# Patient Record
Sex: MALE | Race: WHITE | NOT HISPANIC OR LATINO | Employment: OTHER | URBAN - METROPOLITAN AREA
[De-identification: names, ages, dates, MRNs, and addresses within clinical notes are randomized per-mention and may not be internally consistent; named-entity substitution may affect disease eponyms.]

---

## 2019-02-21 RX ORDER — ATORVASTATIN CALCIUM 80 MG/1
80 TABLET, FILM COATED ORAL DAILY
COMMUNITY
End: 2023-04-03 | Stop reason: SDUPTHER

## 2019-02-21 RX ORDER — AMLODIPINE BESYLATE 5 MG/1
5 TABLET ORAL
Status: ON HOLD | COMMUNITY
End: 2023-03-28 | Stop reason: HOSPADM

## 2019-02-22 ENCOUNTER — OFFICE VISIT (OUTPATIENT)
Dept: ORTHOPEDICS | Facility: CLINIC | Age: 84
End: 2019-02-22
Payer: MEDICARE

## 2019-02-22 DIAGNOSIS — R60.0 PERIPHERAL EDEMA: ICD-10-CM

## 2019-02-22 DIAGNOSIS — M70.61 GREATER TROCHANTERIC BURSITIS OF RIGHT HIP: ICD-10-CM

## 2019-02-22 DIAGNOSIS — M17.11 PRIMARY OSTEOARTHRITIS OF RIGHT KNEE: Primary | ICD-10-CM

## 2019-02-22 PROCEDURE — 99213 PR OFFICE/OUTPT VISIT, EST, LEVL III, 20-29 MIN: ICD-10-PCS | Mod: ,,, | Performed by: ORTHOPAEDIC SURGERY

## 2019-02-22 PROCEDURE — 99213 OFFICE O/P EST LOW 20 MIN: CPT | Mod: ,,, | Performed by: ORTHOPAEDIC SURGERY

## 2019-02-22 RX ORDER — OXYCODONE AND ACETAMINOPHEN 5; 325 MG/1; MG/1
TABLET ORAL
Refills: 0 | COMMUNITY
Start: 2019-02-13 | End: 2019-12-02

## 2019-02-22 RX ORDER — ASPIRIN 81 MG/1
81 TABLET ORAL DAILY
COMMUNITY
End: 2023-04-03 | Stop reason: SDUPTHER

## 2019-02-22 RX ORDER — PROPRANOLOL HYDROCHLORIDE 120 MG/1
CAPSULE, EXTENDED RELEASE ORAL
Refills: 1 | Status: ON HOLD | COMMUNITY
Start: 2019-01-21 | End: 2023-03-28 | Stop reason: HOSPADM

## 2019-02-22 NOTE — PATIENT INSTRUCTIONS
The patient is ready to proceed with total knee arthroplasty.  Complications alternatives risks indications benefits and expectations are discussed with the patient

## 2019-02-22 NOTE — PROGRESS NOTES
Subjective:      Patient ID: Max Squiers is a 84 y.o. male.    Chief Complaint: Pain of the Right Knee    HPI patient has a long history of right knee pain. He has varus alignment on standing and walks with the aid of a walker  ROS      Objective:                General Musculoskeletal Exam   Gait: antalgic       Right Knee Exam     Inspection   Swelling: present  Deformity: present    Tenderness   The patient is tender to palpation of the medial joint line.    Crepitus   The patient has crepitus of the medial joint line and lateral joint line.    Range of Motion   Extension: normal   Flexion: normal     Tests   Ligament Examination Lachman: normal (-1 to 2mm)               Assessment:       Encounter Diagnoses   Name Primary?    Peripheral edema     Primary osteoarthritis of right knee Yes    Greater trochanteric bursitis of right hip           Plan:       Max was seen today for pain.    Diagnoses and all orders for this visit:    Primary osteoarthritis of right knee    Peripheral edema  -     COMPRESSION STOCKINGS    Greater trochanteric bursitis of right hip

## 2019-03-11 ENCOUNTER — OFFICE VISIT (OUTPATIENT)
Dept: ORTHOPEDICS | Facility: CLINIC | Age: 84
End: 2019-03-11
Payer: MEDICARE

## 2019-03-11 VITALS — BODY MASS INDEX: 23.7 KG/M2 | HEIGHT: 72 IN | WEIGHT: 175 LBS

## 2019-03-11 DIAGNOSIS — M17.11 PRIMARY OSTEOARTHRITIS OF RIGHT KNEE: Primary | ICD-10-CM

## 2019-03-11 PROCEDURE — 99499 NO LOS: ICD-10-PCS | Mod: S$GLB,,, | Performed by: ORTHOPAEDIC SURGERY

## 2019-03-11 PROCEDURE — 99499 UNLISTED E&M SERVICE: CPT | Mod: S$GLB,,, | Performed by: ORTHOPAEDIC SURGERY

## 2019-03-11 RX ORDER — ACETAMINOPHEN 500 MG
5000 TABLET ORAL DAILY
COMMUNITY
End: 2019-12-02

## 2019-03-11 NOTE — PROGRESS NOTES
Subjective:      Patient ID: Max Squires is a 84 y.o. male.    Chief Complaint: Pain of the Right Knee    HPI patient comes in today to sign consents for his right total knee arthroplasty.  He has been on anti-inflammatory medications and had injections with only short-term relief  Review of Systems   Constitution: Negative for fever and weight loss.   Cardiovascular: Negative for chest pain and leg swelling.   Musculoskeletal: Positive for arthritis, joint pain, joint swelling, muscle weakness and stiffness.   Genitourinary: Negative for bladder incontinence and hematuria.   Neurological: Negative for numbness, paresthesias and sensory change.         Objective:                General Musculoskeletal Exam   Gait: antalgic       Right Knee Exam     Inspection   Swelling: present  Effusion: present  Deformity: present    Tenderness   The patient is tender to palpation of the medial joint line.    Crepitus   The patient has crepitus of the patella.    Range of Motion   Extension: normal   Flexion: normal     Tests   Ligament Examination Lachman: normal (-1 to 2mm)   MCL - Valgus: normal (0 to 2mm)  LCL - Varus: normal              Assessment:       Encounter Diagnosis   Name Primary?    Primary osteoarthritis of right knee Yes          Plan:       Max was seen today for pain.    Diagnoses and all orders for this visit:    Primary osteoarthritis of right knee

## 2019-03-12 ENCOUNTER — OUTSIDE PLACE OF SERVICE (OUTPATIENT)
Dept: ADMINISTRATIVE | Facility: OTHER | Age: 84
End: 2019-03-12

## 2019-03-19 ENCOUNTER — TELEPHONE (OUTPATIENT)
Dept: ORTHOPEDICS | Facility: CLINIC | Age: 84
End: 2019-03-19

## 2019-03-19 ENCOUNTER — OFFICE VISIT (OUTPATIENT)
Dept: ORTHOPEDICS | Facility: CLINIC | Age: 84
End: 2019-03-19
Payer: MEDICARE

## 2019-03-19 DIAGNOSIS — M17.11 PRIMARY OSTEOARTHRITIS OF RIGHT KNEE: Primary | ICD-10-CM

## 2019-03-19 PROCEDURE — 73560 PR  X-RAY KNEE 1 OR 2 VIEW: ICD-10-PCS | Mod: TC,RT,S$GLB, | Performed by: ORTHOPAEDIC SURGERY

## 2019-03-19 PROCEDURE — 73560 X-RAY EXAM OF KNEE 1 OR 2: CPT | Mod: TC,RT,S$GLB, | Performed by: ORTHOPAEDIC SURGERY

## 2019-03-19 PROCEDURE — 99024 POSTOP FOLLOW-UP VISIT: CPT | Mod: S$GLB,,, | Performed by: ORTHOPAEDIC SURGERY

## 2019-03-19 PROCEDURE — 99024 PR POST-OP FOLLOW-UP VISIT: ICD-10-PCS | Mod: S$GLB,,, | Performed by: ORTHOPAEDIC SURGERY

## 2019-03-19 NOTE — TELEPHONE ENCOUNTER
----- Message from Sherlyn Guerra sent at 3/18/2019  2:33 PM CDT -----  Contact: Alem lozoya/[facility]  Alem with home health PT need verbal consent Phone:[phone]

## 2019-03-19 NOTE — PROGRESS NOTES
Subjective:      Patient ID: Max Squires is a 84 y.o. male.    Chief Complaint: No chief complaint on file.    HPI patient is 1 week postop right total knee arthroplasty.  He has moderate discomfort  ROS    unchanged  Objective:        Range of motion from -5 to 95.  There is no drainage.  There is no evidence of infection    Ortho/SPM Exam            Assessment:       Encounter Diagnosis   Name Primary?    Primary osteoarthritis of right knee Yes          Plan:       Diagnoses and all orders for this visit:    Primary osteoarthritis of right knee     The incision is redressed.  AP and lateral of the right knee shows no evidence of complication

## 2019-03-21 ENCOUNTER — TELEPHONE (OUTPATIENT)
Dept: ORTHOPEDICS | Facility: CLINIC | Age: 84
End: 2019-03-21

## 2019-03-21 NOTE — TELEPHONE ENCOUNTER
----- Message from Sherlyn Guerra sent at 3/21/2019  1:14 PM CDT -----  Contact: Alem lozoya/[facility]  Knee is bleeding, swollen, and mushy to the touch. Phone [phone]

## 2019-03-21 NOTE — TELEPHONE ENCOUNTER
3/21/19  3:10pm   Spoke w/ Alme.  Started bleeding after PT yesterday.  Bled more overnight.   Will make appt. For tomorrow AM.  Will also send in rx. For antibiotics.

## 2019-03-22 ENCOUNTER — OFFICE VISIT (OUTPATIENT)
Dept: ORTHOPEDICS | Facility: CLINIC | Age: 84
End: 2019-03-22
Payer: MEDICARE

## 2019-03-22 DIAGNOSIS — M96.840 POSTOPERATIVE HEMATOMA OF MUSCULOSKELETAL STRUCTURE FOLLOWING MUSCULOSKELETAL PROCEDURE: Primary | ICD-10-CM

## 2019-03-22 PROCEDURE — 99024 POSTOP FOLLOW-UP VISIT: CPT | Mod: S$GLB,,, | Performed by: ORTHOPAEDIC SURGERY

## 2019-03-22 PROCEDURE — 99024 PR POST-OP FOLLOW-UP VISIT: ICD-10-PCS | Mod: S$GLB,,, | Performed by: ORTHOPAEDIC SURGERY

## 2019-03-22 NOTE — PROGRESS NOTES
Subjective:      Patient ID: Max Squires is a 84 y.o. male.    Chief Complaint: Post-op Evaluation    HPI patient is status post total knee arthroplasty.  He comes in for evaluation of his wound for bleeding.    ROS    unchanged  Objective:      Patient has a subcutaneous hematoma which is draining.  This is overlying the patella. There is no evidence of infection or other complication.      Ortho/SPM Exam            Assessment:       Encounter Diagnosis   Name Primary?    Postoperative hematoma of musculoskeletal structure following musculoskeletal procedure Yes          Plan:       Max was seen today for post-op evaluation.    Diagnoses and all orders for this visit:    Postoperative hematoma of musculoskeletal structure following musculoskeletal procedure      The hematoma is expressed in the office today.  There is no further bleeding.  He is to begin antibiotics.  He will be seen back for recheck on Monday

## 2019-03-25 ENCOUNTER — OFFICE VISIT (OUTPATIENT)
Dept: ORTHOPEDICS | Facility: CLINIC | Age: 84
End: 2019-03-25
Payer: MEDICARE

## 2019-03-25 DIAGNOSIS — M96.840 POSTOPERATIVE HEMATOMA OF MUSCULOSKELETAL STRUCTURE FOLLOWING MUSCULOSKELETAL PROCEDURE: Primary | ICD-10-CM

## 2019-03-25 PROCEDURE — 99024 PR POST-OP FOLLOW-UP VISIT: ICD-10-PCS | Mod: S$GLB,,, | Performed by: ORTHOPAEDIC SURGERY

## 2019-03-25 PROCEDURE — 99024 POSTOP FOLLOW-UP VISIT: CPT | Mod: S$GLB,,, | Performed by: ORTHOPAEDIC SURGERY

## 2019-03-25 NOTE — PROGRESS NOTES
Subjective:      Patient ID: Max Squires is a 84 y.o. male.    Chief Complaint: Post-op Evaluation    HPI the patient is here for follow-up and evaluation of his postop wound hematoma status post total knee arthroplasty    ROS    unchanged from prior visit  Objective:     the erythema is significantly decreased.  He has 1 small area of bloody drainage on his dressing measuring approximately 3 cm across      Ortho/SPM Exam            Assessment:       Encounter Diagnosis   Name Primary?    Postoperative hematoma of musculoskeletal structure following musculoskeletal procedure Yes          Plan:       Max was seen today for post-op evaluation.    Diagnoses and all orders for this visit:    Postoperative hematoma of musculoskeletal structure following musculoskeletal procedure

## 2019-03-28 RX ORDER — AMLODIPINE BESYLATE 5 MG/1
TABLET ORAL
COMMUNITY
End: 2019-12-02

## 2019-03-28 RX ORDER — IBUPROFEN 800 MG/1
TABLET ORAL
COMMUNITY
End: 2019-12-02

## 2019-03-28 RX ORDER — ATORVASTATIN CALCIUM 80 MG/1
TABLET, FILM COATED ORAL
COMMUNITY
End: 2019-12-02

## 2019-03-28 RX ORDER — DIFLUPREDNATE OPHTHALMIC 0.5 MG/ML
EMULSION OPHTHALMIC
COMMUNITY
End: 2019-12-02

## 2019-03-28 RX ORDER — AMOXICILLIN 500 MG/1
CAPSULE ORAL
COMMUNITY
End: 2019-12-02

## 2019-03-28 RX ORDER — OXYCODONE AND ACETAMINOPHEN 7.5; 325 MG/1; MG/1
TABLET ORAL
Refills: 0 | COMMUNITY
Start: 2019-03-20 | End: 2019-12-02

## 2019-03-28 RX ORDER — POLYMYXIN B SULFATE AND TRIMETHOPRIM 1; 10000 MG/ML; [USP'U]/ML
SOLUTION OPHTHALMIC
COMMUNITY
End: 2019-12-02

## 2019-03-28 RX ORDER — OXYCODONE AND ACETAMINOPHEN 5; 325 MG/1; MG/1
TABLET ORAL
COMMUNITY
End: 2019-12-02

## 2019-03-28 RX ORDER — CEFADROXIL 500 MG/1
CAPSULE ORAL
Refills: 0 | COMMUNITY
Start: 2019-03-21 | End: 2019-12-02

## 2019-03-28 RX ORDER — AMLODIPINE BESYLATE AND ATORVASTATIN CALCIUM 5; 80 MG/1; MG/1
TABLET, FILM COATED ORAL
Status: ON HOLD | COMMUNITY
End: 2023-03-28 | Stop reason: HOSPADM

## 2019-03-29 ENCOUNTER — OFFICE VISIT (OUTPATIENT)
Dept: ORTHOPEDICS | Facility: CLINIC | Age: 84
End: 2019-03-29
Payer: MEDICARE

## 2019-03-29 DIAGNOSIS — M17.11 PRIMARY OSTEOARTHRITIS OF RIGHT KNEE: Primary | ICD-10-CM

## 2019-03-29 DIAGNOSIS — M96.840 POSTOPERATIVE HEMATOMA OF MUSCULOSKELETAL STRUCTURE FOLLOWING MUSCULOSKELETAL PROCEDURE: ICD-10-CM

## 2019-03-29 PROCEDURE — 99024 POSTOP FOLLOW-UP VISIT: CPT | Mod: S$GLB,,, | Performed by: ORTHOPAEDIC SURGERY

## 2019-03-29 PROCEDURE — 99024 PR POST-OP FOLLOW-UP VISIT: ICD-10-PCS | Mod: S$GLB,,, | Performed by: ORTHOPAEDIC SURGERY

## 2019-03-29 RX ORDER — CEFADROXIL 500 MG/1
500 CAPSULE ORAL EVERY 12 HOURS
Qty: 14 CAPSULE | Refills: 0
Start: 2019-03-29 | End: 2019-12-02

## 2019-03-29 NOTE — PROGRESS NOTES
Subjective:      Patient ID: Max Squires is a 84 y.o. male.    Chief Complaint: Post-op Evaluation of the Right Knee    HPI patient is here for follow-up for his postop hematoma status post total knee arthroplasty.      ROS unchanged from prior visit      Objective:        Range of motion is from 0-100 degrees. There is some persistent erythema.  He has a small area of bloody drainage on his dressing from yesterday measuring approximately 1.5 cm.  There is no active bleeding today    Ortho/SPM Exam            Assessment:       Encounter Diagnoses   Name Primary?    Primary osteoarthritis of right knee Yes    Postoperative hematoma of musculoskeletal structure following musculoskeletal procedure           Plan:       Max was seen today for post-op evaluation.    Diagnoses and all orders for this visit:    Primary osteoarthritis of right knee    Postoperative hematoma of musculoskeletal structure following musculoskeletal procedure      The dressing is changed today. The majority of his staples are removed with the exception of the area that is still oozing.

## 2019-04-05 ENCOUNTER — OFFICE VISIT (OUTPATIENT)
Dept: ORTHOPEDICS | Facility: CLINIC | Age: 84
End: 2019-04-05
Payer: MEDICARE

## 2019-04-05 DIAGNOSIS — M17.11 PRIMARY OSTEOARTHRITIS OF RIGHT KNEE: Primary | ICD-10-CM

## 2019-04-05 PROCEDURE — 99024 PR POST-OP FOLLOW-UP VISIT: ICD-10-PCS | Mod: S$GLB,,, | Performed by: ORTHOPAEDIC SURGERY

## 2019-04-05 PROCEDURE — 99024 POSTOP FOLLOW-UP VISIT: CPT | Mod: S$GLB,,, | Performed by: ORTHOPAEDIC SURGERY

## 2019-04-05 NOTE — PROGRESS NOTES
Subjective:      Patient ID: Max Squires is a 84 y.o. male.    Chief Complaint: Post-op Evaluation of the Right Knee    HPI patient is here for checkup following right total knee arthroplasty. He is 3 and half weeks postop.  He has had only occasional spot of blood on his dressing  ROS    unchanged from prior visit  Objective:            Ortho/SPM Exam  The swelling is markedly diminished.  He has much less redness.  He has only a tiny spot of blood on his dressing.  He has range of motion from 0-100 degrees          Assessment:       Encounter Diagnosis   Name Primary?    Primary osteoarthritis of right knee Yes          Plan:       Max was seen today for post-op evaluation.    Diagnoses and all orders for this visit:    Primary osteoarthritis of right knee     Dressing is changed today.  The remainder of the staples are removed.

## 2019-04-09 DIAGNOSIS — M17.11 PRIMARY OSTEOARTHRITIS OF RIGHT KNEE: Primary | ICD-10-CM

## 2019-04-09 RX ORDER — HYDROCODONE BITARTRATE AND ACETAMINOPHEN 5; 325 MG/1; MG/1
1 TABLET ORAL EVERY 8 HOURS PRN
Qty: 21 TABLET | Refills: 0 | Status: SHIPPED | OUTPATIENT
Start: 2019-04-09 | End: 2019-12-02

## 2019-04-18 ENCOUNTER — OFFICE VISIT (OUTPATIENT)
Dept: ORTHOPEDICS | Facility: CLINIC | Age: 84
End: 2019-04-18
Payer: MEDICARE

## 2019-04-18 DIAGNOSIS — M17.11 PRIMARY OSTEOARTHRITIS OF RIGHT KNEE: Primary | ICD-10-CM

## 2019-04-18 DIAGNOSIS — M96.840 POSTOPERATIVE HEMATOMA OF MUSCULOSKELETAL STRUCTURE FOLLOWING MUSCULOSKELETAL PROCEDURE: ICD-10-CM

## 2019-04-18 PROCEDURE — 99024 POSTOP FOLLOW-UP VISIT: CPT | Mod: S$GLB,,, | Performed by: ORTHOPAEDIC SURGERY

## 2019-04-18 PROCEDURE — 99024 PR POST-OP FOLLOW-UP VISIT: ICD-10-PCS | Mod: S$GLB,,, | Performed by: ORTHOPAEDIC SURGERY

## 2019-04-18 RX ORDER — SULFAMETHOXAZOLE AND TRIMETHOPRIM 800; 160 MG/1; MG/1
1 TABLET ORAL 2 TIMES DAILY
Qty: 14 TABLET | Refills: 0 | Status: SHIPPED | OUTPATIENT
Start: 2019-04-18 | End: 2019-12-02

## 2019-04-18 NOTE — PROGRESS NOTES
Subjective:      Patient ID: Max Squires is a 84 y.o. male.    Chief Complaint: Pain    HPI patient comes in for recheck own the incision of his right knee. He has developed a small opening approximately the size of a dime over the past few days.  There is some serous drainage  ROS    unchanged from prior visit  Objective:            Ortho/SPM Exam  Patient has range of motion from 0-110 degrees. There is a small dime-sized area of opening in the incision.  This appears to extend only into the subcutaneous tissues. Cultures are taken today.  Recommend debridement of the wound in the operating room          Assessment:       Encounter Diagnoses   Name Primary?    Primary osteoarthritis of right knee Yes    Postoperative hematoma of musculoskeletal structure following musculoskeletal procedure           Plan:       Max was seen today for pain.    Diagnoses and all orders for this visit:    Primary osteoarthritis of right knee    Postoperative hematoma of musculoskeletal structure following musculoskeletal procedure     He is scheduled to have debridement of this wound as an outpatient next week.  He is placed on Bactrim in the interim

## 2019-04-20 LAB — CULTURE: NORMAL

## 2019-04-23 ENCOUNTER — OUTSIDE PLACE OF SERVICE (OUTPATIENT)
Dept: ADMINISTRATIVE | Facility: OTHER | Age: 84
End: 2019-04-23

## 2019-04-30 ENCOUNTER — OFFICE VISIT (OUTPATIENT)
Dept: ORTHOPEDICS | Facility: CLINIC | Age: 84
End: 2019-04-30
Payer: MEDICARE

## 2019-04-30 DIAGNOSIS — M96.840 POSTOPERATIVE HEMATOMA OF MUSCULOSKELETAL STRUCTURE FOLLOWING MUSCULOSKELETAL PROCEDURE: Primary | ICD-10-CM

## 2019-04-30 PROCEDURE — 99024 PR POST-OP FOLLOW-UP VISIT: ICD-10-PCS | Mod: S$GLB,,, | Performed by: ORTHOPAEDIC SURGERY

## 2019-04-30 PROCEDURE — 99024 POSTOP FOLLOW-UP VISIT: CPT | Mod: S$GLB,,, | Performed by: ORTHOPAEDIC SURGERY

## 2019-04-30 NOTE — PROGRESS NOTES
Subjective:      Patient ID: Max Squires is a 84 y.o. male.    Chief Complaint: Post-op Evaluation of the Right Knee    HPI patient is 1 week postop debridement and primary closure of the open wound over his total knee arthroplasty.  He is doing well    ROS    unchanged from prior visit  Objective:            Ortho/SPM Exam    The incision is clean.  There is no drainage.  He does have surrounding erythema which is not unexpected.  He has range of motion from -8 to 110 degrees        Assessment:       Encounter Diagnosis   Name Primary?    Postoperative hematoma of musculoskeletal structure following musculoskeletal procedure Yes          Plan:       Max was seen today for post-op evaluation.    Diagnoses and all orders for this visit:    Postoperative hematoma of musculoskeletal structure following musculoskeletal procedure      Arrangements are made for him to attend outpatient physical therapy as he is not making progress with at home PT.  He will be seen back in 5 days for suture removal

## 2019-05-06 ENCOUNTER — OFFICE VISIT (OUTPATIENT)
Dept: ORTHOPEDICS | Facility: CLINIC | Age: 84
End: 2019-05-06
Payer: MEDICARE

## 2019-05-06 DIAGNOSIS — M96.840 POSTOPERATIVE HEMATOMA OF MUSCULOSKELETAL STRUCTURE FOLLOWING MUSCULOSKELETAL PROCEDURE: Primary | ICD-10-CM

## 2019-05-06 PROCEDURE — 99024 POSTOP FOLLOW-UP VISIT: CPT | Mod: S$GLB,,, | Performed by: ORTHOPAEDIC SURGERY

## 2019-05-06 PROCEDURE — 99024 PR POST-OP FOLLOW-UP VISIT: ICD-10-PCS | Mod: S$GLB,,, | Performed by: ORTHOPAEDIC SURGERY

## 2019-05-06 RX ORDER — METHOCARBAMOL 500 MG/1
500 TABLET, FILM COATED ORAL 2 TIMES DAILY
Qty: 28 TABLET | Refills: 2
Start: 2019-05-06 | End: 2019-05-16

## 2019-05-06 NOTE — PROGRESS NOTES
Subjective:      Patient ID: Max Squires is a 84 y.o. male.    Chief Complaint: Post-op Evaluation of the Right Knee    HPI patient is 2 and half weeks out from debridement of his incision with primary closure.    ROS    unchanged from prior visit  Objective:            Ortho/SPM Exam    The incision has sealed.  There is no drainage.  There is mild surrounding erythema but no evidence of infection        Assessment:       Encounter Diagnosis   Name Primary?    Postoperative hematoma of musculoskeletal structure following musculoskeletal procedure Yes          Plan:       Max was seen today for post-op evaluation.    Diagnoses and all orders for this visit:    Postoperative hematoma of musculoskeletal structure following musculoskeletal procedure     Sutures are removed today.  He will be seen back in 3 weeks for recheck

## 2019-05-28 ENCOUNTER — OFFICE VISIT (OUTPATIENT)
Dept: ORTHOPEDICS | Facility: CLINIC | Age: 84
End: 2019-05-28
Payer: MEDICARE

## 2019-05-28 DIAGNOSIS — M17.11 PRIMARY OSTEOARTHRITIS OF RIGHT KNEE: Primary | ICD-10-CM

## 2019-05-28 PROCEDURE — 99024 POSTOP FOLLOW-UP VISIT: CPT | Mod: S$GLB,,, | Performed by: ORTHOPAEDIC SURGERY

## 2019-05-28 PROCEDURE — 99024 PR POST-OP FOLLOW-UP VISIT: ICD-10-PCS | Mod: S$GLB,,, | Performed by: ORTHOPAEDIC SURGERY

## 2019-05-28 NOTE — PROGRESS NOTES
Subjective:      Patient ID: Max Squires is a 84 y.o. male.    Chief Complaint: Knee Pain (RT)    HPI patient is here for routine follow-up following right total knee arthroplasty. He is much improved since his last visit.  He is ambulating with the aid of a cane    ROS unchanged from prior visit      Objective:            Ortho/SPM Exam  The incision is well healed.  There is still some slight warmth and erythema around the knee which is expected.  He has range of motion from -5 to 130°.          Assessment:       Encounter Diagnosis   Name Primary?    Primary osteoarthritis of right knee Yes          Plan:       Max was seen today for knee pain.    Diagnoses and all orders for this visit:    Primary osteoarthritis of right knee

## 2019-12-02 ENCOUNTER — OFFICE VISIT (OUTPATIENT)
Dept: ORTHOPEDICS | Facility: CLINIC | Age: 84
End: 2019-12-02
Payer: MEDICARE

## 2019-12-02 DIAGNOSIS — M17.11 PRIMARY OSTEOARTHRITIS OF RIGHT KNEE: Primary | ICD-10-CM

## 2019-12-02 PROCEDURE — 99213 PR OFFICE/OUTPT VISIT, EST, LEVL III, 20-29 MIN: ICD-10-PCS | Mod: S$GLB,,, | Performed by: ORTHOPAEDIC SURGERY

## 2019-12-02 PROCEDURE — 99213 OFFICE O/P EST LOW 20 MIN: CPT | Mod: S$GLB,,, | Performed by: ORTHOPAEDIC SURGERY

## 2019-12-02 PROCEDURE — 1159F MED LIST DOCD IN RCRD: CPT | Mod: S$GLB,,, | Performed by: ORTHOPAEDIC SURGERY

## 2019-12-02 PROCEDURE — 1159F PR MEDICATION LIST DOCUMENTED IN MEDICAL RECORD: ICD-10-PCS | Mod: S$GLB,,, | Performed by: ORTHOPAEDIC SURGERY

## 2019-12-02 NOTE — PROGRESS NOTES
Subjective:      Patient ID: Max Squires is a 84 y.o. male.    Chief Complaint: Post-op Evaluation of the Right Knee    HPI patient is 8 half months status post right total knee arthroplasty. He is doing well without complaints.    ROS unchanged from prior visit      Objective:      Range of motion is from -3 to 130°.  There is no pathologic laxity and no effusion      Ortho/SPM Exam            Assessment:       Encounter Diagnosis   Name Primary?    Primary osteoarthritis of right knee Yes          Plan:       Max was seen today for post-op evaluation.    Diagnoses and all orders for this visit:    Primary osteoarthritis of right knee     Return p.r.n.

## 2021-08-24 ENCOUNTER — OFFICE VISIT (OUTPATIENT)
Dept: ORTHOPEDICS | Facility: CLINIC | Age: 86
End: 2021-08-24
Payer: MEDICARE

## 2021-08-24 DIAGNOSIS — M17.12 PRIMARY OSTEOARTHRITIS OF LEFT KNEE: Primary | ICD-10-CM

## 2021-08-24 PROCEDURE — 20610 DRAIN/INJ JOINT/BURSA W/O US: CPT | Mod: LT,S$GLB,, | Performed by: ORTHOPAEDIC SURGERY

## 2021-08-24 PROCEDURE — 1160F RVW MEDS BY RX/DR IN RCRD: CPT | Mod: CPTII,S$GLB,, | Performed by: ORTHOPAEDIC SURGERY

## 2021-08-24 PROCEDURE — 1160F PR REVIEW ALL MEDS BY PRESCRIBER/CLIN PHARMACIST DOCUMENTED: ICD-10-PCS | Mod: CPTII,S$GLB,, | Performed by: ORTHOPAEDIC SURGERY

## 2021-08-24 PROCEDURE — 20610 LARGE JOINT ASPIRATION/INJECTION: L KNEE: ICD-10-PCS | Mod: LT,S$GLB,, | Performed by: ORTHOPAEDIC SURGERY

## 2021-08-24 PROCEDURE — 99213 PR OFFICE/OUTPT VISIT, EST, LEVL III, 20-29 MIN: ICD-10-PCS | Mod: 25,S$GLB,, | Performed by: ORTHOPAEDIC SURGERY

## 2021-08-24 PROCEDURE — 99213 OFFICE O/P EST LOW 20 MIN: CPT | Mod: 25,S$GLB,, | Performed by: ORTHOPAEDIC SURGERY

## 2021-08-24 PROCEDURE — 1159F MED LIST DOCD IN RCRD: CPT | Mod: CPTII,S$GLB,, | Performed by: ORTHOPAEDIC SURGERY

## 2021-08-24 PROCEDURE — 1159F PR MEDICATION LIST DOCUMENTED IN MEDICAL RECORD: ICD-10-PCS | Mod: CPTII,S$GLB,, | Performed by: ORTHOPAEDIC SURGERY

## 2021-08-24 RX ORDER — OXYCODONE AND ACETAMINOPHEN 5; 325 MG/1; MG/1
1 TABLET ORAL EVERY 6 HOURS PRN
Status: ON HOLD | COMMUNITY
Start: 2021-08-13 | End: 2023-03-28 | Stop reason: HOSPADM

## 2021-08-24 RX ORDER — TRIAMCINOLONE ACETONIDE 40 MG/ML
20 INJECTION, SUSPENSION INTRA-ARTICULAR; INTRAMUSCULAR
Status: DISCONTINUED | OUTPATIENT
Start: 2021-08-24 | End: 2021-08-24 | Stop reason: HOSPADM

## 2021-08-24 RX ORDER — EZETIMIBE 10 MG/1
10 TABLET ORAL NIGHTLY
COMMUNITY
Start: 2021-06-15 | End: 2023-04-03 | Stop reason: SDUPTHER

## 2021-08-24 RX ORDER — DICLOFENAC SODIUM 10 MG/G
GEL TOPICAL
COMMUNITY
Start: 2021-08-13

## 2021-08-24 RX ORDER — TAMSULOSIN HYDROCHLORIDE 0.4 MG/1
2 CAPSULE ORAL DAILY
COMMUNITY
Start: 2021-07-29 | End: 2023-05-01 | Stop reason: SDUPTHER

## 2021-08-24 RX ORDER — FINASTERIDE 5 MG/1
5 TABLET, FILM COATED ORAL DAILY
COMMUNITY
Start: 2021-07-24 | End: 2023-04-03 | Stop reason: SDUPTHER

## 2021-08-24 RX ADMIN — TRIAMCINOLONE ACETONIDE 20 MG: 40 INJECTION, SUSPENSION INTRA-ARTICULAR; INTRAMUSCULAR at 01:08

## 2023-02-23 NOTE — TELEPHONE ENCOUNTER
3/19/19  11:55am   Spoke w/ Alem.  Verbal ok given to start PT--3 times per week.     Called and spoke with patient's cousin, Keya. Rescheduled patient for 2/28 with Dr. Patel.     Rosemarie Ashby, COT 9:02 AM 02/23/2023

## 2023-03-18 ENCOUNTER — HOSPITAL ENCOUNTER (INPATIENT)
Facility: HOSPITAL | Age: 88
LOS: 15 days | Discharge: HOME HEALTH | DRG: 371 | End: 2023-04-02
Attending: EMERGENCY MEDICINE | Admitting: STUDENT IN AN ORGANIZED HEALTH CARE EDUCATION/TRAINING PROGRAM
Payer: MEDICARE

## 2023-03-18 DIAGNOSIS — I50.9 CONGESTIVE HEART FAILURE, UNSPECIFIED HF CHRONICITY, UNSPECIFIED HEART FAILURE TYPE: ICD-10-CM

## 2023-03-18 DIAGNOSIS — B99.9 INFECTION: ICD-10-CM

## 2023-03-18 DIAGNOSIS — A04.72 C. DIFFICILE DIARRHEA: Primary | ICD-10-CM

## 2023-03-18 DIAGNOSIS — R60.0 LOWER EXTREMITY EDEMA: ICD-10-CM

## 2023-03-18 DIAGNOSIS — R06.00 DYSPNEA: ICD-10-CM

## 2023-03-18 DIAGNOSIS — I82.409 DVT (DEEP VENOUS THROMBOSIS): ICD-10-CM

## 2023-03-18 LAB
ALBUMIN SERPL-MCNC: 2.3 G/DL (ref 3.4–4.8)
ALBUMIN/GLOB SERPL: 0.7 RATIO (ref 1.1–2)
ALP SERPL-CCNC: 86 UNIT/L (ref 40–150)
ALT SERPL-CCNC: 10 UNIT/L (ref 0–55)
APPEARANCE UR: CLEAR
AST SERPL-CCNC: 13 UNIT/L (ref 5–34)
BACTERIA #/AREA URNS AUTO: ABNORMAL /HPF
BASOPHILS # BLD AUTO: 0.1 X10(3)/MCL (ref 0–0.2)
BASOPHILS NFR BLD AUTO: 0.5 %
BILIRUB UR QL STRIP.AUTO: NEGATIVE MG/DL
BILIRUBIN DIRECT+TOT PNL SERPL-MCNC: 0.6 MG/DL
BNP BLD-MCNC: 348.7 PG/ML
BUN SERPL-MCNC: 19.9 MG/DL (ref 8.4–25.7)
CALCIUM SERPL-MCNC: 8.1 MG/DL (ref 8.8–10)
CHLORIDE SERPL-SCNC: 90 MMOL/L (ref 98–107)
CO2 SERPL-SCNC: 33 MMOL/L (ref 23–31)
COLOR UR AUTO: YELLOW
CREAT SERPL-MCNC: 0.92 MG/DL (ref 0.73–1.18)
EOSINOPHIL # BLD AUTO: 0.03 X10(3)/MCL (ref 0–0.9)
EOSINOPHIL NFR BLD AUTO: 0.2 %
ERYTHROCYTE [DISTWIDTH] IN BLOOD BY AUTOMATED COUNT: 15.6 % (ref 11.5–17)
GFR SERPLBLD CREATININE-BSD FMLA CKD-EPI: >60 MLS/MIN/1.73/M2
GLOBULIN SER-MCNC: 3.4 GM/DL (ref 2.4–3.5)
GLUCOSE SERPL-MCNC: 105 MG/DL (ref 82–115)
GLUCOSE UR QL STRIP.AUTO: NEGATIVE MG/DL
HCT VFR BLD AUTO: 40.3 % (ref 42–52)
HGB BLD-MCNC: 13.1 G/DL (ref 14–18)
IMM GRANULOCYTES # BLD AUTO: 0.15 X10(3)/MCL (ref 0–0.04)
IMM GRANULOCYTES NFR BLD AUTO: 0.8 %
KETONES UR QL STRIP.AUTO: NEGATIVE MG/DL
LEUKOCYTE ESTERASE UR QL STRIP.AUTO: ABNORMAL UNIT/L
LYMPHOCYTES # BLD AUTO: 2.12 X10(3)/MCL (ref 0.6–4.6)
LYMPHOCYTES NFR BLD AUTO: 11.2 %
MAGNESIUM SERPL-MCNC: 1.6 MG/DL (ref 1.6–2.6)
MCH RBC QN AUTO: 27.8 PG
MCHC RBC AUTO-ENTMCNC: 32.5 G/DL (ref 33–36)
MCV RBC AUTO: 85.6 FL (ref 80–94)
MONOCYTES # BLD AUTO: 0.56 X10(3)/MCL (ref 0.1–1.3)
MONOCYTES NFR BLD AUTO: 3 %
NEUTROPHILS # BLD AUTO: 15.9 X10(3)/MCL (ref 2.1–9.2)
NEUTROPHILS NFR BLD AUTO: 84.3 %
NITRITE UR QL STRIP.AUTO: NEGATIVE
NRBC BLD AUTO-RTO: 0 %
PH UR STRIP.AUTO: 6 [PH]
PHOSPHATE SERPL-MCNC: 3.1 MG/DL (ref 2.3–4.7)
PLATELET # BLD AUTO: 190 X10(3)/MCL (ref 130–400)
PMV BLD AUTO: 9 FL (ref 7.4–10.4)
POTASSIUM SERPL-SCNC: 2.3 MMOL/L (ref 3.5–5.1)
PROT SERPL-MCNC: 5.7 GM/DL (ref 5.8–7.6)
PROT UR QL STRIP.AUTO: ABNORMAL MG/DL
RBC # BLD AUTO: 4.71 X10(6)/MCL (ref 4.7–6.1)
RBC #/AREA URNS AUTO: <5 /HPF
RBC UR QL AUTO: NEGATIVE UNIT/L
SODIUM SERPL-SCNC: 136 MMOL/L (ref 136–145)
SP GR UR STRIP.AUTO: 1.01 (ref 1–1.03)
SQUAMOUS #/AREA URNS AUTO: <5 /HPF
TROPONIN I SERPL-MCNC: 0.09 NG/ML (ref 0–0.04)
TROPONIN I SERPL-MCNC: 0.1 NG/ML (ref 0–0.04)
UROBILINOGEN UR STRIP-ACNC: 0.2 MG/DL
WBC # SPEC AUTO: 18.9 X10(3)/MCL (ref 4.5–11.5)
WBC #/AREA URNS AUTO: 16 /HPF

## 2023-03-18 PROCEDURE — 84484 ASSAY OF TROPONIN QUANT: CPT | Performed by: STUDENT IN AN ORGANIZED HEALTH CARE EDUCATION/TRAINING PROGRAM

## 2023-03-18 PROCEDURE — 25000003 PHARM REV CODE 250: Performed by: STUDENT IN AN ORGANIZED HEALTH CARE EDUCATION/TRAINING PROGRAM

## 2023-03-18 PROCEDURE — 96375 TX/PRO/DX INJ NEW DRUG ADDON: CPT

## 2023-03-18 PROCEDURE — 11000001 HC ACUTE MED/SURG PRIVATE ROOM

## 2023-03-18 PROCEDURE — 87077 CULTURE AEROBIC IDENTIFY: CPT | Performed by: PHYSICIAN ASSISTANT

## 2023-03-18 PROCEDURE — 85025 COMPLETE CBC W/AUTO DIFF WBC: CPT | Performed by: PHYSICIAN ASSISTANT

## 2023-03-18 PROCEDURE — 86318 IA INFECTIOUS AGENT ANTIBODY: CPT | Performed by: PHYSICIAN ASSISTANT

## 2023-03-18 PROCEDURE — 87045 FECES CULTURE AEROBIC BACT: CPT | Performed by: PHYSICIAN ASSISTANT

## 2023-03-18 PROCEDURE — 83880 ASSAY OF NATRIURETIC PEPTIDE: CPT | Performed by: PHYSICIAN ASSISTANT

## 2023-03-18 PROCEDURE — 87088 URINE BACTERIA CULTURE: CPT | Performed by: PHYSICIAN ASSISTANT

## 2023-03-18 PROCEDURE — 99285 EMERGENCY DEPT VISIT HI MDM: CPT | Mod: 25

## 2023-03-18 PROCEDURE — 63600175 PHARM REV CODE 636 W HCPCS: Performed by: STUDENT IN AN ORGANIZED HEALTH CARE EDUCATION/TRAINING PROGRAM

## 2023-03-18 PROCEDURE — 84100 ASSAY OF PHOSPHORUS: CPT | Performed by: STUDENT IN AN ORGANIZED HEALTH CARE EDUCATION/TRAINING PROGRAM

## 2023-03-18 PROCEDURE — 63600175 PHARM REV CODE 636 W HCPCS: Performed by: EMERGENCY MEDICINE

## 2023-03-18 PROCEDURE — 80053 COMPREHEN METABOLIC PANEL: CPT | Performed by: PHYSICIAN ASSISTANT

## 2023-03-18 PROCEDURE — 83735 ASSAY OF MAGNESIUM: CPT | Performed by: PHYSICIAN ASSISTANT

## 2023-03-18 PROCEDURE — 84484 ASSAY OF TROPONIN QUANT: CPT | Performed by: PHYSICIAN ASSISTANT

## 2023-03-18 PROCEDURE — 21400001 HC TELEMETRY ROOM

## 2023-03-18 PROCEDURE — 81001 URINALYSIS AUTO W/SCOPE: CPT | Performed by: PHYSICIAN ASSISTANT

## 2023-03-18 PROCEDURE — 96374 THER/PROPH/DIAG INJ IV PUSH: CPT

## 2023-03-18 RX ORDER — ONDANSETRON 2 MG/ML
4 INJECTION INTRAMUSCULAR; INTRAVENOUS EVERY 8 HOURS PRN
Status: DISCONTINUED | OUTPATIENT
Start: 2023-03-18 | End: 2023-04-02 | Stop reason: HOSPADM

## 2023-03-18 RX ORDER — GLUCAGON 1 MG
1 KIT INJECTION
Status: DISCONTINUED | OUTPATIENT
Start: 2023-03-18 | End: 2023-04-02 | Stop reason: HOSPADM

## 2023-03-18 RX ORDER — ACETAMINOPHEN 325 MG/1
650 TABLET ORAL EVERY 8 HOURS PRN
Status: DISCONTINUED | OUTPATIENT
Start: 2023-03-18 | End: 2023-04-02 | Stop reason: HOSPADM

## 2023-03-18 RX ORDER — MAGNESIUM SULFATE HEPTAHYDRATE 40 MG/ML
2 INJECTION, SOLUTION INTRAVENOUS
Status: COMPLETED | OUTPATIENT
Start: 2023-03-18 | End: 2023-03-18

## 2023-03-18 RX ORDER — POTASSIUM CHLORIDE 20 MEQ/1
40 TABLET, EXTENDED RELEASE ORAL ONCE
Status: COMPLETED | OUTPATIENT
Start: 2023-03-18 | End: 2023-03-18

## 2023-03-18 RX ORDER — IBUPROFEN 200 MG
24 TABLET ORAL
Status: DISCONTINUED | OUTPATIENT
Start: 2023-03-18 | End: 2023-04-02 | Stop reason: HOSPADM

## 2023-03-18 RX ORDER — FUROSEMIDE 10 MG/ML
40 INJECTION INTRAMUSCULAR; INTRAVENOUS
Status: COMPLETED | OUTPATIENT
Start: 2023-03-18 | End: 2023-03-18

## 2023-03-18 RX ORDER — IBUPROFEN 200 MG
16 TABLET ORAL
Status: DISCONTINUED | OUTPATIENT
Start: 2023-03-18 | End: 2023-04-02 | Stop reason: HOSPADM

## 2023-03-18 RX ORDER — ACETAMINOPHEN 325 MG/1
650 TABLET ORAL EVERY 4 HOURS PRN
Status: DISCONTINUED | OUTPATIENT
Start: 2023-03-18 | End: 2023-04-02 | Stop reason: HOSPADM

## 2023-03-18 RX ORDER — POTASSIUM CHLORIDE 14.9 MG/ML
40 INJECTION INTRAVENOUS
Status: COMPLETED | OUTPATIENT
Start: 2023-03-18 | End: 2023-03-18

## 2023-03-18 RX ADMIN — POTASSIUM CHLORIDE 40 MEQ: 1500 TABLET, EXTENDED RELEASE ORAL at 06:03

## 2023-03-18 RX ADMIN — FUROSEMIDE 40 MG: 10 INJECTION, SOLUTION INTRAMUSCULAR; INTRAVENOUS at 05:03

## 2023-03-18 RX ADMIN — POTASSIUM CHLORIDE 40 MEQ: 14.9 INJECTION, SOLUTION INTRAVENOUS at 05:03

## 2023-03-18 RX ADMIN — MAGNESIUM SULFATE HEPTAHYDRATE 2 G: 40 INJECTION, SOLUTION INTRAVENOUS at 05:03

## 2023-03-18 NOTE — Clinical Note
Diagnosis: C. difficile diarrhea [146692]   Admitting Provider:: JAN PALOMO [[id number]]   Future Attending Provider: JAN PALOMO [[id number]]   Reason for IP Medical Treatment  (Clinical interventions that can only be accomplished in the IP setting? ) :: IV electrolyte therapy   I certify that Inpatient services for greater than or equal to 2 midnights are medically necessary:: Yes   Plans for Post-Acute care--if anticipated (pick the single best option):: A. No post acute care anticipated at this time

## 2023-03-18 NOTE — ED PROVIDER NOTES
Encounter Date: 3/18/2023       History     Chief Complaint   Patient presents with    Diarrhea    Leg Swelling     Pt presents c/o diarrhea x 2 months/ states dx with Cdiff on Tuesday.  / home on meds/ no resolve/ also with bilateral lower extremity swelling.       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  In addition, he has had increasing lower extremity edema over the last 2 months, PND, orthopnea. He is on PRN nasal cannula oxygen secondary to COPD.  Denies any abdominal pain, wounds, rashes, weakness, dizziness, appetite changes, bloody stools.     Review of patient's allergies indicates:  No Known Allergies  Past Medical History:   Diagnosis Date    Arthritis     Hypercholesterolemia     Hypertension      Past Surgical History:   Procedure Laterality Date    BACK SURGERY      CORONARY ANGIOPLASTY WITH STENT PLACEMENT      HIP SURGERY Left     Dr. Hoyos    JOINT REPLACEMENT Left     [facility]    JOINT REPLACEMENT Right     [facility]     No family history on file.  Social History     Tobacco Use    Smoking status: Every Day     Packs/day: 1.00     Types: Cigarettes   Substance Use Topics    Alcohol use: No     Review of Systems   Constitutional:  Negative for activity change, chills, fatigue and fever.   HENT:  Negative for congestion, rhinorrhea and sore throat.    Respiratory:  Negative for cough, chest tightness, shortness of breath and wheezing.    Cardiovascular:  Positive for leg swelling. Negative for chest pain and palpitations.   Gastrointestinal:  Positive for diarrhea. Negative for abdominal pain, anal bleeding, constipation, nausea and vomiting.   Genitourinary:  Positive for frequency and urgency. Negative for dysuria, hematuria, penile swelling and scrotal swelling.   Musculoskeletal:  Negative for arthralgias, back pain and neck pain.   Skin:   Negative for rash and wound.   Neurological:  Negative for weakness, light-headedness and headaches.   Psychiatric/Behavioral:  Negative for agitation and decreased concentration.      Physical Exam     Initial Vitals [03/18/23 1529]   BP Pulse Resp Temp SpO2   115/67 87 16 98.6 °F (37 °C) 98 %      MAP       --         Physical Exam    Constitutional: He appears well-developed and well-nourished. No distress.   HENT:   Head: Normocephalic and atraumatic.   Right Ear: External ear normal.   Left Ear: External ear normal.   Nose: Nose normal.   Mouth/Throat: Oropharynx is clear and moist. No oropharyngeal exudate.   Eyes: EOM are normal. Pupils are equal, round, and reactive to light. Right eye exhibits no discharge. Left eye exhibits no discharge. No scleral icterus.   Neck: Neck supple. No JVD present.   Normal range of motion.  Cardiovascular:  Normal rate and regular rhythm.           No murmur heard.  Pulmonary/Chest: He has no wheezes. He has rhonchi.   Abdominal: Abdomen is soft. He exhibits no distension and no mass. There is no abdominal tenderness.   Musculoskeletal:         General: Edema present. No tenderness. Normal range of motion.      Cervical back: Normal range of motion and neck supple.      Comments: Three plus pitting edema bilateral lower extremities to the knees     Neurological: He is alert and oriented to person, place, and time. He has normal strength. No cranial nerve deficit or sensory deficit.   Skin: Skin is warm and dry. Capillary refill takes less than 2 seconds. No rash noted.       ED Course   Procedures  Labs Reviewed   B-TYPE NATRIURETIC PEPTIDE - Abnormal; Notable for the following components:       Result Value    Natriuretic Peptide 348.7 (*)     All other components within normal limits   COMPREHENSIVE METABOLIC PANEL - Abnormal; Notable for the following components:    Potassium Level 2.3 (*)     Chloride 90 (*)     Carbon Dioxide 33 (*)     Calcium Level Total 8.1 (*)      Protein Total 5.7 (*)     Albumin Level 2.3 (*)     Albumin/Globulin Ratio 0.7 (*)     All other components within normal limits   CBC WITH DIFFERENTIAL - Abnormal; Notable for the following components:    WBC 18.9 (*)     Hgb 13.1 (*)     Hct 40.3 (*)     MCHC 32.5 (*)     Neut # 15.90 (*)     IG# 0.15 (*)     All other components within normal limits   TROPONIN I - Abnormal; Notable for the following components:    Troponin-I 0.090 (*)     All other components within normal limits   MAGNESIUM - Normal   STOOL CULTURE [facility]   CLOSTRIDIUM DIFFICILE TOXIN A AND B, EIA   CBC W/ AUTO DIFFERENTIAL    Narrative:     The following orders were created for panel order CBC auto differential.  Procedure                               Abnormality         Status                     ---------                               -----------         ------                     CBC with Differential[[id number]]        Abnormal            Final result                 Please view results for these tests on the individual orders.   URINALYSIS, REFLEX TO URINE CULTURE   PHOSPHORUS     EKG Readings: (Independently Interpreted)   Initial Reading: No STEMI.     Imaging Results              X-Ray Chest 1 View (Final result)  Result time 03/18/23 17:37:57      Final result by Jasper Cerda MD (03/18/23 17:37:57)                   Impression:      As above.      Electronically signed by: Jasper Cerda  Date:    03/18/2023  Time:    17:37               Narrative:    EXAMINATION:  XR CHEST 1 VIEW    CLINICAL HISTORY:  Dyspnea, unspecified    TECHNIQUE:  One view    COMPARISON:  None available.    FINDINGS:  Cardiopericardial silhouette is within normal limits.  Lungs are remarkable severe chronic emphysematous changes with fibrosis.  There are cystic formations with honeycombing lower lung zones.  There are is scattered patchy and ground-glass opacities of the lungs which could represent associated mild pneumonitis without exclusion of developing early  infiltrates.  No significant fluid within the pleural space.  No pneumothorax.                                    X-Rays:   Independently Interpreted Readings:   Chest X-Ray: Severe chronic emphysematous changes without acute infiltrate   Medications   magnesium sulfate 2g in water 50mL IVPB (premix) (2 g Intravenous New Bag 3/18/23 1750)   potassium chloride SA CR tablet 40 mEq (has no administration in time range)   vancomycin 125 mg/5 mL oral solution 125 mg (has no administration in time range)     Followed by   vancomycin 125 mg/5 mL oral solution 125 mg (has no administration in time range)     Followed by   vancomycin 125 mg/5 mL oral solution 125 mg (has no administration in time range)     Followed by   vancomycin 125 mg/5 mL oral solution 125 mg (has no administration in time range)   potassium chloride 20 mEq in 100 mL IVPB (FOR CENTRAL LINE ADMINISTRATION ONLY) (40 mEq Intravenous New Bag 3/18/23 1720)   furosemide injection 40 mg (40 mg Intravenous Given 3/18/23 1719)     Medical Decision Making:   Differential Diagnosis:   C diff infection, new onset CHF, venous insufficiency  Clinical Tests:   Lab Tests: Ordered and Reviewed       <> Summary of Lab: CMP-hypokalemia, hypomagnesemia  Troponin-mildly elevated  BNP elevated    Radiological Study: Ordered and Reviewed  Medical Tests: Ordered and Reviewed  ED Management:  History and physical exam performed   Labs, EKG, CXR, ECHO  Given IV lasix for edema  IV and PO KCl, IV Mag given  Admit to Hospital medicine for IV electrolyte repletion                         Clinical Impression:   Final diagnoses:  [A04.72] C. difficile diarrhea (Primary)  [I50.9] Congestive heart failure, unspecified HF chronicity, unspecified heart failure type        ED Disposition Condition    Admit Stable                Abel Aggarwal MD  Resident  03/18/23 6999

## 2023-03-18 NOTE — FIRST PROVIDER EVALUATION
Medical screening examination initiated.  I have conducted a focused provider triage encounter, findings are as follows:    Brief history of present illness: 89 yo male presents to ED for evaluation of diarrhea and leg swelling. Daughter reports into and out of hospital with c-diff, last diagnosed on Tuesday. Did not start antibiotics. Hx of COPD     Vitals:    03/18/23 1529   BP: 115/67   BP Location: Left arm   Patient Position: Sitting   Pulse: 87   Resp: 16   Temp: 98.6 °F (37 °C)   TempSrc: Oral   SpO2: 98%   Weight: 68 kg (150 lb)       Pertinent physical exam:  Patient awake and sitting in wheelchair.     Brief workup plan:  labs, UA, Stool culture, cdiff    Preliminary workup initiated; this workup will be continued and followed by the physician or advanced practice provider that is assigned to the patient when roomed.

## 2023-03-19 PROBLEM — A04.72 C. DIFFICILE DIARRHEA: Status: ACTIVE | Noted: 2023-03-19

## 2023-03-19 LAB
ALBUMIN SERPL-MCNC: 2.3 G/DL (ref 3.4–4.8)
ALBUMIN/GLOB SERPL: 0.8 RATIO (ref 1.1–2)
ALP SERPL-CCNC: 82 UNIT/L (ref 40–150)
ALT SERPL-CCNC: 8 UNIT/L (ref 0–55)
ANION GAP SERPL CALC-SCNC: 10 MEQ/L
ANION GAP SERPL CALC-SCNC: 18 MEQ/L
AST SERPL-CCNC: 11 UNIT/L (ref 5–34)
AV INDEX (PROSTH): 1.27
AV MEAN GRADIENT: 3 MMHG
AV PEAK GRADIENT: 6 MMHG
AV VALVE AREA: 4.4 CM2
AV VELOCITY RATIO: 0.96
BASOPHILS # BLD AUTO: 0.05 X10(3)/MCL (ref 0–0.2)
BASOPHILS NFR BLD AUTO: 0.4 %
BILIRUBIN DIRECT+TOT PNL SERPL-MCNC: 0.6 MG/DL
BUN SERPL-MCNC: 13.9 MG/DL (ref 8.4–25.7)
BUN SERPL-MCNC: 17.1 MG/DL (ref 8.4–25.7)
BUN SERPL-MCNC: 18 MG/DL (ref 8.4–25.7)
CALCIUM SERPL-MCNC: 7.6 MG/DL (ref 8.8–10)
CALCIUM SERPL-MCNC: 7.8 MG/DL (ref 8.8–10)
CALCIUM SERPL-MCNC: 8 MG/DL (ref 8.8–10)
CHLORIDE SERPL-SCNC: 90 MMOL/L (ref 98–107)
CHLORIDE SERPL-SCNC: 92 MMOL/L (ref 98–107)
CHLORIDE SERPL-SCNC: 95 MMOL/L (ref 98–107)
CLOSTRIDIUM DIFFICILE GDH ANTIGEN (OHS): POSITIVE
CLOSTRIDIUM DIFFICILE TOXIN A/B (OHS): POSITIVE
CO2 SERPL-SCNC: 28 MMOL/L (ref 23–31)
CO2 SERPL-SCNC: 31 MMOL/L (ref 23–31)
CO2 SERPL-SCNC: 32 MMOL/L (ref 23–31)
CREAT SERPL-MCNC: 0.86 MG/DL (ref 0.73–1.18)
CREAT SERPL-MCNC: 0.86 MG/DL (ref 0.73–1.18)
CREAT SERPL-MCNC: 0.94 MG/DL (ref 0.73–1.18)
CREAT/UREA NIT SERPL: 15
CREAT/UREA NIT SERPL: 21
CV ECHO LV RWT: 0.56 CM
DOP CALC AO PEAK VEL: 1.22 M/S
DOP CALC AO VTI: 19.6 CM
DOP CALC LVOT AREA: 3.5 CM2
DOP CALC LVOT DIAMETER: 2.1 CM
DOP CALC LVOT PEAK VEL: 1.17 M/S
DOP CALC LVOT STROKE VOLUME: 86.2 CM3
DOP CALC MV VTI: 35.5 CM
DOP CALCLVOT PEAK VEL VTI: 24.9 CM
E WAVE DECELERATION TIME: 378 MSEC
E/A RATIO: 0.89
E/E' RATIO: 12 M/S
ECHO LV POSTERIOR WALL: 1.23 CM (ref 0.6–1.1)
EJECTION FRACTION: 50 %
EOSINOPHIL # BLD AUTO: 0.06 X10(3)/MCL (ref 0–0.9)
EOSINOPHIL NFR BLD AUTO: 0.4 %
ERYTHROCYTE [DISTWIDTH] IN BLOOD BY AUTOMATED COUNT: 15.7 % (ref 11.5–17)
FRACTIONAL SHORTENING: 14 % (ref 28–44)
GFR SERPLBLD CREATININE-BSD FMLA CKD-EPI: >60 MLS/MIN/1.73/M2
GLOBULIN SER-MCNC: 2.8 GM/DL (ref 2.4–3.5)
GLUCOSE SERPL-MCNC: 107 MG/DL (ref 82–115)
GLUCOSE SERPL-MCNC: 168 MG/DL (ref 82–115)
GLUCOSE SERPL-MCNC: 99 MG/DL (ref 82–115)
HCT VFR BLD AUTO: 36.6 % (ref 42–52)
HGB BLD-MCNC: 11.8 G/DL (ref 14–18)
IMM GRANULOCYTES # BLD AUTO: 0.1 X10(3)/MCL (ref 0–0.04)
IMM GRANULOCYTES NFR BLD AUTO: 0.7 %
INTERVENTRICULAR SEPTUM: 1.14 CM (ref 0.6–1.1)
LEFT ATRIUM SIZE: 4.9 CM
LEFT ATRIUM VOLUME MOD: 87.2 CM3
LEFT INTERNAL DIMENSION IN SYSTOLE: 3.78 CM (ref 2.1–4)
LEFT VENTRICLE DIASTOLIC VOLUME: 86.8 ML
LEFT VENTRICLE SYSTOLIC VOLUME: 61.2 ML
LEFT VENTRICULAR INTERNAL DIMENSION IN DIASTOLE: 4.38 CM (ref 3.5–6)
LEFT VENTRICULAR MASS: 186.57 G
LV LATERAL E/E' RATIO: 7.64 M/S
LV SEPTAL E/E' RATIO: 28 M/S
LVOT MG: 3 MMHG
LVOT MV: 0.74 CM/S
LYMPHOCYTES # BLD AUTO: 1.88 X10(3)/MCL (ref 0.6–4.6)
LYMPHOCYTES NFR BLD AUTO: 13.3 %
MAGNESIUM SERPL-MCNC: 1.6 MG/DL (ref 1.6–2.6)
MCH RBC QN AUTO: 27.6 PG
MCHC RBC AUTO-ENTMCNC: 32.2 G/DL (ref 33–36)
MCV RBC AUTO: 85.7 FL (ref 80–94)
MONOCYTES # BLD AUTO: 0.49 X10(3)/MCL (ref 0.1–1.3)
MONOCYTES NFR BLD AUTO: 3.5 %
MV MEAN GRADIENT: 2 MMHG
MV PEAK A VEL: 0.94 M/S
MV PEAK E VEL: 0.84 M/S
MV PEAK GRADIENT: 5 MMHG
MV STENOSIS PRESSURE HALF TIME: 89 MS
MV VALVE AREA BY CONTINUITY EQUATION: 2.43 CM2
MV VALVE AREA P 1/2 METHOD: 2.47 CM2
NEUTROPHILS # BLD AUTO: 11.6 X10(3)/MCL (ref 2.1–9.2)
NEUTROPHILS NFR BLD AUTO: 81.7 %
NRBC BLD AUTO-RTO: 0 %
PISA TR MAX VEL: 2.84 M/S
PLATELET # BLD AUTO: 175 X10(3)/MCL (ref 130–400)
PMV BLD AUTO: 9.6 FL (ref 7.4–10.4)
POTASSIUM SERPL-SCNC: 2.2 MMOL/L (ref 3.5–5.1)
POTASSIUM SERPL-SCNC: 2.7 MMOL/L (ref 3.5–5.1)
POTASSIUM SERPL-SCNC: 3.1 MMOL/L (ref 3.5–5.1)
PROT SERPL-MCNC: 5.1 GM/DL (ref 5.8–7.6)
RA PRESSURE: 3 MMHG
RBC # BLD AUTO: 4.27 X10(6)/MCL (ref 4.7–6.1)
RIGHT VENTRICULAR END-DIASTOLIC DIMENSION: 3.24 CM
SARS-COV-2 RDRP RESP QL NAA+PROBE: NEGATIVE
SODIUM SERPL-SCNC: 136 MMOL/L (ref 136–145)
SODIUM SERPL-SCNC: 137 MMOL/L (ref 136–145)
SODIUM SERPL-SCNC: 138 MMOL/L (ref 136–145)
TDI LATERAL: 0.11 M/S
TDI SEPTAL: 0.03 M/S
TDI: 0.07 M/S
TR MAX PG: 32 MMHG
TRICUSPID ANNULAR PLANE SYSTOLIC EXCURSION: 1.88 CM
TROPONIN I SERPL-MCNC: 0.09 NG/ML (ref 0–0.04)
TROPONIN I SERPL-MCNC: 0.11 NG/ML (ref 0–0.04)
TV REST PULMONARY ARTERY PRESSURE: 35 MMHG
WBC # SPEC AUTO: 14.2 X10(3)/MCL (ref 4.5–11.5)

## 2023-03-19 PROCEDURE — 25000003 PHARM REV CODE 250: Performed by: STUDENT IN AN ORGANIZED HEALTH CARE EDUCATION/TRAINING PROGRAM

## 2023-03-19 PROCEDURE — 87040 BLOOD CULTURE FOR BACTERIA: CPT | Performed by: PHYSICIAN ASSISTANT

## 2023-03-19 PROCEDURE — 84484 ASSAY OF TROPONIN QUANT: CPT | Performed by: PHYSICIAN ASSISTANT

## 2023-03-19 PROCEDURE — 93005 ELECTROCARDIOGRAM TRACING: CPT

## 2023-03-19 PROCEDURE — 27000221 HC OXYGEN, UP TO 24 HOURS

## 2023-03-19 PROCEDURE — 83735 ASSAY OF MAGNESIUM: CPT | Performed by: NURSE PRACTITIONER

## 2023-03-19 PROCEDURE — 25000003 PHARM REV CODE 250: Performed by: INTERNAL MEDICINE

## 2023-03-19 PROCEDURE — 93010 EKG 12-LEAD: ICD-10-PCS | Mod: ,,, | Performed by: INTERNAL MEDICINE

## 2023-03-19 PROCEDURE — 85025 COMPLETE CBC W/AUTO DIFF WBC: CPT | Performed by: PHYSICIAN ASSISTANT

## 2023-03-19 PROCEDURE — 63600175 PHARM REV CODE 636 W HCPCS: Performed by: INTERNAL MEDICINE

## 2023-03-19 PROCEDURE — 25000003 PHARM REV CODE 250: Performed by: NURSE PRACTITIONER

## 2023-03-19 PROCEDURE — 80053 COMPREHEN METABOLIC PANEL: CPT | Performed by: PHYSICIAN ASSISTANT

## 2023-03-19 PROCEDURE — 87635 SARS-COV-2 COVID-19 AMP PRB: CPT | Performed by: INTERNAL MEDICINE

## 2023-03-19 PROCEDURE — 21400001 HC TELEMETRY ROOM

## 2023-03-19 PROCEDURE — 93010 ELECTROCARDIOGRAM REPORT: CPT | Mod: ,,, | Performed by: INTERNAL MEDICINE

## 2023-03-19 PROCEDURE — 80061 LIPID PANEL: CPT | Performed by: NURSE PRACTITIONER

## 2023-03-19 PROCEDURE — 63600175 PHARM REV CODE 636 W HCPCS: Performed by: NURSE PRACTITIONER

## 2023-03-19 RX ORDER — EZETIMIBE 10 MG/1
10 TABLET ORAL NIGHTLY
Status: DISCONTINUED | OUTPATIENT
Start: 2023-03-19 | End: 2023-04-02 | Stop reason: HOSPADM

## 2023-03-19 RX ORDER — ACETAMINOPHEN 325 MG/1
650 TABLET ORAL EVERY 8 HOURS PRN
Status: DISCONTINUED | OUTPATIENT
Start: 2023-03-19 | End: 2023-04-02 | Stop reason: HOSPADM

## 2023-03-19 RX ORDER — TALC
6 POWDER (GRAM) TOPICAL NIGHTLY PRN
Status: DISCONTINUED | OUTPATIENT
Start: 2023-03-19 | End: 2023-04-02 | Stop reason: HOSPADM

## 2023-03-19 RX ORDER — POTASSIUM CHLORIDE 20 MEQ/1
40 TABLET, EXTENDED RELEASE ORAL 2 TIMES DAILY
Status: DISCONTINUED | OUTPATIENT
Start: 2023-03-19 | End: 2023-03-28

## 2023-03-19 RX ORDER — POTASSIUM CHLORIDE 20 MEQ/1
60 TABLET, EXTENDED RELEASE ORAL ONCE
Status: COMPLETED | OUTPATIENT
Start: 2023-03-19 | End: 2023-03-19

## 2023-03-19 RX ORDER — ASPIRIN 81 MG/1
81 TABLET ORAL DAILY
Status: DISCONTINUED | OUTPATIENT
Start: 2023-03-19 | End: 2023-04-02 | Stop reason: HOSPADM

## 2023-03-19 RX ORDER — TAMSULOSIN HYDROCHLORIDE 0.4 MG/1
2 CAPSULE ORAL DAILY
Status: DISCONTINUED | OUTPATIENT
Start: 2023-03-20 | End: 2023-04-02 | Stop reason: HOSPADM

## 2023-03-19 RX ORDER — MAGNESIUM SULFATE 1 G/100ML
1 INJECTION INTRAVENOUS ONCE
Status: COMPLETED | OUTPATIENT
Start: 2023-03-19 | End: 2023-03-19

## 2023-03-19 RX ORDER — ALBUTEROL SULFATE 90 UG/1
2 AEROSOL, METERED RESPIRATORY (INHALATION) EVERY 6 HOURS PRN
Status: DISCONTINUED | OUTPATIENT
Start: 2023-03-19 | End: 2023-04-02 | Stop reason: HOSPADM

## 2023-03-19 RX ORDER — ENOXAPARIN SODIUM 100 MG/ML
40 INJECTION SUBCUTANEOUS EVERY 24 HOURS
Status: DISCONTINUED | OUTPATIENT
Start: 2023-03-19 | End: 2023-04-02 | Stop reason: HOSPADM

## 2023-03-19 RX ORDER — POTASSIUM CHLORIDE 14.9 MG/ML
40 INJECTION INTRAVENOUS ONCE
Status: COMPLETED | OUTPATIENT
Start: 2023-03-19 | End: 2023-03-19

## 2023-03-19 RX ORDER — FINASTERIDE 5 MG/1
5 TABLET, FILM COATED ORAL DAILY
Status: DISCONTINUED | OUTPATIENT
Start: 2023-03-20 | End: 2023-04-02 | Stop reason: HOSPADM

## 2023-03-19 RX ORDER — SODIUM CHLORIDE 0.9 % (FLUSH) 0.9 %
10 SYRINGE (ML) INJECTION
Status: DISCONTINUED | OUTPATIENT
Start: 2023-03-19 | End: 2023-04-02 | Stop reason: HOSPADM

## 2023-03-19 RX ORDER — METOPROLOL SUCCINATE 25 MG/1
25 TABLET, EXTENDED RELEASE ORAL DAILY
Status: DISCONTINUED | OUTPATIENT
Start: 2023-03-19 | End: 2023-04-02 | Stop reason: HOSPADM

## 2023-03-19 RX ORDER — ATORVASTATIN CALCIUM 40 MG/1
40 TABLET, FILM COATED ORAL NIGHTLY
Status: DISCONTINUED | OUTPATIENT
Start: 2023-03-19 | End: 2023-04-02 | Stop reason: HOSPADM

## 2023-03-19 RX ORDER — MAGNESIUM SULFATE HEPTAHYDRATE 40 MG/ML
4 INJECTION, SOLUTION INTRAVENOUS ONCE
Status: COMPLETED | OUTPATIENT
Start: 2023-03-19 | End: 2023-03-19

## 2023-03-19 RX ORDER — LEVOFLOXACIN 5 MG/ML
750 INJECTION, SOLUTION INTRAVENOUS
Status: DISCONTINUED | OUTPATIENT
Start: 2023-03-19 | End: 2023-03-21

## 2023-03-19 RX ADMIN — MAGNESIUM SULFATE HEPTAHYDRATE 4 G: 40 INJECTION, SOLUTION INTRAVENOUS at 12:03

## 2023-03-19 RX ADMIN — MAGNESIUM SULFATE IN DEXTROSE 1 G: 10 INJECTION, SOLUTION INTRAVENOUS at 09:03

## 2023-03-19 RX ADMIN — EZETIMIBE 10 MG: 10 TABLET ORAL at 09:03

## 2023-03-19 RX ADMIN — Medication 125 MG: at 12:03

## 2023-03-19 RX ADMIN — LEVOFLOXACIN 750 MG: 750 INJECTION, SOLUTION INTRAVENOUS at 04:03

## 2023-03-19 RX ADMIN — POTASSIUM CHLORIDE 40 MEQ: 1500 TABLET, EXTENDED RELEASE ORAL at 09:03

## 2023-03-19 RX ADMIN — Medication 125 MG: at 01:03

## 2023-03-19 RX ADMIN — Medication 125 MG: at 07:03

## 2023-03-19 RX ADMIN — SACUBITRIL AND VALSARTAN 1 TABLET: 24; 26 TABLET, FILM COATED ORAL at 09:03

## 2023-03-19 RX ADMIN — ATORVASTATIN CALCIUM 40 MG: 40 TABLET, FILM COATED ORAL at 09:03

## 2023-03-19 RX ADMIN — POTASSIUM CHLORIDE 60 MEQ: 1500 TABLET, EXTENDED RELEASE ORAL at 09:03

## 2023-03-19 RX ADMIN — Medication 125 MG: at 05:03

## 2023-03-19 RX ADMIN — POTASSIUM CHLORIDE 40 MEQ: 14.9 INJECTION, SOLUTION INTRAVENOUS at 01:03

## 2023-03-19 RX ADMIN — METOPROLOL SUCCINATE 25 MG: 25 TABLET, EXTENDED RELEASE ORAL at 12:03

## 2023-03-19 RX ADMIN — POTASSIUM CHLORIDE 40 MEQ: 1500 TABLET, EXTENDED RELEASE ORAL at 08:03

## 2023-03-19 RX ADMIN — POTASSIUM CHLORIDE 40 MEQ: 14.9 INJECTION, SOLUTION INTRAVENOUS at 09:03

## 2023-03-19 RX ADMIN — POTASSIUM CHLORIDE 40 MEQ: 1500 TABLET, EXTENDED RELEASE ORAL at 01:03

## 2023-03-19 RX ADMIN — ASPIRIN 81 MG: 81 TABLET, COATED ORAL at 12:03

## 2023-03-19 RX ADMIN — ENOXAPARIN SODIUM 40 MG: 40 INJECTION SUBCUTANEOUS at 05:03

## 2023-03-19 NOTE — CONSULTS
Inpatient consult to Cardiology  Consult performed by: BELLE aBrry  Consult ordered by: Canelo Colorado MD      [facility] - [facility] Medical Telemetry  Cardiology  Consult Note    Patient Name: Max Squires  MRN: [id number]  Admission Date: 3/18/2023  Hospital Length of Stay: 1 days  Code Status: Full Code   Attending Provider: Flavio Muñoz MD   Consulting Provider: BELLE Barry  Primary Care Physician: Alber Menjivar MD  Principal Problem:C. difficile diarrhea    Patient information was obtained from patient, past medical records, and ER records.     Subjective:     Chief Complaint: Reason for Consult: Elevated Troponin/HF    HPI: Mr. Squires is an 87 y/o male who is unknown to [facility]. The patient presented to [facility] on 3.18.23 with c/o Diarrhea and BLE Swelling. The patient reported a 2 month history of Diarrhea and a more recent history of BLE Swelling. He has been treated for C.Diff with recurrence of infection. Upon arrival he was evaluated and found to have WBC 18.9, K 2.3, .7 and Troponin of 0.087. He also had a CXR which revealed Patchy Infiltrates and Emphysematic changes. He was started on Tx for C.Diff and Admitted to Hospital Medicine. [facility] has been consulted for Elevated Troponin and HF.     PMH: HTN, HLD, C.Diff, COPD/Home O2, OA  PSH: Back Surgery, Angiogram, Hip Surgery, Joint Replacements (Hip and Knee)  Family History: Reviewed and Unremarkable for Heart Disease  Social History: Denies ETOH and Illicit Drug Use; + Tobacco Use 1ppd for 60+ Years    Previous Cardiac Diagnostics:   ECHO 3.18.23: Pending     Review of patient's allergies indicates:  No Known Allergies    No current facility-administered medications on file prior to encounter.     Current Outpatient Medications on File Prior to Encounter   Medication Sig    amLODIPine (NORVASC) 5 MG tablet Take 5 mg by mouth.    amlodipine-atorvastatin (CADUET) 5-80 mg per tablet amlodipine 5 mg-atorvastatin 80 mg  tablet    aspirin (ECOTRIN) 81 MG EC tablet Take 81 mg by mouth once daily.    atorvastatin (LIPITOR) 80 MG tablet Take 80 mg by mouth once daily.    diclofenac sodium (VOLTAREN) 1 % Gel APPLY 1 TO 2 GRAMS TOPICALLY TO THE AFFECTED AREA FOUR TIMES DAILY AS NEEDED    ezetimibe (ZETIA) 10 mg tablet Take 10 mg by mouth every evening.    finasteride (PROSCAR) 5 mg tablet Take 5 mg by mouth once daily.    oxyCODONE-acetaminophen (PERCOCET) 5-325 mg per tablet Take 1 tablet by mouth every 6 (six) hours as needed.    propranolol (INDERAL LA) 120 MG 24 hr capsule TK 1 C PO QD    tamsulosin (FLOMAX) 0.4 mg Cap Take 2 capsules by mouth once daily.     Review of Systems   Constitutional:  Positive for fatigue.   Respiratory:  Negative for shortness of breath.    Cardiovascular:  Positive for leg swelling. Negative for chest pain and palpitations.   Gastrointestinal:  Positive for diarrhea. Negative for nausea, rectal pain and vomiting.   All other systems reviewed and are negative.    Objective:     Vital Signs (Most Recent):  Temp: 97.9 °F (36.6 °C) (03/19/23 0816)  Pulse: 72 (03/19/23 0816)  Resp: 18 (03/19/23 0402)  BP: 120/68 (03/19/23 0816)  SpO2: 100 % (03/19/23 0816) Vital Signs (24h Range):  Temp:  [97.6 °F (36.4 °C)-98.6 °F (37 °C)] 97.9 °F (36.6 °C)  Pulse:  [64-87] 72  Resp:  [16-23] 18  SpO2:  [95 %-100 %] 100 %  BP: (115-152)/(63-93) 120/68     Weight: 68 kg (150 lb)  Body mass index is 20.34 kg/m².    SpO2: 100 %         Intake/Output Summary (Last 24 hours) at 3/19/2023 0915  Last data filed at 3/19/2023 0623  Gross per 24 hour   Intake 240 ml   Output 600 ml   Net -360 ml       Lines/Drains/Airways       Peripheral Intravenous Line  Duration                  Peripheral IV - Single Lumen -- days         Peripheral IV - Single Lumen 03/18/23 22 G Right Forearm 1 day         Peripheral IV - Single Lumen 03/18/23 1557 20 G Left Hand <1 day                  Significant Labs:  Recent Results (from the past 72  hour(s))   Brain natriuretic peptide    Collection Time: 03/18/23  3:47 PM   Result Value Ref Range    Natriuretic Peptide 348.7 (H) <=100.0 pg/mL   Comprehensive metabolic panel    Collection Time: 03/18/23  3:47 PM   Result Value Ref Range    Sodium Level 136 136 - 145 mmol/L    Potassium Level 2.3 (LL) 3.5 - 5.1 mmol/L    Chloride 90 (L) 98 - 107 mmol/L    Carbon Dioxide 33 (H) 23 - 31 mmol/L    Glucose Level 105 82 - 115 mg/dL    Blood Urea Nitrogen 19.9 8.4 - 25.7 mg/dL    Creatinine 0.92 0.73 - 1.18 mg/dL    Calcium Level Total 8.1 (L) 8.8 - 10.0 mg/dL    Protein Total 5.7 (L) 5.8 - 7.6 gm/dL    Albumin Level 2.3 (L) 3.4 - 4.8 g/dL    Globulin 3.4 2.4 - 3.5 gm/dL    Albumin/Globulin Ratio 0.7 (L) 1.1 - 2.0 ratio    Bilirubin Total 0.6 <=1.5 mg/dL    Alkaline Phosphatase 86 40 - 150 unit/L    Alanine Aminotransferase 10 0 - 55 unit/L    Aspartate Aminotransferase 13 5 - 34 unit/L    eGFR >60 mls/min/1.73/m2   Magnesium    Collection Time: 03/18/23  3:47 PM   Result Value Ref Range    Magnesium Level 1.60 1.60 - 2.60 mg/dL   CBC with Differential    Collection Time: 03/18/23  3:47 PM   Result Value Ref Range    WBC 18.9 (H) 4.5 - 11.5 x10(3)/mcL    RBC 4.71 4.70 - 6.10 x10(6)/mcL    Hgb 13.1 (L) 14.0 - 18.0 g/dL    Hct 40.3 (L) 42.0 - 52.0 %    MCV 85.6 80.0 - 94.0 fL    MCH 27.8 pg    MCHC 32.5 (L) 33.0 - 36.0 g/dL    RDW 15.6 11.5 - 17.0 %    Platelet 190 130 - 400 x10(3)/mcL    MPV 9.0 7.4 - 10.4 fL    Neut % 84.3 %    Lymph % 11.2 %    Mono % 3.0 %    Eos % 0.2 %    Basophil % 0.5 %    Lymph # 2.12 0.6 - 4.6 x10(3)/mcL    Neut # 15.90 (H) 2.1 - 9.2 x10(3)/mcL    Mono # 0.56 0.1 - 1.3 x10(3)/mcL    Eos # 0.03 0 - 0.9 x10(3)/mcL    Baso # 0.10 0 - 0.2 x10(3)/mcL    IG# 0.15 (H) 0 - 0.04 x10(3)/mcL    IG% 0.8 %    NRBC% 0.0 %   Troponin I    Collection Time: 03/18/23  3:47 PM   Result Value Ref Range    Troponin-I 0.090 (H) 0.000 - 0.045 ng/mL   Echo    Collection Time: 03/18/23  5:31 PM   Result Value Ref  Range    TDI SEPTAL 0.03 m/s    LV LATERAL E/E' RATIO 7.64 m/s    LV SEPTAL E/E' RATIO 28.00 m/s    EF 40 %    Left Ventricular Outflow Tract Mean Velocity 0.74 cm/s    Left Ventricular Outflow Tract Mean Gradient 3.00 mmHg    TDI LATERAL 0.11 m/s    LVIDd 4.38 3.5 - 6.0 cm    IVS 1.14 (A) 0.6 - 1.1 cm    Posterior Wall 1.23 (A) 0.6 - 1.1 cm    LVIDs 3.78 2.1 - 4.0 cm    FS 14 28 - 44 %    LV mass 186.57 g    LA size 4.90 cm    RVDD 3.24 cm    TAPSE 1.88 cm    Left Ventricle Relative Wall Thickness 0.56 cm    AV mean gradient 3 mmHg    AV valve area 4.40 cm2    AV Velocity Ratio 0.96     AV index (prosthetic) 1.27     MV mean gradient 2 mmHg    MV valve area p 1/2 method 2.47 cm2    MV valve area by continuity eq 2.43 cm2    E/A ratio 0.89     Mean e' 0.07 m/s    E wave deceleration time 378.00 msec    LVOT diameter 2.10 cm    LVOT area 3.5 cm2    LVOT peak tyree 1.17 m/s    LVOT peak VTI 24.90 cm    Ao peak tyree 1.22 m/s    Ao VTI 19.6 cm    LVOT stroke volume 86.20 cm3    AV peak gradient 6 mmHg    MV peak gradient 5 mmHg    E/E' ratio 12.00 m/s    MV Peak E Tyree 0.84 m/s    TR Max Tyree 2.84 m/s    MV VTI 35.5 cm    MV stenosis pressure 1/2 time 89.00 ms    MV Peak A Tyree 0.94 m/s    LV Systolic Volume 61.20 mL    LV Diastolic Volume 86.80 mL    Triscuspid Valve Regurgitation Peak Gradient 32 mmHg    LA volume (mod) 87.20 cm3   Urinalysis, Reflex to Urine Culture    Collection Time: 03/18/23  6:04 PM    Specimen: Urine   Result Value Ref Range    Color, UA Yellow Yellow, Light-Yellow, Dark Yellow, Hannah, Straw    Appearance, UA Clear Clear    Specific Gravity, UA 1.009 1.005 - 1.030    pH, UA 6.0 5.0 - 8.5    Protein, UA Trace (A) Negative mg/dL    Glucose, UA Negative Negative, Normal mg/dL    Ketones, UA Negative Negative mg/dL    Blood, UA Negative Negative unit/L    Bilirubin, UA Negative Negative mg/dL    Urobilinogen, UA 0.2 0.2, 1.0, Normal mg/dL    Nitrites, UA Negative Negative    Leukocyte Esterase, UA 2+ (A)  Negative unit/L   Urinalysis, Microscopic    Collection Time: 03/18/23  6:04 PM   Result Value Ref Range    RBC, UA <5 <=5 /HPF    WBC, UA 16 (H) <=5 /HPF    Squamous Epithelial Cells, UA <5 <=5 /HPF    Bacteria, UA None Seen None Seen, Rare, Occasional /HPF   Urine culture    Collection Time: 03/18/23  6:04 PM    Specimen: Urine   Result Value Ref Range    Urine Culture No Growth At 24 Hours    Stool Culture **CANNOT BE ORDERED STAT**    Collection Time: 03/18/23  6:40 PM    Specimen: Stool   Result Value Ref Range    Stool Culture       Negative for Salmonella, Shigella, Campylobacter, Vibrio, Aeromonas, Pleisiomonas,Yersinia, or Shiga Toxin 1 and 2.   Clostridium Diff Toxin, A & B, EIA    Collection Time: 03/18/23  6:40 PM    Specimen: Stool   Result Value Ref Range    Clostridium Difficile GDH Antigen Positive (A) Negative    Clostridium Difficile Toxin A/B Positive (A) Negative   Phosphorus    Collection Time: 03/18/23 10:33 PM   Result Value Ref Range    Phosphorus Level 3.1 2.3 - 4.7 mg/dL   Troponin I    Collection Time: 03/18/23 10:33 PM   Result Value Ref Range    Troponin-I 0.097 (H) 0.000 - 0.045 ng/mL   Basic Metabolic Panel    Collection Time: 03/18/23 10:33 PM   Result Value Ref Range    Sodium Level 138 136 - 145 mmol/L    Potassium Level 2.7 (LL) 3.5 - 5.1 mmol/L    Chloride 92 (L) 98 - 107 mmol/L    Carbon Dioxide 28 23 - 31 mmol/L    Glucose Level 107 82 - 115 mg/dL    Blood Urea Nitrogen 18.0 8.4 - 25.7 mg/dL    Creatinine 0.86 0.73 - 1.18 mg/dL    BUN/Creatinine Ratio 21     Calcium Level Total 7.8 (L) 8.8 - 10.0 mg/dL    Anion Gap 18.0 mEq/L    eGFR >60 mls/min/1.73/m2   Comprehensive Metabolic Panel (CMP)    Collection Time: 03/19/23  5:08 AM   Result Value Ref Range    Sodium Level 136 136 - 145 mmol/L    Potassium Level 2.2 (LL) 3.5 - 5.1 mmol/L    Chloride 90 (L) 98 - 107 mmol/L    Carbon Dioxide 31 23 - 31 mmol/L    Glucose Level 99 82 - 115 mg/dL    Blood Urea Nitrogen 17.1 8.4 - 25.7  mg/dL    Creatinine 0.86 0.73 - 1.18 mg/dL    Calcium Level Total 8.0 (L) 8.8 - 10.0 mg/dL    Protein Total 5.1 (L) 5.8 - 7.6 gm/dL    Albumin Level 2.3 (L) 3.4 - 4.8 g/dL    Globulin 2.8 2.4 - 3.5 gm/dL    Albumin/Globulin Ratio 0.8 (L) 1.1 - 2.0 ratio    Bilirubin Total 0.6 <=1.5 mg/dL    Alkaline Phosphatase 82 40 - 150 unit/L    Alanine Aminotransferase 8 0 - 55 unit/L    Aspartate Aminotransferase 11 5 - 34 unit/L    eGFR >60 mls/min/1.73/m2   Troponin I    Collection Time: 03/19/23  5:08 AM   Result Value Ref Range    Troponin-I 0.088 (H) 0.000 - 0.045 ng/mL   Magnesium    Collection Time: 03/19/23  5:08 AM   Result Value Ref Range    Magnesium Level 1.60 1.60 - 2.60 mg/dL   CBC with Differential    Collection Time: 03/19/23  7:14 AM   Result Value Ref Range    WBC 14.2 (H) 4.5 - 11.5 x10(3)/mcL    RBC 4.27 (L) 4.70 - 6.10 x10(6)/mcL    Hgb 11.8 (L) 14.0 - 18.0 g/dL    Hct 36.6 (L) 42.0 - 52.0 %    MCV 85.7 80.0 - 94.0 fL    MCH 27.6 pg    MCHC 32.2 (L) 33.0 - 36.0 g/dL    RDW 15.7 11.5 - 17.0 %    Platelet 175 130 - 400 x10(3)/mcL    MPV 9.6 7.4 - 10.4 fL    Neut % 81.7 %    Lymph % 13.3 %    Mono % 3.5 %    Eos % 0.4 %    Basophil % 0.4 %    Lymph # 1.88 0.6 - 4.6 x10(3)/mcL    Neut # 11.60 (H) 2.1 - 9.2 x10(3)/mcL    Mono # 0.49 0.1 - 1.3 x10(3)/mcL    Eos # 0.06 0 - 0.9 x10(3)/mcL    Baso # 0.05 0 - 0.2 x10(3)/mcL    IG# 0.10 (H) 0 - 0.04 x10(3)/mcL    IG% 0.7 %    NRBC% 0.0 %     Significant Imaging:  Imaging Results              X-Ray Chest 1 View (Final result)  Result time 03/18/23 17:37:57      Final result by Jasper Cerda MD (03/18/23 17:37:57)                   Impression:      As above.      Electronically signed by: Jasper Cerda  Date:    03/18/2023  Time:    17:37               Narrative:    EXAMINATION:  XR CHEST 1 VIEW    CLINICAL HISTORY:  Dyspnea, unspecified    TECHNIQUE:  One view    COMPARISON:  None available.    FINDINGS:  Cardiopericardial silhouette is within normal limits.  Lungs  are remarkable severe chronic emphysematous changes with fibrosis.  There are cystic formations with honeycombing lower lung zones.  There are is scattered patchy and ground-glass opacities of the lungs which could represent associated mild pneumonitis without exclusion of developing early infiltrates.  No significant fluid within the pleural space.  No pneumothorax.                                    EKG:       Telemetry: SR    Physical Exam  Constitutional:       Appearance: Normal appearance.   HENT:      Head: Normocephalic.      Mouth/Throat:      Mouth: Mucous membranes are moist.   Eyes:      Extraocular Movements: Extraocular movements intact.   Cardiovascular:      Rate and Rhythm: Normal rate and regular rhythm.      Pulses: Normal pulses.      Heart sounds: Normal heart sounds.   Pulmonary:      Effort: Pulmonary effort is normal.      Breath sounds: Normal breath sounds.   Abdominal:      Palpations: Abdomen is soft.   Musculoskeletal:         General: Swelling present.      Right lower leg: Edema present.      Left lower leg: Edema present.   Skin:     General: Skin is warm and dry.   Neurological:      General: No focal deficit present.      Mental Status: He is alert and oriented to person, place, and time. Mental status is at baseline.   Psychiatric:         Mood and Affect: Mood normal.         Behavior: Behavior normal.     Home Medications:   No current facility-administered medications on file prior to encounter.     Current Outpatient Medications on File Prior to Encounter   Medication Sig Dispense Refill    amLODIPine (NORVASC) 5 MG tablet Take 5 mg by mouth.      amlodipine-atorvastatin (CADUET) 5-80 mg per tablet amlodipine 5 mg-atorvastatin 80 mg tablet      aspirin (ECOTRIN) 81 MG EC tablet Take 81 mg by mouth once daily.      atorvastatin (LIPITOR) 80 MG tablet Take 80 mg by mouth once daily.      diclofenac sodium (VOLTAREN) 1 % Gel APPLY 1 TO 2 GRAMS TOPICALLY TO THE AFFECTED AREA FOUR  TIMES DAILY AS NEEDED      ezetimibe (ZETIA) 10 mg tablet Take 10 mg by mouth every evening.      finasteride (PROSCAR) 5 mg tablet Take 5 mg by mouth once daily.      oxyCODONE-acetaminophen (PERCOCET) 5-325 mg per tablet Take 1 tablet by mouth every 6 (six) hours as needed.      propranolol (INDERAL LA) 120 MG 24 hr capsule TK 1 C PO QD  1    tamsulosin (FLOMAX) 0.4 mg Cap Take 2 capsules by mouth once daily.       Current Inpatient Medications:    Current Facility-Administered Medications:     acetaminophen tablet 650 mg, 650 mg, Oral, Q8H PRN, Jourdan Rodriguez PA-C    acetaminophen tablet 650 mg, 650 mg, Oral, Q4H PRN, Jourdan Rodriguez PA-C    acetaminophen tablet 650 mg, 650 mg, Oral, Q8H PRN, Canelo Colorado MD    dextrose 10% bolus 125 mL 125 mL, 12.5 g, Intravenous, PRN, Jourdan Rodriguez PA-C    dextrose 10% bolus 250 mL 250 mL, 25 g, Intravenous, PRN, Jourdan Rodriguez PA-C    enoxaparin injection 40 mg, 40 mg, Subcutaneous, Daily, Canelo Colorado MD    glucagon (human recombinant) injection 1 mg, 1 mg, Intramuscular, PRN, Jourdan Rodriguez PA-C    glucose chewable tablet 16 g, 16 g, Oral, PRN, Jourdan Rodriguez PA-C    glucose chewable tablet 24 g, 24 g, Oral, PRN, Jourdan Rodriguez PA-C    levoFLOXacin 750 mg/150 mL IVPB 750 mg, 750 mg, Intravenous, Q24H, Canelo Colorado MD, Stopped at 03/19/23 0552    melatonin tablet 6 mg, 6 mg, Oral, Nightly PRN, Canelo Colorado MD    ondansetron injection 4 mg, 4 mg, Intravenous, Q8H PRN, Jourdan Rodriguez PA-C    potassium chloride 20 mEq in 100 mL IVPB (FOR CENTRAL LINE ADMINISTRATION ONLY), 40 mEq, Intravenous, Once, Echo Fields MD, Last Rate: 50 mL/hr at 03/19/23 0934, 40 mEq at 03/19/23 0934    potassium chloride SA CR tablet 40 mEq, 40 mEq, Oral, BID, Canelo Colorado MD, 40 mEq at 03/19/23 0836    sodium chloride 0.9% flush 10 mL, 10 mL, Intravenous, PRN, Canelo Colorado MD    vancomycin 125 mg/5 mL oral solution 125 mg, 125 mg, Oral,  Q6H, 125 mg at 03/19/23 0716 **FOLLOWED BY** [START ON 3/29/2023] vancomycin 125 mg/5 mL oral solution 125 mg, 125 mg, Oral, Q12H **FOLLOWED BY** [START ON 4/5/2023] vancomycin 125 mg/5 mL oral solution 125 mg, 125 mg, Oral, Daily **FOLLOWED BY** [START ON 4/12/2023] vancomycin 125 mg/5 mL oral solution 125 mg, 125 mg, Oral, Q3 Days, Abel Aggarwal MD    VTE Risk Mitigation (From admission, onward)           Ordered     enoxaparin injection 40 mg  Daily         03/19/23 0331     IP VTE HIGH RISK PATIENT  Once         03/19/23 0331     Place sequential compression device  Until discontinued         03/19/23 0331     Place sequential compression device  Until discontinued         03/18/23 1839                  Assessment:   NSTEMI - Likely Type II in the Setting of HF  Newly Diagnosed CMO/EF 40% (Preliminary EF)  Newly Diagnosed Systolic HF/EF 40%  Recurrent C.Diff Infection with Severe Diarrhea  Severe Electrolyte Derangements - Hypokalemia, Hypomagnesemia  Abnormal CXR    - CXR (3.18.23) - Patchy Infiltrates  UTI  HTN  HLD  COPD/Home O2  Hx of CAD/Stentns (No Report Available for Review)  Leukocytosis   No Hx of GIB    Plan:   Start ASA 81mg PO Qday   Start Toprol XL 25mg PO Qday  Start Entresto 24/26mg PO Qday  Accurate I&Os and Daily Weights  Keep K > 4.0 and Mg > 2.0  Replete Lytes  Preliminary ECHO Reviewed   Will Plan for OP Ischemic Evaluation at Diarrhea/C.Diff Treated  Labs in AM: CBC, CMP, Mg and Lipid Panel    Thank you for your consult.     Randall Colorado, BELLE  Cardiology  [facility] - [facility] Medical Telemetry  03/19/2023 9:48 AM

## 2023-03-19 NOTE — PROGRESS NOTES
[facility] Hospital Medicine Progress Note        Chief Complaint: Inpatient Follow-up for SOB, Diarrhea    HPI: Patient is a pleasant 88-year-old male was brought to the ER with significant diarrhea as well as reported bilateral lower extremity swelling.  Daughter was initially present (no longer present inpatient is not a great historian) but explained he has been dealing with diarrhea for several months as well as recurrent C diff infection and was diagnosed recently 03/15/2023 but has not yet started oral vancomycin again.     He arrived to the ER afebrile hemodynamically stable maintaining adequate sats on his baseline 2 L nasal cannula.  Laboratory work showed leukocytosis of 18 K, hypokalemia with a potassium of 2.3, a mildly elevated troponin and BNP, positive stool C diff.      Chest x-ray showed patchy infiltrates and emphysematous changes.  Echo showed preliminary EF of 40%.  He was started on oral vancomycin admitted to the hospitalist service for further management.    Interval Hx:     Patient was seen and examined at bedside, reports diarrhea and no other complaints, was found to be on oxygen with trace edema on lower extremities.    VSS. Leukocytosis of 14.2 and Hypokalemia of 2.2 noted on the labs. Trop-Elevated      Objective/physical exam:  General: In no acute distress, afebrile  Chest: Clear to auscultation bilaterally  Heart: RRR, +S1, S2, no appreciable murmur  Abdomen: Soft, nontender, BS +  MSK: Warm, no lower extremity edema, no clubbing or cyanosis  Neurologic: Alert and oriented x4, Cranial nerve II-XII intact, Strength 5/5 in all 4 extremities    VITAL SIGNS: 24 HRS MIN & MAX LAST   Temp  Min: 97.6 °F (36.4 °C)  Max: 98.5 °F (36.9 °C) 97.8 °F (36.6 °C)   BP  Min: 120/68  Max: 153/83 (!) 153/83     Pulse  Min: 64  Max: 77  75   Resp  Min: 17  Max: 22 20   SpO2  Min: 92 %  Max: 100 % (!) 92 %         Recent Labs   Lab 03/18/23  1547 03/19/23  0714   WBC 18.9*  14.2*   RBC 4.71 4.27*   HGB 13.1* 11.8*   HCT 40.3* 36.6*   MCV 85.6 85.7   MCH 27.8 27.6   MCHC 32.5* 32.2*   RDW 15.6 15.7    175   MPV 9.0 9.6       Recent Labs   Lab 03/18/23  1547 03/18/23  2233 03/19/23  0508    138 136   K 2.3* 2.7* 2.2*   CO2 33* 28 31   BUN 19.9 18.0 17.1   CREATININE 0.92 0.86 0.86   CALCIUM 8.1* 7.8* 8.0*   MG 1.60  --  1.60   ALBUMIN 2.3*  --  2.3*   ALKPHOS 86  --  82   ALT 10  --  8   AST 13  --  11   BILITOT 0.6  --  0.6          Microbiology Results (last 7 days)       Procedure Component Value Units Date/Time    Stool Culture **CANNOT BE ORDERED STAT** [[id number]]  (Normal) Collected: 03/18/23 1840    Order Status: Completed Specimen: Stool Updated: 03/19/23 0727     Stool Culture Negative for Salmonella, Shigella, Campylobacter, Vibrio, Aeromonas, Pleisiomonas,Yersinia, or Shiga Toxin 1 and 2.    Urine culture [[id number]] Collected: 03/18/23 1804    Order Status: Completed Specimen: Urine Updated: 03/19/23 0626     Urine Culture No Growth At 24 Hours    Blood Culture [[id number]] Collected: 03/19/23 0038    Order Status: Resulted Specimen: Blood Updated: 03/19/23 0044    Blood Culture [[id number]] Collected: 03/19/23 0038    Order Status: Resulted Specimen: Blood Updated: 03/19/23 0044    Clostridium Diff Toxin, A & B, EIA [[id number]]  (Abnormal) Collected: 03/18/23 1840    Order Status: Completed Specimen: Stool Updated: 03/19/23 0014     Clostridium Difficile GDH Antigen Positive     Clostridium Difficile Toxin A/B Positive             See below for Radiology    Scheduled Med:   aspirin  81 mg Oral Daily    enoxaparin  40 mg Subcutaneous Daily    levoFLOXacin  750 mg Intravenous Q24H    metoprolol succinate  25 mg Oral Daily    potassium chloride  40 mEq Oral BID    sacubitriL-valsartan  1 tablet Oral BID    vancomycin  125 mg Oral Q6H    Followed by    [START ON 3/29/2023] vancomycin  125 mg Oral Q12H    Followed by    [START ON 4/5/2023] vancomycin  125 mg Oral  Daily    Followed by    [START ON 4/12/2023] vancomycin  125 mg Oral Q3 Days        Continuous Infusions:       PRN Meds:  acetaminophen, acetaminophen, acetaminophen, dextrose 10%, dextrose 10%, glucagon (human recombinant), glucose, glucose, melatonin, ondansetron, sodium chloride 0.9%       Assessment/Plan:  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   Hypokalemia   NSTEMI likely type 2 demand ischemia   AHRF,Newly Diagnosed Systolic HF/EF 40%  Possible Community Acquired Pneumonia and UTI  Leukocytosis  Anemia,Unspecified  Essential hypertension,Hyperlipidemia,COPD on 2 L nasal cannula nightly,CAD      Plan:  -Continue Oral Vanc, may need a prolonged course versus Fidaxomicin  -Replete Potassium, Ordered f/u BMP, f/u Mag, Keep K>4,Mg>2, Monitor on Tele  -NSTEMI-likely demand, Cardiology on board, started Asp,Toprol,Entresto, recommend Outpatient Ischemic Evaluation   -Oxygen Supplementation as needed, Hold off on Diuresis up until Potassium is normal  -Cont Antibiotics -Levaquin for possible CAP and UTI, Monitor WBC, fever curve, r/o COVID, f/u Blood Urine Resp Cultures  -Monitor Hb, Transfuse to keep >8  -PT once stable  -Home Medications resumed as needed , Amlodipine still on hold, add Inhaler given Hx of COPD            Critical care Time Spent>35 min           VTE prophylaxis: lovenox    Patient condition:  Critical    Anticipated discharge and Disposition:         All diagnosis and differential diagnosis have been reviewed; assessment and plan has been documented; I have personally reviewed the labs and test results that are presently available; I have reviewed the patients medication list; I have reviewed the consulting providers response and recommendations. I have reviewed or attempted to review medical records based upon their availability    All of the patient's questions have been  addressed and answered. Patient's is agreeable to the above stated plan. I will continue to monitor closely and  make adjustments to medical management as needed.  _____________________________________________________________________    Nutrition Status:    Radiology:  Echo  · Concentric hypertrophy and low normal systolic function.  · The estimated ejection fraction is 50%.  · Indeterminate left ventricular diastolic function.  · Normal right ventricular size.  · Mild right atrial enlargement.  · Mild mitral regurgitation.  · Mild to moderate tricuspid regurgitation.  · Normal central venous pressure (3 mmHg).  · The estimated PA systolic pressure is 35 mmHg.  · Moderate left atrial enlargement.         Echo Feilds MD   03/19/2023

## 2023-03-19 NOTE — H&P
[facility] Hospital Medicine History & Physical Examination       Patient Name: Max Squires  MRN: [id number]  Patient Class: IP- Inpatient   Admission Date: 3/18/2023  3:31 PM  Length of Stay: 1  Admitting Service: Hospital Medicine   Attending Physician: Canelo Colorado MD   Primary Care Provider: Alber Menjivar MD  History source: EMR, patient and/or patient's family    CHIEF COMPLAINT   Diarrhea and Leg Swelling (Pt presents c/o diarrhea x 2 months/ states dx with Cdiff on Tuesday.  / home on meds/ no resolve/ also with bilateral lower extremity swelling.  )    HISTORY OF PRESENT ILLNESS:   Patient is a pleasant 88-year-old male was brought to the ER with significant diarrhea as well as reported bilateral lower extremity swelling.  Daughter was initially present (no longer present inpatient is not a great historian) but explained he has been dealing with diarrhea for several months as well as recurrent C diff infection and was diagnosed recently 03/15/2023 but has not yet started oral vancomycin again.    He arrived to the ER afebrile hemodynamically stable maintaining adequate sats on his baseline 2 L nasal cannula.  Laboratory work showed leukocytosis of 18 K, hypokalemia with a potassium of 2.3, a mildly elevated troponin and BNP, positive stool C diff.     Chest x-ray showed patchy infiltrates and emphysematous changes.  Echo showed preliminary EF of 40%.  He was started on oral vancomycin admitted to the hospitalist service for further management.    PAST MEDICAL HISTORY:   Essential hypertension  Hyperlipidemia  Reported recurrent C diff   COPD on 2 L nasal cannula nightly    PAST SURGICAL HISTORY:      BACK SURGERY      CORONARY ANGIOPLASTY WITH STENT PLACEMENT      HIP SURGERY Left     Dr. Hoyos    JOINT REPLACEMENT Left     QUETA    JOINT REPLACEMENT Right     TKA       ALLERGIES:   Patient has no known allergies.    FAMILY HISTORY:   Reviewed and non-contributory      SOCIAL HISTORY:      Smoking status: Every Day     Packs/day: 1.00     Types: Cigarettes    Smokeless tobacco: Not on file    Alcohol use: No        HOME MEDICATIONS:     amLODIPine (NORVASC) 5 MG tablet Take 5 mg by mouth.   amlodipine-atorvastatin (CADUET) 5-80 mg per tablet amlodipine 5 mg-atorvastatin 80 mg tablet   aspirin (ECOTRIN) 81 MG EC tablet Take 81 mg by mouth once daily.   atorvastatin (LIPITOR) 80 MG tablet Take 80 mg by mouth once daily.   diclofenac sodium (VOLTAREN) 1 % Gel APPLY 1 TO 2 GRAMS TOPICALLY TO THE AFFECTED AREA FOUR TIMES DAILY AS NEEDED   ezetimibe (ZETIA) 10 mg tablet Take 10 mg by mouth every evening.   finasteride (PROSCAR) 5 mg tablet Take 5 mg by mouth once daily.   oxyCODONE-acetaminophen (PERCOCET) 5-325 mg per tablet Take 1 tablet by mouth every 6 (six) hours as needed.   propranolol (INDERAL LA) 120 MG 24 hr capsule TK 1 C PO QD   tamsulosin (FLOMAX) 0.4 mg Cap Take 2 capsules by mouth once daily.       REVIEW OF SYSTEMS:   Except as documented, all other systems reviewed and negative     PHYSICAL EXAM:   T 98.5 °F (36.9 °C)   /63   P 64   RR 17   O2 98 %  GENERAL: awake, alert, oriented to self and in no acute distress, non-toxic appearing   HEENT: normocephalic atraumatic   NECK: supple   LUNGS: Clear bilaterally, no wheezing or rales, no accessory muscle use   CVS: Regular rate and rhythm, normal peripheral perfusion  ABD: Soft, non-tender, non-distended, bowel sounds present  EXTREMITIES: no clubbing or cyanosis, bilateral lower extremity edema  SKIN: Warm, dry.   NEURO: alert, grossly without focal deficits   PSYCHIATRIC: Cooperative    LABS AND IMAGING:     Recent Labs     03/18/23  1547   WBC 18.9*   RBC 4.71   HGB 13.1*   HCT 40.3*   MCV 85.6   MCH 27.8   MCHC 32.5*   RDW 15.6        No results for input(s): LACTIC in the last 72 hours.  No results for input(s): INR, APTT, D-DIMER in the last 72 hours.  No results for input(s): HGBA1C, CHOL, TRIG, LDL,  VLDL, HDL in the last 72 hours.   Recent Labs     03/18/23  1547 03/18/23  2233    138   K 2.3* 2.7*   CHLORIDE 90* 92*   CO2 33* 28   BUN 19.9 18.0   CREATININE 0.92 0.86   GLUCOSE 105 107   CALCIUM 8.1* 7.8*   MG 1.60  --    PHOS  --  3.1   ALBUMIN 2.3*  --    GLOBULIN 3.4  --    ALKPHOS 86  --    ALT 10  --    AST 13  --    BILITOT 0.6  --      Recent Labs     03/18/23  1547 03/18/23  2233   .7*  --    TROPONINI 0.090* 0.097*          X-Ray Chest 1 View  Narrative: EXAMINATION:  XR CHEST 1 VIEW    CLINICAL HISTORY:  Dyspnea, unspecified    TECHNIQUE:  One view    COMPARISON:  None available.    FINDINGS:  Cardiopericardial silhouette is within normal limits.  Lungs are remarkable severe chronic emphysematous changes with fibrosis.  There are cystic formations with honeycombing lower lung zones.  There are is scattered patchy and ground-glass opacities of the lungs which could represent associated mild pneumonitis without exclusion of developing early infiltrates.  No significant fluid within the pleural space.  No pneumothorax.  Impression: As above.    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   Hypokalemia secondary to above  Mildly decompensated systolic heart failure  NSTEMI likely type 2 demand ischemia   Patchy infiltrates- ?CAP   Essential hypertension  Hyperlipidemia  COPD on 2 L nasal cannula nightly    - continue oral vancomycin   - aggressive repletion of electrolytes as indicated   - telemetry monitoring, official echo read pending, trend cardiac enzymes  - holding off on diuresis until potassium repleted  - Levaquin for potential CAP, mild UTI    DVT prophylaxis: lovenox  Code status: full code    If patient was admitted under observational status it is with my approval/permission.     At least 55 min was spent on this history and physical.  Time seen: 2 AM 3/19/23  Critical care  time = 35 min; Critical care diagnosis = severe hypokalemia  Canelo Colorado MD

## 2023-03-19 NOTE — NURSING
Nurses Note -- 4 Eyes      3/18/2023   11:59 PM      Skin assessed during: Admit      [] No Pressure Injuries Present    []Prevention Measures Documented      [x] Yes- Altered Skin Integrity Present or Discovered   [x] LDA Added if Not in Epic (Describe Wound)   [] New Altered Skin Integrity was Present on Admit and Documented in LDA   [] Wound Image Taken    Wound Care Consulted? Yes    Attending Nurse:  Ephraim Meeks RN     Second RN/Staff Member:  Milo Bar RN

## 2023-03-20 LAB
ALBUMIN SERPL-MCNC: 1.9 G/DL (ref 3.4–4.8)
ALBUMIN/GLOB SERPL: 0.8 RATIO (ref 1.1–2)
ALP SERPL-CCNC: 63 UNIT/L (ref 40–150)
ALT SERPL-CCNC: 7 UNIT/L (ref 0–55)
AST SERPL-CCNC: 8 UNIT/L (ref 5–34)
BASOPHILS # BLD AUTO: 0.03 X10(3)/MCL (ref 0–0.2)
BASOPHILS NFR BLD AUTO: 0.3 %
BILIRUBIN DIRECT+TOT PNL SERPL-MCNC: 0.4 MG/DL
BUN SERPL-MCNC: 9.8 MG/DL (ref 8.4–25.7)
CALCIUM SERPL-MCNC: 7.5 MG/DL (ref 8.8–10)
CHLORIDE SERPL-SCNC: 95 MMOL/L (ref 98–107)
CHOLEST SERPL-MCNC: 110 MG/DL
CHOLEST/HDLC SERPL: 4 {RATIO} (ref 0–5)
CO2 SERPL-SCNC: 32 MMOL/L (ref 23–31)
CREAT SERPL-MCNC: 0.74 MG/DL (ref 0.73–1.18)
EOSINOPHIL # BLD AUTO: 0.11 X10(3)/MCL (ref 0–0.9)
EOSINOPHIL NFR BLD AUTO: 1.2 %
ERYTHROCYTE [DISTWIDTH] IN BLOOD BY AUTOMATED COUNT: 15.7 % (ref 11.5–17)
GFR SERPLBLD CREATININE-BSD FMLA CKD-EPI: >60 MLS/MIN/1.73/M2
GLOBULIN SER-MCNC: 2.5 GM/DL (ref 2.4–3.5)
GLUCOSE SERPL-MCNC: 107 MG/DL (ref 82–115)
HCT VFR BLD AUTO: 34.4 % (ref 42–52)
HDLC SERPL-MCNC: 31 MG/DL (ref 35–60)
HGB BLD-MCNC: 10.9 G/DL (ref 14–18)
IMM GRANULOCYTES # BLD AUTO: 0.04 X10(3)/MCL (ref 0–0.04)
IMM GRANULOCYTES NFR BLD AUTO: 0.4 %
LDLC SERPL CALC-MCNC: 60 MG/DL (ref 50–140)
LYMPHOCYTES # BLD AUTO: 1.99 X10(3)/MCL (ref 0.6–4.6)
LYMPHOCYTES NFR BLD AUTO: 22.2 %
MAGNESIUM SERPL-MCNC: 1.8 MG/DL (ref 1.6–2.6)
MCH RBC QN AUTO: 27.7 PG
MCHC RBC AUTO-ENTMCNC: 31.7 G/DL (ref 33–36)
MCV RBC AUTO: 87.5 FL (ref 80–94)
MONOCYTES # BLD AUTO: 0.45 X10(3)/MCL (ref 0.1–1.3)
MONOCYTES NFR BLD AUTO: 5 %
NEUTROPHILS # BLD AUTO: 6.35 X10(3)/MCL (ref 2.1–9.2)
NEUTROPHILS NFR BLD AUTO: 70.9 %
NRBC BLD AUTO-RTO: 0 %
PLATELET # BLD AUTO: 156 X10(3)/MCL (ref 130–400)
PMV BLD AUTO: 9.8 FL (ref 7.4–10.4)
POTASSIUM SERPL-SCNC: 3 MMOL/L (ref 3.5–5.1)
PROT SERPL-MCNC: 4.4 GM/DL (ref 5.8–7.6)
RBC # BLD AUTO: 3.93 X10(6)/MCL (ref 4.7–6.1)
SODIUM SERPL-SCNC: 136 MMOL/L (ref 136–145)
TRIGL SERPL-MCNC: 97 MG/DL (ref 34–140)
VLDLC SERPL CALC-MCNC: 19 MG/DL
WBC # SPEC AUTO: 9 X10(3)/MCL (ref 4.5–11.5)

## 2023-03-20 PROCEDURE — 21400001 HC TELEMETRY ROOM

## 2023-03-20 PROCEDURE — 85025 COMPLETE CBC W/AUTO DIFF WBC: CPT | Performed by: NURSE PRACTITIONER

## 2023-03-20 PROCEDURE — 25000003 PHARM REV CODE 250: Performed by: INTERNAL MEDICINE

## 2023-03-20 PROCEDURE — 80053 COMPREHEN METABOLIC PANEL: CPT | Performed by: NURSE PRACTITIONER

## 2023-03-20 PROCEDURE — 27000221 HC OXYGEN, UP TO 24 HOURS

## 2023-03-20 PROCEDURE — 25000003 PHARM REV CODE 250: Performed by: NURSE PRACTITIONER

## 2023-03-20 PROCEDURE — 94761 N-INVAS EAR/PLS OXIMETRY MLT: CPT

## 2023-03-20 PROCEDURE — 83735 ASSAY OF MAGNESIUM: CPT | Performed by: NURSE PRACTITIONER

## 2023-03-20 PROCEDURE — 25000003 PHARM REV CODE 250: Performed by: STUDENT IN AN ORGANIZED HEALTH CARE EDUCATION/TRAINING PROGRAM

## 2023-03-20 PROCEDURE — 63600175 PHARM REV CODE 636 W HCPCS: Performed by: INTERNAL MEDICINE

## 2023-03-20 RX ORDER — CHOLESTYRAMINE 4 G/4.8G
1 POWDER, FOR SUSPENSION ORAL 2 TIMES DAILY
Status: DISCONTINUED | OUTPATIENT
Start: 2023-03-20 | End: 2023-03-23

## 2023-03-20 RX ORDER — LACTOBACILLUS ACIDOPHILUS 500MM CELL
1 CAPSULE ORAL
Status: DISCONTINUED | OUTPATIENT
Start: 2023-03-20 | End: 2023-04-02 | Stop reason: HOSPADM

## 2023-03-20 RX ORDER — MICONAZOLE NITRATE 2 %
POWDER (GRAM) TOPICAL 2 TIMES DAILY
Status: DISCONTINUED | OUTPATIENT
Start: 2023-03-20 | End: 2023-04-02 | Stop reason: HOSPADM

## 2023-03-20 RX ADMIN — POTASSIUM CHLORIDE 40 MEQ: 1500 TABLET, EXTENDED RELEASE ORAL at 09:03

## 2023-03-20 RX ADMIN — Medication 125 MG: at 11:03

## 2023-03-20 RX ADMIN — FINASTERIDE 5 MG: 5 TABLET, FILM COATED ORAL at 08:03

## 2023-03-20 RX ADMIN — ENOXAPARIN SODIUM 40 MG: 40 INJECTION SUBCUTANEOUS at 05:03

## 2023-03-20 RX ADMIN — SACUBITRIL AND VALSARTAN 1 TABLET: 24; 26 TABLET, FILM COATED ORAL at 08:03

## 2023-03-20 RX ADMIN — LEVOFLOXACIN 750 MG: 750 INJECTION, SOLUTION INTRAVENOUS at 05:03

## 2023-03-20 RX ADMIN — ASPIRIN 81 MG: 81 TABLET, COATED ORAL at 08:03

## 2023-03-20 RX ADMIN — Medication 1 CAPSULE: at 05:03

## 2023-03-20 RX ADMIN — Medication 125 MG: at 05:03

## 2023-03-20 RX ADMIN — POTASSIUM CHLORIDE 40 MEQ: 1500 TABLET, EXTENDED RELEASE ORAL at 08:03

## 2023-03-20 RX ADMIN — EZETIMIBE 10 MG: 10 TABLET ORAL at 09:03

## 2023-03-20 RX ADMIN — TAMSULOSIN HYDROCHLORIDE 0.8 MG: 0.4 CAPSULE ORAL at 08:03

## 2023-03-20 RX ADMIN — METOPROLOL SUCCINATE 25 MG: 25 TABLET, EXTENDED RELEASE ORAL at 08:03

## 2023-03-20 RX ADMIN — ATORVASTATIN CALCIUM 40 MG: 40 TABLET, FILM COATED ORAL at 09:03

## 2023-03-20 RX ADMIN — SACUBITRIL AND VALSARTAN 1 TABLET: 24; 26 TABLET, FILM COATED ORAL at 09:03

## 2023-03-20 RX ADMIN — CHOLESTYRAMINE 4 G: 4 POWDER, FOR SUSPENSION ORAL at 09:03

## 2023-03-20 RX ADMIN — Medication 125 MG: at 01:03

## 2023-03-20 RX ADMIN — Medication 1 CAPSULE: at 11:03

## 2023-03-20 NOTE — PROGRESS NOTES
[facility] Hospital Medicine Progress Note        Chief Complaint: Inpatient Follow-up for SOB, Diarrhea    HPI: Patient is a pleasant 88-year-old male was brought to the ER with significant diarrhea as well as reported bilateral lower extremity swelling.  Daughter was initially present (no longer present inpatient is not a great historian) but explained he has been dealing with diarrhea for several months as well as recurrent C diff infection and was diagnosed recently 03/15/2023 but has not yet started oral vancomycin again.     He arrived to the ER afebrile hemodynamically stable maintaining adequate sats on his baseline 2 L nasal cannula.  Laboratory work showed leukocytosis of 18 K, hypokalemia with a potassium of 2.3, a mildly elevated troponin and BNP, positive stool C diff.      Chest x-ray showed patchy infiltrates and emphysematous changes.  Echo showed preliminary EF of 40%.  He was started on oral vancomycin admitted to the hospitalist service for further management.  Added probiotics and Questran  Interval Hx:     Patient was seen and examined still complains of diarrhea still on 2 L nasal cannula saturating around 94-97%    Objective/physical exam:  General: In no acute distress, afebrile  Chest: Clear to auscultation bilaterally  Heart: RRR, +S1, S2, no appreciable murmur  Abdomen: Soft, nontender, BS +  MSK: Warm, no lower extremity edema, no clubbing or cyanosis  Neurologic: Alert and oriented x4,   VITAL SIGNS: 24 HRS MIN & MAX LAST   Temp  Min: 97.8 °F (36.6 °C)  Max: 98.2 °F (36.8 °C) 97.9 °F (36.6 °C)   BP  Min: 120/68  Max: 153/83 136/69     Pulse  Min: 65  Max: 76  65   Resp  Min: 20  Max: 20 20   SpO2  Min: 92 %  Max: 100 % (!) 94 %         Recent Labs   Lab 03/18/23  1547 03/19/23  0714 03/20/23  0449   WBC 18.9* 14.2* 9.0   RBC 4.71 4.27* 3.93*   HGB 13.1* 11.8* 10.9*   HCT 40.3* 36.6* 34.4*   MCV 85.6 85.7 87.5   MCH 27.8 27.6 27.7   MCHC 32.5* 32.2* 31.7*    RDW 15.6 15.7 15.7    175 156   MPV 9.0 9.6 9.8       Recent Labs   Lab 03/18/23  1547 03/18/23  2233 03/19/23  0508 03/19/23  1817 03/20/23  0450      < > 136 137 136   K 2.3*   < > 2.2* 3.1* 3.0*   CO2 33*   < > 31 32* 32*   BUN 19.9   < > 17.1 13.9 9.8   CREATININE 0.92   < > 0.86 0.94 0.74   CALCIUM 8.1*   < > 8.0* 7.6* 7.5*   MG 1.60  --  1.60  --  1.80   ALBUMIN 2.3*  --  2.3*  --  1.9*   ALKPHOS 86  --  82  --  63   ALT 10  --  8  --  7   AST 13  --  11  --  8   BILITOT 0.6  --  0.6  --  0.4    < > = values in this interval not displayed.          Microbiology Results (last 7 days)       Procedure Component Value Units Date/Time    Stool Culture **CANNOT BE ORDERED STAT** [[id number]]  (Normal) Collected: 03/18/23 1840    Order Status: Completed Specimen: Stool Updated: 03/20/23 0753     Stool Culture Negative for Salmonella, Shigella, Campylobacter, Vibrio, Aeromonas, Pleisiomonas,Yersinia, or Shiga Toxin 1 and 2.    Urine culture [[id number]]  (Abnormal) Collected: 03/18/23 1804    Order Status: Completed Specimen: Urine Updated: 03/20/23 0750     Urine Culture 25,000-50,000 colonies/ml Gram-negative Rods    Blood Culture [[id number]]  (Normal) Collected: 03/19/23 0038    Order Status: Completed Specimen: Blood Updated: 03/20/23 0200     CULTURE, BLOOD ([facility]) No Growth At 24 Hours    Blood Culture [[id number]]  (Normal) Collected: 03/19/23 0038    Order Status: Completed Specimen: Blood Updated: 03/20/23 0200     CULTURE, BLOOD ([facility]) No Growth At 24 Hours    Respiratory Culture [[id number]]     Order Status: Sent Specimen: Sputum, Expectorated     Clostridium Diff Toxin, A & B, EIA [[id number]]  (Abnormal) Collected: 03/18/23 1840    Order Status: Completed Specimen: Stool Updated: 03/19/23 0014     Clostridium Difficile GDH Antigen Positive     Clostridium Difficile Toxin A/B Positive             See below for Radiology    Scheduled Med:   aspirin  81 mg Oral Daily    atorvastatin  40 mg Oral QHS     enoxaparin  40 mg Subcutaneous Daily    ezetimibe  10 mg Oral QHS    finasteride  5 mg Oral Daily    levoFLOXacin  750 mg Intravenous Q24H    metoprolol succinate  25 mg Oral Daily    potassium chloride  40 mEq Oral BID    sacubitriL-valsartan  1 tablet Oral BID    tamsulosin  2 capsule Oral Daily    vancomycin  125 mg Oral Q6H    Followed by    [START ON 3/29/2023] vancomycin  125 mg Oral Q12H    Followed by    [START ON 4/5/2023] vancomycin  125 mg Oral Daily    Followed by    [START ON 4/12/2023] vancomycin  125 mg Oral Q3 Days        Continuous Infusions:       PRN Meds:  acetaminophen, acetaminophen, acetaminophen, albuterol, dextrose 10%, dextrose 10%, glucagon (human recombinant), glucose, glucose, melatonin, ondansetron, sodium chloride 0.9%       Assessment/Plan:  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   Hypokalemia   NSTEMI likely type 2 demand ischemia   AHRF,Newly Diagnosed Systolic HF/EF 40%  Possible Community Acquired Pneumonia and UTI  Leukocytosis  Anemia,Unspecified  Essential hypertension,Hyperlipidemia,COPD on 2 L nasal cannula nightly,CAD      Plan:  Continue with oral vancomycin and added probiotics and Questran  Cardiology following patient is on aspirin Toprol Entresto and they recommended outpatient ischemic eval   They recommended starting the patient on Lasix once diarrhea is better and electrolytes states stable   Will continue with oxygen supplementation   Urine culture is growing Gram-negative rods will await culture results patient is on Levaquin for possible questionable pneumonia and UTI   White cell count is back to normal   Will keep a close watch on patient's H&H        VTE prophylaxis: lovenox    Patient condition:  Critical    Anticipated discharge and Disposition:         All diagnosis and differential diagnosis have been reviewed; assessment and plan has been documented; I have personally reviewed the labs and test results that are presently available; I have  reviewed the patients medication list; I have reviewed the consulting providers response and recommendations. I have reviewed or attempted to review medical records based upon their availability    All of the patient's questions have been  addressed and answered. Patient's is agreeable to the above stated plan. I will continue to monitor closely and make adjustments to medical management as needed.  _____________________________________________________________________    Nutrition Status:    Radiology:  Echo  · Concentric hypertrophy and low normal systolic function.  · The estimated ejection fraction is 50%.  · Indeterminate left ventricular diastolic function.  · Normal right ventricular size.  · Mild right atrial enlargement.  · Mild mitral regurgitation.  · Mild to moderate tricuspid regurgitation.  · Normal central venous pressure (3 mmHg).  · The estimated PA systolic pressure is 35 mmHg.  · Moderate left atrial enlargement.         Elena Nina MD   03/20/2023

## 2023-03-20 NOTE — PROGRESS NOTES
Inpatient Nutrition Assessment    Admit Date: 3/18/2023   Total duration of encounter: 2 days     Nutrition Recommendation/Prescription     -Continue liberalized diet to encourage oral intake.  -Add Boost (provides 240 kcal, 10 g protein per serving) to encourage increased oral intake.   -Add Banantrol TID for diarrhea.   -Replete electrolytes as medically feasible.     Communication of Recommendations: reviewed with patient    Nutrition Assessment     Malnutrition Assessment/Nutrition-Focused Physical Exam    Malnutrition in the context of acute illness or injury  Degree of Malnutrition: unable to complete  Energy Intake: < 75% of estimated energy requirement for > 7 days  Interpretation of Weight Loss: unable to obtain  Body Fat:mild depletion  Area of Body Fat Loss: upper arm region - triceps / biceps  Muscle Mass Loss: mild depletion  Area of Muscle Mass Loss: clavicle bone region - pectoralis major, deltoid, trapezius muscles and clavicle and acromion bone region - deltoid muscle  Fluid Accumulation: mild  Edema: 1+ edema - trace   Reduced  Strength: unable to obtain  A minimum of two characteristics is recommended for diagnosis of either severe or non-severe malnutrition.    Chart Review    Reason Seen: malnutrition screening tool (MST)    Malnutrition Screening Tool Results   Have you recently lost weight without trying?: Yes: Unsure how much  Have you been eating poorly because of a decreased appetite?: Yes   MST Score: 3     Diagnosis:  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   Hypokalemia   NSTEMI likely type 2 demand ischemia   AHRF,Newly Diagnosed Systolic HF/EF 40%  Possible Community Acquired Pneumonia and UTI  Leukocytosis  Anemia,Unspecified    Relevant Medical History: HTN, HLD, COPD, CAD    Nutrition-Related Medications: questran, probiotic, KCl  Calorie Containing IV Medications: no significant kcals from medications at this time    Nutrition-Related Labs:  3/20/23 K 3, Cl 95, CO2 32,  Ca 7.5, Total pro 4.4, Alb 1.9    Diet/PN Order: Diet Adult Regular  Oral Supplement Order: none  Tube Feeding Order: none  Appetite/Oral Intake: good/50-75% of meals  Factors Affecting Nutritional Intake: diarrhea  Food/Jehovah's witness/Cultural Preferences: none reported  Food Allergies: none reported    Skin Integrity: wound  Wound(s):   Altered Skin Integrity 23 2200 Sacral spine Intact skin with non-blanchable redness of localized area    Comments    3/20/23 Pt reports fair-good appetite since admit, decreased prior and reports diarrhea over the last few months; unsure if unintentional weight loss, no recent wt hx in chart. Denies any nausea or vomiting. Willing to trial banatrol for diarrhea and Boost for additional nutrition.     Anthropometrics    Height: 6' (182.9 cm)    Last Weight: 68 kg (150 lb) (23 0336) Weight Method: Stated  BMI (Calculated): 20.3  BMI Classification: underweight (BMI less than 22 if >65 years of age)        Ideal Body Weight (IBW), Male: 178 lb     % Ideal Body Weight, Male (lb): 84.27 %                          Usual Weight Provided By: patient    Wt Readings from Last 5 Encounters:   23 68 kg (150 lb)   19 79.4 kg (175 lb)     Weight Change(s) Since Admission:  Admit Weight: 68 kg (150 lb) (23 1529)  3/19/23 68kg admit wt     Estimated Needs    Weight Used For Calorie Calculations: 68 kg (149 lb 14.6 oz)  Energy Calorie Requirements (kcal): 2040kcals/d (30kcals/kg)  Energy Need Method: Kcal/kg  Weight Used For Protein Calculations: 68 kg (149 lb 14.6 oz)  Protein Requirements: 81-88g/d (1.2-1.3g/kg)  Fluid Requirements (mL): 1700ml fl/d (25ml/kg)  Temp (24hrs), Av.1 °F (36.7 °C), Min:97.5 °F (36.4 °C), Max:99.1 °F (37.3 °C)         Enteral Nutrition    Patient not receiving enteral nutrition at this time.    Parenteral Nutrition    Patient not receiving parenteral nutrition support at this time.    Evaluation of Received Nutrient Intake    Calories: not  meeting estimated needs  Protein: not meeting estimated needs    Patient Education    Not applicable.    Nutrition Diagnosis     PES: Malnutrition related to recurrent c. Diff/inadequate oral intake as evidenced by <75% EER >1 wk, muscle and fat loss. (new)    Interventions/Goals     Intervention(s): general/healthful diet, commercial food, multivitamin/mineral supplement therapy, and collaboration with other providers  Goal: Maintain weight throughout hospitalization. (new)    Monitoring & Evaluation     Dietitian will monitor food and beverage intake, weight change, and electrolyte/renal panel.  Nutrition Risk/Follow-Up: high (follow-up in 1-4 days)   Please consult if re-assessment needed sooner.

## 2023-03-20 NOTE — PROGRESS NOTES
[facility] - [facility] Medical Telemetry  Cardiology  Progress Note    Patient Name: Max Squires  MRN: [id number]  Admission Date: 3/18/2023  Hospital Length of Stay: 2 days  Code Status: Full Code   Attending Physician: Flavio Muñoz MD   Primary Care Physician: Alber Menjivar MD  Expected Discharge Date:   Principal Problem:C. difficile diarrhea    Subjective:     Brief HPI:   Mr. Squires is an 87 y/o male who is unknown to [facility]. The patient presented to [facility] on 3.18.23 with c/o Diarrhea and BLE Swelling. The patient reported a 2 month history of Diarrhea and a more recent history of BLE Swelling. He has been treated for C.Diff with recurrence of infection. Upon arrival he was evaluated and found to have WBC 18.9, K 2.3, .7 and Troponin of 0.087. He also had a CXR which revealed Patchy Infiltrates and Emphysematic changes. He was started on Tx for C.Diff and Admitted to Hospital Medicine. [facility] has been consulted for Elevated Troponin and HF.     Hospital Course:   3.20.23: In bed, states he feels better today but still weak. Remains on O2 per NC.      PMH: HTN, HLD, C.Diff, COPD/Home O2, OA  PSH: Back Surgery, Angiogram, Hip Surgery, Joint Replacements (Hip and Knee)  Family History: Reviewed and Unremarkable for Heart Disease  Social History: Denies ETOH and Illicit Drug Use; + Tobacco Use 1ppd for 60+ Years     Previous Cardiac Diagnostics:   ECHO 3.18.23  Concentric hypertrophy and low normal systolic function.  The estimated ejection fraction is 50%.  Indeterminate left ventricular diastolic function.  Normal right ventricular size.  Mild right atrial enlargement.  Mild mitral regurgitation.  Mild to moderate tricuspid regurgitation.  Normal central venous pressure (3 mmHg).  The estimated PA systolic pressure is 35 mmHg.  Moderate left atrial enlargement.        Review of Systems   Constitutional: Negative for chills and fever.   Cardiovascular:  Negative for chest pain and palpitations.    Respiratory:  Negative for shortness of breath.    Psychiatric/Behavioral:  Negative for altered mental status.          Objective:     Vital Signs (Most Recent):  Temp: 97.5 °F (36.4 °C) (03/20/23 0802)  Pulse: 67 (03/20/23 0847)  Resp: 18 (03/20/23 0802)  BP: 117/63 (03/20/23 0847)  SpO2: 97 % (03/20/23 0802) Vital Signs (24h Range):  Temp:  [97.5 °F (36.4 °C)-98.2 °F (36.8 °C)] 97.5 °F (36.4 °C)  Pulse:  [65-76] 67  Resp:  [18-20] 18  SpO2:  [92 %-97 %] 97 %  BP: (117-153)/(63-83) 117/63     Weight: 68 kg (150 lb)  Body mass index is 20.34 kg/m².    SpO2: 97 %         Intake/Output Summary (Last 24 hours) at 3/20/2023 1009  Last data filed at 3/20/2023 0555  Gross per 24 hour   Intake 1680 ml   Output 800 ml   Net 880 ml       Lines/Drains/Airways       Peripheral Intravenous Line  Duration                  Peripheral IV - Single Lumen -- days         Peripheral IV - Single Lumen 03/18/23 22 G Right Forearm 2 days         Peripheral IV - Single Lumen 03/18/23 1557 20 G Left Hand 1 day                    Significant Labs: CMP   Recent Labs   Lab 03/18/23  1547 03/18/23  2233 03/19/23  0508 03/19/23  1817 03/20/23  0450      < > 136 137 136   K 2.3*   < > 2.2* 3.1* 3.0*   CO2 33*   < > 31 32* 32*   BUN 19.9   < > 17.1 13.9 9.8   CREATININE 0.92   < > 0.86 0.94 0.74   CALCIUM 8.1*   < > 8.0* 7.6* 7.5*   ALBUMIN 2.3*  --  2.3*  --  1.9*   BILITOT 0.6  --  0.6  --  0.4   ALKPHOS 86  --  82  --  63   AST 13  --  11  --  8   ALT 10  --  8  --  7    < > = values in this interval not displayed.    and CBC   Recent Labs   Lab 03/18/23  1547 03/19/23  0714 03/20/23  0449   WBC 18.9* 14.2* 9.0   HGB 13.1* 11.8* 10.9*   HCT 40.3* 36.6* 34.4*    175 156       Telemetry:  SR    Physical Exam:  Physical Exam  Constitutional:       Appearance: Normal appearance.   HENT:      Head: Normocephalic.      Mouth/Throat:      Mouth: Mucous membranes are moist.   Eyes:      Extraocular Movements: Extraocular movements  intact.   Cardiovascular:      Rate and Rhythm: Normal rate and regular rhythm.      Pulses: Normal pulses.      Heart sounds: Normal heart sounds.   Pulmonary:      Effort: Pulmonary effort is normal.      Breath sounds: Normal breath sounds.   Abdominal:      Palpations: Abdomen is soft.   Musculoskeletal:         General: Swelling present.      Right lower leg: Edema present.      Left lower leg: Edema present.   Skin:     General: Skin is warm and dry.   Neurological:      General: No focal deficit present.      Mental Status: He is alert and oriented to person, place, and time. Mental status is at baseline.   Psychiatric:         Mood and Affect: Mood normal.         Behavior: Behavior normal.       Current Inpatient Medications:    Current Facility-Administered Medications:     acetaminophen tablet 650 mg, 650 mg, Oral, Q8H PRN, Jourdan Rodriguez PA-C    acetaminophen tablet 650 mg, 650 mg, Oral, Q4H PRN, Jourdan Rodriguez PA-C    acetaminophen tablet 650 mg, 650 mg, Oral, Q8H PRN, Canelo Colorado MD    albuterol inhaler 2 puff, 2 puff, Inhalation, Q6H PRN, Echo Fields MD    aspirin EC tablet 81 mg, 81 mg, Oral, Daily, Randall Colorado, BELLE, 81 mg at 03/20/23 0847    atorvastatin tablet 40 mg, 40 mg, Oral, QHS, Echo Fields MD, 40 mg at 03/19/23 2137    dextrose 10% bolus 125 mL 125 mL, 12.5 g, Intravenous, PRN, Jourdan Rodriguez PA-C    dextrose 10% bolus 250 mL 250 mL, 25 g, Intravenous, PRN, Jourdan Rodriguez PA-C    enoxaparin injection 40 mg, 40 mg, Subcutaneous, Daily, Canelo Colorado MD, 40 mg at 03/19/23 1733    ezetimibe tablet 10 mg, 10 mg, Oral, QHS, Echo Fields MD, 10 mg at 03/19/23 2137    finasteride tablet 5 mg, 5 mg, Oral, Daily, Echo Fields MD, 5 mg at 03/20/23 0848    glucagon (human recombinant) injection 1 mg, 1 mg, Intramuscular, PRN, Jourdan Rodriguez PA-C    glucose chewable tablet 16 g, 16 g, Oral, PRN, Jourdan Rodriguez PA-C    glucose chewable tablet 24 g, 24 g, Oral, PRN,  Jourdan Rodriguez PA-C    Lactobacillus acidophilus capsule 1 capsule, 1 capsule, Oral, TID WM, Elena Nina MD    levoFLOXacin 750 mg/150 mL IVPB 750 mg, 750 mg, Intravenous, Q24H, Canelo Colorado MD, Stopped at 03/20/23 0705    melatonin tablet 6 mg, 6 mg, Oral, Nightly PRN, Canelo Colorado MD    metoprolol succinate (TOPROL-XL) 24 hr tablet 25 mg, 25 mg, Oral, Daily, BELLE Barry, 25 mg at 03/20/23 0847    ondansetron injection 4 mg, 4 mg, Intravenous, Q8H PRN, Jourdan Rodriguez PA-C    potassium chloride SA CR tablet 40 mEq, 40 mEq, Oral, BID, Canelo Colorado MD, 40 mEq at 03/20/23 0847    sacubitriL-valsartan 24-26 mg per tablet 1 tablet, 1 tablet, Oral, BID, BELLE Barry, 1 tablet at 03/20/23 0847    sodium chloride 0.9% flush 10 mL, 10 mL, Intravenous, PRN, Canelo Colorado MD    tamsulosin 24 hr capsule 0.8 mg, 2 capsule, Oral, Daily, Echo Fields MD, 0.8 mg at 03/20/23 0848    vancomycin 125 mg/5 mL oral solution 125 mg, 125 mg, Oral, Q6H, 125 mg at 03/20/23 0122 **FOLLOWED BY** [START ON 3/29/2023] vancomycin 125 mg/5 mL oral solution 125 mg, 125 mg, Oral, Q12H **FOLLOWED BY** [START ON 4/5/2023] vancomycin 125 mg/5 mL oral solution 125 mg, 125 mg, Oral, Daily **FOLLOWED BY** [START ON 4/12/2023] vancomycin 125 mg/5 mL oral solution 125 mg, 125 mg, Oral, Q3 Days, Abel Aggarwal MD        Assessment:     IMPRESSION:  NSTEMI - Likely Type II in the Setting of HF  Newly Diagnosed CMO/EF 50%  Newly Diagnosed Systolic HF/EF 50%  Recurrent C.Diff Infection with Severe Diarrhea  Severe Electrolyte Derangements - Hypokalemia, Hypomagnesemia  Abnormal CXR    - CXR (3.18.23) - Patchy Infiltrates  UTI  HTN  HLD  COPD/Home O2  Hx of CAD/Stents (No Report Available for Review)  Leukocytosis   No Hx of GIB      Plan:     PLAN:  ContinueASA 81mg PO Qday   Continue Toprol XL 25mg PO Qday  Continue Entresto 24/26mg PO Qday  Accurate I&Os and Daily Weights  Keep K > 4.0 and Mg >  2.0  Replete Lytes  Will Plan for OP Ischemic Evaluation at Diarrhea/C.Diff Treated  Labs in AM: CBC, CMP, Mg and Lipid Panel    MADELINE Rose-BC  Cardiology  [facility] - [facility] Medical Telemetry  03/20/2023

## 2023-03-20 NOTE — PROGRESS NOTES
[facility] - [facility] Medical Telemetry  Wound Care    Patient Name:  Max Squires   MRN:  [id number]  Date: 3/20/2023  Diagnosis: C. difficile diarrhea    History:     Past Medical History:   Diagnosis Date    Arthritis     Hypercholesterolemia     Hypertension        Social History     Socioeconomic History    Marital status:    Tobacco Use    Smoking status: Every Day     Packs/day: 1.00     Types: Cigarettes   Substance and Sexual Activity    Alcohol use: No       Precautions:     Allergies as of 03/18/2023    (No Known Allergies)       WOC Assessment Details/Treatment   WOCN Consult visit related to  sacrum; Cleanse sacral area with Remedy foam, pat dry, apply Z-Guard skin barrier BID and PRN. (No open areas, blanchable.) Cleanse scrotal area with Remedy foam, pat dry, apply Miconazole powder  BID and PRN.  Care Recommendations:   Continue head to toe skin assessment q 12 hours;  Continue turning/repositioning q 2 hours or schedule to prevent erythema;  Continue with specialty bed and wedge for 30 degree pelvic tilt;  Keep sacral area clean and dry;  Avoid foam dressings on sacrum;  Avoid adult briefs;  Encourage activity as ordered;  Ensure adequate nutrition/hydration for healing;   03/20/23 1600        Altered Skin Integrity 03/18/23 2200 Sacral spine Intact skin with non-blanchable redness of localized area   Date First Assessed/Time First Assessed: 03/18/23 2200   Altered Skin Integrity Present on Admission - Did Patient arrive to the hospital with altered skin?: yes  Location: Sacral spine  Is this injury device related?: No  Description of Altered Skin ...   Wound Image     Care Cleansed with:;Applied:;Skin Barrier  (Remedy foam)       Orders placed. 03/20/2023

## 2023-03-21 LAB
ANION GAP SERPL CALC-SCNC: 6 MEQ/L
BACTERIA STL CULT: NORMAL
BACTERIA UR CULT: ABNORMAL
BASOPHILS # BLD AUTO: 0.03 X10(3)/MCL (ref 0–0.2)
BASOPHILS NFR BLD AUTO: 0.4 %
BUN SERPL-MCNC: 11.1 MG/DL (ref 8.4–25.7)
CALCIUM SERPL-MCNC: 7.3 MG/DL (ref 8.8–10)
CHLORIDE SERPL-SCNC: 92 MMOL/L (ref 98–107)
CO2 SERPL-SCNC: 34 MMOL/L (ref 23–31)
CREAT SERPL-MCNC: 0.66 MG/DL (ref 0.73–1.18)
CREAT/UREA NIT SERPL: 17
EOSINOPHIL # BLD AUTO: 0.12 X10(3)/MCL (ref 0–0.9)
EOSINOPHIL NFR BLD AUTO: 1.4 %
ERYTHROCYTE [DISTWIDTH] IN BLOOD BY AUTOMATED COUNT: 15.4 % (ref 11.5–17)
GFR SERPLBLD CREATININE-BSD FMLA CKD-EPI: >60 MLS/MIN/1.73/M2
GLUCOSE SERPL-MCNC: 96 MG/DL (ref 82–115)
HCT VFR BLD AUTO: 35.2 % (ref 42–52)
HGB BLD-MCNC: 11.4 G/DL (ref 14–18)
IMM GRANULOCYTES # BLD AUTO: 0.06 X10(3)/MCL (ref 0–0.04)
IMM GRANULOCYTES NFR BLD AUTO: 0.7 %
LYMPHOCYTES # BLD AUTO: 2.21 X10(3)/MCL (ref 0.6–4.6)
LYMPHOCYTES NFR BLD AUTO: 26 %
MCH RBC QN AUTO: 28.3 PG
MCHC RBC AUTO-ENTMCNC: 32.4 G/DL (ref 33–36)
MCV RBC AUTO: 87.3 FL (ref 80–94)
MONOCYTES # BLD AUTO: 0.48 X10(3)/MCL (ref 0.1–1.3)
MONOCYTES NFR BLD AUTO: 5.6 %
NEUTROPHILS # BLD AUTO: 5.61 X10(3)/MCL (ref 2.1–9.2)
NEUTROPHILS NFR BLD AUTO: 65.9 %
NRBC BLD AUTO-RTO: 0 %
PLATELET # BLD AUTO: 164 X10(3)/MCL (ref 130–400)
PMV BLD AUTO: 9.8 FL (ref 7.4–10.4)
POTASSIUM SERPL-SCNC: 3.5 MMOL/L (ref 3.5–5.1)
RBC # BLD AUTO: 4.03 X10(6)/MCL (ref 4.7–6.1)
SODIUM SERPL-SCNC: 132 MMOL/L (ref 136–145)
WBC # SPEC AUTO: 8.5 X10(3)/MCL (ref 4.5–11.5)

## 2023-03-21 PROCEDURE — 63600175 PHARM REV CODE 636 W HCPCS: Performed by: INTERNAL MEDICINE

## 2023-03-21 PROCEDURE — 25000003 PHARM REV CODE 250: Performed by: STUDENT IN AN ORGANIZED HEALTH CARE EDUCATION/TRAINING PROGRAM

## 2023-03-21 PROCEDURE — 25000003 PHARM REV CODE 250: Performed by: INTERNAL MEDICINE

## 2023-03-21 PROCEDURE — 25000003 PHARM REV CODE 250: Performed by: NURSE PRACTITIONER

## 2023-03-21 PROCEDURE — 85025 COMPLETE CBC W/AUTO DIFF WBC: CPT | Performed by: INTERNAL MEDICINE

## 2023-03-21 PROCEDURE — 27000221 HC OXYGEN, UP TO 24 HOURS

## 2023-03-21 PROCEDURE — 80048 BASIC METABOLIC PNL TOTAL CA: CPT | Performed by: INTERNAL MEDICINE

## 2023-03-21 PROCEDURE — 21400001 HC TELEMETRY ROOM

## 2023-03-21 RX ADMIN — LEVOFLOXACIN 750 MG: 500 TABLET, FILM COATED ORAL at 12:03

## 2023-03-21 RX ADMIN — Medication 125 MG: at 12:03

## 2023-03-21 RX ADMIN — MICONAZOLE NITRATE: 20 POWDER TOPICAL at 12:03

## 2023-03-21 RX ADMIN — POTASSIUM CHLORIDE 40 MEQ: 1500 TABLET, EXTENDED RELEASE ORAL at 10:03

## 2023-03-21 RX ADMIN — LEVOFLOXACIN 750 MG: 750 INJECTION, SOLUTION INTRAVENOUS at 05:03

## 2023-03-21 RX ADMIN — CHOLESTYRAMINE 4 G: 4 POWDER, FOR SUSPENSION ORAL at 10:03

## 2023-03-21 RX ADMIN — ASPIRIN 81 MG: 81 TABLET, COATED ORAL at 08:03

## 2023-03-21 RX ADMIN — ATORVASTATIN CALCIUM 40 MG: 40 TABLET, FILM COATED ORAL at 10:03

## 2023-03-21 RX ADMIN — Medication 1 CAPSULE: at 05:03

## 2023-03-21 RX ADMIN — METOPROLOL SUCCINATE 25 MG: 25 TABLET, EXTENDED RELEASE ORAL at 08:03

## 2023-03-21 RX ADMIN — Medication 125 MG: at 05:03

## 2023-03-21 RX ADMIN — CHOLESTYRAMINE 4 G: 4 POWDER, FOR SUSPENSION ORAL at 09:03

## 2023-03-21 RX ADMIN — Medication 1 CAPSULE: at 12:03

## 2023-03-21 RX ADMIN — MICONAZOLE NITRATE: 20 POWDER TOPICAL at 08:03

## 2023-03-21 RX ADMIN — FINASTERIDE 5 MG: 5 TABLET, FILM COATED ORAL at 08:03

## 2023-03-21 RX ADMIN — SACUBITRIL AND VALSARTAN 1 TABLET: 24; 26 TABLET, FILM COATED ORAL at 08:03

## 2023-03-21 RX ADMIN — Medication 1 CAPSULE: at 08:03

## 2023-03-21 RX ADMIN — ENOXAPARIN SODIUM 40 MG: 40 INJECTION SUBCUTANEOUS at 05:03

## 2023-03-21 RX ADMIN — POTASSIUM CHLORIDE 40 MEQ: 1500 TABLET, EXTENDED RELEASE ORAL at 08:03

## 2023-03-21 RX ADMIN — TAMSULOSIN HYDROCHLORIDE 0.8 MG: 0.4 CAPSULE ORAL at 08:03

## 2023-03-21 RX ADMIN — EZETIMIBE 10 MG: 10 TABLET ORAL at 10:03

## 2023-03-21 NOTE — PROGRESS NOTES
[facility] [address]  Wound Care    Patient Name:  Max Squires   MRN:  [id number]  Date: 3/21/2023  Diagnosis: C. difficile diarrhea    History:     Past Medical History:   Diagnosis Date    Arthritis     Hypercholesterolemia     Hypertension        Social History     Socioeconomic History    Marital status:    Tobacco Use    Smoking status: Every Day     Packs/day: 1.00     Types: Cigarettes   Substance and Sexual Activity    Alcohol use: No       Precautions:     Allergies as of 03/18/2023    (No Known Allergies)       WOC Assessment Details/Treatment   WOCN follow up related to consult 03/20/23 for sacrum; patient utilizing KORINA bed, he reports the (miconazole) powder on his scrotum brought a lot of relief!  Will continue to follow.  Care Recommendations:   Continue head to toe skin assessment q 12 hours;  Continue turning/repositioning q 2 hours or schedule to prevent erythema;  Continue with specialty bed and wedge for 30 degree pelvic tilt;  Keep sacral area clean and dry;  Avoid foam dressings on sacrum;  Avoid adult briefs;  Encourage activity as ordered;  Ensure adequate nutrition/hydration for healing;   03/21/23 1547   Skin Interventions   Pressure Reduction Devices specialty bed utilized

## 2023-03-21 NOTE — PLAN OF CARE
Spoke with patient daughter Cristina about a discharge plan, she is open to sitter services at home for patient. We discussed assisted living as well. She is going to come out tomorrow so I can provide some lists of options available.        03/21/23 1338   Discharge Assessment   Assessment Type Discharge Planning Assessment   Confirmed/corrected address, phone number and insurance Yes   Confirmed Demographics Correct on Facesheet   Source of Information patient;family   Does patient/caregiver understand observation status Yes   Communicated MATTY with patient/caregiver Yes;Date not available/Unable to determine   People in Home spouse   Walking or Climbing Stairs ambulation difficulty, requires equipment   Equipment Currently Used at Home walker, standard;oxygen   Readmission within 30 days? No   Do you currently have service(s) that help you manage your care at home? Yes   Name and Contact number of agency [facility] [employer]   Do you take prescription medications? Yes   Do you have prescription coverage? Yes   Coverage [employer] Medicare   How do you get to doctors appointments? family or friend will provide   Are you on dialysis? No   Do you take coumadin? No   Discharge Plan A Home with family   Discharge Plan B Assisted Living   Discharge Plan discussed with: Adult children;Patient

## 2023-03-21 NOTE — PROGRESS NOTES
[facility] Hospital Medicine Progress Note        Chief Complaint: Inpatient Follow-up for SOB, Diarrhea    HPI: Patient is a pleasant 88-year-old male was brought to the ER with significant diarrhea as well as reported bilateral lower extremity swelling.  Daughter was initially present (no longer present inpatient is not a great historian) but explained he has been dealing with diarrhea for several months as well as recurrent C diff infection and was diagnosed recently 03/15/2023 but has not yet started oral vancomycin again.     He arrived to the ER afebrile hemodynamically stable maintaining adequate sats on his baseline 2 L nasal cannula.  Laboratory work showed leukocytosis of 18 K, hypokalemia with a potassium of 2.3, a mildly elevated troponin and BNP, positive stool C diff.      Chest x-ray showed patchy infiltrates and emphysematous changes.  Echo showed preliminary EF of 40%.  He was started on oral vancomycin admitted to the hospitalist service for further management.  Added probiotics and Questran  Interval Hx:     Patient was seen and examined this morning reports diarrhea is better still on 2 L nasal cannula all other vitals stable afebrile    Objective/physical exam:  General: In no acute distress, afebrile  Chest: Clear to auscultation bilaterally  Heart: RRR, +S1, S2, no appreciable murmur  Abdomen: Soft, nontender, BS +  MSK: Warm, no lower extremity edema, no clubbing or cyanosis  Neurologic: Alert and awake  VITAL SIGNS: 24 HRS MIN & MAX LAST   Temp  Min: 98 °F (36.7 °C)  Max: 99.1 °F (37.3 °C) 98.1 °F (36.7 °C)   BP  Min: 106/64  Max: 134/61 114/67     Pulse  Min: 67  Max: 73  72   Resp  Min: 18  Max: 20 20   SpO2  Min: 94 %  Max: 99 % 99 %         Recent Labs   Lab 03/19/23  0714 03/20/23  0449 03/21/23  0426   WBC 14.2* 9.0 8.5   RBC 4.27* 3.93* 4.03*   HGB 11.8* 10.9* 11.4*   HCT 36.6* 34.4* 35.2*   MCV 85.7 87.5 87.3   MCH 27.6 27.7 28.3   MCHC 32.2* 31.7*  32.4*   RDW 15.7 15.7 15.4    156 164   MPV 9.6 9.8 9.8       Recent Labs   Lab 03/18/23  1547 03/18/23  2233 03/19/23  0508 03/19/23  1817 03/20/23  0450 03/21/23  0426      < > 136 137 136 132*   K 2.3*   < > 2.2* 3.1* 3.0* 3.5   CO2 33*   < > 31 32* 32* 34*   BUN 19.9   < > 17.1 13.9 9.8 11.1   CREATININE 0.92   < > 0.86 0.94 0.74 0.66*   CALCIUM 8.1*   < > 8.0* 7.6* 7.5* 7.3*   MG 1.60  --  1.60  --  1.80  --    ALBUMIN 2.3*  --  2.3*  --  1.9*  --    ALKPHOS 86  --  82  --  63  --    ALT 10  --  8  --  7  --    AST 13  --  11  --  8  --    BILITOT 0.6  --  0.6  --  0.4  --     < > = values in this interval not displayed.          Microbiology Results (last 7 days)       Procedure Component Value Units Date/Time    Urine culture [[id number]]  (Abnormal)  (Susceptibility) Collected: 03/18/23 1804    Order Status: Completed Specimen: Urine Updated: 03/21/23 0644     Urine Culture 25,000-50,000 colonies/ml Klebsiella pneumoniae ssp pneumoniae    Blood Culture [[id number]]  (Normal) Collected: 03/19/23 0038    Order Status: Completed Specimen: Blood Updated: 03/21/23 0200     CULTURE, BLOOD ([facility]) No Growth At 48 Hours    Blood Culture [[id number]]  (Normal) Collected: 03/19/23 0038    Order Status: Completed Specimen: Blood Updated: 03/21/23 0200     CULTURE, BLOOD ([facility]) No Growth At 48 Hours    Stool Culture **CANNOT BE ORDERED STAT** [[id number]]  (Normal) Collected: 03/18/23 1840    Order Status: Completed Specimen: Stool Updated: 03/20/23 0753     Stool Culture Negative for Salmonella, Shigella, Campylobacter, Vibrio, Aeromonas, Pleisiomonas,Yersinia, or Shiga Toxin 1 and 2.    Respiratory Culture [[id number]]     Order Status: Sent Specimen: Sputum, Expectorated     Clostridium Diff Toxin, A & B, EIA [[id number]]  (Abnormal) Collected: 03/18/23 1840    Order Status: Completed Specimen: Stool Updated: 03/19/23 0014     Clostridium Difficile GDH Antigen Positive     Clostridium Difficile Toxin A/B Positive              See below for Radiology    Scheduled Med:   aspirin  81 mg Oral Daily    atorvastatin  40 mg Oral QHS    cholestyramine-aspartame  1 packet Oral BID    enoxaparin  40 mg Subcutaneous Daily    ezetimibe  10 mg Oral QHS    finasteride  5 mg Oral Daily    Lactobacillus acidophilus  1 capsule Oral TID WM    levoFLOXacin  750 mg Intravenous Q24H    metoprolol succinate  25 mg Oral Daily    miconazole NITRATE 2 %   Topical (Top) BID    potassium chloride  40 mEq Oral BID    sacubitriL-valsartan  1 tablet Oral BID    tamsulosin  2 capsule Oral Daily    vancomycin  125 mg Oral Q6H    Followed by    [START ON 3/29/2023] vancomycin  125 mg Oral Q12H    Followed by    [START ON 4/5/2023] vancomycin  125 mg Oral Daily    Followed by    [START ON 4/12/2023] vancomycin  125 mg Oral Q3 Days        Continuous Infusions:       PRN Meds:  acetaminophen, acetaminophen, acetaminophen, albuterol, dextrose 10%, dextrose 10%, glucagon (human recombinant), glucose, glucose, melatonin, ondansetron, sodium chloride 0.9%       Assessment/Plan:  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   Hypokalemia   NSTEMI likely type 2 demand ischemia   AHRF,Newly Diagnosed Systolic HF/EF 40%  Possible Community Acquired Pneumonia and UTI  Leukocytosis  Anemia,Unspecified  Essential hypertension,Hyperlipidemia,COPD on 2 L nasal cannula nightly,CAD      Plan:  Continue with oral vancomycin , Questran and probiotics stools are better   Urine culture grew Klebsiella and that is sensitive to Levaquin will switch to p.o. Levaquin   Cardiology following patient is on aspirin Toprol Entresto and they recommended outpatient ischemic eval   They recommended starting the patient on Lasix once diarrhea is better and electrolytes states stable   Will continue with oxygen supplementation   White cell count is back to normal   Will keep a close watch on patient's H&H  Will consult physical therapy  All other vitals are stable repeat chest x-ray  today    VTE prophylaxis: lovenox    Patient condition:  Critical    Anticipated discharge and Disposition:         All diagnosis and differential diagnosis have been reviewed; assessment and plan has been documented; I have personally reviewed the labs and test results that are presently available; I have reviewed the patients medication list; I have reviewed the consulting providers response and recommendations. I have reviewed or attempted to review medical records based upon their availability    All of the patient's questions have been  addressed and answered. Patient's is agreeable to the above stated plan. I will continue to monitor closely and make adjustments to medical management as needed.  _____________________________________________________________________    Nutrition Status:    Radiology:  Echo  · Concentric hypertrophy and low normal systolic function.  · The estimated ejection fraction is 50%.  · Indeterminate left ventricular diastolic function.  · Normal right ventricular size.  · Mild right atrial enlargement.  · Mild mitral regurgitation.  · Mild to moderate tricuspid regurgitation.  · Normal central venous pressure (3 mmHg).  · The estimated PA systolic pressure is 35 mmHg.  · Moderate left atrial enlargement.         Elena Nina MD   03/21/2023

## 2023-03-22 LAB
ANION GAP SERPL CALC-SCNC: 4 MEQ/L
BUN SERPL-MCNC: 10.7 MG/DL (ref 8.4–25.7)
CALCIUM SERPL-MCNC: 7.9 MG/DL (ref 8.8–10)
CHLORIDE SERPL-SCNC: 95 MMOL/L (ref 98–107)
CO2 SERPL-SCNC: 35 MMOL/L (ref 23–31)
CREAT SERPL-MCNC: 0.74 MG/DL (ref 0.73–1.18)
CREAT/UREA NIT SERPL: 14
GFR SERPLBLD CREATININE-BSD FMLA CKD-EPI: >60 MLS/MIN/1.73/M2
GLUCOSE SERPL-MCNC: 103 MG/DL (ref 82–115)
POTASSIUM SERPL-SCNC: 4 MMOL/L (ref 3.5–5.1)
SODIUM SERPL-SCNC: 134 MMOL/L (ref 136–145)

## 2023-03-22 PROCEDURE — 63600175 PHARM REV CODE 636 W HCPCS: Performed by: INTERNAL MEDICINE

## 2023-03-22 PROCEDURE — 25000003 PHARM REV CODE 250: Performed by: INTERNAL MEDICINE

## 2023-03-22 PROCEDURE — 97162 PT EVAL MOD COMPLEX 30 MIN: CPT

## 2023-03-22 PROCEDURE — 27000221 HC OXYGEN, UP TO 24 HOURS

## 2023-03-22 PROCEDURE — 94761 N-INVAS EAR/PLS OXIMETRY MLT: CPT

## 2023-03-22 PROCEDURE — 80048 BASIC METABOLIC PNL TOTAL CA: CPT | Performed by: INTERNAL MEDICINE

## 2023-03-22 PROCEDURE — 25000003 PHARM REV CODE 250: Performed by: STUDENT IN AN ORGANIZED HEALTH CARE EDUCATION/TRAINING PROGRAM

## 2023-03-22 PROCEDURE — 25000003 PHARM REV CODE 250: Performed by: NURSE PRACTITIONER

## 2023-03-22 PROCEDURE — 21400001 HC TELEMETRY ROOM

## 2023-03-22 PROCEDURE — 97166 OT EVAL MOD COMPLEX 45 MIN: CPT

## 2023-03-22 RX ADMIN — Medication 1 CAPSULE: at 08:03

## 2023-03-22 RX ADMIN — EZETIMIBE 10 MG: 10 TABLET ORAL at 08:03

## 2023-03-22 RX ADMIN — Medication 125 MG: at 11:03

## 2023-03-22 RX ADMIN — ASPIRIN 81 MG: 81 TABLET, COATED ORAL at 08:03

## 2023-03-22 RX ADMIN — ENOXAPARIN SODIUM 40 MG: 40 INJECTION SUBCUTANEOUS at 05:03

## 2023-03-22 RX ADMIN — SACUBITRIL AND VALSARTAN 1 TABLET: 24; 26 TABLET, FILM COATED ORAL at 08:03

## 2023-03-22 RX ADMIN — Medication 125 MG: at 06:03

## 2023-03-22 RX ADMIN — FINASTERIDE 5 MG: 5 TABLET, FILM COATED ORAL at 08:03

## 2023-03-22 RX ADMIN — POTASSIUM CHLORIDE 40 MEQ: 1500 TABLET, EXTENDED RELEASE ORAL at 08:03

## 2023-03-22 RX ADMIN — MICONAZOLE NITRATE: 20 POWDER TOPICAL at 08:03

## 2023-03-22 RX ADMIN — Medication 1 CAPSULE: at 05:03

## 2023-03-22 RX ADMIN — Medication 1 CAPSULE: at 11:03

## 2023-03-22 RX ADMIN — CHOLESTYRAMINE 4 G: 4 POWDER, FOR SUSPENSION ORAL at 09:03

## 2023-03-22 RX ADMIN — TAMSULOSIN HYDROCHLORIDE 0.8 MG: 0.4 CAPSULE ORAL at 08:03

## 2023-03-22 RX ADMIN — Medication 125 MG: at 01:03

## 2023-03-22 RX ADMIN — METOPROLOL SUCCINATE 25 MG: 25 TABLET, EXTENDED RELEASE ORAL at 08:03

## 2023-03-22 RX ADMIN — Medication 125 MG: at 12:03

## 2023-03-22 RX ADMIN — CHOLESTYRAMINE 4 G: 4 POWDER, FOR SUSPENSION ORAL at 10:03

## 2023-03-22 RX ADMIN — MICONAZOLE NITRATE: 20 POWDER TOPICAL at 09:03

## 2023-03-22 RX ADMIN — ATORVASTATIN CALCIUM 40 MG: 40 TABLET, FILM COATED ORAL at 08:03

## 2023-03-22 RX ADMIN — LEVOFLOXACIN 750 MG: 500 TABLET, FILM COATED ORAL at 08:03

## 2023-03-22 NOTE — PT/OT/SLP EVAL
Physical Therapy Evaluation/Re-Evaluation    Patient Name:  Max Squires   MRN:  [id number]    Recommendations:     Discharge Recommendations: home with home health   Discharge Equipment Recommendations: walker, rolling   Barriers to discharge:  medical dx, decreased endurance     Assessment:     Max Squires is a 88 y.o. male admitted with a medical diagnosis of C. difficile diarrhea, NSTEMI, respiratory failure, possible community acquired pneumonia and UTI.  He presents with the following impairments/functional limitations: weakness, impaired endurance, impaired balance, decreased lower extremity function, impaired functional mobility, impaired self care skills, impaired cardiopulmonary response to activity.    Rehab Prognosis: Good; patient would benefit from acute skilled PT services to address these deficits and reach maximum level of function.    Recent Surgery: * No surgery found *      Plan:     During this hospitalization, patient to be seen 6 x/week to address the identified rehab impairments via gait training, therapeutic exercises, therapeutic activities and progress toward the following goals:    Plan of Care Expires:  04/22/23    Subjective     Chief Complaint: n/a  Patient/Family Comments/goals: to go home   Pain/Comfort:  Pain Rating 1: 0/10    Patients cultural, spiritual, Christianity conflicts given the current situation: no    Living Environment:  Pt lives in a 3 story home with his spouse. Spouse has Alzheimer's. Lives on the second floor (stated he doesn't leave much). But, when he does, each level has about 12 stairs with B railings.   Prior to admission, patients level of function was independent. Has help from neighbors and family as needed.    Equipment used at home:  oxygen.  DME owned (not currently used): shower chair and oxygen .    Upon discharge, patient will have assistance from family.    Objective:     Communicated with NSG prior to session.  Patient found sitting edge of bed with  pulse ox (continuous), telemetry, oxygen  upon PT entry to room.    General Precautions: Standard, fall, contact  Orthopedic Precautions:N/A   Braces: N/A  Respiratory Status: Nasal cannula, flow 2 L/min   *O2 was removed and at rest pt remained with adequate O2 sats.   Pt ambulated without O2 and demonstrated a drop in O2 sats to about 88%, only recovered to about 91% without O@ therefore NC was reapplied       Exams:  Cognitive Exam:  Patient is oriented to Person, Place, Time, and Situation  RLE Strength: WFL  LLE Strength: WFL  Skin integrity: Visible skin intact and swelling noted to dorsum of B feet with slight discoloration on R foot      Functional Mobility:  Transfers:     Sit to Stand:  stand by assistance with rolling walker  Gait: pt ambulated approx 15 ft with use of RW; steady step through pattern; CGA; limited by decreased endurance + drop in O2 sats with complaints of SOB      Patient provided with verbal education regarding POC, mobility, and strength .  Understanding was verbalized.     Patient left up in chair with all lines intact and call button in reach. Pillows left under B LE for elevation     GOALS:   Multidisciplinary Problems       Physical Therapy Goals          Problem: Physical Therapy    Goal Priority Disciplines Outcome Goal Variances Interventions   Physical Therapy Goal     PT, PT/OT Ongoing, Progressing     Description: Goals to be met by: 23     Patient will increase functional independence with mobility by performin. Supine to sit with [place]  2. Sit to supine with [place]  3. Sit to stand transfer with Modified [place]  4. Gait  x 150 feet with Modified [place] using Rolling Walker.   5. Ascend/ descend 12 stairs with use of B railings with modified independence                          History:     Past Medical History:   Diagnosis Date    Arthritis     Hypercholesterolemia     Hypertension        Past Surgical History:   Procedure Laterality  Date    BACK SURGERY      CORONARY ANGIOPLASTY WITH STENT PLACEMENT      HIP SURGERY Left     Dr. Hoyos    JOINT REPLACEMENT Left     QUETA    JOINT REPLACEMENT Right     TKA       Time Tracking:     PT Received On: 03/22/23  PT Start Time: 1413     PT Stop Time: 1436  PT Total Time (min): 23 min     Billable Minutes: Evaluation mod      03/22/2023

## 2023-03-22 NOTE — PLAN OF CARE
Problem: Physical Therapy  Goal: Physical Therapy Goal  Description: Goals to be met by: 23     Patient will increase functional independence with mobility by performin. Supine to sit with [place]  2. Sit to supine with [place]  3. Sit to stand transfer with Modified [place]  4. Gait  x 150 feet with Modified [place] using Rolling Walker.   5. Ascend/ descend 12 stairs with use of B railings with modified independence     Outcome: Ongoing, Progressing

## 2023-03-22 NOTE — PROGRESS NOTES
[facility] Hospital Medicine Progress Note        Chief Complaint: Inpatient Follow-up for SOB, Diarrhea    HPI: Patient is a pleasant 88-year-old male was brought to the ER with significant diarrhea as well as reported bilateral lower extremity swelling.  Daughter was initially present (no longer present inpatient is not a great historian) but explained he has been dealing with diarrhea for several months as well as recurrent C diff infection and was diagnosed recently 03/15/2023 but has not yet started oral vancomycin again.     He arrived to the ER afebrile hemodynamically stable maintaining adequate sats on his baseline 2 L nasal cannula.  Laboratory work showed leukocytosis of 18 K, hypokalemia with a potassium of 2.3, a mildly elevated troponin and BNP, positive stool C diff.      Chest x-ray showed patchy infiltrates and emphysematous changes.  Echo showed preliminary EF of 40%.  He was started on oral vancomycin admitted to the hospitalist service for further management.  Added probiotics and Questran  Interval Hx:     Patient was seen and examined this morningWith patient's daughter bedside yesterday patient had 7-8 stools discussed with patient's daughter if he continues to have loose stools then we might consult GI again called the nursing staff mid morning and he just had 3 bowel movement so will keep a close watch   Objective/physical exam:  General: In no acute distress, afebrile  Chest: Clear to auscultation bilaterally  Heart: RRR, +S1, S2, no appreciable murmur  Abdomen: Soft, nontender, BS +  MSK: Warm, no lower extremity edema, no clubbing or cyanosis  Neurologic: Alert and awake  VITAL SIGNS: 24 HRS MIN & MAX LAST   Temp  Min: 97.5 °F (36.4 °C)  Max: 97.9 °F (36.6 °C) 97.5 °F (36.4 °C)   BP  Min: 93/56  Max: 131/79 97/61     Pulse  Min: 71  Max: 85  79   Resp  Min: 18  Max: 19 18   SpO2  Min: 95 %  Max: 100 % 100 %         Recent Labs   Lab 03/19/23  0714  03/20/23 0449 03/21/23  0426   WBC 14.2* 9.0 8.5   RBC 4.27* 3.93* 4.03*   HGB 11.8* 10.9* 11.4*   HCT 36.6* 34.4* 35.2*   MCV 85.7 87.5 87.3   MCH 27.6 27.7 28.3   MCHC 32.2* 31.7* 32.4*   RDW 15.7 15.7 15.4    156 164   MPV 9.6 9.8 9.8       Recent Labs   Lab 03/18/23  1547 03/18/23  2233 03/19/23  0508 03/19/23  1817 03/20/23  0450 03/21/23  0426 03/22/23  0940      < > 136   < > 136 132* 134*   K 2.3*   < > 2.2*   < > 3.0* 3.5 4.0   CO2 33*   < > 31   < > 32* 34* 35*   BUN 19.9   < > 17.1   < > 9.8 11.1 10.7   CREATININE 0.92   < > 0.86   < > 0.74 0.66* 0.74   CALCIUM 8.1*   < > 8.0*   < > 7.5* 7.3* 7.9*   MG 1.60  --  1.60  --  1.80  --   --    ALBUMIN 2.3*  --  2.3*  --  1.9*  --   --    ALKPHOS 86  --  82  --  63  --   --    ALT 10  --  8  --  7  --   --    AST 13  --  11  --  8  --   --    BILITOT 0.6  --  0.6  --  0.4  --   --     < > = values in this interval not displayed.          Microbiology Results (last 7 days)       Procedure Component Value Units Date/Time    Blood Culture [[id number]]  (Normal) Collected: 03/19/23 0038    Order Status: Completed Specimen: Blood Updated: 03/22/23 0201     CULTURE, BLOOD ([facility]) No Growth At 72 Hours    Blood Culture [[id number]]  (Normal) Collected: 03/19/23 0038    Order Status: Completed Specimen: Blood Updated: 03/22/23 0201     CULTURE, BLOOD ([facility]) No Growth At 72 Hours    Stool Culture **CANNOT BE ORDERED STAT** [[id number]]  (Normal) Collected: 03/18/23 1840    Order Status: Completed Specimen: Stool Updated: 03/21/23 1051     Stool Culture Negative for Salmonella, Shigella, Campylobacter, Vibrio, Aeromonas, Pleisiomonas,Yersinia, or Shiga Toxin 1 and 2.    Urine culture [[id number]]  (Abnormal)  (Susceptibility) Collected: 03/18/23 1804    Order Status: Completed Specimen: Urine Updated: 03/21/23 0644     Urine Culture 25,000-50,000 colonies/ml Klebsiella pneumoniae ssp pneumoniae    Respiratory Culture [[id number]]     Order Status: Sent Specimen:  Sputum, Expectorated     Clostridium Diff Toxin, A & B, EIA [[id number]]  (Abnormal) Collected: 03/18/23 1840    Order Status: Completed Specimen: Stool Updated: 03/19/23 0014     Clostridium Difficile GDH Antigen Positive     Clostridium Difficile Toxin A/B Positive             See below for Radiology    Scheduled Med:   aspirin  81 mg Oral Daily    atorvastatin  40 mg Oral QHS    cholestyramine-aspartame  1 packet Oral BID    enoxaparin  40 mg Subcutaneous Daily    ezetimibe  10 mg Oral QHS    finasteride  5 mg Oral Daily    Lactobacillus acidophilus  1 capsule Oral TID WM    levoFLOXacin  750 mg Oral Daily    metoprolol succinate  25 mg Oral Daily    miconazole NITRATE 2 %   Topical (Top) BID    potassium chloride  40 mEq Oral BID    sacubitriL-valsartan  1 tablet Oral BID    tamsulosin  2 capsule Oral Daily    vancomycin  125 mg Oral Q6H    Followed by    [START ON 3/29/2023] vancomycin  125 mg Oral Q12H    Followed by    [START ON 4/5/2023] vancomycin  125 mg Oral Daily    Followed by    [START ON 4/12/2023] vancomycin  125 mg Oral Q3 Days        Continuous Infusions:       PRN Meds:  acetaminophen, acetaminophen, acetaminophen, albuterol, dextrose 10%, dextrose 10%, glucagon (human recombinant), glucose, glucose, melatonin, ondansetron, sodium chloride 0.9%       Assessment/Plan:  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 Levaquin   Cardiology following patient is on aspirin Toprol Entresto and they  recommended outpatient ischemic eval   They recommended starting the patient on Lasix once diarrhea is better and electrolytes states stable   Will continue with oxygen supplementation   White cell count is back to normal   Will keep a close watch on patient's H&H  Will consult physical therapy  Chest x-ray showed no significant change from before  VTE prophylaxis: lovenox    Patient condition:  Critical    Anticipated discharge and Disposition:         All diagnosis and differential diagnosis have been reviewed; assessment and plan has been documented; I have personally reviewed the labs and test results that are presently available; I have reviewed the patients medication list; I have reviewed the consulting providers response and recommendations. I have reviewed or attempted to review medical records based upon their availability    All of the patient's questions have been  addressed and answered. Patient's is agreeable to the above stated plan. I will continue to monitor closely and make adjustments to medical management as needed.  _____________________________________________________________________    Nutrition Status:    Radiology:  X-Ray Chest PA And Lateral  Narrative: EXAMINATION:  XR CHEST PA AND LATERAL    CLINICAL HISTORY:  ?  Pneumonia;, .    COMPARISON:  March 18, 2023    FINDINGS:  Examination reveals cardiomediastinal silhouette and pleuroparenchymal changes to be essentially unchanged as compared with the previous exam.    Persistent increase interstitial markings mostly at both bases similar in distribution and configuration as compared with the previous exam of March 18, 2023 in likely chronic in nature.    No new focal consolidative changes are identified  Impression: No significant change    Electronically signed by: Matti Garcia  Date:    03/22/2023  Time:    09:30      Elena Nina MD   03/22/2023

## 2023-03-22 NOTE — PT/OT/SLP EVAL
"Occupational Therapy   Evaluation    Name: Max Squires  MRN: [id number]  Admitting Diagnosis: C. difficile diarrhea  Recent Surgery: * No surgery found *      Recommendations:     Discharge Recommendations: home with home health  Discharge Equipment Recommendations:  walker, rolling  Barriers to discharge:  Inaccessible home environment, Other (Comment) (pt lives on 2nd floor of 3-story home)    Assessment:     Max Squires is a 88 y.o. male with a medical diagnosis of C. difficile diarrhea.  He presents with great motivation, alert and oriented x4, BLE edema especially at ankles and dorsal feet with redness noted at R foot. Performance deficits affecting function: weakness, impaired endurance, impaired self care skills, impaired functional mobility, edema, impaired cardiopulmonary response to activity.      Rehab Prognosis: Good; patient would benefit from acute skilled OT services to address these deficits and reach maximum level of function.       Plan:     Patient to be seen 3 x/week, 5 x/week to address the above listed problems via therapeutic exercises, therapeutic activities, self-care/home management  Plan of Care Expires:  (DC)  Plan of Care Reviewed with: patient    Subjective     Chief Complaint: "diarrhea is so much better today, but I'm weak from being in the hospital so long"  Patient/Family Comments/goals: return to [place], [place] back home with his wife who has Alzheimer's Dx    Occupational Profile:  Living Environment: 3-story home with his wife, walk-in shower with bench, primary bedroom, bathroom, and kitchen all on 2nd floor and pt reported they do not go out much  Previous level of function: mod I all ADLs and mobility  Roles and Routines: pt cooks and cleans, still driving, with support for IADLs from multiple family members and friends nearby  Equipment Used at Home: oxygen, shower chair  Assistance upon Discharge: able to return to prior living situation, pt stated they had initiated home health " but with only 1 visit before he required hospitalization so would like to resume HH services on [place]    Pain/Comfort:  Pain Rating 1: 0/10    Patients cultural, spiritual, Congregational conflicts given the current situation: no    Objective:     Communicated with: nurse Julien prior to session.  Patient found sitting edge of bed with oxygen, telemetry, pulse ox (continuous), peripheral IV upon OT entry to room.    General Precautions: Standard, fall, special contact, other (see comments) (C Diff precautions)  Orthopedic Precautions: N/A  Braces: N/A  Respiratory Status: Nasal cannula, flow 2 L/min  O2 sat 96% at initiation of tx, attempted activity on room air and pt O2 sat remained stable during activities seated EOB but did decrease to 88% with ambulation 10 feet to recliner chair.  Pt returned to 2L nasal cannula and returned to 97% within 10 seconds seated rest break.    Occupational Performance:    Bed Mobility:    NA; pt sitting EOB at initiation of tx and CNA confirmed pt able to tf supine to sitting EOB unassisted    Functional Mobility/Transfers:  Patient completed Sit <> Stand Transfer with contact guard assistance  with  rolling walker   Patient completed Bed <> Chair Transfer using Step Transfer technique with minimum assistance with rolling walker  Functional Mobility: CGA to ambulate short distances in room with RW    Activities of Daily Living:  Feeding:  independence .  Grooming: stand by assistance seated EOB  Lower Body Dressing: minimum assistance seated at recliner chair, limited by endurance deficits and by B foot edema  Toileting: pt reported he is able to use urinal with mod I seated EOB, reported last bowel movement at 4am as loose stool in bed and required max A hygiene and clothing change    Cognitive/Visual Perceptual:  Cognitive/Psychosocial Skills:     -       Oriented to: Person, Place, Time, and Situation   -       Follows Commands/attention:Follows multistep  commands  -       Safety  awareness/insight to disability: intact   Visual/Perceptual:      -Intact .    Physical Exam:  BUE AROM WNL, strength 4/5 grossly, with mild deconditioning noted    AMPAC 6 Click ADL:  AMPAC Total Score: 19    Treatment & Education:  Pt educated on OT goals and POC, use of call bell for assist with all transfers OOB d/t fall risk.  Pt verbalized understanding.    Patient left up in chair with all lines intact, call button in reach, and nurse and CNA notified    GOALS:   Multidisciplinary Problems       Occupational Therapy Goals          Problem: Occupational Therapy    Goal Priority Disciplines Outcome Interventions   Occupational Therapy Goal     OT, PT/OT Ongoing, Progressing    Description: Patient will perform grooming with mod I while standing at sink.    Patient will perform toileting with mod I using toilet in restroom.    Patient will perform toilet transfer with mod I with use of grab bars and RW as needed.    Patient will perform bathing with SB assist while using shower bench.    Patient will perform lower body dressing with mod I while seated EOB.                          History:     Past Medical History:   Diagnosis Date    Arthritis     Hypercholesterolemia     Hypertension          Past Surgical History:   Procedure Laterality Date    BACK SURGERY      CORONARY ANGIOPLASTY WITH STENT PLACEMENT      HIP SURGERY Left     Dr. Hoyos    JOINT REPLACEMENT Left     QUETA    JOINT REPLACEMENT Right     TKA       Time Tracking:     OT Date of Treatment: 03/22/23  OT Start Time: 1411  OT Stop Time: 1442  OT Total Time (min): 31 min    Billable Minutes:Evaluation 31    3/22/2023

## 2023-03-22 NOTE — PLAN OF CARE
Problem: Occupational Therapy  Goal: Occupational Therapy Goal  Description: Patient will perform grooming with mod I while standing at sink.    Patient will perform toileting with mod I using toilet in restroom.    Patient will perform toilet transfer with mod I with use of grab bars and RW as needed.    Patient will perform bathing with SB assist while using shower bench.    Patient will perform lower body dressing with mod I while seated EOB.     Outcome: Ongoing, Progressing

## 2023-03-23 LAB
ANION GAP SERPL CALC-SCNC: 4 MEQ/L
BASOPHILS # BLD AUTO: 0.04 X10(3)/MCL (ref 0–0.2)
BASOPHILS NFR BLD AUTO: 0.6 %
BUN SERPL-MCNC: 13.4 MG/DL (ref 8.4–25.7)
CALCIUM SERPL-MCNC: 7.6 MG/DL (ref 8.8–10)
CHLORIDE SERPL-SCNC: 98 MMOL/L (ref 98–107)
CO2 SERPL-SCNC: 30 MMOL/L (ref 23–31)
CREAT SERPL-MCNC: 0.78 MG/DL (ref 0.73–1.18)
CREAT/UREA NIT SERPL: 17
EOSINOPHIL # BLD AUTO: 0.1 X10(3)/MCL (ref 0–0.9)
EOSINOPHIL NFR BLD AUTO: 1.4 %
ERYTHROCYTE [DISTWIDTH] IN BLOOD BY AUTOMATED COUNT: 15.9 % (ref 11.5–17)
GFR SERPLBLD CREATININE-BSD FMLA CKD-EPI: >60 MLS/MIN/1.73/M2
GLUCOSE SERPL-MCNC: 89 MG/DL (ref 82–115)
HCT VFR BLD AUTO: 34.6 % (ref 42–52)
HGB BLD-MCNC: 10.9 G/DL (ref 14–18)
IMM GRANULOCYTES # BLD AUTO: 0.26 X10(3)/MCL (ref 0–0.04)
IMM GRANULOCYTES NFR BLD AUTO: 3.6 %
LYMPHOCYTES # BLD AUTO: 2.18 X10(3)/MCL (ref 0.6–4.6)
LYMPHOCYTES NFR BLD AUTO: 30.4 %
MCH RBC QN AUTO: 27.8 PG
MCHC RBC AUTO-ENTMCNC: 31.5 G/DL (ref 33–36)
MCV RBC AUTO: 88.3 FL (ref 80–94)
MONOCYTES # BLD AUTO: 0.48 X10(3)/MCL (ref 0.1–1.3)
MONOCYTES NFR BLD AUTO: 6.7 %
NEUTROPHILS # BLD AUTO: 4.1 X10(3)/MCL (ref 2.1–9.2)
NEUTROPHILS NFR BLD AUTO: 57.3 %
NRBC BLD AUTO-RTO: 0 %
PLATELET # BLD AUTO: 156 X10(3)/MCL (ref 130–400)
PMV BLD AUTO: 9.5 FL (ref 7.4–10.4)
POTASSIUM SERPL-SCNC: 4.8 MMOL/L (ref 3.5–5.1)
RBC # BLD AUTO: 3.92 X10(6)/MCL (ref 4.7–6.1)
SODIUM SERPL-SCNC: 132 MMOL/L (ref 136–145)
WBC # SPEC AUTO: 7.2 X10(3)/MCL (ref 4.5–11.5)

## 2023-03-23 PROCEDURE — 97110 THERAPEUTIC EXERCISES: CPT | Mod: CQ

## 2023-03-23 PROCEDURE — 99223 1ST HOSP IP/OBS HIGH 75: CPT | Mod: FS,,, | Performed by: GENERAL PRACTICE

## 2023-03-23 PROCEDURE — 97535 SELF CARE MNGMENT TRAINING: CPT

## 2023-03-23 PROCEDURE — 21400001 HC TELEMETRY ROOM

## 2023-03-23 PROCEDURE — 25000003 PHARM REV CODE 250: Performed by: INTERNAL MEDICINE

## 2023-03-23 PROCEDURE — 25000003 PHARM REV CODE 250: Performed by: STUDENT IN AN ORGANIZED HEALTH CARE EDUCATION/TRAINING PROGRAM

## 2023-03-23 PROCEDURE — 97530 THERAPEUTIC ACTIVITIES: CPT | Mod: CQ

## 2023-03-23 PROCEDURE — 63600175 PHARM REV CODE 636 W HCPCS: Performed by: INTERNAL MEDICINE

## 2023-03-23 PROCEDURE — 97116 GAIT TRAINING THERAPY: CPT | Mod: CQ

## 2023-03-23 PROCEDURE — 25000003 PHARM REV CODE 250: Performed by: PHYSICIAN ASSISTANT

## 2023-03-23 PROCEDURE — 25000003 PHARM REV CODE 250: Performed by: NURSE PRACTITIONER

## 2023-03-23 PROCEDURE — 80048 BASIC METABOLIC PNL TOTAL CA: CPT | Performed by: INTERNAL MEDICINE

## 2023-03-23 PROCEDURE — 99223 PR INITIAL HOSPITAL CARE,LEVL III: ICD-10-PCS | Mod: FS,,, | Performed by: GENERAL PRACTICE

## 2023-03-23 PROCEDURE — 85025 COMPLETE CBC W/AUTO DIFF WBC: CPT | Performed by: INTERNAL MEDICINE

## 2023-03-23 PROCEDURE — 27000221 HC OXYGEN, UP TO 24 HOURS

## 2023-03-23 RX ADMIN — POTASSIUM CHLORIDE 40 MEQ: 1500 TABLET, EXTENDED RELEASE ORAL at 07:03

## 2023-03-23 RX ADMIN — FINASTERIDE 5 MG: 5 TABLET, FILM COATED ORAL at 08:03

## 2023-03-23 RX ADMIN — FIDAXOMICIN 200 MG: 200 TABLET, FILM COATED ORAL at 01:03

## 2023-03-23 RX ADMIN — ASPIRIN 81 MG: 81 TABLET, COATED ORAL at 08:03

## 2023-03-23 RX ADMIN — Medication 1 CAPSULE: at 01:03

## 2023-03-23 RX ADMIN — TAMSULOSIN HYDROCHLORIDE 0.8 MG: 0.4 CAPSULE ORAL at 08:03

## 2023-03-23 RX ADMIN — Medication 125 MG: at 05:03

## 2023-03-23 RX ADMIN — SACUBITRIL AND VALSARTAN 1 TABLET: 24; 26 TABLET, FILM COATED ORAL at 07:03

## 2023-03-23 RX ADMIN — ATORVASTATIN CALCIUM 40 MG: 40 TABLET, FILM COATED ORAL at 07:03

## 2023-03-23 RX ADMIN — METOPROLOL SUCCINATE 25 MG: 25 TABLET, EXTENDED RELEASE ORAL at 08:03

## 2023-03-23 RX ADMIN — Medication 1 CAPSULE: at 04:03

## 2023-03-23 RX ADMIN — EZETIMIBE 10 MG: 10 TABLET ORAL at 07:03

## 2023-03-23 RX ADMIN — SACUBITRIL AND VALSARTAN 1 TABLET: 24; 26 TABLET, FILM COATED ORAL at 08:03

## 2023-03-23 RX ADMIN — Medication 1 CAPSULE: at 06:03

## 2023-03-23 RX ADMIN — MICONAZOLE NITRATE: 20 POWDER TOPICAL at 08:03

## 2023-03-23 RX ADMIN — LEVOFLOXACIN 750 MG: 500 TABLET, FILM COATED ORAL at 08:03

## 2023-03-23 RX ADMIN — ENOXAPARIN SODIUM 40 MG: 40 INJECTION SUBCUTANEOUS at 04:03

## 2023-03-23 RX ADMIN — POTASSIUM CHLORIDE 40 MEQ: 1500 TABLET, EXTENDED RELEASE ORAL at 08:03

## 2023-03-23 RX ADMIN — FIDAXOMICIN 200 MG: 200 TABLET, FILM COATED ORAL at 07:03

## 2023-03-23 NOTE — CONSULTS
I have reviewed the notes, assessments, and/or procedures performed by ALLEGRA Aaron, I concur with her documentation of Max Squires.      Gastroenterology Consult    Reason for Consult:     C. Diff diarrhea     HPI:    Max Squires is a 88 y.o. male unknown to our service with PMHx of CAD s/p CABG, COPD on nightly O2 supplementation 2 L, arthritis s/p hip and knee arthroplasty, HTN, and HLD presented to the ED 5 days ago with continued diarrhea, NSTEMI, new onset HF EF 40%, UTI and possible pneumonia.    Hx limited by pt's memory loss and Stebbins. Called and spoke to pt's daughter Cristina [phone]. She states he was treated for dental abscess by PCP in January '23 with unknown antibiotics. Diarrhea began at the end of January. Feb 13 pt presented to ED at [facility] and was admitted for 2 weeks, treated w/ vanc and he had a colonoscopy there. We don't have a record of this procedure. Unknown findings.    He was also hypokalemic on admission, with leukocytosis of 18,900.  His potassium and leukocytosis have improved, but after 4 days of vancomycin 125 mg q.i.d., his symptoms have not improved. He is still having multiple watery Bms, today had 4-5 episodes. He was also placed on Questran b.i.d. and probiotics 3 days ago. No abd pain. VSS      PCP:  Alber Menjivar MD    Review of patient's allergies indicates:  No Known Allergies    Current Facility-Administered Medications   Medication Dose Route Frequency Provider Last Rate Last Admin    acetaminophen tablet 650 mg  650 mg Oral Q8H PRN Jourdan Rodriguez PA-C        acetaminophen tablet 650 mg  650 mg Oral Q4H PRN Jourdan Rodriguez PA-C        acetaminophen tablet 650 mg  650 mg Oral Q8H PRN Canelo Colorado MD        albuterol inhaler 2 puff  2 puff Inhalation Q6H PRN Echo Fields MD        aspirin EC tablet 81 mg  81 mg Oral Daily BELLE Barry   81 mg at 03/23/23 0802    atorvastatin tablet 40 mg  40 mg Oral QHS Echo Fields MD   40 mg at 03/22/23  2020    cholestyramine-aspartame 4 gram packet 4 g  1 packet Oral BID Elena Nina MD   4 g at 03/22/23 2145    dextrose 10% bolus 125 mL 125 mL  12.5 g Intravenous PRN Jourdan Rodriguez PA-C        dextrose 10% bolus 250 mL 250 mL  25 g Intravenous PRN Jourdan Rodriguez PA-C        enoxaparin injection 40 mg  40 mg Subcutaneous Daily Canelo Colorado MD   40 mg at 03/22/23 1723    ezetimibe tablet 10 mg  10 mg Oral QHS Echo Fields MD   10 mg at 03/22/23 2020    finasteride tablet 5 mg  5 mg Oral Daily Echo Fields MD   5 mg at 03/23/23 0802    glucagon (human recombinant) injection 1 mg  1 mg Intramuscular PRN Jourdan Rodriguez PA-C        glucose chewable tablet 16 g  16 g Oral PRN Jourdan Rodriguez PA-C        glucose chewable tablet 24 g  24 g Oral PRN Jourdan Rodriguez PA-C        Lactobacillus acidophilus capsule 1 capsule  1 capsule Oral TID WM Elena Nina MD   1 capsule at 03/23/23 0647    melatonin tablet 6 mg  6 mg Oral Nightly PRN Canelo Colorado MD        metoprolol succinate (TOPROL-XL) 24 hr tablet 25 mg  25 mg Oral Daily BELLE Barry   25 mg at 03/23/23 0802    miconazole NITRATE 2 % top powder   Topical (Top) BID Flavio Muñoz MD   Given at 03/23/23 0803    ondansetron injection 4 mg  4 mg Intravenous Q8H PRN Jourdan Rodriguez PA-C        potassium chloride SA CR tablet 40 mEq  40 mEq Oral BID Canelo Colorado MD   40 mEq at 03/23/23 0802    sacubitriL-valsartan 24-26 mg per tablet 1 tablet  1 tablet Oral BID BELLE Barry   1 tablet at 03/23/23 0802    sodium chloride 0.9% flush 10 mL  10 mL Intravenous PRN Canelo Colorado MD        tamsulosin 24 hr capsule 0.8 mg  2 capsule Oral Daily Echo Fields MD   0.8 mg at 03/23/23 0802    vancomycin 125 mg/5 mL oral solution 125 mg  125 mg Oral Q6H Abel Aggarwal MD   125 mg at 03/23/23 0524    Followed by    [START ON 3/29/2023] vancomycin 125 mg/5 mL oral solution 125 mg  125 mg Oral Q12H Abel Aggarwal MD         Followed by    [START ON 4/5/2023] vancomycin 125 mg/5 mL oral solution 125 mg  125 mg Oral Daily Abel Aggarwal MD        Followed by    [START ON 4/12/2023] vancomycin 125 mg/5 mL oral solution 125 mg  125 mg Oral Q3 Days Abel Aggarwal MD         Medications Prior to Admission   Medication Sig Dispense Refill Last Dose    amLODIPine (NORVASC) 5 MG tablet Take 5 mg by mouth.       amlodipine-atorvastatin (CADUET) 5-80 mg per tablet amlodipine 5 mg-atorvastatin 80 mg tablet       aspirin (ECOTRIN) 81 MG EC tablet Take 81 mg by mouth once daily.       atorvastatin (LIPITOR) 80 MG tablet Take 80 mg by mouth once daily.       diclofenac sodium (VOLTAREN) 1 % Gel APPLY 1 TO 2 GRAMS TOPICALLY TO THE AFFECTED AREA FOUR TIMES DAILY AS NEEDED       ezetimibe (ZETIA) 10 mg tablet Take 10 mg by mouth every evening.       finasteride (PROSCAR) 5 mg tablet Take 5 mg by mouth once daily.       oxyCODONE-acetaminophen (PERCOCET) 5-325 mg per tablet Take 1 tablet by mouth every 6 (six) hours as needed.       propranolol (INDERAL LA) 120 MG 24 hr capsule TK 1 C PO QD  1     tamsulosin (FLOMAX) 0.4 mg Cap Take 2 capsules by mouth once daily.          Past Medical History:  Past Medical History:   Diagnosis Date    Arthritis     Hypercholesterolemia     Hypertension        Past Surgical History:  Past Surgical History:   Procedure Laterality Date    BACK SURGERY      CORONARY ANGIOPLASTY WITH STENT PLACEMENT      HIP SURGERY Left     Dr. Hoyos    JOINT REPLACEMENT Left     QUETA    JOINT REPLACEMENT Right     TKA       Family History:  No family history on file.    Social History:  Social History     Tobacco Use    Smoking status: Every Day     Packs/day: 1.00     Types: Cigarettes    Smokeless tobacco: Not on file   Substance Use Topics    Alcohol use: No           Review of Systems:    Review of Systems   Constitutional:  Negative for fever and unexpected weight change.   Respiratory:  Negative for cough and shortness of breath.     Cardiovascular:  Negative for chest pain and leg swelling.   Gastrointestinal:  Positive for diarrhea. Negative for abdominal pain, blood in stool, nausea and vomiting.   Musculoskeletal:  Negative for back pain and myalgias.   Skin:  Negative for color change and pallor.   Neurological:  Negative for speech difficulty and weakness.   All other systems reviewed and are negative.    Objective:      VITAL SIGNS: 24 HR MIN & MAX LAST  Temp  Min: 97.5 °F (36.4 °C)  Max: 98.2 °F (36.8 °C) 97.7 °F (36.5 °C)  BP  Min: 97/61  Max: 129/70 115/70  Pulse  Min: 66  Max: 84 76  Resp  Min: 18  Max: 20 20  SpO2  Min: 97 %  Max: 100 % 98 %      Intake/Output Summary (Last 24 hours) at 3/23/2023 0853  Last data filed at 3/23/2023 0619  Gross per 24 hour   Intake 1890 ml   Output 600 ml   Net 1290 ml       Physical Exam  Vitals and nursing note reviewed.   Constitutional:       Appearance: Normal appearance.   HENT:      Head: Normocephalic and atraumatic.   Eyes:      General: No scleral icterus.     Extraocular Movements: Extraocular movements intact.   Cardiovascular:      Rate and Rhythm: Normal rate and regular rhythm.      Heart sounds: Normal heart sounds.   Pulmonary:      Effort: Pulmonary effort is normal. No respiratory distress.      Breath sounds: Normal breath sounds.   Abdominal:      General: Abdomen is flat. Bowel sounds are normal. There is no distension.      Palpations: Abdomen is soft.      Tenderness: There is no abdominal tenderness.   Musculoskeletal:         General: No swelling or deformity. Normal range of motion.      Right lower leg: No edema.      Left lower leg: No edema.   Skin:     General: Skin is warm and dry.   Neurological:      General: No focal deficit present.      Mental Status: He is alert.   Psychiatric:         Mood and Affect: Mood normal.         Behavior: Behavior normal.       Recent Results (from the past 48 hour(s))   Basic Metabolic Panel    Collection Time: 03/22/23  9:40 AM    Result Value Ref Range    Sodium Level 134 (L) 136 - 145 mmol/L    Potassium Level 4.0 3.5 - 5.1 mmol/L    Chloride 95 (L) 98 - 107 mmol/L    Carbon Dioxide 35 (H) 23 - 31 mmol/L    Glucose Level 103 82 - 115 mg/dL    Blood Urea Nitrogen 10.7 8.4 - 25.7 mg/dL    Creatinine 0.74 0.73 - 1.18 mg/dL    BUN/Creatinine Ratio 14     Calcium Level Total 7.9 (L) 8.8 - 10.0 mg/dL    Anion Gap 4.0 mEq/L    eGFR >60 mls/min/1.73/m2   Basic Metabolic Panel    Collection Time: 03/23/23  4:12 AM   Result Value Ref Range    Sodium Level 132 (L) 136 - 145 mmol/L    Potassium Level 4.8 3.5 - 5.1 mmol/L    Chloride 98 98 - 107 mmol/L    Carbon Dioxide 30 23 - 31 mmol/L    Glucose Level 89 82 - 115 mg/dL    Blood Urea Nitrogen 13.4 8.4 - 25.7 mg/dL    Creatinine 0.78 0.73 - 1.18 mg/dL    BUN/Creatinine Ratio 17     Calcium Level Total 7.6 (L) 8.8 - 10.0 mg/dL    Anion Gap 4.0 mEq/L    eGFR >60 mls/min/1.73/m2   CBC with Differential    Collection Time: 03/23/23  4:12 AM   Result Value Ref Range    WBC 7.2 4.5 - 11.5 x10(3)/mcL    RBC 3.92 (L) 4.70 - 6.10 x10(6)/mcL    Hgb 10.9 (L) 14.0 - 18.0 g/dL    Hct 34.6 (L) 42.0 - 52.0 %    MCV 88.3 80.0 - 94.0 fL    MCH 27.8 pg    MCHC 31.5 (L) 33.0 - 36.0 g/dL    RDW 15.9 11.5 - 17.0 %    Platelet 156 130 - 400 x10(3)/mcL    MPV 9.5 7.4 - 10.4 fL    Neut % 57.3 %    Lymph % 30.4 %    Mono % 6.7 %    Eos % 1.4 %    Basophil % 0.6 %    Lymph # 2.18 0.6 - 4.6 x10(3)/mcL    Neut # 4.10 2.1 - 9.2 x10(3)/mcL    Mono # 0.48 0.1 - 1.3 x10(3)/mcL    Eos # 0.10 0 - 0.9 x10(3)/mcL    Baso # 0.04 0 - 0.2 x10(3)/mcL    IG# 0.26 (H) 0 - 0.04 x10(3)/mcL    IG% 3.6 %    NRBC% 0.0 %       X-Ray Chest 1 View    Result Date: 3/18/2023  EXAMINATION: XR CHEST 1 VIEW CLINICAL HISTORY: Dyspnea, unspecified TECHNIQUE: One view COMPARISON: None available. FINDINGS: Cardiopericardial silhouette is within normal limits.  Lungs are remarkable severe chronic emphysematous changes with fibrosis.  There are cystic  formations with honeycombing lower lung zones.  There are is scattered patchy and ground-glass opacities of the lungs which could represent associated mild pneumonitis without exclusion of developing early infiltrates.  No significant fluid within the pleural space.  No pneumothorax.     As above. Electronically signed by: Jasper Cerda Date:    03/18/2023 Time:    17:37    X-Ray Chest PA And Lateral    Result Date: 3/22/2023  EXAMINATION: XR CHEST PA AND LATERAL CLINICAL HISTORY: ?  Pneumonia;, . COMPARISON: March 18, 2023 FINDINGS: Examination reveals cardiomediastinal silhouette and pleuroparenchymal changes to be essentially unchanged as compared with the previous exam. Persistent increase interstitial markings mostly at both bases similar in distribution and configuration as compared with the previous exam of March 18, 2023 in likely chronic in nature. No new focal consolidative changes are identified     No significant change Electronically signed by: Matti Garcia Date:03/22/2023 Time: 09:30    Echo    Result Date: 3/19/2023  · Concentric hypertrophy and low normal systolic function. · The estimated ejection fraction is 50%. · Indeterminate left ventricular diastolic function. · Normal right ventricular size. · Mild right atrial enlargement. · Mild mitral regurgitation. · Mild to moderate tricuspid regurgitation. · Normal central venous pressure (3 mmHg). · The estimated PA systolic pressure is 35 mmHg. · Moderate left atrial enlargement.      Assessment & Plan:    88 y.o. male unknown to our service with PMHx of CAD s/p CABG, COPD on nightly O2 supplementation 2 L, arthritis s/p hip and knee arthroplasty, HTN, and HLD admitted with diarrhea x 2 months, positive c. Diff. Vanc initiated 4 days ago without symptom improvement.     c. Diff diarrhea - second episode  - antigen and toxin both positive  - Admitted to [facility] last month, treated for first episode c. Diff and reportedly had c-scope there  with negative repeat stool studies.  - obtain records of procedure please  - per criteria, this is a recurrent case and is currently being treated appropriately with a tapered/pulsed vanc regimen. We could consider switching to fidaxomicin 200 mg PO BID x 10 days   - d/c questran as is doesn't seem to be helping and it may be interfering with abx absorption  - continue probiotics, high fiber diet, ambulation and supportive care.    2. Possible PNA on imaging  3. Klebsiella UTI  - treating with levaquin  4. NSTEMI   - cardiology consulted, likely typ II in setting of HF   5. Newly diagnosed CHF w/ EF 40%     - addendum: d/c vanc and initiate dificid 200 mg PO BID x 10 days.     Thank you for allowing us to participate in this patient's care.    Adriana Crook PA-C  [facility]

## 2023-03-23 NOTE — CONSULTS
CONSULTATION DATE: 3/23/2023          CONSULTING PHYSICIAN: Dr. Elena Nina     REASON FOR CONSULTATION: Use of Dificid per [facility] policy              HISTORY OF PRESENT ILLNESS:  Information for HPI primarily gathered through chart review as patient is a poor historian.  He is an 88-year-old male with a past medical history of CAD, COPD on home oxygen, and hypertension who presented to the emergency room on 03/18 with complaints of watery diarrhea.  He was apparently diagnosed with C difficile colitis on 03/15, however had not started oral vancomycin yet.  His daughter reported that patient patient previously was treated for C difficile colitis at an outlying facility in mid February.  Per GI note, patient did undergo a colonoscopy at that time, however findings are unknown.  He completed a 2 week course of oral vancomycin at that time.  No known prior occurrences.  Patient denies any recent antibiotics, however per chart review, patient was treated for a dental abscess by his PCP in January of this year.  C difficile studies from admit show a positive GDH antigen and positive toxin.  Did have a positive urine culture on admit, however denies any urinary symptoms.  He was initiated on oral vancomycin, however this was changed today given ongoing watery bowel movements.  Leukocytosis from admit has resolved.  Patient did receive 3 days of levofloxacin, completed today, given some concern for pneumonia on imaging, however patient without respiratory complaints or increased oxygen needs.  He reports 3 stools thus far today which remain watery, however denies any abdominal pain.  He remains afebrile and hemodynamically stable.  We have been consulted per [facility] policy for the use of Dificid.        PAST MEDICAL HISTORY:   Past Medical History:   Diagnosis Date    Arthritis     Hypercholesterolemia     Hypertension             PAST SURGICAL HISTORY:   Past Surgical History:   Procedure Laterality Date    BACK SURGERY       CORONARY ANGIOPLASTY WITH STENT PLACEMENT      HIP SURGERY Left     Dr. Hoyos    JOINT REPLACEMENT Left     QUETA    JOINT REPLACEMENT Right     TKA            SOCIAL HISTORY:   Social History     Socioeconomic History    Marital status:    Tobacco Use    Smoking status: Every Day     Packs/day: 1.00     Types: Cigarettes   Substance and Sexual Activity    Alcohol use: No            FAMILY HISTORY: Reviewed - noncontributory        ALLERGIES: Review of patient's allergies indicates:  No Known Allergies         REVIEW OF SYSTEMS: Negative unless stated above         MEDICATIONS:   Reviewed in EMR        PHYSICAL EXAM:   Vitals:    03/23/23 1220   BP: 112/76   Pulse: 77   Resp:    Temp: 97.7 °F (36.5 °C)      GENERAL: Older male, NAD; does not appear toxic  SKIN: no rash  HEENT: sclera non-icteric; PERRL; NC in place  NECK: supple; no LAD  CHEST: CTA; nonlabored, equal expansion; no adventitious BS  CARDIOVASCULAR: RRR, S1S2; no murmur; equal peripheral pulses  ABDOMEN:  active bowel sounds; abdomen soft, nondistended, nontender to palpation  GENITOURINARY: no suprapubic tenderness  EXTREMITIES: no cyanosis or clubbing  NEURO: AAO x4; CN II-XII grossly intact; extremely hard of hearing  PSYCH: Mentation and affect appropriate          LABORATORY DATA: Reviewed         RADIOLOGICAL DATA:   Imaging Results              X-Ray Chest 1 View (Final result)  Result time 03/18/23 17:37:57      Final result by Jasper Cerda MD (03/18/23 17:37:57)                   Impression:      As above.      Electronically signed by: Jasper Cerda  Date:    03/18/2023  Time:    17:37               Narrative:    EXAMINATION:  XR CHEST 1 VIEW    CLINICAL HISTORY:  Dyspnea, unspecified    TECHNIQUE:  One view    COMPARISON:  None available.    FINDINGS:  Cardiopericardial silhouette is within normal limits.  Lungs are remarkable severe chronic emphysematous changes with fibrosis.  There are cystic formations with honeycombing lower lung  zones.  There are is scattered patchy and ground-glass opacities of the lungs which could represent associated mild pneumonitis without exclusion of developing early infiltrates.  No significant fluid within the pleural space.  No pneumothorax.                                            IMPRESSION:   88-year-old male presenting with ongoing diarrhea after C difficile stool studies positive on 03/15.  Prior episode of C difficile colitis in February, status post 14 days oral vancomycin at that time.  Initially on oral vancomycin, however changed to Dificid today given ongoing diarrhea.  ID consulted per [facility] policy for its use.     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 Diff.  Plan a 10 day course.   GI following - recs noted.   Strongly recommend avoiding any additional systemic antibiotics unless absolutely necessary in setting of active C. Difficile infection.   Discussed with patient.

## 2023-03-23 NOTE — PT/OT/SLP PROGRESS
Occupational Therapy   Treatment    Name: Max Squires  MRN: [id number]  Admitting Diagnosis:  C. difficile diarrhea       Recommendations:     Discharge Recommendations: home with home health, home health OT  Discharge Equipment Recommendations:  walker, rolling  Barriers to discharge:  Other (Comment) (Medical needs)    Assessment:     Max Squires is a 88 y.o. male with a medical diagnosis of C. difficile diarrhea.  He presents with the following performance deficits affecting function: weakness, impaired endurance, impaired self care skills, impaired functional mobility, impaired balance. He tolerated today's tx session well, and demonstrated good motivation to participate. The session was limited to a few feet from the bedside due to short oxygen tubing. Left additional oxygen tubing in pt's room for next tx session.     Rehab Prognosis:  Good; patient would benefit from acute skilled OT services to address these deficits and reach maximum level of function.       Plan:     Patient to be seen 3 x/week, 5 x/week to address the above listed problems via therapeutic exercises, therapeutic activities, self-care/home management  Plan of Care Expires:  (DC)  Plan of Care Reviewed with: patient    Subjective     Chief Complaint: None  Patient/Family Comments/goals: To return to PLOF  Pain/Comfort:  Pain Rating 1: 0/10    Objective:     Communicated with: RN prior to session.  Patient found HOB elevated with telemetry, oxygen upon OT entry to room.    General Precautions: Standard, fall, contact    Orthopedic Precautions:N/A  Braces: N/A  Respiratory Status: Nasal cannula, flow 2 L/min     Occupational Performance:     Bed Mobility:    Patient completed Supine to Sit with independence     Functional Mobility/Transfers:  Patient completed Sit <> Stand Transfer with minimum assistance  with  no assistive device   Patient completed Bed <> Chair Transfer using Step Transfer technique with minimum assistance with no  assistive device and hand-held assist    Activities of Daily Living:  Grooming: Set-up assist for brushing teeth and washing his face while seated in bedside chair.     Patient left up in chair with all lines intact, call button in reach, and RN notified    GOALS:   Multidisciplinary Problems       Occupational Therapy Goals          Problem: Occupational Therapy    Goal Priority Disciplines Outcome Interventions   Occupational Therapy Goal     OT, PT/OT Ongoing, Progressing    Description: Patient will perform grooming with mod I while standing at sink.    Patient will perform toileting with mod I using toilet in restroom.    Patient will perform toilet transfer with mod I with use of grab bars and RW as needed.    Patient will perform bathing with SB assist while using shower bench.    Patient will perform lower body dressing with mod I while seated EOB.                          Time Tracking:     OT Date of Treatment: 03/23/23  OT Start Time: 1030  OT Stop Time: 1047  OT Total Time (min): 17 min    Billable Minutes:Self Care/Home Management 17 min    OT/SLIM: OT     Number of SLIM visits since last OT visit: 1    3/23/2023

## 2023-03-23 NOTE — PROGRESS NOTES
[facility] Hospital Medicine Progress Note        Chief Complaint: Inpatient Follow-up for SOB, Diarrhea    HPI: Patient is a pleasant 88-year-old male was brought to the ER with significant diarrhea as well as reported bilateral lower extremity swelling.  Daughter was initially present (no longer present inpatient is not a great historian) but explained he has been dealing with diarrhea for several months as well as recurrent C diff infection and was diagnosed recently 03/15/2023 but has not yet started oral vancomycin again.     He arrived to the ER afebrile hemodynamically stable maintaining adequate sats on his baseline 2 L nasal cannula.  Laboratory work showed leukocytosis of 18 K, hypokalemia with a potassium of 2.3, a mildly elevated troponin and BNP, positive stool C diff.      Chest x-ray showed patchy infiltrates and emphysematous changes.  Echo showed preliminary EF of 40%.  He was started on oral vancomycin admitted to the hospitalist service for further management.  Added probiotics and Questran  Interval Hx:     Patient was seen and examined this morning still having 5-6 stools so GI was consulted denies any fever or chills   Objective/physical exam:  General: In no acute distress, afebrile  Chest: Clear to auscultation bilaterally  Heart: RRR, +S1, S2, no appreciable murmur  Abdomen: Soft, nontender, BS +  MSK: Warm, no lower extremity edema, no clubbing or cyanosis  Neurologic: Alert and awake  VITAL SIGNS: 24 HRS MIN & MAX LAST   Temp  Min: 97.5 °F (36.4 °C)  Max: 98.2 °F (36.8 °C) 97.7 °F (36.5 °C)   BP  Min: 97/61  Max: 129/70 115/70     Pulse  Min: 66  Max: 84  76   Resp  Min: 18  Max: 20 20   SpO2  Min: 97 %  Max: 100 % 98 %         Recent Labs   Lab 03/20/23  0449 03/21/23  0426 03/23/23  0412   WBC 9.0 8.5 7.2   RBC 3.93* 4.03* 3.92*   HGB 10.9* 11.4* 10.9*   HCT 34.4* 35.2* 34.6*   MCV 87.5 87.3 88.3   MCH 27.7 28.3 27.8   MCHC 31.7* 32.4* 31.5*   RDW 15.7 15.4  15.9    164 156   MPV 9.8 9.8 9.5       Recent Labs   Lab 03/18/23  1547 03/18/23  2233 03/19/23  0508 03/19/23  1817 03/20/23  0450 03/21/23  0426 03/22/23  0940 03/23/23  0412      < > 136   < > 136 132* 134* 132*   K 2.3*   < > 2.2*   < > 3.0* 3.5 4.0 4.8   CO2 33*   < > 31   < > 32* 34* 35* 30   BUN 19.9   < > 17.1   < > 9.8 11.1 10.7 13.4   CREATININE 0.92   < > 0.86   < > 0.74 0.66* 0.74 0.78   CALCIUM 8.1*   < > 8.0*   < > 7.5* 7.3* 7.9* 7.6*   MG 1.60  --  1.60  --  1.80  --   --   --    ALBUMIN 2.3*  --  2.3*  --  1.9*  --   --   --    ALKPHOS 86  --  82  --  63  --   --   --    ALT 10  --  8  --  7  --   --   --    AST 13  --  11  --  8  --   --   --    BILITOT 0.6  --  0.6  --  0.4  --   --   --     < > = values in this interval not displayed.          Microbiology Results (last 7 days)       Procedure Component Value Units Date/Time    Blood Culture [[id number]]  (Normal) Collected: 03/19/23 0038    Order Status: Completed Specimen: Blood Updated: 03/23/23 0200     CULTURE, BLOOD ([facility]) No Growth At 96 Hours    Blood Culture [[id number]]  (Normal) Collected: 03/19/23 0038    Order Status: Completed Specimen: Blood Updated: 03/23/23 0200     CULTURE, BLOOD ([facility]) No Growth At 96 Hours    Stool Culture **CANNOT BE ORDERED STAT** [[id number]]  (Normal) Collected: 03/18/23 1840    Order Status: Completed Specimen: Stool Updated: 03/21/23 1051     Stool Culture Negative for Salmonella, Shigella, Campylobacter, Vibrio, Aeromonas, Pleisiomonas,Yersinia, or Shiga Toxin 1 and 2.    Urine culture [[id number]]  (Abnormal)  (Susceptibility) Collected: 03/18/23 1804    Order Status: Completed Specimen: Urine Updated: 03/21/23 0644     Urine Culture 25,000-50,000 colonies/ml Klebsiella pneumoniae ssp pneumoniae    Respiratory Culture [[id number]]     Order Status: Sent Specimen: Sputum, Expectorated     Clostridium Diff Toxin, A & B, EIA [[id number]]  (Abnormal) Collected: 03/18/23 1840    Order Status: Completed  Specimen: Stool Updated: 03/19/23 0014     Clostridium Difficile GDH Antigen Positive     Clostridium Difficile Toxin A/B Positive             See below for Radiology    Scheduled Med:   aspirin  81 mg Oral Daily    atorvastatin  40 mg Oral QHS    cholestyramine-aspartame  1 packet Oral BID    enoxaparin  40 mg Subcutaneous Daily    ezetimibe  10 mg Oral QHS    finasteride  5 mg Oral Daily    Lactobacillus acidophilus  1 capsule Oral TID WM    metoprolol succinate  25 mg Oral Daily    miconazole NITRATE 2 %   Topical (Top) BID    potassium chloride  40 mEq Oral BID    sacubitriL-valsartan  1 tablet Oral BID    tamsulosin  2 capsule Oral Daily    vancomycin  125 mg Oral Q6H    Followed by    [START ON 3/29/2023] vancomycin  125 mg Oral Q12H    Followed by    [START ON 4/5/2023] vancomycin  125 mg Oral Daily    Followed by    [START ON 4/12/2023] vancomycin  125 mg Oral Q3 Days        Continuous Infusions:       PRN Meds:  acetaminophen, acetaminophen, acetaminophen, albuterol, dextrose 10%, dextrose 10%, glucagon (human recombinant), glucose, glucose, melatonin, ondansetron, sodium chloride 0.9%       Assessment/Plan:  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 vancomycin GI has discontinued Questran and they are recommending continuing probiotics   As that is not helping  If patient is still having diarrhea then might consider switching to fidaxomicin  Repeat chest x-ray done yesterday showed no significant change from before completed 7 days of antibiotics  Cardiology following patient is on aspirin Toprol Entresto and they recommended outpatient ischemic eval    They recommended starting the patient on Lasix once diarrhea is better and electrolytes states stable   Will continue with oxygen supplementation   White cell count is back to normal   Will keep a close watch on patient's H&H  Patient working with PT  VTE prophylaxis: lovenox    Patient condition:  Critical    Anticipated discharge and Disposition:         All diagnosis and differential diagnosis have been reviewed; assessment and plan has been documented; I have personally reviewed the labs and test results that are presently available; I have reviewed the patients medication list; I have reviewed the consulting providers response and recommendations. I have reviewed or attempted to review medical records based upon their availability    All of the patient's questions have been  addressed and answered. Patient's is agreeable to the above stated plan. I will continue to monitor closely and make adjustments to medical management as needed.  _____________________________________________________________________    Nutrition Status:    Radiology:  X-Ray Chest PA And Lateral  Narrative: EXAMINATION:  XR CHEST PA AND LATERAL    CLINICAL HISTORY:  ?  Pneumonia;, .    COMPARISON:  March 18, 2023    FINDINGS:  Examination reveals cardiomediastinal silhouette and pleuroparenchymal changes to be essentially unchanged as compared with the previous exam.    Persistent increase interstitial markings mostly at both bases similar in distribution and configuration as compared with the previous exam of March 18, 2023 in likely chronic in nature.    No new focal consolidative changes are identified  Impression: No significant change    Electronically signed by: Matti Garcia  Date:    03/22/2023  Time:    09:30      Elena Nina MD   03/23/2023

## 2023-03-23 NOTE — PLAN OF CARE
Problem: Adult Inpatient Plan of Care  Goal: Plan of Care Review  Outcome: Ongoing, Progressing  Goal: Patient-Specific Goal (Individualized)  Outcome: Ongoing, Progressing  Goal: Absence of Hospital-Acquired Illness or Injury  Outcome: Ongoing, Progressing  Goal: Readiness for Transition of Care  Outcome: Ongoing, Progressing     Problem: Skin Injury Risk Increased  Goal: Skin Health and Integrity  Outcome: Ongoing, Progressing     Problem: Impaired Wound Healing  Goal: Optimal Wound Healing  Outcome: Ongoing, Progressing     Problem: Fall Injury Risk  Goal: Absence of Fall and Fall-Related Injury  Outcome: Ongoing, Progressing

## 2023-03-24 LAB
BACTERIA BLD CULT: NORMAL
BACTERIA BLD CULT: NORMAL

## 2023-03-24 PROCEDURE — 25000003 PHARM REV CODE 250: Performed by: PHYSICIAN ASSISTANT

## 2023-03-24 PROCEDURE — 63600175 PHARM REV CODE 636 W HCPCS: Performed by: INTERNAL MEDICINE

## 2023-03-24 PROCEDURE — 25000003 PHARM REV CODE 250: Performed by: INTERNAL MEDICINE

## 2023-03-24 PROCEDURE — 25000003 PHARM REV CODE 250: Performed by: NURSE PRACTITIONER

## 2023-03-24 PROCEDURE — 94761 N-INVAS EAR/PLS OXIMETRY MLT: CPT

## 2023-03-24 PROCEDURE — 97116 GAIT TRAINING THERAPY: CPT

## 2023-03-24 PROCEDURE — 27000221 HC OXYGEN, UP TO 24 HOURS

## 2023-03-24 PROCEDURE — 97535 SELF CARE MNGMENT TRAINING: CPT

## 2023-03-24 PROCEDURE — 21400001 HC TELEMETRY ROOM

## 2023-03-24 PROCEDURE — 97530 THERAPEUTIC ACTIVITIES: CPT

## 2023-03-24 PROCEDURE — 99232 PR SUBSEQUENT HOSPITAL CARE,LEVL II: ICD-10-PCS | Mod: ,,, | Performed by: GENERAL PRACTICE

## 2023-03-24 PROCEDURE — 99232 SBSQ HOSP IP/OBS MODERATE 35: CPT | Mod: ,,, | Performed by: GENERAL PRACTICE

## 2023-03-24 RX ADMIN — ASPIRIN 81 MG: 81 TABLET, COATED ORAL at 08:03

## 2023-03-24 RX ADMIN — FIDAXOMICIN 200 MG: 200 TABLET, FILM COATED ORAL at 08:03

## 2023-03-24 RX ADMIN — TAMSULOSIN HYDROCHLORIDE 0.8 MG: 0.4 CAPSULE ORAL at 08:03

## 2023-03-24 RX ADMIN — POTASSIUM CHLORIDE 40 MEQ: 1500 TABLET, EXTENDED RELEASE ORAL at 08:03

## 2023-03-24 RX ADMIN — SACUBITRIL AND VALSARTAN 1 TABLET: 24; 26 TABLET, FILM COATED ORAL at 08:03

## 2023-03-24 RX ADMIN — MICONAZOLE NITRATE: 20 POWDER TOPICAL at 08:03

## 2023-03-24 RX ADMIN — FINASTERIDE 5 MG: 5 TABLET, FILM COATED ORAL at 08:03

## 2023-03-24 RX ADMIN — ENOXAPARIN SODIUM 40 MG: 40 INJECTION SUBCUTANEOUS at 05:03

## 2023-03-24 RX ADMIN — Medication 1 CAPSULE: at 12:03

## 2023-03-24 RX ADMIN — EZETIMIBE 10 MG: 10 TABLET ORAL at 08:03

## 2023-03-24 RX ADMIN — METOPROLOL SUCCINATE 25 MG: 25 TABLET, EXTENDED RELEASE ORAL at 08:03

## 2023-03-24 RX ADMIN — Medication 1 CAPSULE: at 05:03

## 2023-03-24 RX ADMIN — Medication 1 CAPSULE: at 08:03

## 2023-03-24 RX ADMIN — ATORVASTATIN CALCIUM 40 MG: 40 TABLET, FILM COATED ORAL at 08:03

## 2023-03-24 NOTE — PROGRESS NOTES
PROGRESS NOTE    SUBJECTIVE:  AF, VSS.  Had about 3 small loose BMs today.  No abdominal cramping.        REVIEW OF SYSTEMS: Negative unless stated above         MEDICATIONS:   Reviewed in EMR        PHYSICAL EXAM:   Vitals:    03/24/23 1138   BP: 114/63   Pulse: 73   Resp: 20   Temp: 97.7 °F (36.5 °C)          GENERAL: Older male, NAD; does not appear toxic  SKIN: no rash  HEENT: sclera non-icteric; PERRL; NC in place  NECK: supple; no LAD  CHEST: CTA; nonlabored, equal expansion; no adventitious BS  CARDIOVASCULAR: RRR, S1S2; no murmur; equal peripheral pulses  ABDOMEN:  active bowel sounds; abdomen soft, nondistended, nontender to palpation  GENITOURINARY: no suprapubic tenderness  EXTREMITIES: no cyanosis or clubbing  NEURO: AAO x4; CN II-XII grossly intact; extremely hard of hearing  PSYCH: Mentation and affect appropriate          LABORATORY DATA: Reviewed         RADIOLOGICAL DATA: Reviewed         IMPRESSION:   88-year-old male presenting with ongoing diarrhea after C difficile stool studies positive on 03/15.  Prior episode of C difficile colitis in February, status post 14 days oral vancomycin at that time.  Initially on oral vancomycin, however changed to Dificid today given ongoing diarrhea.  ID consulted per ASP policy for its use.     Recurrent C. Difficile colitis - second episode  Leukocytosis s/t above, resolved  COPD on home oxygen  Asymptomatic bacteriuria       PLAN:  Still with some loose BMs although improving per report.    Continue Dificid as planned x10 days.   Discussed with patient and nursing.

## 2023-03-24 NOTE — PHYSICIAN QUERY
PT Name: Max Squires  MR #: [id number]     DOCUMENTATION CLARIFICATION     CDS/: Socorro Reyes RN, CDI            Contact information:[email or web address]     This form is a permanent document in the medical record.     Query Date: March 24, 2023    By submitting this query, we are merely seeking further clarification of documentation.  Please utilize your independent clinical judgment when addressing the question(s) below.    The Medical Record contains the following   Indicators Supporting Clinical Findings Location in Medical Record   x Heart Failure documented Mildly decompensated systolic heart failure    Newly Diagnosed Systolic HF/EF 50% H&P 3/19    Cards consult 3/20   x .7 Lab 3/18   x EF/Echo Concentric hypertrophy and low normal systolic function.  The estimated ejection fraction is 50%.  Indeterminate left ventricular diastolic function.  Normal right ventricular size.  Mild right atrial enlargement.  Mild mitral regurgitation.  Mild to moderate tricuspid regurgitation.  Normal central venous pressure (3 mmHg).  The estimated PA systolic pressure is 35 mmHg.  Moderate left atrial enlargement.    Echo 3/18   x Radiology findings Chest x-ray showed patchy infiltrates and emphysematous changes H&P 3/19   x Subjective/Objective Respiratory Conditions He arrived to the ER afebrile hemodynamically stable maintaining adequate sats on his baseline 2 L nasal cannula  COPD on 2 L nasal cannula nightly H&P 3/19   x Recent/Current MI NSTEMI likely type 2 demand ischemia  H&P 3/19    Heart Transplant, LVAD     x Edema, JVD  Pitting lower extremity edema noted. Cards consult 3/20    Ascites     x Diuretics/Meds  holding off on diuresis until potassium repleted    -Patient can be started on Lasix 20 mg p.o. q.day when his diarrhea resolves H&P 3/19    Cards consult 3/20   x Other Treatment - telemetry monitoring, official echo read pending, trend cardiac enzymes H&P 3/19    Other       Heart failure is a  clinical diagnosis which includes symptomatic fluid retention, elevated intracardiac pressures, and/or the inability of the heart to deliver adequate blood flow.    Heart Failure with reduced Ejection Fraction (HFrEF) or Systolic Heart Failure (loses ability to contract normally, EF is <40%)    Heart Failure with preserved Ejection Fraction (HFpEF) or Diastolic Heart Failure (stiff ventricles, does not relax properly, EF is >50%)     Heart Failure with Combined Systolic and Diastolic Failure (stiff ventricles, does not relax properly and EF is <50%)    Mid-range or mildly reduced ejection fraction (HFmrEF) is classified as systolic heart failure.  Congestive heart failure with a recovered EF is classified as Diastolic Heart Failure.  Common clues to acute exacerbation:  Rapidly progressive symptoms (w/in 2 weeks of presentation), using IV diuretics, using supplemental O2, pulmonary edema on Xray, new or worsening pleural effusion, +JVD or other signs of volume overload, MI w/in 4 weeks, and/or BNP >500  The clinical guidelines noted are only system guidelines, and do not replace the providers clinical judgment.    Provider, please specify the diagnosis associated with the above clinical findings.    [  x ]  Acute Systolic Heart Failure (HFrEF or HFmrEF) - new diagnosis   [   ]  Acute on Chronic Systolic Heart Failure (HFrEF or HFmrEF) - worsening of CHF signs/symptoms in preexisting CHF   [   ]  Other (please specify): ___________________________________     Please document in your progress notes daily for the duration of treatment until resolved and include in your discharge summary.    References:  American Heart Association editorial staff. (2017, May). Ejection Fraction Heart Failure Measurement. American Heart Association.  https://www.heart.org/en/health-topics/heart-failure/diagnosing-heart-failure/ejection-fraction-heart-failure-measurement#:~:text=Ejection%20fraction%20(EF)%20is%20a,pushed%20out%20with%20each%20heartbeat  MARY Chow (2020, December 15). Heart failure with preserved ejection fraction: Clinical manifestations and diagnosis. [employer]. https://www.[employer]oying/contents/heart-failure-with-preserved-ejection-fraction-clinical-manifestations-and-diagnosis.  ICD-10-CM/PCS Coding Clinic Third Quarter ICD-10, Effective with discharges: September 8, 2020 [facility] § Heart failure with mid-range or mildly reduced ejection fraction (2020).  ICD-10-CM/PCS Coding Clinic Third Quarter ICD-10, Effective with discharges: September 8, 2020 [facility] § Heart failure with recovered ejection fraction (2020).  Form No. [id number]

## 2023-03-24 NOTE — PT/OT/SLP PROGRESS
"Physical Therapy Treatment    Patient Name:  Max Squires   MRN:  [id number]    Recommendations:     Discharge Recommendations: home with home health  Discharge Equipment Recommendations: walker, rolling  Barriers to discharge: Decreased caregiver support    Assessment:     Max Squires is a 88 y.o. male admitted with a medical diagnosis of C. difficile diarrhea.  He presents with the following impairments/functional limitations: weakness, impaired endurance, impaired balance, decreased lower extremity function, impaired functional mobility, impaired self care skills, impaired cardiopulmonary response to activity     Rehab Prognosis: Good; patient would benefit from acute skilled PT services to address these deficits and reach maximum level of function.    Recent Surgery: * No surgery found *      Plan:     During this hospitalization, patient to be seen 6 x/week to address the identified rehab impairments via gait training, therapeutic exercises, therapeutic activities and progress toward the following goals:    Plan of Care Expires:  04/22/23    Subjective     Chief Complaint: "I need to pee"  Patient/Family Comments/goals: "Pt wants to walk"  Pain/Comfort:         Objective:     Communicated with NSG prior to session.  Patient found up in chair with   upon PT entry to room.     General Precautions: Standard, fall  Orthopedic Precautions: N/A  Braces: N/A  Respiratory Status: Nasal cannula, flow 3 L/min  Blood Pressure:   Skin Integrity: Visible skin intact      Functional Mobility:  Transfers:     Sit to Stand:  minimum assistance with rolling walker  Gait: Pt ambulated ~100ft. Step through gait pattern. Decreased step length and bharath. No unsteadiness or LOB. Min A. RW.    Pt stood and reports that he urgently needed to urinate. Pt assisted with the urinal but then began having a BM in standing. Pt and floor cleaned. Pt left sitting UIC with all needs in reach.      Patient left up in chair with all lines " intact and call button in reach..    GOALS:   Multidisciplinary Problems       Physical Therapy Goals          Problem: Physical Therapy    Goal Priority Disciplines Outcome Goal Variances Interventions   Physical Therapy Goal     PT, PT/OT Ongoing, Progressing     Description: Goals to be met by: 23     Patient will increase functional independence with mobility by performin. Supine to sit with Crowley  2. Sit to supine with Crowley  3. Sit to stand transfer with Modified Crowley  4. Gait  x 150 feet with Modified Crowley using Rolling Walker.   5. Ascend/ descend 12 stairs with use of B railings with modified independence                          Time Tracking:     PT Received On:    PT Start Time: 1430     PT Stop Time: 1454  PT Total Time (min): 24 min     Billable Minutes: Gait Training 12 and Therapeutic Activity 12    Treatment Type: Treatment  PT/PTA: PTA     Number of PTA visits since last PT visit: 2023

## 2023-03-24 NOTE — PROGRESS NOTES
Gastroenterology Progress Note      HPI:    Infectious disease consulted yesterday, and they recommend discontinuing the Levaquin and continuing Dificid b.i.d. times 10 days. PT resting now. No family at bedside. He has continued to have multiple watery Bms, about 3-4 today.     ROS:    Review of Systems   Constitutional:  Negative for fever, malaise/fatigue and weight loss.   Respiratory:  Negative for cough and shortness of breath.    Cardiovascular:  Negative for chest pain, palpitations and leg swelling.   Gastrointestinal:  Negative for abdominal pain, blood in stool, constipation, diarrhea, heartburn, melena, nausea and vomiting.   Musculoskeletal:  Negative for back pain and myalgias.   Skin:  Negative for rash.   Neurological:  Negative for speech change and focal weakness.   All other systems reviewed and are negative.      Vital Signs:  /63   Pulse 63   Temp 97.8 °F (36.6 °C) (Oral)   Resp 20   Ht 6' (1.829 m)   Wt 70.4 kg (155 lb 1.6 oz)   SpO2 98%   BMI 21.04 kg/m²   Body mass index is 21.04 kg/m².    Physical Exam:    Physical Exam  Vitals and nursing note reviewed.   Constitutional:       Appearance: Normal appearance.   HENT:      Head: Normocephalic and atraumatic.   Eyes:      General: No scleral icterus.     Extraocular Movements: Extraocular movements intact.   Cardiovascular:      Rate and Rhythm: Normal rate and regular rhythm.      Heart sounds: Normal heart sounds.   Pulmonary:      Effort: Pulmonary effort is normal. No respiratory distress.      Breath sounds: Normal breath sounds.   Abdominal:      General: Abdomen is flat. Bowel sounds are normal. There is no distension.      Palpations: Abdomen is soft.      Tenderness: There is no abdominal tenderness.   Musculoskeletal:         General: No swelling or deformity. Normal range of motion.      Right lower leg: No edema.      Left lower leg: No edema.   Skin:     General: Skin is warm and dry.   Neurological:      General: No  focal deficit present.      Mental Status: He is alert and oriented to person, place, and time.   Psychiatric:         Mood and Affect: Mood normal.         Behavior: Behavior normal.       Labs:  Recent Results (from the past 48 hour(s))   Basic Metabolic Panel    Collection Time: 03/22/23  9:40 AM   Result Value Ref Range    Sodium Level 134 (L) 136 - 145 mmol/L    Potassium Level 4.0 3.5 - 5.1 mmol/L    Chloride 95 (L) 98 - 107 mmol/L    Carbon Dioxide 35 (H) 23 - 31 mmol/L    Glucose Level 103 82 - 115 mg/dL    Blood Urea Nitrogen 10.7 8.4 - 25.7 mg/dL    Creatinine 0.74 0.73 - 1.18 mg/dL    BUN/Creatinine Ratio 14     Calcium Level Total 7.9 (L) 8.8 - 10.0 mg/dL    Anion Gap 4.0 mEq/L    eGFR >60 mls/min/1.73/m2   Basic Metabolic Panel    Collection Time: 03/23/23  4:12 AM   Result Value Ref Range    Sodium Level 132 (L) 136 - 145 mmol/L    Potassium Level 4.8 3.5 - 5.1 mmol/L    Chloride 98 98 - 107 mmol/L    Carbon Dioxide 30 23 - 31 mmol/L    Glucose Level 89 82 - 115 mg/dL    Blood Urea Nitrogen 13.4 8.4 - 25.7 mg/dL    Creatinine 0.78 0.73 - 1.18 mg/dL    BUN/Creatinine Ratio 17     Calcium Level Total 7.6 (L) 8.8 - 10.0 mg/dL    Anion Gap 4.0 mEq/L    eGFR >60 mls/min/1.73/m2   CBC with Differential    Collection Time: 03/23/23  4:12 AM   Result Value Ref Range    WBC 7.2 4.5 - 11.5 x10(3)/mcL    RBC 3.92 (L) 4.70 - 6.10 x10(6)/mcL    Hgb 10.9 (L) 14.0 - 18.0 g/dL    Hct 34.6 (L) 42.0 - 52.0 %    MCV 88.3 80.0 - 94.0 fL    MCH 27.8 pg    MCHC 31.5 (L) 33.0 - 36.0 g/dL    RDW 15.9 11.5 - 17.0 %    Platelet 156 130 - 400 x10(3)/mcL    MPV 9.5 7.4 - 10.4 fL    Neut % 57.3 %    Lymph % 30.4 %    Mono % 6.7 %    Eos % 1.4 %    Basophil % 0.6 %    Lymph # 2.18 0.6 - 4.6 x10(3)/mcL    Neut # 4.10 2.1 - 9.2 x10(3)/mcL    Mono # 0.48 0.1 - 1.3 x10(3)/mcL    Eos # 0.10 0 - 0.9 x10(3)/mcL    Baso # 0.04 0 - 0.2 x10(3)/mcL    IG# 0.26 (H) 0 - 0.04 x10(3)/mcL    IG% 3.6 %    NRBC% 0.0 %         Assessment/Plan:  88  y.o. male unknown to our service with PMHx of CAD s/p CABG, COPD on nightly O2 supplementation 2 L, arthritis s/p hip and knee arthroplasty, HTN, and HLD admitted with diarrhea x 2 months, positive c. Diff. Vanc initiated 4 days ago without symptom improvement.      c. Diff diarrhea - second episode  - antigen and toxin both positive  - Admitted to [facility] last month, treated for first episode c. Diff and reportedly had c-scope there with negative repeat stool studies.  - obtain records of procedure please  - per criteria, this is a recurrent case, continue treatment with dificid 200 mg PO BID x 10 days  - d/c questran as is doesn't seem to be helping and it may be interfering with abx absorption  - continue probiotics, high fiber diet, ambulation and supportive care.    2. NSTEMI   - cardiology consulted, likely typ II in setting of HF   3. Newly diagnosed CHF w/ EF 40%      - will monitor for symptom improvement.   - GI will sign off. Follow ID recs.      KENNEDY PittsC  [facility]

## 2023-03-24 NOTE — PT/OT/SLP PROGRESS
Occupational Therapy   Treatment    Name: Max Squires  MRN: [id number]  Admitting Diagnosis:  C. difficile diarrhea       Recommendations:     Discharge Recommendations: home with home health  Discharge Equipment Recommendations:  walker, rolling  Barriers to discharge:       Assessment:     Max Squires is a 88 y.o. male with a medical diagnosis of C. difficile diarrhea.  Performance deficits affecting function are weakness, impaired endurance, impaired self care skills, impaired functional mobility, impaired balance.     Rehab Prognosis:  Good; patient would benefit from acute skilled OT services to address these deficits and reach maximum level of function.       Plan:     Patient to be seen 3 x/week, 5 x/week to address the above listed problems via therapeutic exercises, therapeutic activities, self-care/home management  Plan of Care Expires:  (DC)  Plan of Care Reviewed with: patient    Subjective     Chief Complaint: constant diarrhea  Patient/Family Comments/goals: to stop the diarrhea  Pain/Comfort:   No complaint of pain    Objective:     Communicated with: RN prior to session.  Patient found HOB elevated with peripheral IV upon OT entry to room.    General Precautions: Standard, contact    Orthopedic Precautions:N/A  Braces: N/A  Respiratory Status: Nasal cannula, flow 2 L/min     Occupational Performance:     Bed Mobility:    Patient completed Rolling/Turning to Left with  independence  Patient completed Supine to Sit with stand by assistance     Functional Mobility/Transfers:  Patient completed Sit <> Stand Transfer with stand by assistance  with  no assistive device   Patient completed Bed <> Chair Transfer using Step Transfer technique with contact guard assistance with hand-held assist  Functional Mobility: sit to stand x 2 due to BM x 2    Activities of Daily Living:  Grooming: contact guard assistance standing brushing teeth  Upper Body Dressing: moderate assistance due to lines  Toileting:  moderate assistance to wipe after BM x 2      Treatment & Education:  Fall prevention, home safety with ADLs and transfers. Grooming and oral hygiene in standing.Doff and nathan gown with moderate assistance following toileting.    Patient left up in chair with all lines intact and call button in reach    GOALS:   Multidisciplinary Problems       Occupational Therapy Goals          Problem: Occupational Therapy    Goal Priority Disciplines Outcome Interventions   Occupational Therapy Goal     OT, PT/OT Ongoing, Progressing    Description: Patient will perform grooming with mod I while standing at sink.    Patient will perform toileting with mod I using toilet in restroom.    Patient will perform toilet transfer with mod I with use of grab bars and RW as needed.    Patient will perform bathing with SB assist while using shower bench.    Patient will perform lower body dressing with mod I while seated EOB.                          Time Tracking:     OT Date of Treatment: 03/24/23  OT Start Time: 1000  OT Stop Time: 1030  OT Total Time (min): 30 min    Billable Minutes:Self Care/Home Management 30          Number of SLIM visits since last OT visit: 1    3/24/2023

## 2023-03-24 NOTE — PT/OT/SLP PROGRESS
Physical Therapy Treatment    Patient Name:  Max Squires   MRN:  [id number]    Recommendations:     Discharge Recommendations: home health PT  Discharge Equipment Recommendations: walker, rolling  Barriers to discharge:  medical dx, decreased endurance     Assessment:     Max Squires is a 88 y.o. male admitted with a medical diagnosis of C. difficile diarrhea.  He presents with the following impairments/functional limitations: weakness, impaired endurance, impaired balance, impaired functional mobility, impaired self care skills, impaired cardiopulmonary response to activity.    Rehab Prognosis: Good; patient would benefit from acute skilled PT services to address these deficits and reach maximum level of function.    Recent Surgery: * No surgery found *      Plan:     During this hospitalization, patient to be seen 6 x/week to address the identified rehab impairments via gait training, therapeutic activities, therapeutic exercises and progress toward the following goals:    Plan of Care Expires:  04/22/23    Subjective     Chief Complaint: n/a  Patient/Family Comments/goals: to get stronger and go home   Pain/Comfort:  Pain Rating 1: 0/10  Pain Rating Post-Intervention 2: 0/10      Objective:     Communicated with NSG prior to session.  Patient found up in chair with oxygen, pulse ox (continuous), telemetry upon PT entry to room.     General Precautions: Standard, contact  Orthopedic Precautions: N/A  Braces: N/A  Respiratory Status: Nasal cannula, flow 2 L/min   SpO2: 97% at rest   SpO2: 87% with ax    Functional Mobility:  Transfers:     Sit to Stand:  stand by assistance with rolling walker  Toilet Transfer: stand by assistance with  rolling walker  Pt with small + BM, PT performed gulshan-care while pt stood within RW  Gait: pt ambulated approx 100ft + 80ft with use of RW and SBA; a seated rest break with extended time was required btw trials for recovery; excessive forward flexed posture requiring VC for erect  posture; VC for proper RW management- hao to keep RW close in proximity to body     Education:  Patient provided with verbal education regarding POC, mobility, and safety .  Understanding was verbalized.     Patient left up in chair with all lines intact, call button in reach, and spouse present..    GOALS:   Multidisciplinary Problems       Physical Therapy Goals          Problem: Physical Therapy    Goal Priority Disciplines Outcome Goal Variances Interventions   Physical Therapy Goal     PT, PT/OT Ongoing, Progressing     Description: Goals to be met by: 23     Patient will increase functional independence with mobility by performin. Supine to sit with Cole  2. Sit to supine with Cole  3. Sit to stand transfer with Modified Cole  4. Gait  x 150 feet with Modified Cole using Rolling Walker.   5. Ascend/ descend 12 stairs with use of B railings with modified independence                          Time Tracking:     PT Received On: 23  PT Start Time: 1448     PT Stop Time: 1513  PT Total Time (min): 25 min     Billable Minutes: Gait Training 1 and Therapeutic Activity 1    Treatment Type: Treatment  PT/PTA: PT     Number of PTA visits since last PT visit: 2     2023

## 2023-03-24 NOTE — PROGRESS NOTES
[facility] Hospital Medicine Progress Note        Chief Complaint: Inpatient Follow-up for SOB, Diarrhea    HPI: Patient is a pleasant 88-year-old male was brought to the ER with significant diarrhea as well as reported bilateral lower extremity swelling.  Daughter was initially present (no longer present inpatient is not a great historian) but explained he has been dealing with diarrhea for several months as well as recurrent C diff infection and was diagnosed recently 03/15/2023 but has not yet started oral vancomycin again.     He arrived to the ER afebrile hemodynamically stable maintaining adequate sats on his baseline 2 L nasal cannula.  Laboratory work showed leukocytosis of 18 K, hypokalemia with a potassium of 2.3, a mildly elevated troponin and BNP, positive stool C diff.      Chest x-ray showed patchy infiltrates and emphysematous changes.  Echo showed preliminary EF of 40%.  He was started on oral vancomycin admitted to the hospitalist service for further management.  Added probiotics and Questran  Interval Hx:     Patient was seen and examined this morning Diarrhea is slowing down    Objective/physical exam:  General: In no acute distress, afebrile  Chest: Clear to auscultation bilaterally  Heart: RRR, +S1, S2, no appreciable murmur  Abdomen: Soft, nontender, BS +  MSK: Warm, no lower extremity edema, no clubbing or cyanosis  Neurologic: Alert and awake  VITAL SIGNS: 24 HRS MIN & MAX LAST   Temp  Min: 96.9 °F (36.1 °C)  Max: 98.4 °F (36.9 °C) 97.7 °F (36.5 °C)   BP  Min: 104/57  Max: 123/68 114/63     Pulse  Min: 63  Max: 80  73   Resp  Min: 16  Max: 20 20   SpO2  Min: 97 %  Max: 100 % 99 %         Recent Labs   Lab 03/20/23  0449 03/21/23  0426 03/23/23  0412   WBC 9.0 8.5 7.2   RBC 3.93* 4.03* 3.92*   HGB 10.9* 11.4* 10.9*   HCT 34.4* 35.2* 34.6*   MCV 87.5 87.3 88.3   MCH 27.7 28.3 27.8   MCHC 31.7* 32.4* 31.5*   RDW 15.7 15.4 15.9    164 156   MPV 9.8 9.8 9.5        Recent Labs   Lab 03/18/23  1547 03/18/23  2233 03/19/23  0508 03/19/23  1817 03/20/23  0450 03/21/23  0426 03/22/23  0940 03/23/23  0412      < > 136   < > 136 132* 134* 132*   K 2.3*   < > 2.2*   < > 3.0* 3.5 4.0 4.8   CO2 33*   < > 31   < > 32* 34* 35* 30   BUN 19.9   < > 17.1   < > 9.8 11.1 10.7 13.4   CREATININE 0.92   < > 0.86   < > 0.74 0.66* 0.74 0.78   CALCIUM 8.1*   < > 8.0*   < > 7.5* 7.3* 7.9* 7.6*   MG 1.60  --  1.60  --  1.80  --   --   --    ALBUMIN 2.3*  --  2.3*  --  1.9*  --   --   --    ALKPHOS 86  --  82  --  63  --   --   --    ALT 10  --  8  --  7  --   --   --    AST 13  --  11  --  8  --   --   --    BILITOT 0.6  --  0.6  --  0.4  --   --   --     < > = values in this interval not displayed.          Microbiology Results (last 7 days)       Procedure Component Value Units Date/Time    Blood Culture [[id number]]  (Normal) Collected: 03/19/23 0038    Order Status: Completed Specimen: Blood Updated: 03/24/23 0200     CULTURE, BLOOD ([facility]) No Growth at 5 days    Blood Culture [[id number]]  (Normal) Collected: 03/19/23 0038    Order Status: Completed Specimen: Blood Updated: 03/24/23 0200     CULTURE, BLOOD ([facility]) No Growth at 5 days    Stool Culture **CANNOT BE ORDERED STAT** [[id number]]  (Normal) Collected: 03/18/23 1840    Order Status: Completed Specimen: Stool Updated: 03/21/23 1051     Stool Culture Negative for Salmonella, Shigella, Campylobacter, Vibrio, Aeromonas, Pleisiomonas,Yersinia, or Shiga Toxin 1 and 2.    Urine culture [[id number]]  (Abnormal)  (Susceptibility) Collected: 03/18/23 1804    Order Status: Completed Specimen: Urine Updated: 03/21/23 0644     Urine Culture 25,000-50,000 colonies/ml Klebsiella pneumoniae ssp pneumoniae    Respiratory Culture [[id number]]     Order Status: Sent Specimen: Sputum, Expectorated     Clostridium Diff Toxin, A & B, EIA [[id number]]  (Abnormal) Collected: 03/18/23 1840    Order Status: Completed Specimen: Stool Updated: 03/19/23 0014      Clostridium Difficile GDH Antigen Positive     Clostridium Difficile Toxin A/B Positive             See below for Radiology    Scheduled Med:   aspirin  81 mg Oral Daily    atorvastatin  40 mg Oral QHS    enoxaparin  40 mg Subcutaneous Daily    ezetimibe  10 mg Oral QHS    fidaxomicin  200 mg Oral BID    finasteride  5 mg Oral Daily    Lactobacillus acidophilus  1 capsule Oral TID WM    metoprolol succinate  25 mg Oral Daily    miconazole NITRATE 2 %   Topical (Top) BID    potassium chloride  40 mEq Oral BID    sacubitriL-valsartan  1 tablet Oral BID    tamsulosin  2 capsule Oral Daily        Continuous Infusions:       PRN Meds:  acetaminophen, acetaminophen, acetaminophen, albuterol, dextrose 10%, dextrose 10%, glucagon (human recombinant), glucose, glucose, melatonin, ondansetron, sodium chloride 0.9%       Assessment/Plan:  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   Hypokalemia   NSTEMI likely type 2 demand ischemia   AHRF,Newly Diagnosed Systolic HF/EF 40%  Possible Community Acquired Pneumonia and UTI  Leukocytosis  Anemia,Unspecified  Essential hypertension,Hyperlipidemia,COPD on 2 L nasal cannula nightly,CAD      Plan:  Oral vancomycin has been discontinued and patient has been started on fidaxomicin for 10 days  Continue with probiotics diarrhea is better   Repeat chest x-ray done yesterday showed no significant change from before completed 7 days of antibiotics  Cardiology following patient is on aspirin Toprol Entresto and they recommended outpatient ischemic eval   They recommended starting the patient on Lasix once diarrhea is better and electrolytes states stable   Will continue with oxygen supplementation   White cell count is back to normal   Will keep a close watch on patient's H&H  Patient working with PT  VTE prophylaxis: lovenox    Patient condition:  Critical    Anticipated discharge and Disposition:         All diagnosis and differential diagnosis have been reviewed; assessment and plan  has been documented; I have personally reviewed the labs and test results that are presently available; I have reviewed the patients medication list; I have reviewed the consulting providers response and recommendations. I have reviewed or attempted to review medical records based upon their availability    All of the patient's questions have been  addressed and answered. Patient's is agreeable to the above stated plan. I will continue to monitor closely and make adjustments to medical management as needed.  _____________________________________________________________________    Nutrition Status:    Radiology:  X-Ray Chest PA And Lateral  Narrative: EXAMINATION:  XR CHEST PA AND LATERAL    CLINICAL HISTORY:  ?  Pneumonia;, .    COMPARISON:  March 18, 2023    FINDINGS:  Examination reveals cardiomediastinal silhouette and pleuroparenchymal changes to be essentially unchanged as compared with the previous exam.    Persistent increase interstitial markings mostly at both bases similar in distribution and configuration as compared with the previous exam of March 18, 2023 in likely chronic in nature.    No new focal consolidative changes are identified  Impression: No significant change    Electronically signed by: Matti Garcia  Date:    03/22/2023  Time:    09:30      Elena Nina MD   03/24/2023

## 2023-03-24 NOTE — PROGRESS NOTES
Inpatient Nutrition Assessment    Admit Date: 3/18/2023   Total duration of encounter: 6 days     Nutrition Recommendation/Prescription     -Continue liberalized diet.     -Continue supplements:  Boost (provides 240 kcal, 10 g protein per serving) for additional nutrition  Banatrol TID while having diarrhea.     -Consider adding daily MVI.     Communication of Recommendations: reviewed with patient    Nutrition Assessment     Malnutrition Assessment/Nutrition-Focused Physical Exam    Malnutrition in the context of acute illness or injury  Degree of Malnutrition: unable to complete  Energy Intake: < 75% of estimated energy requirement for > 7 days, improving  Interpretation of Weight Loss: unable to obtain  Body Fat:mild depletion  Area of Body Fat Loss: upper arm region - triceps / biceps  Muscle Mass Loss: mild depletion  Area of Muscle Mass Loss: clavicle bone region - pectoralis major, deltoid, trapezius muscles and clavicle and acromion bone region - deltoid muscle  Fluid Accumulation: mild  Edema: 1+ edema - trace   Reduced  Strength: unable to obtain  A minimum of two characteristics is recommended for diagnosis of either severe or non-severe malnutrition.    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   Hypokalemia   NSTEMI likely type 2 demand ischemia   AHRF,Newly Diagnosed Systolic HF/EF 40%  Possible Community Acquired Pneumonia and UTI  Leukocytosis  Anemia,Unspecified    Relevant Medical History: HTN, HLD, COPD, CAD    Nutrition-Related Medications: probiotic, KCl  Calorie Containing IV Medications: no significant kcals from medications at this time    Nutrition-Related Labs:  3/20/23 K 3, Cl 95, CO2 32, Ca 7.5,  Total pro 4.4, Alb 1.9  3/23/23 Na 134, Ca 7.6     Diet/PN Order: Diet Adult Regular  Oral Supplement Order: Banatrol Plus Banana Flakes and Boost  Tube Feeding Order: none  Appetite/Oral Intake: good/% of meals  Factors Affecting Nutritional Intake: diarrhea  Food/[employer]/Cultural Preferences: none reported  Food Allergies: none reported    Skin Integrity: bruised (ecchymotic)  Wound(s):   Altered Skin Integrity 03/18/23 2200 Sacral spine Intact skin with non-blanchable redness of localized area    Comments    3/20/23 Pt reports fair-good appetite since admit, decreased prior and reports diarrhea over the last few months; unsure if unintentional weight loss, no recent wt hx in chart. Denies any nausea or vomiting. Willing to trial banatrol for diarrhea and Boost for additional nutrition.     3/24/23 Reports eating 100% meals and with good tolerance. Drinking Boost and tried first banatrol packet yesterday. Had episodes of diarrhea this morning from 8-9am per pt. Encouraged increasing banatrol to TID if possible.     Anthropometrics    Height: 6' (182.9 cm)    Last Weight: 70.4 kg (155 lb 1.6 oz) (03/24/23 0300) Weight Method: Bed Scale  BMI (Calculated): 21  BMI Classification: underweight (BMI less than 22 if >65 years of age)        Ideal Body Weight (IBW), Male: 178 lb     % Ideal Body Weight, Male (lb): 84.27 %                          Usual Weight Provided By: patient    Wt Readings from Last 5 Encounters:   03/24/23 70.4 kg (155 lb 1.6 oz)   03/11/19 79.4 kg (175 lb)     Weight Change(s) Since Admission:  Admit Weight: 68 kg (150 lb) (03/18/23 1529)  3/19/23 68kg admit wt   3/24/23 70.4kg    Estimated Needs    Weight Used For Calorie Calculations: 68 kg (149 lb 14.6 oz)  Energy Calorie Requirements (kcal): 2040kcals/d (30kcals/kg)  Energy Need Method: Kcal/kg  Weight Used For Protein Calculations: 68 kg (149 lb 14.6 oz)  Protein Requirements: 81-88g/d (1.2-1.3g/kg)  Fluid Requirements (mL): 1700ml  fl/d (25ml/kg)  Temp (24hrs), Av.7 °F (36.5 °C), Min:96.9 °F (36.1 °C), Max:98.4 °F (36.9 °C)         Enteral Nutrition    Patient not receiving enteral nutrition at this time.    Parenteral Nutrition    Patient not receiving parenteral nutrition support at this time.    Evaluation of Received Nutrient Intake    Calories: meeting estimated needs  Protein: meeting estimated needs    Patient Education    Not applicable.    Nutrition Diagnosis     PES: Malnutrition related to recurrent c. Diff/inadequate oral intake as evidenced by <75% EER >1 wk, muscle and fat loss. (improving)    Interventions/Goals     Intervention(s): multivitamin/mineral supplement therapy and collaboration with other providers  Goal: Maintain weight throughout hospitalization. (goal met)    Monitoring & Evaluation     Dietitian will monitor food and beverage intake, weight change, and electrolyte/renal panel.  Nutrition Risk/Follow-Up: high (follow-up in 1-4 days)   Please consult if re-assessment needed sooner.

## 2023-03-25 PROCEDURE — 27000221 HC OXYGEN, UP TO 24 HOURS

## 2023-03-25 PROCEDURE — 25000003 PHARM REV CODE 250: Performed by: INTERNAL MEDICINE

## 2023-03-25 PROCEDURE — 25000003 PHARM REV CODE 250: Performed by: PHYSICIAN ASSISTANT

## 2023-03-25 PROCEDURE — 25000003 PHARM REV CODE 250: Performed by: NURSE PRACTITIONER

## 2023-03-25 PROCEDURE — 21400001 HC TELEMETRY ROOM

## 2023-03-25 PROCEDURE — 63600175 PHARM REV CODE 636 W HCPCS: Performed by: INTERNAL MEDICINE

## 2023-03-25 PROCEDURE — 94761 N-INVAS EAR/PLS OXIMETRY MLT: CPT

## 2023-03-25 RX ADMIN — SACUBITRIL AND VALSARTAN 1 TABLET: 24; 26 TABLET, FILM COATED ORAL at 08:03

## 2023-03-25 RX ADMIN — Medication 1 CAPSULE: at 11:03

## 2023-03-25 RX ADMIN — FINASTERIDE 5 MG: 5 TABLET, FILM COATED ORAL at 08:03

## 2023-03-25 RX ADMIN — ATORVASTATIN CALCIUM 40 MG: 40 TABLET, FILM COATED ORAL at 08:03

## 2023-03-25 RX ADMIN — ENOXAPARIN SODIUM 40 MG: 40 INJECTION SUBCUTANEOUS at 05:03

## 2023-03-25 RX ADMIN — Medication 1 CAPSULE: at 08:03

## 2023-03-25 RX ADMIN — MICONAZOLE NITRATE: 20 POWDER TOPICAL at 08:03

## 2023-03-25 RX ADMIN — ASPIRIN 81 MG: 81 TABLET, COATED ORAL at 08:03

## 2023-03-25 RX ADMIN — Medication 1 CAPSULE: at 05:03

## 2023-03-25 RX ADMIN — FIDAXOMICIN 200 MG: 200 TABLET, FILM COATED ORAL at 08:03

## 2023-03-25 RX ADMIN — EZETIMIBE 10 MG: 10 TABLET ORAL at 08:03

## 2023-03-25 RX ADMIN — POTASSIUM CHLORIDE 40 MEQ: 1500 TABLET, EXTENDED RELEASE ORAL at 08:03

## 2023-03-25 RX ADMIN — METOPROLOL SUCCINATE 25 MG: 25 TABLET, EXTENDED RELEASE ORAL at 08:03

## 2023-03-25 RX ADMIN — TAMSULOSIN HYDROCHLORIDE 0.8 MG: 0.4 CAPSULE ORAL at 08:03

## 2023-03-25 NOTE — PROGRESS NOTES
[facility] Hospital Medicine Progress Note        Chief Complaint: Inpatient Follow-up for SOB, Diarrhea    HPI: Patient is a pleasant 88-year-old male was brought to the ER with significant diarrhea as well as reported bilateral lower extremity swelling.  Daughter was initially present (no longer present inpatient is not a great historian) but explained he has been dealing with diarrhea for several months as well as recurrent C diff infection and was diagnosed recently 03/15/2023 but has not yet started oral vancomycin again.     He arrived to the ER afebrile hemodynamically stable maintaining adequate sats on his baseline 2 L nasal cannula.  Laboratory work showed leukocytosis of 18 K, hypokalemia with a potassium of 2.3, a mildly elevated troponin and BNP, positive stool C diff.      Chest x-ray showed patchy infiltrates and emphysematous changes.  Echo showed preliminary EF of 40%.  He was started on oral vancomycin admitted to the hospitalist service for further management.  Added probiotics and Questran  Interval Hx:     Patient was seen and examined this morning Diarrhea is better   Objective/physical exam:  General: In no acute distress, afebrile  Chest: Clear to auscultation bilaterally  Heart: RRR, +S1, S2, no appreciable murmur  Abdomen: Soft, nontender, BS +  MSK: Warm, no lower extremity edema, no clubbing or cyanosis  Neurologic: Alert and awake  VITAL SIGNS: 24 HRS MIN & MAX LAST   Temp  Min: 97.3 °F (36.3 °C)  Max: 98 °F (36.7 °C) 98 °F (36.7 °C)   BP  Min: 93/47  Max: 121/67 106/65     Pulse  Min: 65  Max: 78  78   Resp  Min: 16  Max: 20 18   SpO2  Min: 95 %  Max: 99 % 96 %         Recent Labs   Lab 03/20/23  0449 03/21/23  0426 03/23/23  0412   WBC 9.0 8.5 7.2   RBC 3.93* 4.03* 3.92*   HGB 10.9* 11.4* 10.9*   HCT 34.4* 35.2* 34.6*   MCV 87.5 87.3 88.3   MCH 27.7 28.3 27.8   MCHC 31.7* 32.4* 31.5*   RDW 15.7 15.4 15.9    164 156   MPV 9.8 9.8 9.5       Recent  Labs   Lab 03/18/23  1547 03/18/23  2233 03/19/23  0508 03/19/23  1817 03/20/23  0450 03/21/23  0426 03/22/23  0940 03/23/23  0412      < > 136   < > 136 132* 134* 132*   K 2.3*   < > 2.2*   < > 3.0* 3.5 4.0 4.8   CO2 33*   < > 31   < > 32* 34* 35* 30   BUN 19.9   < > 17.1   < > 9.8 11.1 10.7 13.4   CREATININE 0.92   < > 0.86   < > 0.74 0.66* 0.74 0.78   CALCIUM 8.1*   < > 8.0*   < > 7.5* 7.3* 7.9* 7.6*   MG 1.60  --  1.60  --  1.80  --   --   --    ALBUMIN 2.3*  --  2.3*  --  1.9*  --   --   --    ALKPHOS 86  --  82  --  63  --   --   --    ALT 10  --  8  --  7  --   --   --    AST 13  --  11  --  8  --   --   --    BILITOT 0.6  --  0.6  --  0.4  --   --   --     < > = values in this interval not displayed.          Microbiology Results (last 7 days)       Procedure Component Value Units Date/Time    Blood Culture [[id number]]  (Normal) Collected: 03/19/23 0038    Order Status: Completed Specimen: Blood Updated: 03/24/23 0200     CULTURE, BLOOD ([facility]) No Growth at 5 days    Blood Culture [[id number]]  (Normal) Collected: 03/19/23 0038    Order Status: Completed Specimen: Blood Updated: 03/24/23 0200     CULTURE, BLOOD ([facility]) No Growth at 5 days    Stool Culture **CANNOT BE ORDERED STAT** [[id number]]  (Normal) Collected: 03/18/23 1840    Order Status: Completed Specimen: Stool Updated: 03/21/23 1051     Stool Culture Negative for Salmonella, Shigella, Campylobacter, Vibrio, Aeromonas, Pleisiomonas,Yersinia, or Shiga Toxin 1 and 2.    Urine culture [[id number]]  (Abnormal)  (Susceptibility) Collected: 03/18/23 1804    Order Status: Completed Specimen: Urine Updated: 03/21/23 0644     Urine Culture 25,000-50,000 colonies/ml Klebsiella pneumoniae ssp pneumoniae    Respiratory Culture [[id number]]     Order Status: Sent Specimen: Sputum, Expectorated     Clostridium Diff Toxin, A & B, EIA [[id number]]  (Abnormal) Collected: 03/18/23 1840    Order Status: Completed Specimen: Stool Updated: 03/19/23 0014     Clostridium  Difficile GDH Antigen Positive     Clostridium Difficile Toxin A/B Positive             See below for Radiology    Scheduled Med:   aspirin  81 mg Oral Daily    atorvastatin  40 mg Oral QHS    enoxaparin  40 mg Subcutaneous Daily    ezetimibe  10 mg Oral QHS    fidaxomicin  200 mg Oral BID    finasteride  5 mg Oral Daily    Lactobacillus acidophilus  1 capsule Oral TID WM    metoprolol succinate  25 mg Oral Daily    miconazole NITRATE 2 %   Topical (Top) BID    potassium chloride  40 mEq Oral BID    sacubitriL-valsartan  1 tablet Oral BID    tamsulosin  2 capsule Oral Daily        Continuous Infusions:       PRN Meds:  acetaminophen, acetaminophen, acetaminophen, albuterol, dextrose 10%, dextrose 10%, glucagon (human recombinant), glucose, glucose, melatonin, ondansetron, sodium chloride 0.9%       Assessment/Plan:  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   Hypokalemia   NSTEMI likely type 2 demand ischemia   AHRF,Newly Diagnosed Systolic HF/EF 40%  Possible Community Acquired Pneumonia and UTI  Leukocytosis  Anemia,Unspecified  Essential hypertension,Hyperlipidemia,COPD on 2 L nasal cannula nightly,CAD      Plan:   on fidaxomicin for 10 days stool is much better  Continue with probiotics  Will need to make sure that this is covered by patient's insurance so we will check with  on Monday   Repeat chest x-ray done yesterday showed no significant change from before completed 7 days of antibiotics  Cardiology following patient is on aspirin Toprol Entresto and they recommended outpatient ischemic eval   They recommended starting the patient on Lasix once diarrhea is better and electrolytes states stable   continue with oxygen supplementation   White cell count is back to normal   Will keep a close watch on patient's H&H  Patient working with PT  VTE prophylaxis: lovenox    Patient condition:  Critical    Anticipated discharge and Disposition:         All diagnosis and differential diagnosis have  been reviewed; assessment and plan has been documented; I have personally reviewed the labs and test results that are presently available; I have reviewed the patients medication list; I have reviewed the consulting providers response and recommendations. I have reviewed or attempted to review medical records based upon their availability    All of the patient's questions have been  addressed and answered. Patient's is agreeable to the above stated plan. I will continue to monitor closely and make adjustments to medical management as needed.  _____________________________________________________________________    Nutrition Status:    Radiology:  X-Ray Chest PA And Lateral  Narrative: EXAMINATION:  XR CHEST PA AND LATERAL    CLINICAL HISTORY:  ?  Pneumonia;, .    COMPARISON:  March 18, 2023    FINDINGS:  Examination reveals cardiomediastinal silhouette and pleuroparenchymal changes to be essentially unchanged as compared with the previous exam.    Persistent increase interstitial markings mostly at both bases similar in distribution and configuration as compared with the previous exam of March 18, 2023 in likely chronic in nature.    No new focal consolidative changes are identified  Impression: No significant change    Electronically signed by: Matti Garcia  Date:    03/22/2023  Time:    09:30      Elena Nina MD   03/25/2023

## 2023-03-26 PROCEDURE — 63600175 PHARM REV CODE 636 W HCPCS: Performed by: INTERNAL MEDICINE

## 2023-03-26 PROCEDURE — 25000003 PHARM REV CODE 250: Performed by: PHYSICIAN ASSISTANT

## 2023-03-26 PROCEDURE — 21400001 HC TELEMETRY ROOM

## 2023-03-26 PROCEDURE — 25000003 PHARM REV CODE 250: Performed by: INTERNAL MEDICINE

## 2023-03-26 PROCEDURE — 97116 GAIT TRAINING THERAPY: CPT | Mod: CQ

## 2023-03-26 PROCEDURE — 97110 THERAPEUTIC EXERCISES: CPT | Mod: CQ

## 2023-03-26 PROCEDURE — 25000003 PHARM REV CODE 250: Performed by: NURSE PRACTITIONER

## 2023-03-26 RX ADMIN — Medication 1 CAPSULE: at 05:03

## 2023-03-26 RX ADMIN — MICONAZOLE NITRATE: 20 POWDER TOPICAL at 08:03

## 2023-03-26 RX ADMIN — METOPROLOL SUCCINATE 25 MG: 25 TABLET, EXTENDED RELEASE ORAL at 08:03

## 2023-03-26 RX ADMIN — POTASSIUM CHLORIDE 40 MEQ: 1500 TABLET, EXTENDED RELEASE ORAL at 08:03

## 2023-03-26 RX ADMIN — ASPIRIN 81 MG: 81 TABLET, COATED ORAL at 08:03

## 2023-03-26 RX ADMIN — Medication 1 CAPSULE: at 08:03

## 2023-03-26 RX ADMIN — FINASTERIDE 5 MG: 5 TABLET, FILM COATED ORAL at 08:03

## 2023-03-26 RX ADMIN — SACUBITRIL AND VALSARTAN 1 TABLET: 24; 26 TABLET, FILM COATED ORAL at 08:03

## 2023-03-26 RX ADMIN — ENOXAPARIN SODIUM 40 MG: 40 INJECTION SUBCUTANEOUS at 05:03

## 2023-03-26 RX ADMIN — TAMSULOSIN HYDROCHLORIDE 0.8 MG: 0.4 CAPSULE ORAL at 08:03

## 2023-03-26 RX ADMIN — FIDAXOMICIN 200 MG: 200 TABLET, FILM COATED ORAL at 08:03

## 2023-03-26 RX ADMIN — Medication 1 CAPSULE: at 12:03

## 2023-03-26 RX ADMIN — ATORVASTATIN CALCIUM 40 MG: 40 TABLET, FILM COATED ORAL at 08:03

## 2023-03-26 RX ADMIN — EZETIMIBE 10 MG: 10 TABLET ORAL at 08:03

## 2023-03-26 NOTE — PROGRESS NOTES
[facility] Hospital Medicine Progress Note        Chief Complaint: Inpatient Follow-up for SOB, Diarrhea    HPI: Patient is a pleasant 88-year-old male was brought to the ER with significant diarrhea as well as reported bilateral lower extremity swelling.  Daughter was initially present (no longer present inpatient is not a great historian) but explained he has been dealing with diarrhea for several months as well as recurrent C diff infection and was diagnosed recently 03/15/2023 but has not yet started oral vancomycin again.     He arrived to the ER afebrile hemodynamically stable maintaining adequate sats on his baseline 2 L nasal cannula.  Laboratory work showed leukocytosis of 18 K, hypokalemia with a potassium of 2.3, a mildly elevated troponin and BNP, positive stool C diff.      Chest x-ray showed patchy infiltrates and emphysematous changes.  Echo showed preliminary EF of 40%.  He was started on oral vancomycin admitted to the hospitalist service for further management.  Added probiotics and Questran  Interval Hx:     Patient was seen and examined this morning Diarrhea is better   Objective/physical exam:  General: In no acute distress, afebrile  Chest: Clear to auscultation bilaterally  Heart: RRR, +S1, S2, no appreciable murmur  Abdomen: Soft, nontender, BS +  MSK: Warm, no lower extremity edema, no clubbing or cyanosis  Neurologic: Alert and awake  VITAL SIGNS: 24 HRS MIN & MAX LAST   Temp  Min: 97.4 °F (36.3 °C)  Max: 98.4 °F (36.9 °C) 97.9 °F (36.6 °C)   BP  Min: 94/59  Max: 120/73 119/72     Pulse  Min: 64  Max: 82  71   Resp  Min: 18  Max: 20 18   SpO2  Min: 93 %  Max: 100 % 99 %         Recent Labs   Lab 03/20/23  0449 03/21/23  0426 03/23/23  0412   WBC 9.0 8.5 7.2   RBC 3.93* 4.03* 3.92*   HGB 10.9* 11.4* 10.9*   HCT 34.4* 35.2* 34.6*   MCV 87.5 87.3 88.3   MCH 27.7 28.3 27.8   MCHC 31.7* 32.4* 31.5*   RDW 15.7 15.4 15.9    164 156   MPV 9.8 9.8 9.5        Recent Labs   Lab 03/20/23  0450 03/21/23  0426 03/22/23  0940 03/23/23  0412    132* 134* 132*   K 3.0* 3.5 4.0 4.8   CO2 32* 34* 35* 30   BUN 9.8 11.1 10.7 13.4   CREATININE 0.74 0.66* 0.74 0.78   CALCIUM 7.5* 7.3* 7.9* 7.6*   MG 1.80  --   --   --    ALBUMIN 1.9*  --   --   --    ALKPHOS 63  --   --   --    ALT 7  --   --   --    AST 8  --   --   --    BILITOT 0.4  --   --   --           Microbiology Results (last 7 days)       Procedure Component Value Units Date/Time    Blood Culture [[id number]]  (Normal) Collected: 03/19/23 0038    Order Status: Completed Specimen: Blood Updated: 03/24/23 0200     CULTURE, BLOOD ([facility]) No Growth at 5 days    Blood Culture [[id number]]  (Normal) Collected: 03/19/23 0038    Order Status: Completed Specimen: Blood Updated: 03/24/23 0200     CULTURE, BLOOD ([facility]) No Growth at 5 days    Stool Culture **CANNOT BE ORDERED STAT** [[id number]]  (Normal) Collected: 03/18/23 1840    Order Status: Completed Specimen: Stool Updated: 03/21/23 1051     Stool Culture Negative for Salmonella, Shigella, Campylobacter, Vibrio, Aeromonas, Pleisiomonas,Yersinia, or Shiga Toxin 1 and 2.    Urine culture [[id number]]  (Abnormal)  (Susceptibility) Collected: 03/18/23 1804    Order Status: Completed Specimen: Urine Updated: 03/21/23 0644     Urine Culture 25,000-50,000 colonies/ml Klebsiella pneumoniae ssp pneumoniae    Respiratory Culture [[id number]]     Order Status: Sent Specimen: Sputum, Expectorated              See below for Radiology    Scheduled Med:   aspirin  81 mg Oral Daily    atorvastatin  40 mg Oral QHS    enoxaparin  40 mg Subcutaneous Daily    ezetimibe  10 mg Oral QHS    fidaxomicin  200 mg Oral BID    finasteride  5 mg Oral Daily    Lactobacillus acidophilus  1 capsule Oral TID WM    metoprolol succinate  25 mg Oral Daily    miconazole NITRATE 2 %   Topical (Top) BID    potassium chloride  40 mEq Oral BID    sacubitriL-valsartan  1 tablet Oral BID    tamsulosin  2 capsule Oral  Daily        Continuous Infusions:       PRN Meds:  acetaminophen, acetaminophen, acetaminophen, albuterol, dextrose 10%, dextrose 10%, glucagon (human recombinant), glucose, glucose, melatonin, ondansetron, sodium chloride 0.9%       Assessment/Plan:  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  Will check with  to see if insurance will pay for fidaxomicin at home  Discussed with patient's daughter she wants to take him home with home health and PT  Continue with probiotics  Repeat chest x-ray done  showed no significant change from before completed 7 days of antibiotics  Cardiology following patient is on aspirin Toprol Entresto and they recommended outpatient ischemic eval   They recommended starting the patient on Lasix once diarrhea is better and electrolytes states stable   continue with oxygen supplementation   White cell count is back to normal   Will keep a close watch on patient's H&H  Patient working with PT  VTE prophylaxis: lovenox      Anticipated discharge and Disposition:         All diagnosis and differential diagnosis have been reviewed; assessment and plan has been documented; I have personally reviewed the labs and test results that are presently available; I have reviewed the patients medication list; I have reviewed the consulting providers response and recommendations. I have reviewed or attempted to review medical records based upon their availability    All of the patient's questions have been  addressed and answered. Patient's is agreeable to the above stated plan. I will continue to monitor closely and make adjustments to medical management as  needed.  _____________________________________________________________________    Nutrition Status:    Radiology:  X-Ray Chest PA And Lateral  Narrative: EXAMINATION:  XR CHEST PA AND LATERAL    CLINICAL HISTORY:  ?  Pneumonia;, .    COMPARISON:  March 18, 2023    FINDINGS:  Examination reveals cardiomediastinal silhouette and pleuroparenchymal changes to be essentially unchanged as compared with the previous exam.    Persistent increase interstitial markings mostly at both bases similar in distribution and configuration as compared with the previous exam of March 18, 2023 in likely chronic in nature.    No new focal consolidative changes are identified  Impression: No significant change    Electronically signed by: Matti Garcia  Date:    03/22/2023  Time:    09:30      Elena Nina MD   03/26/2023

## 2023-03-26 NOTE — PT/OT/SLP PROGRESS
Physical Therapy Treatment    Patient Name:  Max Squires   MRN:  [id number]    Recommendations:     Discharge Recommendations: home health PT  Discharge Equipment Recommendations: walker, rolling  Barriers to discharge: None    Assessment:     Max Squires is a 88 y.o. male admitted with a medical diagnosis of C. difficile diarrhea.  He presents with the following impairments/functional limitations: weakness, impaired endurance, impaired balance, impaired functional mobility, impaired self care skills, impaired cardiopulmonary response to activity.    Rehab Prognosis: Good; patient would benefit from acute skilled PT services to address these deficits and reach maximum level of function.    Recent Surgery: * No surgery found *      Plan:     During this hospitalization, patient to be seen 6 x/week to address the identified rehab impairments via gait training, therapeutic activities, therapeutic exercises and progress toward the following goals:    Plan of Care Expires:  04/22/23    Subjective     Chief Complaint: None  Pain/Comfort:  0/10      Objective:     Communicated with NSG prior to session.  Patient found supine upon PT entry to room.     General Precautions: Standard, contact  Orthopedic Precautions: N/A  Braces: N/A  Respiratory Status: Nasal cannula, flow 2 L/min  Skin Integrity: Visible skin intact      Functional Mobility:  Bed Mobility:     Rolling Left:  contact guard assistance  Gait: pt sat eob to completed B LE therex, pt also needed to transfer to the bs commode twice to have a BM with min A, pt also stood with min A, and amb for appx 120 ft with RW and min A.        Patient left up in chair with  wife and daughter present.    GOALS:   Multidisciplinary Problems       Physical Therapy Goals          Problem: Physical Therapy    Goal Priority Disciplines Outcome Goal Variances Interventions   Physical Therapy Goal     PT, PT/OT Ongoing, Progressing     Description: Goals to be met by: 4/22/23      Patient will increase functional independence with mobility by performin. Supine to sit with [place]  2. Sit to supine with [place]  3. Sit to stand transfer with Modified [place]  4. Gait  x 150 feet with Modified [place] using Rolling Walker.   5. Ascend/ descend 12 stairs with use of B railings with modified independence                          Time Tracking:     Billable Minutes: Gait Training 13 and Therapeutic Exercise 13       PT/PTA: PTA     Number of PTA visits since last PT visit: 3     2023

## 2023-03-27 PROCEDURE — 25000003 PHARM REV CODE 250: Performed by: INTERNAL MEDICINE

## 2023-03-27 PROCEDURE — 25000003 PHARM REV CODE 250: Performed by: NURSE PRACTITIONER

## 2023-03-27 PROCEDURE — 97535 SELF CARE MNGMENT TRAINING: CPT | Mod: CO

## 2023-03-27 PROCEDURE — 21400001 HC TELEMETRY ROOM

## 2023-03-27 PROCEDURE — 97116 GAIT TRAINING THERAPY: CPT | Mod: CQ

## 2023-03-27 PROCEDURE — 99232 SBSQ HOSP IP/OBS MODERATE 35: CPT | Mod: FS,,, | Performed by: GENERAL PRACTICE

## 2023-03-27 PROCEDURE — 99232 PR SUBSEQUENT HOSPITAL CARE,LEVL II: ICD-10-PCS | Mod: FS,,, | Performed by: GENERAL PRACTICE

## 2023-03-27 PROCEDURE — 25000003 PHARM REV CODE 250: Performed by: PHYSICIAN ASSISTANT

## 2023-03-27 PROCEDURE — 63600175 PHARM REV CODE 636 W HCPCS: Performed by: INTERNAL MEDICINE

## 2023-03-27 RX ADMIN — Medication 1 CAPSULE: at 05:03

## 2023-03-27 RX ADMIN — MICONAZOLE NITRATE: 20 POWDER TOPICAL at 08:03

## 2023-03-27 RX ADMIN — POTASSIUM CHLORIDE 40 MEQ: 1500 TABLET, EXTENDED RELEASE ORAL at 09:03

## 2023-03-27 RX ADMIN — SACUBITRIL AND VALSARTAN 1 TABLET: 24; 26 TABLET, FILM COATED ORAL at 08:03

## 2023-03-27 RX ADMIN — EZETIMIBE 10 MG: 10 TABLET ORAL at 08:03

## 2023-03-27 RX ADMIN — FIDAXOMICIN 200 MG: 200 TABLET, FILM COATED ORAL at 08:03

## 2023-03-27 RX ADMIN — FIDAXOMICIN 200 MG: 200 TABLET, FILM COATED ORAL at 09:03

## 2023-03-27 RX ADMIN — Medication 1 CAPSULE: at 09:03

## 2023-03-27 RX ADMIN — ATORVASTATIN CALCIUM 40 MG: 40 TABLET, FILM COATED ORAL at 08:03

## 2023-03-27 RX ADMIN — ASPIRIN 81 MG: 81 TABLET, COATED ORAL at 09:03

## 2023-03-27 RX ADMIN — FINASTERIDE 5 MG: 5 TABLET, FILM COATED ORAL at 09:03

## 2023-03-27 RX ADMIN — TAMSULOSIN HYDROCHLORIDE 0.8 MG: 0.4 CAPSULE ORAL at 09:03

## 2023-03-27 RX ADMIN — POTASSIUM CHLORIDE 40 MEQ: 1500 TABLET, EXTENDED RELEASE ORAL at 08:03

## 2023-03-27 RX ADMIN — Medication 1 CAPSULE: at 01:03

## 2023-03-27 RX ADMIN — MICONAZOLE NITRATE: 20 POWDER TOPICAL at 09:03

## 2023-03-27 RX ADMIN — ENOXAPARIN SODIUM 40 MG: 40 INJECTION SUBCUTANEOUS at 05:03

## 2023-03-27 NOTE — PROGRESS NOTES
PROGRESS NOTE    SUBJECTIVE: Afebrile with stable vitals over the weekend. Per patient 3 loose BMs so far this morning.        REVIEW OF SYSTEMS: Negative unless stated above         MEDICATIONS:   Reviewed in EMR        PHYSICAL EXAM:   VITALS: Reviewed        GENERAL: Older male, NAD; does not appear toxic  SKIN: no rash  HEENT: sclera non-icteric; PERRL; NC in place  NECK: supple; no LAD  CHEST: CTA; nonlabored, equal expansion; no adventitious BS  CARDIOVASCULAR: RRR, S1S2; no murmur; equal peripheral pulses  ABDOMEN:  active bowel sounds; abdomen soft, nondistended, nontender to palpation  GENITOURINARY: no suprapubic tenderness  EXTREMITIES: no cyanosis or clubbing  NEURO: AAO x4; CN II-XII grossly intact; extremely hard of hearing  PSYCH: Mentation and affect appropriate          LABORATORY DATA: Reviewed         RADIOLOGICAL DATA: Reviewed         IMPRESSION:   88-year-old male presenting with ongoing diarrhea after C difficile stool studies positive on 03/15.  Prior episode of C difficile colitis in February, status post 14 days oral vancomycin at that time.  Initially on oral vancomycin, however changed to Dificid today given ongoing diarrhea.  ID consulted per [employer] policy for its use.     Recurrent C. Difficile colitis - second episode  Leukocytosis s/t above, resolved  COPD on home oxygen  Asymptomatic bacteriuria       PLAN:  Loose bowel movements improving per patient. No abdominal pain.   Continue Dificid as planned x10 days. end date: 4/2/23  Discussed with patient and nursing.

## 2023-03-27 NOTE — PT/OT/SLP PROGRESS
Occupational Therapy   Treatment    Name: Max Squires  MRN: [id number]  Admitting Diagnosis:  C. difficile diarrhea       Recommendations:     Discharge Recommendations: home with home health  Discharge Equipment Recommendations:  walker, rolling  Barriers to discharge:       Assessment:     Max Squires is a 88 y.o. male with a medical diagnosis of C. difficile diarrhea. Performance deficits affecting function are weakness, impaired endurance, impaired self care skills, impaired functional mobility, impaired balance.     Rehab Prognosis:  Fair; patient would benefit from acute skilled OT services to address these deficits and reach maximum level of function.       Plan:     Patient to be seen 3 x/week, 5 x/week to address the above listed problems via therapeutic exercises, therapeutic activities, self-care/home management  Plan of Care Expires:  (DC)  Plan of Care Reviewed with: patient    Subjective     Pain/Comfort:       Objective:     Communicated with: RN prior to session.  Patient found up in chair with   upon OT entry to room.    General Precautions: Standard, contact    Orthopedic Precautions:N/A  Braces: N/A  Respiratory Status: Room air     Occupational Performance:     Functional Mobility/Transfers:  Patient completed Sit <> Stand Transfer with stand by assistance  with  rolling walker   Functional Mobility: patient ambulating to in room sink for ADL activity requiring SBA for balance    Activities of Daily Living:  Grooming: stand by assistance washing hands  Lower Body Dressing: independence donning/doffing socks    Therapeutic Positioning  Skin integrity:  patient with brief on unable to see sacral are. Patient L LE with swelling and indention noted from sock sock lowered on foot to reduce pitting and increase circulation      The following interventions were performed in an effort to prevent and/or reduce acquired pressure ulcers: all visible skin was assessed during the course of  treatment      Patient left up in chair with all lines intact and call button in reach    GOALS:   Multidisciplinary Problems       Occupational Therapy Goals          Problem: Occupational Therapy    Goal Priority Disciplines Outcome Interventions   Occupational Therapy Goal     OT, PT/OT Ongoing, Progressing    Description: Patient will perform grooming with mod I while standing at sink.    Patient will perform toileting with mod I using toilet in restroom.    Patient will perform toilet transfer with mod I with use of grab bars and RW as needed.    Patient will perform bathing with SB assist while using shower bench.    Patient will perform lower body dressing with mod I while seated EOB.                          Time Tracking:     OT Date of Treatment: 03/27/23  OT Start Time: 1035  OT Stop Time: 1048  OT Total Time (min): 13 min    Billable Minutes:Self Care/Home Management 1    OT/SLIM: SLIM     Number of SLIM visits since last OT visit: 2    3/27/2023

## 2023-03-27 NOTE — PROGRESS NOTES
[facility] Medicine Progress Note        Chief Complaint: Inpatient Follow-up for SOB, Diarrhea    HPI: Patient is a pleasant 88-year-old male was brought to the ER with significant diarrhea as well as reported bilateral lower extremity swelling.  Daughter was initially present (no longer present inpatient is not a great historian) but explained he has been dealing with diarrhea for several months as well as recurrent C diff infection and was diagnosed recently 03/15/2023 but has not yet started oral vancomycin again.     He arrived to the ER afebrile hemodynamically stable maintaining adequate sats on his baseline 2 L nasal cannula.  Laboratory work showed leukocytosis of 18 K, hypokalemia with a potassium of 2.3, a mildly elevated troponin and BNP, positive stool C diff.      Chest x-ray showed patchy infiltrates and emphysematous changes.  Echo showed preliminary EF of 40%.  He was started on oral vancomycin admitted to the hospitalist service for further management.  Added probiotics and Questran  Interval Hx:     Patient was seen and examined this morning    Objective/physical exam:  General: In no acute distress, afebrile  Chest: Clear to auscultation bilaterally  Heart: RRR, +S1, S2, no appreciable murmur  Abdomen: Soft, nontender, BS +  MSK: Warm, no lower extremity edema, no clubbing or cyanosis  Neurologic: Alert and awake  VITAL SIGNS: 24 HRS MIN & MAX LAST   Temp  Min: 97.4 °F (36.3 °C)  Max: 97.9 °F (36.6 °C) 97.4 °F (36.3 °C)   BP  Min: 91/46  Max: 126/66 (!) 91/46     Pulse  Min: 64  Max: 83  77   Resp  Min: 18  Max: 19 19   SpO2  Min: 90 %  Max: 100 % 99 %         Recent Labs   Lab 03/21/23  0426 03/23/23  0412   WBC 8.5 7.2   RBC 4.03* 3.92*   HGB 11.4* 10.9*   HCT 35.2* 34.6*   MCV 87.3 88.3   MCH 28.3 27.8   MCHC 32.4* 31.5*   RDW 15.4 15.9    156   MPV 9.8 9.5       Recent Labs   Lab 03/21/23  0426 03/22/23  0940 03/23/23  0412   * 134* 132*    K 3.5 4.0 4.8   CO2 34* 35* 30   BUN 11.1 10.7 13.4   CREATININE 0.66* 0.74 0.78   CALCIUM 7.3* 7.9* 7.6*          Microbiology Results (last 7 days)       Procedure Component Value Units Date/Time    Blood Culture [[id number]]  (Normal) Collected: 03/19/23 0038    Order Status: Completed Specimen: Blood Updated: 03/24/23 0200     CULTURE, BLOOD ([facility]) No Growth at 5 days    Blood Culture [[id number]]  (Normal) Collected: 03/19/23 0038    Order Status: Completed Specimen: Blood Updated: 03/24/23 0200     CULTURE, BLOOD ([facility]) No Growth at 5 days    Stool Culture **CANNOT BE ORDERED STAT** [[id number]]  (Normal) Collected: 03/18/23 1840    Order Status: Completed Specimen: Stool Updated: 03/21/23 1051     Stool Culture Negative for Salmonella, Shigella, Campylobacter, Vibrio, Aeromonas, Pleisiomonas,Yersinia, or Shiga Toxin 1 and 2.    Urine culture [[id number]]  (Abnormal)  (Susceptibility) Collected: 03/18/23 1804    Order Status: Completed Specimen: Urine Updated: 03/21/23 0644     Urine Culture 25,000-50,000 colonies/ml Klebsiella pneumoniae ssp pneumoniae             See below for Radiology    Scheduled Med:   aspirin  81 mg Oral Daily    atorvastatin  40 mg Oral QHS    enoxaparin  40 mg Subcutaneous Daily    ezetimibe  10 mg Oral QHS    fidaxomicin  200 mg Oral BID    finasteride  5 mg Oral Daily    Lactobacillus acidophilus  1 capsule Oral TID WM    metoprolol succinate  25 mg Oral Daily    miconazole NITRATE 2 %   Topical (Top) BID    potassium chloride  40 mEq Oral BID    sacubitriL-valsartan  1 tablet Oral BID    tamsulosin  2 capsule Oral Daily        Continuous Infusions:       PRN Meds:  acetaminophen, acetaminophen, acetaminophen, albuterol, dextrose 10%, dextrose 10%, glucagon (human recombinant), glucose, glucose, melatonin, ondansetron, sodium chloride 0.9%       Assessment/Plan:  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  I Will check with  to see if insurance will pay for fidaxomicin at home  Discussed with patient's daughter she wants to take him home with home health and PT  Continue with probiotics  Repeat chest x-ray done  showed no significant change from before completed 7 days of antibiotics  Cardiology following patient is on aspirin Toprol Entresto and they recommended outpatient ischemic eval   They recommended starting the patient on Lasix once diarrhea is better and electrolytes states stable   continue with oxygen supplementation   White cell count is back to normal   Will keep a close watch on patient's H&H  Patient working with PT  VTE prophylaxis: lovenox      Anticipated discharge and Disposition:         All diagnosis and differential diagnosis have been reviewed; assessment and plan has been documented; I have personally reviewed the labs and test results that are presently available; I have reviewed the patients medication list; I have reviewed the consulting providers response and recommendations. I have reviewed or attempted to review medical records based upon their availability    All of the patient's questions have been  addressed and answered. Patient's is agreeable to the above stated plan. I will continue to monitor closely and make adjustments to medical management as needed.  _____________________________________________________________________    Nutrition Status:    Radiology:  X-Ray Chest PA And Lateral  Narrative: EXAMINATION:  XR CHEST PA AND LATERAL    CLINICAL HISTORY:  ?  Pneumonia;, .    COMPARISON:  March 18, 2023    FINDINGS:  Examination reveals cardiomediastinal silhouette and pleuroparenchymal changes to be essentially unchanged as compared  with the previous exam.    Persistent increase interstitial markings mostly at both bases similar in distribution and configuration as compared with the previous exam of March 18, 2023 in likely chronic in nature.    No new focal consolidative changes are identified  Impression: No significant change    Electronically signed by: Matti Garcia  Date:    03/22/2023  Time:    09:30      Elena Nina MD   03/27/2023

## 2023-03-27 NOTE — PT/OT/SLP PROGRESS
"Physical Therapy Treatment    Patient Name:  Max Squires   MRN:  [id number]    Recommendations:     Discharge Recommendations: home health PT  Discharge Equipment Recommendations: walker, rolling  Barriers to discharge:  medical condition    Assessment:     Max Squires is a 88 y.o. male admitted with a medical diagnosis of C. difficile diarrhea.  He presents with the following impairments/functional limitations: gait instability, impaired balance .    Rehab Prognosis: Good; patient would benefit from acute skilled PT services to address these deficits and reach maximum level of function.    Recent Surgery: * No surgery found *      Plan:     During this hospitalization, patient to be seen 6 x/week to address the identified rehab impairments via gait training, therapeutic activities, therapeutic exercises and progress toward the following goals:    Plan of Care Expires:  04/22/23    Subjective     Chief Complaint: "wants coke and sprite"  Patient/Family Comments/goals:   Pain/Comfort:         Objective:     Communicated with RN prior to session.  Patient found up in chair with   upon PT entry to room.     General Precautions: Standard, contact  Orthopedic Precautions: N/A  Braces: N/A  Respiratory Status: Room air  Skin Integrity: Visible skin intact      Functional Mobility:  Transfers:     Sit to Stand:  supervision with rolling walker  Gait: Pt amb 120ft with RW CGA. Required cues for errect posture. Step through gait pattern. No LOB noted.  Balance: Standing  balance supervision    Therapeutic Activities/Exercises:  Therex in RW hip flex and heel raises, 10x1, Seated marches and LAQ, 10X1    Education:  Patient provided with verbal education regarding POC.  Understanding was verbalized, however additional teaching warranted.     Patient left up in chair with all lines intact and call button in reach..    GOALS:   Multidisciplinary Problems       Physical Therapy Goals          Problem: Physical Therapy    Goal " Priority Disciplines Outcome Goal Variances Interventions   Physical Therapy Goal     PT, PT/OT Ongoing, Progressing     Description: Goals to be met by: 23     Patient will increase functional independence with mobility by performin. Supine to sit with [place]  2. Sit to supine with [place]  3. Sit to stand transfer with Modified [place]  4. Gait  x 150 feet with Modified [place] using Rolling Walker.   5. Ascend/ descend 12 stairs with use of B railings with modified independence                          Time Tracking:     PT Received On: 23  PT Start Time: 1045     PT Stop Time: 1103  PT Total Time (min): 18 min     Billable Minutes: Gait Training 18    Treatment Type: Treatment  PT/PTA: PTA     Number of PTA visits since last PT visit: 2023

## 2023-03-28 LAB
ALBUMIN SERPL-MCNC: 2.3 G/DL (ref 3.4–4.8)
ALBUMIN/GLOB SERPL: 0.9 RATIO (ref 1.1–2)
ALP SERPL-CCNC: 68 UNIT/L (ref 40–150)
ALT SERPL-CCNC: 18 UNIT/L (ref 0–55)
AST SERPL-CCNC: 17 UNIT/L (ref 5–34)
BASOPHILS # BLD AUTO: 0.06 X10(3)/MCL (ref 0–0.2)
BASOPHILS NFR BLD AUTO: 0.8 %
BILIRUBIN DIRECT+TOT PNL SERPL-MCNC: 0.5 MG/DL
BUN SERPL-MCNC: 15.4 MG/DL (ref 8.4–25.7)
CALCIUM SERPL-MCNC: 8 MG/DL (ref 8.8–10)
CHLORIDE SERPL-SCNC: 101 MMOL/L (ref 98–107)
CO2 SERPL-SCNC: 26 MMOL/L (ref 23–31)
CREAT SERPL-MCNC: 0.74 MG/DL (ref 0.73–1.18)
EOSINOPHIL # BLD AUTO: 0.07 X10(3)/MCL (ref 0–0.9)
EOSINOPHIL NFR BLD AUTO: 0.9 %
ERYTHROCYTE [DISTWIDTH] IN BLOOD BY AUTOMATED COUNT: 16.6 % (ref 11.5–17)
GFR SERPLBLD CREATININE-BSD FMLA CKD-EPI: >60 MLS/MIN/1.73/M2
GLOBULIN SER-MCNC: 2.6 GM/DL (ref 2.4–3.5)
GLUCOSE SERPL-MCNC: 74 MG/DL (ref 82–115)
HCT VFR BLD AUTO: 33 % (ref 42–52)
HGB BLD-MCNC: 10.5 G/DL (ref 14–18)
IMM GRANULOCYTES # BLD AUTO: 0.19 X10(3)/MCL (ref 0–0.04)
IMM GRANULOCYTES NFR BLD AUTO: 2.4 %
LYMPHOCYTES # BLD AUTO: 2.57 X10(3)/MCL (ref 0.6–4.6)
LYMPHOCYTES NFR BLD AUTO: 32.7 %
MCH RBC QN AUTO: 27.8 PG (ref 27–31)
MCHC RBC AUTO-ENTMCNC: 31.8 G/DL (ref 33–36)
MCV RBC AUTO: 87.3 FL (ref 80–94)
MONOCYTES # BLD AUTO: 0.49 X10(3)/MCL (ref 0.1–1.3)
MONOCYTES NFR BLD AUTO: 6.2 %
NEUTROPHILS # BLD AUTO: 4.48 X10(3)/MCL (ref 2.1–9.2)
NEUTROPHILS NFR BLD AUTO: 57 %
NRBC BLD AUTO-RTO: 0 %
PLATELET # BLD AUTO: 205 X10(3)/MCL (ref 130–400)
PMV BLD AUTO: 9.5 FL (ref 7.4–10.4)
POTASSIUM SERPL-SCNC: 5.5 MMOL/L (ref 3.5–5.1)
PROT SERPL-MCNC: 4.9 GM/DL (ref 5.8–7.6)
RBC # BLD AUTO: 3.78 X10(6)/MCL (ref 4.7–6.1)
SODIUM SERPL-SCNC: 131 MMOL/L (ref 136–145)
WBC # SPEC AUTO: 7.9 X10(3)/MCL (ref 4.5–11.5)

## 2023-03-28 PROCEDURE — 99232 PR SUBSEQUENT HOSPITAL CARE,LEVL II: ICD-10-PCS | Mod: ,,, | Performed by: GENERAL PRACTICE

## 2023-03-28 PROCEDURE — 97530 THERAPEUTIC ACTIVITIES: CPT | Mod: CQ

## 2023-03-28 PROCEDURE — 21400001 HC TELEMETRY ROOM

## 2023-03-28 PROCEDURE — 25000003 PHARM REV CODE 250: Performed by: INTERNAL MEDICINE

## 2023-03-28 PROCEDURE — 97116 GAIT TRAINING THERAPY: CPT | Mod: CQ

## 2023-03-28 PROCEDURE — 25000003 PHARM REV CODE 250: Performed by: NURSE PRACTITIONER

## 2023-03-28 PROCEDURE — 85025 COMPLETE CBC W/AUTO DIFF WBC: CPT | Performed by: GENERAL PRACTICE

## 2023-03-28 PROCEDURE — 80053 COMPREHEN METABOLIC PANEL: CPT | Performed by: GENERAL PRACTICE

## 2023-03-28 PROCEDURE — 99232 SBSQ HOSP IP/OBS MODERATE 35: CPT | Mod: ,,, | Performed by: GENERAL PRACTICE

## 2023-03-28 PROCEDURE — 63600175 PHARM REV CODE 636 W HCPCS: Performed by: INTERNAL MEDICINE

## 2023-03-28 RX ORDER — METOPROLOL SUCCINATE 25 MG/1
25 TABLET, EXTENDED RELEASE ORAL DAILY
Qty: 30 TABLET | Refills: 0 | Status: SHIPPED | OUTPATIENT
Start: 2023-03-28 | End: 2023-04-02 | Stop reason: SDUPTHER

## 2023-03-28 RX ADMIN — TAMSULOSIN HYDROCHLORIDE 0.8 MG: 0.4 CAPSULE ORAL at 09:03

## 2023-03-28 RX ADMIN — METOPROLOL SUCCINATE 25 MG: 25 TABLET, EXTENDED RELEASE ORAL at 09:03

## 2023-03-28 RX ADMIN — FINASTERIDE 5 MG: 5 TABLET, FILM COATED ORAL at 09:03

## 2023-03-28 RX ADMIN — MICONAZOLE NITRATE: 20 POWDER TOPICAL at 09:03

## 2023-03-28 RX ADMIN — Medication 1 CAPSULE: at 12:03

## 2023-03-28 RX ADMIN — SODIUM ZIRCONIUM CYCLOSILICATE 10 G: 10 POWDER, FOR SUSPENSION ORAL at 09:03

## 2023-03-28 RX ADMIN — SACUBITRIL AND VALSARTAN 1 TABLET: 24; 26 TABLET, FILM COATED ORAL at 09:03

## 2023-03-28 RX ADMIN — Medication 1 CAPSULE: at 09:03

## 2023-03-28 RX ADMIN — Medication 1 CAPSULE: at 06:03

## 2023-03-28 RX ADMIN — EZETIMIBE 10 MG: 10 TABLET ORAL at 08:03

## 2023-03-28 RX ADMIN — MICONAZOLE NITRATE: 20 POWDER TOPICAL at 08:03

## 2023-03-28 RX ADMIN — ATORVASTATIN CALCIUM 40 MG: 40 TABLET, FILM COATED ORAL at 08:03

## 2023-03-28 RX ADMIN — SACUBITRIL AND VALSARTAN 1 TABLET: 24; 26 TABLET, FILM COATED ORAL at 08:03

## 2023-03-28 RX ADMIN — ENOXAPARIN SODIUM 40 MG: 40 INJECTION SUBCUTANEOUS at 06:03

## 2023-03-28 RX ADMIN — ASPIRIN 81 MG: 81 TABLET, COATED ORAL at 09:03

## 2023-03-28 NOTE — PHYSICIAN QUERY
PT Name: Max Squires  MR #: [id number]     DOCUMENTATION CLARIFICATION     CDS: Brandy Capley, RN  Email: [email or web address]     This form is a permanent document in the medical record.    Query Date: March 28, 2023    By submitting this query, we are merely seeking further clarification of documentation.  Please utilize your independent clinical judgment when addressing the question(s) below.  The Medical Record contains the following:   Indicators   Supporting Clinical Findings Location in Medical Record   X Pneumonia documented   Patchy infiltrates- ?CAP     Possible Community Acquired Pneumonia      H&P 3/19    Hospital medicine progress notes 3/24   X Chest X-Ray/CT Scan Cardiopericardial silhouette is within normal limits.  Lungs are remarkable severe chronic emphysematous changes with fibrosis.  There are cystic formations with honeycombing lower lung zones.  There are is scattered patchy and ground-glass opacities of the lungs which could represent associated mild pneumonitis without exclusion of developing early infiltrates.  No significant fluid within the pleural space.  No pneumothorax.    Persistent increase interstitial markings mostly at both bases similar in distribution and configuration as compared with the previous exam of March 18, 2023 in likely chronic in nature.     No new focal consolidative changes are identified   CXR 3/18              CXR 3/22    PaO2    PaCO2     O2 sat     X WBC  03/18/23 15:47 03/19/23 07:14 03/20/23 04:49   WBC 18.9 (H) 14.2 (H) 9.0      Labs 3/18-3/20   X Vital Signs T 98.5 °F (36.9 °C)   /63   P 64   RR 17   O2 98 %    VITAL SIGNS: 24 HRS MIN & MAX   Temp  Min: 97.8 °F (36.6 °C)  Max: 98.2 °F (36.8 °C)   BP  Min: 120/68  Max: 153/83   Pulse  Min: 65  Max: 76    Resp  Min: 20  Max: 20   SpO2  Min: 92 %  Max: 100 %      H&P 3/19    Hospital medicine progress notes 3/20    Cultures      X Treatment  Levaquin for potential CAP, mild UTI    On presentation to the  hospital, he was noted to have significant leukocytosis, C diff antigen and toxin were positive by EIA.  He was started on p.o. vancomycin however was also treated with levofloxacin for concerns possible UTI and pneumonia.     currently no evidence of infection other than C diff colitis, please discontinue all systemic antibiotics     levoFLOXacin 750 mg/150 mL IVPB 750 mg  Dose: 750 mg  Freq: Every 24 hours (non-standard times) Route: IV  Indications of Use: lower respiratory infection,urinary tract infection  Start: 03/19/23 0430 End: 03/21/23 1040     H&P 3/19    ID consult 3/23, filed 3/25              MAR 3/19-3/21   X Supplemental O2 2 L nasal cannula saturating around 94-97%   Hospital medicine progress notes 3/20    Dysphagia/Swallow study     X Other COPD on 2 L nasal cannula nightly    Respiratory:  Negative for cough, chest tightness, shortness of breath and wheezing.  Pulmonary/Chest: He has no wheezes. He has rhonchi.    H&P 3/19    ED provider notes 3/18     Please clarify if the __possible community acquired pneumonia__ diagnosis has been:    [  x] Ruled In   [  ] Ruled Out   [  ] Other/Clarification of findings (please specify): _______________    [   ] Clinically undetermined         Please document in your progress notes daily for the duration of treatment, until resolved, and include in your discharge summary.     Form No. [id number]

## 2023-03-28 NOTE — PROGRESS NOTES
[facility] - [address] Medical Telemetry  Wound Care    Patient Name:  Max Squires   MRN:  [id number]  Date: 3/28/2023  Diagnosis: C. difficile diarrhea    History:     Past Medical History:   Diagnosis Date    Arthritis     Hypercholesterolemia     Hypertension        Social History     Socioeconomic History    Marital status:    Tobacco Use    Smoking status: Every Day     Packs/day: 1.00     Types: Cigarettes   Substance and Sexual Activity    Alcohol use: No       Precautions:     Allergies as of 03/18/2023    (No Known Allergies)       WO Assessment Details/Treatment   WO follow up visit related to consult 036/20/23 for sacrum; sacrum skin intact no breaks, scrotum redness markedly decreased after initiating Miconazole.  Patient contact precautions maintained, will continue to follow.  Recommendations:   Head to toe skin assessment q 12 hours;  Turn/reposition q 2 hours or schedule to prevent erythema;  Specialty bed, and wedge for 30 degree pelvic tilt;  Keep sacral area clean and dry with < 3 layers on bed;   Avoid foam dressings on sacrum;  Avoid adult briefs;  Encourage activity as ordered;  Ensure adequate nutrition/hydration for healing;   03/28/23 0955        Altered Skin Integrity 03/18/23 2200 Sacral spine Intact skin with non-blanchable redness of localized area   Date First Assessed/Time First Assessed: 03/18/23 2200   Altered Skin Integrity Present on Admission - Did Patient arrive to the hospital with altered skin?: yes  Location: Sacral spine  Is this injury device related?: No  Description of Altered Skin ...   Care Cleansed with:  (Remedy)

## 2023-03-28 NOTE — PROGRESS NOTES
PROGRESS NOTE    SUBJECTIVE: Afebrile with stable vitals overnight. Per patient no BMs today.        REVIEW OF SYSTEMS: Negative unless stated above         MEDICATIONS:   Reviewed in EMR        PHYSICAL EXAM:   VITALS: Reviewed        GENERAL: Older male, NAD; does not appear toxic  SKIN: no rash  HEENT: sclera non-icteric; PERRL; NC in place  NECK: supple; no LAD  CHEST: CTA; nonlabored, equal expansion; no adventitious BS  CARDIOVASCULAR: RRR, S1S2; no murmur; equal peripheral pulses  ABDOMEN:  active bowel sounds; abdomen soft, nondistended, nontender to palpation  GENITOURINARY: no suprapubic tenderness  EXTREMITIES: no cyanosis or clubbing  NEURO: AAO x4; CN II-XII grossly intact; extremely hard of hearing  PSYCH: Mentation and affect appropriate          LABORATORY DATA: Reviewed         RADIOLOGICAL DATA: Reviewed         IMPRESSION:   88-year-old male presenting with ongoing diarrhea after C difficile stool studies positive on 03/15.  Prior episode of C difficile colitis in February, status post 14 days oral vancomycin at that time.  Initially on oral vancomycin, however changed to Dificid today given ongoing diarrhea.  ID consulted per [employer] policy for its use.     Recurrent C. Difficile colitis - second episode  Leukocytosis s/t above, resolved  COPD on home oxygen  Asymptomatic bacteriuria       PLAN:  No bowel movements yet today per patient.   Continue Dificid as planned for 10 days total. end date: 4/2/23  Discussed with patient at bedside.

## 2023-03-28 NOTE — PROGRESS NOTES
[facility] Hospital Medicine Progress Note        Chief Complaint: Inpatient Follow-up for SOB, Diarrhea    HPI: Patient is a pleasant 88-year-old male was brought to the ER with significant diarrhea as well as reported bilateral lower extremity swelling.  Daughter was initially present (no longer present inpatient is not a great historian) but explained he has been dealing with diarrhea for several months as well as recurrent C diff infection and was diagnosed recently 03/15/2023 but has not yet started oral vancomycin again.     He arrived to the ER afebrile hemodynamically stable maintaining adequate sats on his baseline 2 L nasal cannula.  Laboratory work showed leukocytosis of 18 K, hypokalemia with a potassium of 2.3, a mildly elevated troponin and BNP, positive stool C diff.      Chest x-ray showed patchy infiltrates and emphysematous changes.  Echo showed preliminary EF of 40%.  He was started on oral vancomycin admitted to the hospitalist service for further management.  Added probiotics and Questran  Interval Hx:     Patient was seen and examined this morning no loose stools symptomatically better   Objective/physical exam:  General: In no acute distress, afebrile  Chest: Clear to auscultation bilaterally  Heart: RRR, +S1, S2, no appreciable murmur  Abdomen: Soft, nontender, BS +  MSK: Warm, no lower extremity edema, no clubbing or cyanosis  Neurologic: Alert and awake  VITAL SIGNS: 24 HRS MIN & MAX LAST   Temp  Min: 97.3 °F (36.3 °C)  Max: 97.9 °F (36.6 °C) 97.6 °F (36.4 °C)   BP  Min: 91/46  Max: 133/77 107/72     Pulse  Min: 65  Max: 77  74   Resp  Min: 18  Max: 20 20   SpO2  Min: 97 %  Max: 100 % 100 %         Recent Labs   Lab 03/23/23  0412 03/28/23  0534   WBC 7.2 7.9   RBC 3.92* 3.78*   HGB 10.9* 10.5*   HCT 34.6* 33.0*   MCV 88.3 87.3   MCH 27.8 27.8   MCHC 31.5* 31.8*   RDW 15.9 16.6    205   MPV 9.5 9.5       Recent Labs   Lab 03/22/23  0940 03/23/23 0412  03/28/23  0534   * 132* 131*   K 4.0 4.8 5.5*   CO2 35* 30 26   BUN 10.7 13.4 15.4   CREATININE 0.74 0.78 0.74   CALCIUM 7.9* 7.6* 8.0*   ALBUMIN  --   --  2.3*   ALKPHOS  --   --  68   ALT  --   --  18   AST  --   --  17   BILITOT  --   --  0.5          Microbiology Results (last 7 days)       Procedure Component Value Units Date/Time    Blood Culture [[id number]]  (Normal) Collected: 03/19/23 0038    Order Status: Completed Specimen: Blood Updated: 03/24/23 0200     CULTURE, BLOOD ([facility]) No Growth at 5 days    Blood Culture [[id number]]  (Normal) Collected: 03/19/23 0038    Order Status: Completed Specimen: Blood Updated: 03/24/23 0200     CULTURE, BLOOD ([facility]) No Growth at 5 days    Stool Culture **CANNOT BE ORDERED STAT** [[id number]]  (Normal) Collected: 03/18/23 1840    Order Status: Completed Specimen: Stool Updated: 03/21/23 1051     Stool Culture Negative for Salmonella, Shigella, Campylobacter, Vibrio, Aeromonas, Pleisiomonas,Yersinia, or Shiga Toxin 1 and 2.             See below for Radiology    Scheduled Med:   aspirin  81 mg Oral Daily    atorvastatin  40 mg Oral QHS    enoxaparin  40 mg Subcutaneous Daily    ezetimibe  10 mg Oral QHS    fidaxomicin  200 mg Oral BID    finasteride  5 mg Oral Daily    Lactobacillus acidophilus  1 capsule Oral TID WM    metoprolol succinate  25 mg Oral Daily    miconazole NITRATE 2 %   Topical (Top) BID    sacubitriL-valsartan  1 tablet Oral BID    sodium zirconium cyclosilicate  10 g Oral Once    tamsulosin  2 capsule Oral Daily        Continuous Infusions:       PRN Meds:  acetaminophen, acetaminophen, acetaminophen, albuterol, dextrose 10%, dextrose 10%, glucagon (human recombinant), glucose, glucose, melatonin, ondansetron, sodium chloride 0.9%       Assessment/Plan:  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  I have sent the prescription of fidaxomicin to our retail pharmacy to see what the price is if it is very expensive and patient cannot afford then we will keep the patient in here to complete total of 10days    today is day 5   Continue with probiotics  Potassium is high today so we will give a dose of Lokelma discontinue p.o. potassium  Repeat chest x-ray done  showed no significant change from before completed 7 days of antibiotics  Cardiology following patient is on aspirin Toprol Entresto and they recommended outpatient ischemic eval   They recommended starting the patient on Lasix once diarrhea is better and electrolytes states stable   continue with oxygen supplementation   White cell count is back to normal   VTE prophylaxis: lovenox      Anticipated discharge and Disposition:         All diagnosis and differential diagnosis have been reviewed; assessment and plan has been documented; I have personally reviewed the labs and test results that are presently available; I have reviewed the patients medication list; I have reviewed the consulting providers response and recommendations. I have reviewed or attempted to review medical records based upon their availability    All of the patient's questions have been  addressed and answered. Patient's is agreeable to the above stated plan. I will continue to monitor closely and make adjustments to medical management as needed.  _____________________________________________________________________    Nutrition Status:    Radiology:  X-Ray Chest PA And Lateral  Narrative: EXAMINATION:  XR CHEST PA AND LATERAL    CLINICAL HISTORY:  ?  Pneumonia;, .    COMPARISON:  March 18, 2023    FINDINGS:  Examination reveals cardiomediastinal silhouette and pleuroparenchymal changes to be essentially  unchanged as compared with the previous exam.    Persistent increase interstitial markings mostly at both bases similar in distribution and configuration as compared with the previous exam of March 18, 2023 in likely chronic in nature.    No new focal consolidative changes are identified  Impression: No significant change    Electronically signed by: Matti Garcia  Date:    03/22/2023  Time:    09:30      Elena Nina MD   03/28/2023

## 2023-03-28 NOTE — PROGRESS NOTES
Inpatient Nutrition Assessment    Admit Date: 3/18/2023   Total duration of encounter: 10 days     Nutrition Recommendation/Prescription     -Continue liberalized diet.     -Continue Boost (provides 240 kcal, 10 g protein per serving) for additional nutrition    -Diarrhea resolved, discontinue Banatrol.    -Continue probiotics and consider adding daily MVI.     Communication of Recommendations: reviewed with patient    Nutrition Assessment     Malnutrition Assessment/Nutrition-Focused Physical Exam    Malnutrition in the context of acute illness or injury  Degree of Malnutrition: unable to complete  Energy Intake: does not meet criteria  Interpretation of Weight Loss: unable to obtain  Body Fat:mild depletion  Area of Body Fat Loss: upper arm region - triceps / biceps  Muscle Mass Loss: mild depletion  Area of Muscle Mass Loss: clavicle bone region - pectoralis major, deltoid, trapezius muscles and clavicle and acromion bone region - deltoid muscle  Fluid Accumulation: mild  Edema: 1+ edema - trace   Reduced  Strength: unable to obtain  A minimum of two characteristics is recommended for diagnosis of either severe or non-severe malnutrition.    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   Hypokalemia   NSTEMI likely type 2 demand ischemia   AHRF,Newly Diagnosed Systolic HF/EF 40%  Possible Community Acquired Pneumonia and UTI  Leukocytosis  Anemia,Unspecified    Relevant Medical History: HTN, HLD, COPD, CAD    Nutrition-Related Medications: probiotic  Calorie Containing IV Medications: no significant kcals from medications at this time    Nutrition-Related Labs:  3/20/23 K 3, Cl 95, CO2 32, Ca 7.5, Total pro 4.4, Alb  1.9  3/23/23 Na 134, Ca 7.6  3/28/23 Na 131, K 5.5, Gluc 74, Ca 8, Total pro 4.9, Alb 2.3     Diet/PN Order: Diet Adult Regular  Oral Supplement Order: Banatrol Plus Banana Flakes and Boost  Tube Feeding Order: none  Appetite/Oral Intake: good/% of meals  Factors Affecting Nutritional Intake: none identified  Food/B[place]/Cultural Preferences: none reported  Food Allergies: none reported    Skin Integrity: bruised (ecchymotic)  Wound(s):   Altered Skin Integrity 03/18/23 2200 Sacral spine Intact skin with non-blanchable redness of localized area    Comments    3/20/23 Pt reports fair-good appetite since admit, decreased prior and reports diarrhea over the last few months; unsure if unintentional weight loss, no recent wt hx in chart. Denies any nausea or vomiting. Willing to trial banatrol for diarrhea and Boost for additional nutrition.     3/24/23 Reports eating 100% meals and with good tolerance. Drinking Boost and tried first banatrol packet yesterday. Had episodes of diarrhea this morning from 8-9am per pt. Encouraged increasing banatrol to TID if possible.     3/28/23 Continue with good appetite and oral intake. Diarrhea improved/resolved.    Anthropometrics    Height: 6' (182.9 cm)    Last Weight: 70.9 kg (156 lb 4.9 oz) (03/28/23 0545) Weight Method: Bed Scale  BMI (Calculated): 21.2  BMI Classification: underweight (BMI less than 22 if >65 years of age)        Ideal Body Weight (IBW), Male: 178 lb     % Ideal Body Weight, Male (lb): 84.27 %                          Usual Weight Provided By: patient    Wt Readings from Last 5 Encounters:   03/28/23 70.9 kg (156 lb 4.9 oz)   03/11/19 79.4 kg (175 lb)     Weight Change(s) Since Admission:  Admit Weight: 68 kg (150 lb) (03/18/23 1529)  3/19/23 68kg admit wt   3/24/23 70.4kg  3/28/23 70.9kg    Estimated Needs    Weight Used For Calorie Calculations: 68 kg (149 lb 14.6 oz)  Energy Calorie Requirements (kcal): 2040kcals/d (30kcals/kg)  Energy Need Method:  Kcal/kg  Weight Used For Protein Calculations: 68 kg (149 lb 14.6 oz)  Protein Requirements: 81-88g/d (1.2-1.3g/kg)  Fluid Requirements (mL): 1700ml fl/d (25ml/kg)  Temp (24hrs), Av.7 °F (36.5 °C), Min:97.3 °F (36.3 °C), Max:97.9 °F (36.6 °C)         Enteral Nutrition    Patient not receiving enteral nutrition at this time.    Parenteral Nutrition    Patient not receiving parenteral nutrition support at this time.    Evaluation of Received Nutrient Intake    Calories: meeting estimated needs  Protein: meeting estimated needs    Patient Education    Not applicable.    Nutrition Diagnosis     PES: Malnutrition related to recurrent c. Diff/inadequate oral intake as evidenced by <75% EER >1 wk, muscle and fat loss. (improving)    Interventions/Goals     Intervention(s): prescription medication and collaboration with other providers  Goal: Maintain weight throughout hospitalization. (goal progressing)    Monitoring & Evaluation     Dietitian will monitor food and beverage intake, weight change, and electrolyte/renal panel.  Nutrition Risk/Follow-Up: moderate (follow-up in 3-5 days)   Please consult if re-assessment needed sooner.

## 2023-03-28 NOTE — PT/OT/SLP PROGRESS
"Physical Therapy Treatment    Patient Name:  Max Squires   MRN:  [id number]    Recommendations:     Discharge Recommendations: home health PT  Discharge Equipment Recommendations: walker, rolling  Barriers to discharge:  medical condition    Assessment:     Max Squires is a 88 y.o. male admitted with a medical diagnosis of C. difficile diarrhea.  He presents with the following impairments/functional limitations: weakness, impaired endurance .    Pt zhang tx well. Pt performs majority of mobility SBA. Requires Cisco for ascending and descending steps.    Rehab Prognosis: Good; patient would benefit from acute skilled PT services to address these deficits and reach maximum level of function.    Recent Surgery: * No surgery found *      Plan:     During this hospitalization, patient to be seen 6 x/week to address the identified rehab impairments via gait training, therapeutic activities, therapeutic exercises and progress toward the following goals:    Plan of Care Expires:  04/22/23    Subjective     Chief Complaint: none  Patient/Family Comments/goals:   Pain/Comfort:         Objective:     Communicated with RN prior to session.  Patient found  on BSC  with   upon PT entry to room.     General Precautions: Standard, contact  Orthopedic Precautions: N/A  Braces: N/A  Respiratory Status: Nasal cannula, flow 2 L/min  O2 Sats with activity: 90% on RA  Skin Integrity: Visible skin intact      Functional Mobility:  Transfers:     Sit to Stand:  stand by assistance with rolling walker  Bed to Chair: stand by assistance with  rolling walker  using  Step Transfer  Gait: Pt amb 100ft with RW SBA. Decreased stride on RLE noted.    Therapeutic Activities/Exercises:  Step up/down with 6" step with rail 4X Cisco. Cues needed step sequencing and safety awareness.    Education:  Patient provided with verbal education regarding POC.  Understanding was verbalized, however additional teaching warranted.     Patient left up in chair with " all lines intact and call button in reach..    GOALS:   Multidisciplinary Problems       Physical Therapy Goals          Problem: Physical Therapy    Goal Priority Disciplines Outcome Goal Variances Interventions   Physical Therapy Goal     PT, PT/OT Ongoing, Progressing     Description: Goals to be met by: 23     Patient will increase functional independence with mobility by performin. Supine to sit with [place]  2. Sit to supine with [place]  3. Sit to stand transfer with Modified [place]  4. Gait  x 150 feet with Modified [place] using Rolling Walker.   5. Ascend/ descend 12 stairs with use of B railings with modified independence                          Time Tracking:     PT Received On: 23  PT Start Time: 1030     PT Stop Time: 1057  PT Total Time (min): 27 min     Billable Minutes: Gait Training 19 and Therapeutic Activity 8    Treatment Type: Treatment  PT/PTA: PTA     Number of PTA visits since last PT visit: 2023

## 2023-03-28 NOTE — PHYSICIAN QUERY
PT Name: Max Squires  MR #: [id number]    DOCUMENTATION CLARIFICATION     CDS: Brandy Capley, RN  Email: [email or web address]      This form is a permanent document in the medical record.     Query Date: March 28, 2023    By submitting this query, we are merely seeking further clarification of documentation.. Please utilize your independent clinical judgment when addressing the question(s) below.    The medical record contains the following:   Indicators  Supporting Clinical Findings Location in Medical Record   X Energy Intake Energy Intake: < 75% of estimated energy requirement for > 7 days, improving   Nutrition progress notes 3/24   X Weight Loss Interpretation of Weight Loss: unable to obtain    Malnutrition Screening Tool Results   Have you recently lost weight without trying?: Yes: Unsure how much  Have you been eating poorly because of a decreased appetite?: Yes   MST Score: 3      Nutrition progress notes 3/24   X Fat Loss Body Fat:mild depletion  Area of Body Fat Loss: upper arm region - triceps / biceps   Nutrition progress notes 3/24   X Muscle Loss Muscle Mass Loss: mild depletion  Area of Muscle Mass Loss: clavicle bone region - pectoralis major, deltoid, trapezius muscles and clavicle and acromion bone region - deltoid muscle   Nutrition progress notes 3/24   X Edema/Fluid Accumulation Fluid Accumulation: mild  Edema: 1+ edema - trace    Nutrition progress notes 3/24   X Reduced  Strength (by dynamometer) Reduced  Strength: unable to obtain   Nutrition progress notes 3/24   X Weight, BMI, Usual Body Weight Last Weight: 70.4 kg (155 lb 1.6 oz) (03/24/23 0300) Weight Method: Bed Scale  BMI (Calculated): 21  BMI Classification: underweight (BMI less than 22 if >65 years of age)  Ideal Body Weight (IBW), Male: 178 lb  % Ideal Body Weight, Male (lb): 84.27 %  Usual Weight Provided By: patient     Wt Readings from Last 5 Encounters:   03/24/23 70.4 kg (155 lb 1.6 oz)   03/11/19 79.4 kg (175 lb)       Weight Change(s) Since Admission:  Admit Weight: 68 kg (150 lb) (03/18/23 1529)  3/19/23 68kg admit wt   3/24/23 70.4kg   Nutrition progress notes 3/24   X Delayed Wound Healing Skin Integrity: bruised (ecchymotic)  Wound(s):   Altered Skin Integrity 03/18/23 2200 Sacral spine Intact skin with non-blanchable redness of localized area   Nutrition progress notes 3/24   X Registered Dietician Diagnosis Malnutrition in the context of acute illness or injury  Degree of Malnutrition: unable to complete    PES: Malnutrition related to recurrent c. Diff/inadequate oral intake as evidenced by <75% EER >1 wk, muscle and fat loss. 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   -Continue supplements:  Boost (provides 240 kcal, 10 g protein per serving) for additional nutrition  Banatrol TID while having diarrhea.   -Consider adding daily MVI   Orders 3/19    Orders 3/20      Nutrition progress notes 3/24    Other       Academy of Nutrition and Dietetics (Academy) and the American Society for Parenteral and Enteral Nutrition (A.S.P.E.N.) Clinical Characteristics to support Malnutrition   Malnutrition in the Context of Acute Illness or Injury Malnutrition in the Context of Chronic Illness or Injury Malnutrition in the Context of Social or Environmental Circumstances    Malnutrition Level Moderate Severe Moderate Severe   Moderate   Severe   Energy Intake <75%                   >7 days <50%                 >5 days <75%           >1 month <75%                      >1 month   <75% for >3 months   <50% for >1 month   Weight Loss   1-2% in 1 week >2% in 1 week 5% in 1 month >5% in 1 month 5% in 1 month >5% in 1 month    5% in 1 month >5% in 1 month 7.5% in 3 months >7.5% in 3 months 7.5% in 3 months >7.5% in 3 months    7.5% in 3 months >7.5% in 3 months 10% in 6 months >10% in 6 months 10% in 6 months >10% in 6 months        20% in 1 year                    >20% in 1 year                                                                  20% in 1 year                            >20% in 1 year                                                  Subcutaneous Fat Loss Mild  Moderate  Mild  Severe    Mild   Severe   Muscle Loss Mild  Moderate  Mild  Severe    Mild   Severe   Edema/Fluid Accumulation Mild Moderate to severe  Mild  Severe   Mild   Severe   Reduced  Strength         (based on standards supplied by  of dynamometer) N/A Measurably reduced N/A Measurably reduced N/A Measurably reduced     Criteria for mild malnutrition is defined as 1 characteristic outlined above within the established moderate or severe parameters.  A minimum of 2 out of the 6 characteristics noted above are recommended for a diagnosis of moderate or severe malnutrition.  Chronic illness/injury is a disease/condition lasting 3 months or longer.    The noted clinical guidelines are only system guidelines and do not replace the providers clinical judgment.    Provider, please specify diagnosis or diagnoses associated with above clinical findings.    [ x ] Mild Malnutrition -   1 characteristic outlined above within the established moderate or severe parameters     [  ] Moderate Malnutrition -   a minimum of 2 of the 6 moderate malnutrition characteristics noted above      [  ] Malnutrition,  Unspecified degree     [  ] Underweight     [  ] Other Nutritional Diagnosis (please specify): _______     [  ] Clinically Undetermined       Please document in your progress notes daily for the duration of treatment until resolved and  include in your discharge summary.      References:    CHENG López, & АННА Mirza (2022, April). Assessment and management of anorexia and cachexia in palliative care. Retrieved May 23, 2022, from https://www.[employer]/contents/assessment-and-management-of-anorexia-and-cachexia-in-palliative-care?lpvtvXlm=0046&source=see_link     RUIZ Casillas, PhD, RD, PAOLA, BULMARO Motta, PhD, RN, IRON Segura MD, PhD, Nuzaht MENA A., MS, RD, [facility], MATT Toribio, MS, RD, The Academy Malnutrition Work Group, The A.S.P.E.N. Board of Directors. (2012). Consensus Statement: Academy of Nutrition and Dietetics and American Society for Parenteral and Enteral Nutrition: Characteristics Recommended for the Identification and Documentation of Adult Malnutrition (Undernutrition). Journal of Parenteral and Enteral Nutrition, 36(3), 275-283. doi:10.1177/8273381763206995     Form No. 65342

## 2023-03-29 PROCEDURE — 97164 PT RE-EVAL EST PLAN CARE: CPT

## 2023-03-29 PROCEDURE — 63600175 PHARM REV CODE 636 W HCPCS: Performed by: INTERNAL MEDICINE

## 2023-03-29 PROCEDURE — 25000003 PHARM REV CODE 250: Performed by: NURSE PRACTITIONER

## 2023-03-29 PROCEDURE — 25000003 PHARM REV CODE 250: Performed by: INTERNAL MEDICINE

## 2023-03-29 PROCEDURE — 25000003 PHARM REV CODE 250: Performed by: PHYSICIAN ASSISTANT

## 2023-03-29 PROCEDURE — 21400001 HC TELEMETRY ROOM

## 2023-03-29 PROCEDURE — 97535 SELF CARE MNGMENT TRAINING: CPT | Mod: CO

## 2023-03-29 RX ORDER — FUROSEMIDE 20 MG/1
20 TABLET ORAL DAILY
Status: DISCONTINUED | OUTPATIENT
Start: 2023-03-29 | End: 2023-04-02

## 2023-03-29 RX ADMIN — EZETIMIBE 10 MG: 10 TABLET ORAL at 08:03

## 2023-03-29 RX ADMIN — FINASTERIDE 5 MG: 5 TABLET, FILM COATED ORAL at 09:03

## 2023-03-29 RX ADMIN — ATORVASTATIN CALCIUM 40 MG: 40 TABLET, FILM COATED ORAL at 08:03

## 2023-03-29 RX ADMIN — ASPIRIN 81 MG: 81 TABLET, COATED ORAL at 09:03

## 2023-03-29 RX ADMIN — SACUBITRIL AND VALSARTAN 1 TABLET: 24; 26 TABLET, FILM COATED ORAL at 08:03

## 2023-03-29 RX ADMIN — TAMSULOSIN HYDROCHLORIDE 0.8 MG: 0.4 CAPSULE ORAL at 09:03

## 2023-03-29 RX ADMIN — ENOXAPARIN SODIUM 40 MG: 40 INJECTION SUBCUTANEOUS at 05:03

## 2023-03-29 RX ADMIN — FIDAXOMICIN 200 MG: 200 TABLET, FILM COATED ORAL at 08:03

## 2023-03-29 RX ADMIN — FUROSEMIDE 20 MG: 20 TABLET ORAL at 01:03

## 2023-03-29 RX ADMIN — Medication 1 CAPSULE: at 09:03

## 2023-03-29 RX ADMIN — Medication 1 CAPSULE: at 01:03

## 2023-03-29 RX ADMIN — MICONAZOLE NITRATE: 20 POWDER TOPICAL at 09:03

## 2023-03-29 RX ADMIN — FIDAXOMICIN 200 MG: 200 TABLET, FILM COATED ORAL at 09:03

## 2023-03-29 RX ADMIN — Medication 1 CAPSULE: at 05:03

## 2023-03-29 NOTE — PT/OT/SLP PROGRESS
"Occupational Therapy   Treatment    Name: Max Squires  MRN: [id number]  Admitting Diagnosis:  C. difficile diarrhea       Recommendations:     Discharge Recommendations: home with home health  Discharge Equipment Recommendations:  walker, rolling  Barriers to discharge:       Assessment:     Max Squires is a 88 y.o. male with a medical diagnosis of C. difficile diarrhea.  Performance deficits affecting function are weakness, impaired endurance, impaired self care skills, impaired functional mobility, impaired balance.     Rehab Prognosis:  Fair; patient would benefit from acute skilled OT services to address these deficits and reach maximum level of function.       Plan:     Patient to be seen 3 x/week, 5 x/week to address the above listed problems via therapeutic exercises, therapeutic activities, self-care/home management  Plan of Care Expires:  (DC)  Plan of Care Reviewed with: patient    Subjective     Pain/Comfort:       Objective:     Communicated with: RN prior to session.  Patient found up in chair with   upon OT entry to room.    General Precautions: Standard, contact    Orthopedic Precautions:N/A  Braces: N/A  Respiratory Status: Room air     Occupational Performance:     Activities of Daily Living:  Lower Body Dressing: independence sitting in chair requiring SBA  Patient declining further ADL activities stating "I don't care to get up right now".    Therapeutic Positioning  Skin integrity: Visible skin intact       Patient left up in chair with all lines intact and call button in reach    GOALS:   Multidisciplinary Problems       Occupational Therapy Goals          Problem: Occupational Therapy    Goal Priority Disciplines Outcome Interventions   Occupational Therapy Goal     OT, PT/OT Ongoing, Progressing    Description: Patient will perform grooming with mod I while standing at sink.    Patient will perform toileting with mod I using toilet in restroom.    Patient will perform toilet transfer with " mod I with use of grab bars and RW as needed.    Patient will perform bathing with SB assist while using shower bench.    Patient will perform lower body dressing with mod I while seated EOB.                          Time Tracking:     OT Date of Treatment: 03/29/23  OT Start Time: 1010  OT Stop Time: 1023  OT Total Time (min): 13 min    Billable Minutes:Self Care/Home Management 1    OT/SLIM: SLIM     Number of SLIM visits since last OT visit: 3    3/29/2023

## 2023-03-29 NOTE — PROGRESS NOTES
[facility] Hospital Medicine Progress Note        Chief Complaint: Inpatient Follow-up for SOB, Diarrhea    HPI: Patient is a pleasant 88-year-old male was brought to the ER with significant diarrhea as well as reported bilateral lower extremity swelling.  Daughter was initially present (no longer present inpatient is not a great historian) but explained he has been dealing with diarrhea for several months as well as recurrent C diff infection and was diagnosed recently 03/15/2023 but has not yet started oral vancomycin again.     He arrived to the ER afebrile hemodynamically stable maintaining adequate sats on his baseline 2 L nasal cannula.  Laboratory work showed leukocytosis of 18 K, hypokalemia with a potassium of 2.3, a mildly elevated troponin and BNP, positive stool C diff.      Chest x-ray showed patchy infiltrates and emphysematous changes.  Echo showed preliminary EF of 40%.  He was started on oral vancomycin admitted to the hospitalist service for further management.  Added probiotics and Questran  Interval Hx:     Patient was seen and examined this morning no loose stools symptomatically better   Objective/physical exam:  General: In no acute distress, afebrile  Chest: Clear to auscultation bilaterally  Heart: RRR, +S1, S2, no appreciable murmur  Abdomen: Soft, nontender, BS +  MSK: Warm, no lower extremity edema, no clubbing or cyanosis  Neurologic: Alert and awake  VITAL SIGNS: 24 HRS MIN & MAX LAST   Temp  Min: 97.3 °F (36.3 °C)  Max: 98 °F (36.7 °C) 97.5 °F (36.4 °C)   BP  Min: 98/57  Max: 116/63 116/63     Pulse  Min: 66  Max: 77  76   Resp  Min: 18  Max: 20 18   SpO2  Min: 97 %  Max: 100 % 100 %         Recent Labs   Lab 03/23/23  0412 03/28/23  0534   WBC 7.2 7.9   RBC 3.92* 3.78*   HGB 10.9* 10.5*   HCT 34.6* 33.0*   MCV 88.3 87.3   MCH 27.8 27.8   MCHC 31.5* 31.8*   RDW 15.9 16.6    205   MPV 9.5 9.5       Recent Labs   Lab 03/23/23 0412 03/28/23  0534    * 131*   K 4.8 5.5*   CO2 30 26   BUN 13.4 15.4   CREATININE 0.78 0.74   CALCIUM 7.6* 8.0*   ALBUMIN  --  2.3*   ALKPHOS  --  68   ALT  --  18   AST  --  17   BILITOT  --  0.5          Microbiology Results (last 7 days)       Procedure Component Value Units Date/Time    Blood Culture [[id number]]  (Normal) Collected: 03/19/23 0038    Order Status: Completed Specimen: Blood Updated: 03/24/23 0200     CULTURE, BLOOD ([facility]) No Growth at 5 days    Blood Culture [[id number]]  (Normal) Collected: 03/19/23 0038    Order Status: Completed Specimen: Blood Updated: 03/24/23 0200     CULTURE, BLOOD ([facility]) No Growth at 5 days             See below for Radiology    Scheduled Med:   aspirin  81 mg Oral Daily    atorvastatin  40 mg Oral QHS    enoxaparin  40 mg Subcutaneous Daily    ezetimibe  10 mg Oral QHS    fidaxomicin  200 mg Oral BID    finasteride  5 mg Oral Daily    Lactobacillus acidophilus  1 capsule Oral TID WM    metoprolol succinate  25 mg Oral Daily    miconazole NITRATE 2 %   Topical (Top) BID    sacubitriL-valsartan  1 tablet Oral BID    tamsulosin  2 capsule Oral Daily        Continuous Infusions:       PRN Meds:  acetaminophen, acetaminophen, acetaminophen, albuterol, dextrose 10%, dextrose 10%, glucagon (human recombinant), glucose, glucose, melatonin, ondansetron, sodium chloride 0.9%       Assessment/Plan:  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   Hypokalemia   NSTEMI likely type 2 demand ischemia   AHRF,Newly Diagnosed Systolic HF/EF 40%  Possible Community Acquired Pneumonia and UTI  Leukocytosis  Anemia,Unspecified  Essential hypertension,Hyperlipidemia,COPD on 2 L nasal cannula nightly,CAD      Plan:  Out of pocket for for Dificid was almost 800 dollars and patient and his family can not afford it so we will keep the patient in-house until completion of antibiotics with the end date of April 2, 2023  Repeat chest x-ray done  showed no significant change from before completed 7 days of  antibiotics  Cardiology following patient is on aspirin Toprol Entresto and they recommended outpatient ischemic eval   Started on Lasix  Repeat blood work in a.m.     VTE prophylaxis: lovenox      Anticipated discharge and Disposition:         All diagnosis and differential diagnosis have been reviewed; assessment and plan has been documented; I have personally reviewed the labs and test results that are presently available; I have reviewed the patients medication list; I have reviewed the consulting providers response and recommendations. I have reviewed or attempted to review medical records based upon their availability    All of the patient's questions have been  addressed and answered. Patient's is agreeable to the above stated plan. I will continue to monitor closely and make adjustments to medical management as needed.  _____________________________________________________________________    Nutrition Status:    Radiology:  CV Ultrasound doppler venous legs bilat  · There is no evidence of a right lower extremity DVT.  · There is no evidence of a left lower extremity DVT.     Bilateral lower extremities negative for DVT at time of exam.      Elena Nina MD   03/29/2023

## 2023-03-29 NOTE — PT/OT/SLP RE-EVAL
Physical Therapy Re-evaluation    Patient Name:  Max Squires   MRN:  [id number]    Recommendations:     Discharge Recommendations: home health PT  Discharge Equipment Recommendations: walker, rolling   Barriers to discharge:  medical dx    Assessment:     Max Squires is a 88 y.o. male admitted with a medical diagnosis of C. difficile diarrhea.  He presents with the following impairments/functional limitations: weakness, decreased lower extremity function, impaired functional mobility, impaired cardiopulmonary response to activity. Patient tolerated PT re-eval well. Patient has made good progress over the course of his still. Will continue treating pt acutely in order to maximize functional independence upon return home as well as build strength so that pt can safety ascend/descend stairs.     Rehab Prognosis:  good; patient would benefit from acute skilled PT services to address these deficits and reach maximum level of function.      Recent Surgery: * No surgery found *      Plan:     During this hospitalization, patient to be seen 5 x/week to address the above listed problems via gait training, therapeutic activities, therapeutic exercises  Plan of Care Expires:  04/22/23  Plan of Care Reviewed with: patient    Subjective     Communicated with NSG prior to session.  Patient found up in chair with oxygen, telemetry upon PT entry to room, agreeable to evaluation.      Chief Complaint: n/a  Patient comments/goals: to go home  Pain/Comfort:  Pain Rating 1: 0/10    Patients cultural, spiritual, Congregation conflicts given the current situation: no      Objective:     Patient found with: oxygen, telemetry     General Precautions: Standard, contact  Orthopedic Precautions: N/A  Braces: N/A  Respiratory Status: Nasal cannula, flow 2 L/min    Exams:  Cognitive Exam:  Patient is oriented to Person, Place, Time, and Situation  RLE Strength: WFL  LLE Strength: WFL    Functional Mobility:  Transfers:     Sit to Stand:   modified independence with rolling walker  Gait: pt ambulated approx 200 ft with use of RW; slowed bharath, step through pattern, no over LOB demonstrated.     Patient left up in chair with all lines intact and call button in reach.    GOALS:   Multidisciplinary Problems       Physical Therapy Goals          Problem: Physical Therapy    Goal Priority Disciplines Outcome Goal Variances Interventions   Physical Therapy Goal     PT, PT/OT Ongoing, Progressing     Description: Goals to be met by: 23     Patient will increase functional independence with mobility by performin. Supine to sit with [place]  2. Sit to supine with [place]  3. Sit to stand transfer with Modified [place]  4. Gait  x 150 feet with Modified [place] using Rolling Walker.   5. Ascend/ descend 12 stairs with use of B railings with modified independence                          History:     Past Medical History:   Diagnosis Date    Arthritis     Hypercholesterolemia     Hypertension        Past Surgical History:   Procedure Laterality Date    BACK SURGERY      CORONARY ANGIOPLASTY WITH STENT PLACEMENT      HIP SURGERY Left     Dr. Hoyos    JOINT REPLACEMENT Left     QUETA    JOINT REPLACEMENT Right     TKA       Time Tracking:     PT Received On: 23  PT Start Time: 1111     PT Stop Time: 1118  PT Total Time (min): 7 min     Billable Minutes: Re-eval 1      2023

## 2023-03-30 LAB
ANION GAP SERPL CALC-SCNC: 6 MEQ/L
BUN SERPL-MCNC: 18.5 MG/DL (ref 8.4–25.7)
CALCIUM SERPL-MCNC: 7.8 MG/DL (ref 8.8–10)
CHLORIDE SERPL-SCNC: 97 MMOL/L (ref 98–107)
CO2 SERPL-SCNC: 27 MMOL/L (ref 23–31)
CREAT SERPL-MCNC: 0.74 MG/DL (ref 0.73–1.18)
CREAT/UREA NIT SERPL: 25
GFR SERPLBLD CREATININE-BSD FMLA CKD-EPI: >60 MLS/MIN/1.73/M2
GLUCOSE SERPL-MCNC: 87 MG/DL (ref 82–115)
POTASSIUM SERPL-SCNC: 4.6 MMOL/L (ref 3.5–5.1)
SODIUM SERPL-SCNC: 130 MMOL/L (ref 136–145)

## 2023-03-30 PROCEDURE — 80048 BASIC METABOLIC PNL TOTAL CA: CPT | Performed by: INTERNAL MEDICINE

## 2023-03-30 PROCEDURE — 21400001 HC TELEMETRY ROOM

## 2023-03-30 PROCEDURE — 25000003 PHARM REV CODE 250: Performed by: PHYSICIAN ASSISTANT

## 2023-03-30 PROCEDURE — 63600175 PHARM REV CODE 636 W HCPCS: Performed by: INTERNAL MEDICINE

## 2023-03-30 PROCEDURE — 97116 GAIT TRAINING THERAPY: CPT | Mod: CQ

## 2023-03-30 PROCEDURE — 25000003 PHARM REV CODE 250: Performed by: NURSE PRACTITIONER

## 2023-03-30 PROCEDURE — 25000003 PHARM REV CODE 250: Performed by: INTERNAL MEDICINE

## 2023-03-30 PROCEDURE — 97535 SELF CARE MNGMENT TRAINING: CPT

## 2023-03-30 PROCEDURE — 97110 THERAPEUTIC EXERCISES: CPT | Mod: CQ

## 2023-03-30 RX ADMIN — ENOXAPARIN SODIUM 40 MG: 40 INJECTION SUBCUTANEOUS at 04:03

## 2023-03-30 RX ADMIN — SACUBITRIL AND VALSARTAN 1 TABLET: 24; 26 TABLET, FILM COATED ORAL at 08:03

## 2023-03-30 RX ADMIN — FUROSEMIDE 20 MG: 20 TABLET ORAL at 09:03

## 2023-03-30 RX ADMIN — MICONAZOLE NITRATE: 20 POWDER TOPICAL at 08:03

## 2023-03-30 RX ADMIN — TAMSULOSIN HYDROCHLORIDE 0.8 MG: 0.4 CAPSULE ORAL at 09:03

## 2023-03-30 RX ADMIN — EZETIMIBE 10 MG: 10 TABLET ORAL at 08:03

## 2023-03-30 RX ADMIN — ASPIRIN 81 MG: 81 TABLET, COATED ORAL at 09:03

## 2023-03-30 RX ADMIN — METOPROLOL SUCCINATE 25 MG: 25 TABLET, EXTENDED RELEASE ORAL at 09:03

## 2023-03-30 RX ADMIN — ATORVASTATIN CALCIUM 40 MG: 40 TABLET, FILM COATED ORAL at 08:03

## 2023-03-30 RX ADMIN — FIDAXOMICIN 200 MG: 200 TABLET, FILM COATED ORAL at 08:03

## 2023-03-30 RX ADMIN — MICONAZOLE NITRATE: 20 POWDER TOPICAL at 09:03

## 2023-03-30 RX ADMIN — Medication 1 CAPSULE: at 02:03

## 2023-03-30 RX ADMIN — FIDAXOMICIN 200 MG: 200 TABLET, FILM COATED ORAL at 09:03

## 2023-03-30 RX ADMIN — Medication 1 CAPSULE: at 09:03

## 2023-03-30 RX ADMIN — FINASTERIDE 5 MG: 5 TABLET, FILM COATED ORAL at 09:03

## 2023-03-30 RX ADMIN — Medication 1 CAPSULE: at 04:03

## 2023-03-30 RX ADMIN — SACUBITRIL AND VALSARTAN 1 TABLET: 24; 26 TABLET, FILM COATED ORAL at 09:03

## 2023-03-30 NOTE — PT/OT/SLP DISCHARGE
Occupational Therapy Discharge Summary    Max Squires  MRN: [id number]   Principal Problem: C. difficile diarrhea      Patient Discharged from acute Occupational Therapy on 3/30/23.  Please refer to this note and note from 3/29/23 for functional status.  Please reconsult for any acute changes.    Assessment:       Pt has met OT goals and would no longer benefit from acute services at this time. COOLEY and OT held a conference discussing Pt's functional status; OT re evaluated and agrees.   Pt overall Mod I-Ind in self care tasks and transfers at this time.  Pt in agreeance with being discharged from OT services.    Objective:     GOALS:   Multidisciplinary Problems       Occupational Therapy Goals          Problem: Occupational Therapy    Goal Priority Disciplines Outcome Interventions   Occupational Therapy Goal     OT, PT/OT Ongoing, Progressing    Description: Patient will perform grooming with mod I while standing at sink.    Patient will perform toileting with mod I using toilet in restroom.    Patient will perform toilet transfer with mod I with use of grab bars and RW as needed.    Patient will perform bathing with SB assist while using shower bench.    Patient will perform lower body dressing with mod I while seated EOB.                          Reasons for Discontinuation of Therapy Services  Satisfactory goal achievement. and Therapist determines that the patient will no longer benefit from therapy services.      Plan:     Patient Discharged to: Home with Home Health Service    3/30/2023

## 2023-03-30 NOTE — PT/OT/SLP PROGRESS
Physical Therapy Treatment    Patient Name:  Max Squires   MRN:  [id number]    Recommendations:     Discharge Recommendations:  home health PT   Discharge Equipment Recommendations: walker, rolling     Subjective     Patient awake and alert.     Objective:     General Precautions: Standard, contact   Orthopedic Precautions:N/A   Braces:    Respiratory Status: Nasal cannula, flow . L/min   Communicated with nurse prior to treatment.     Functional Mobility:    Rolling:Modified Independent  Supine to sit:Modified Independent  Sit to stand transfer: Stand-by Assistance  Bed to chair transfer:Stand-by Assistance    Gait 200 ft and 150 ft with RW SBA and cues given to correct forward lean. Pt performed LE PRE's to increase strenth, ROM, and endurance to improve overall independence.     Patient left up in chair with call button in reach.    AM-PAC 6 CLICK MOBILITY        GOALS:   Multidisciplinary Problems       Physical Therapy Goals          Problem: Physical Therapy    Goal Priority Disciplines Outcome Goal Variances Interventions   Physical Therapy Goal     PT, PT/OT Ongoing, Progressing     Description: Goals to be met by: 23     Patient will increase functional independence with mobility by performin. Supine to sit with [place]  2. Sit to supine with [place]  3. Sit to stand transfer with Modified [place]  4. Gait  x 150 feet with Modified [place] using Rolling Walker.   5. Ascend/ descend 12 stairs with use of B railings with modified independence                          Assessment:     Max Squires is a 88 y.o. male admitted with a medical diagnosis of C. difficile diarrhea.     Patient Active Problem List   Diagnosis    C. difficile diarrhea        Rehab Prognosis: Good; patient would benefit from acute skilled PT services to address these deficits and reach maximum level of function.    Recent Surgery: * No surgery found *      Plan:     During this hospitalization, patient to  be seen 5 x/week to address the identified rehab impairments via gait training, therapeutic activities, therapeutic exercises and progress toward the following goals:    Plan of Care Expires:  04/22/23    Billable Minutes     Billable Minutes: Gait Training 12 and Therapeutic Exercise 11    Treatment Type: Treatment  PT/PTA: PTA     Number of PTA visits since last PT visit: 1 03/30/2023

## 2023-03-30 NOTE — PROGRESS NOTES
[facility]  Hospital Medicine Progress Note        Chief Complaint: diarrhea    HPI:   88-year-old male was brought to the ER with significant diarrhea as well as reported bilateral lower extremity swelling.  Daughter was initially present (no longer present inpatient is not a great historian) but explained he has been dealing with diarrhea for several months as well as recurrent C diff infection and was diagnosed recently 03/15/2023 but has not yet started oral vancomycin again.     He arrived to the ER afebrile hemodynamically stable maintaining adequate sats on his baseline 2 L nasal cannula.  Laboratory work showed leukocytosis of 18 K, hypokalemia with a potassium of 2.3, a mildly elevated troponin and BNP, positive stool C diff.      Chest x-ray showed patchy infiltrates and emphysematous changes.  Echo showed preliminary EF of 40%.  He was started on oral vancomycin admitted to the hospitalist service for further management.  Added probiotics and Questran     Interval Hx:   No overnight events; no new complaints.     Objective/physical exam:    General: Appears comfortable, no acute distress.  Integumentary: Warm, dry, intact.  Musculoskeletal: Purposeful movement noted.   Respiratory: No accessory muscle use. Breath sounds are equal.  Cardiovascular: Regular rate. No peripheral edema.    VITAL SIGNS: 24 HRS MIN & MAX LAST   Temp  Min: 97.5 °F (36.4 °C)  Max: 98.6 °F (37 °C) 98.1 °F (36.7 °C)   BP  Min: 101/63  Max: 116/68 116/68   Pulse  Min: 68  Max: 81  68   Resp  Min: 18  Max: 20 18   SpO2  Min: 95 %  Max: 98 % 97 %     CV Ultrasound doppler venous legs bilat  · There is no evidence of a right lower extremity DVT.  · There is no evidence of a left lower extremity DVT.     Bilateral lower extremities negative for DVT at time of exam.    Recent Labs   Lab 03/28/23  0534   WBC 7.9   RBC 3.78*   HGB 10.5*   HCT 33.0*   MCV 87.3   MCH 27.8   MCHC 31.8*   RDW 16.6      MPV  9.5       Recent Labs   Lab 03/28/23  0534 03/30/23  0329   * 130*   K 5.5* 4.6   CO2 26 27   BUN 15.4 18.5   CREATININE 0.74 0.74   CALCIUM 8.0* 7.8*   ALBUMIN 2.3*  --    ALKPHOS 68  --    ALT 18  --    AST 17  --    BILITOT 0.5  --           Microbiology Results (last 7 days)       Procedure Component Value Units Date/Time    Blood Culture [[id number]]  (Normal) Collected: 03/19/23 0038    Order Status: Completed Specimen: Blood Updated: 03/24/23 0200     CULTURE, BLOOD ([facility]) No Growth at 5 days    Blood Culture [[id number]]  (Normal) Collected: 03/19/23 0038    Order Status: Completed Specimen: Blood Updated: 03/24/23 0200     CULTURE, BLOOD ([facility]) No Growth at 5 days             See below for Radiology    Scheduled Med:   aspirin  81 mg Oral Daily    atorvastatin  40 mg Oral QHS    enoxaparin  40 mg Subcutaneous Daily    ezetimibe  10 mg Oral QHS    fidaxomicin  200 mg Oral BID    finasteride  5 mg Oral Daily    furosemide  20 mg Oral Daily    Lactobacillus acidophilus  1 capsule Oral TID WM    metoprolol succinate  25 mg Oral Daily    miconazole NITRATE 2 %   Topical (Top) BID    sacubitriL-valsartan  1 tablet Oral BID    tamsulosin  2 capsule Oral Daily        Continuous Infusions:       PRN Meds:  acetaminophen, acetaminophen, acetaminophen, albuterol, dextrose 10%, dextrose 10%, glucagon (human recombinant), glucose, glucose, melatonin, ondansetron, sodium chloride 0.9%     Nutrition Status:      Assessment/Plan:  Recurrent C diff infection-symptomatic  Hypokalemia   NSTEMI likely type 2 demand ischemia   AHRF,Newly Diagnosed Systolic HF/EF 40%  Possible Community Acquired Pneumonia and UTI  Leukocytosis  Anemia,Unspecified  Essential hypertension,Hyperlipidemia,COPD on 2 L nasal cannula nightly,CAD      ----------------------------------------------------------    Currently being treated for C diff, will need to remain hospitalized to complete course of [place], with end date of April 2, 2023.         Anticipated discharge and Disposition:    Patient set up with [facility].     All diagnosis and differential diagnosis have been reviewed,  interpreted and communicated appropriately to care team. assessment and plan has been documented; I have personally reviewed the labs and test results that are presently available and pertinent to this hospital course; I have reviewed medical records based upon their availability.    All of the patient's questions have been  addressed and answered. Patient's is agreeable to the above stated plan.   I will continue to monitor closely and make adjustments to medical management as needed.          Brittany Camilo,    03/30/2023        This note was created with the assistance of Dragon voice recognition software. There may be transcription errors as a result of using this technology however minimal. Effort has been made to assure accuracy of transcription but any obvious errors or omissions should be clarified with the author of the document.

## 2023-03-31 PROCEDURE — 21400001 HC TELEMETRY ROOM

## 2023-03-31 PROCEDURE — 25000003 PHARM REV CODE 250: Performed by: PHYSICIAN ASSISTANT

## 2023-03-31 PROCEDURE — 25000003 PHARM REV CODE 250: Performed by: NURSE PRACTITIONER

## 2023-03-31 PROCEDURE — 25000003 PHARM REV CODE 250: Performed by: INTERNAL MEDICINE

## 2023-03-31 PROCEDURE — 63600175 PHARM REV CODE 636 W HCPCS: Performed by: INTERNAL MEDICINE

## 2023-03-31 PROCEDURE — 97116 GAIT TRAINING THERAPY: CPT | Mod: CQ

## 2023-03-31 RX ADMIN — Medication 1 CAPSULE: at 12:03

## 2023-03-31 RX ADMIN — FINASTERIDE 5 MG: 5 TABLET, FILM COATED ORAL at 10:03

## 2023-03-31 RX ADMIN — Medication 1 CAPSULE: at 05:03

## 2023-03-31 RX ADMIN — FIDAXOMICIN 200 MG: 200 TABLET, FILM COATED ORAL at 10:03

## 2023-03-31 RX ADMIN — MICONAZOLE NITRATE: 20 POWDER TOPICAL at 10:03

## 2023-03-31 RX ADMIN — Medication 1 CAPSULE: at 10:03

## 2023-03-31 RX ADMIN — ENOXAPARIN SODIUM 40 MG: 40 INJECTION SUBCUTANEOUS at 05:03

## 2023-03-31 RX ADMIN — ASPIRIN 81 MG: 81 TABLET, COATED ORAL at 10:03

## 2023-03-31 RX ADMIN — ATORVASTATIN CALCIUM 40 MG: 40 TABLET, FILM COATED ORAL at 10:03

## 2023-03-31 RX ADMIN — TAMSULOSIN HYDROCHLORIDE 0.8 MG: 0.4 CAPSULE ORAL at 10:03

## 2023-03-31 RX ADMIN — EZETIMIBE 10 MG: 10 TABLET ORAL at 10:03

## 2023-03-31 NOTE — PT/OT/SLP PROGRESS
Physical Therapy Treatment    Patient Name:  Max Squires   MRN:  [id number]    Recommendations:     Discharge Recommendations: home with home health  Discharge Equipment Recommendations: walker, rolling  Barriers to discharge:       Assessment:     Max Squires is a 88 y.o. male admitted with a medical diagnosis of C. difficile diarrhea.  He presents with the following impairments/functional limitations: weakness, gait instability, impaired endurance, impaired balance, impaired functional mobility, decreased coordination.    Rehab Prognosis: Good; patient would benefit from acute skilled PT services to address these deficits and reach maximum level of function.    Recent Surgery: * No surgery found *      Plan:     During this hospitalization, patient to be seen 5 x/week to address the identified rehab impairments via gait training, therapeutic activities, therapeutic exercises and progress toward the following goals:    Plan of Care Expires:  04/22/23    Subjective     Chief Complaint: Pt wants to know channel for [employer]. PTA gave pt a channel guide.   Patient/Family Comments/goals:   Pain/Comfort:         Objective:     Communicated with NSG prior to session.  Patient found up in chair with oxygen, telemetry and wife sitting bedside upon PTA entry to room.     General Precautions: Standard, contact  Orthopedic Precautions: N/A  Braces: N/A  Respiratory Status: Room air     Functional Mobility:  T/F: SBA sit <-> stand with RW  Gait: Pt amb for ~ 200 ft using RW. Pt demonstrated step through gait pattern with flex posture and slow bharath. Pt had one standing rest break 2/2 fatigue. CGA for safety, no major LOB.     Patient left up in chair with all lines intact and call button in reach..    GOALS:   Multidisciplinary Problems       Physical Therapy Goals          Problem: Physical Therapy    Goal Priority Disciplines Outcome Goal Variances Interventions   Physical Therapy Goal     PT, PT/OT Ongoing, Progressing      Description: Goals to be met by: 23     Patient will increase functional independence with mobility by performin. Supine to sit with [place]  2. Sit to supine with [place]  3. Sit to stand transfer with Modified [place]  4. Gait  x 150 feet with Modified [place] using Rolling Walker.   5. Ascend/ descend 12 stairs with use of B railings with modified independence                          Time Tracking:     PT Received On:    PT Start Time: 1452     PT Stop Time: 1503  PT Total Time (min): 11 min     Billable Minutes: Gait Training 11    Treatment Type: Treatment  PT/PTA: PTA     Number of PTA visits since last PT visit: 2     2023

## 2023-03-31 NOTE — PROGRESS NOTES
Inpatient Nutrition Assessment    Admit Date: 3/18/2023   Total duration of encounter: 13 days     Nutrition Recommendation/Prescription     -Continue liberalized diet.     -Continue Boost (provides 240 kcal, 10 g protein per serving) for additional nutrition.    -Continue probiotics and consider adding daily MVI.     Communication of Recommendations: reviewed with patient    Nutrition Assessment     Malnutrition Assessment/Nutrition-Focused Physical Exam    Malnutrition in the context of acute illness or injury  Degree of Malnutrition: unable to complete  Energy Intake: does not meet criteria  Interpretation of Weight Loss: unable to obtain  Body Fat:mild depletion  Area of Body Fat Loss: upper arm region - triceps / biceps  Muscle Mass Loss: mild depletion  Area of Muscle Mass Loss: clavicle bone region - pectoralis major, deltoid, trapezius muscles and clavicle and acromion bone region - deltoid muscle  Fluid Accumulation: mild  Edema: 1+ edema - trace   Reduced  Strength: unable to obtain  A minimum of two characteristics is recommended for diagnosis of either severe or non-severe malnutrition.    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   Hypokalemia   NSTEMI likely type 2 demand ischemia   AHRF,Newly Diagnosed Systolic HF/EF 40%  Possible Community Acquired Pneumonia and UTI  Leukocytosis  Anemia,Unspecified    Relevant Medical History: HTN, HLD, COPD, CAD    Nutrition-Related Medications: probiotic, furosemide  Calorie Containing IV Medications: no significant kcals from medications at this time    Nutrition-Related Labs:  3/20/23 K 3, Cl 95, CO2 32, Ca 7.5, Total pro 4.4, Alb 1.9  3/23/23 Na 134, Ca 7.6  3/28/23 Na  131, K 5.5, Gluc 74, Ca 8, Total pro 4.9, Alb 2.3   3/30/23: Na 130, Glu 87, GFR>60    Diet/PN Order: Diet Adult Regular  Oral Supplement Order: Boost  Tube Feeding Order: none  Appetite/Oral Intake: good/% of meals  Factors Affecting Nutritional Intake: none identified  Food/Anabaptist/Cultural Preferences: none reported  Food Allergies: none reported    Skin Integrity: intact  Wound(s):   Altered Skin Integrity 03/18/23 2200 Sacral spine Intact skin with non-blanchable redness of localized area    Comments    3/20/23 Pt reports fair-good appetite since admit, decreased prior and reports diarrhea over the last few months; unsure if unintentional weight loss, no recent wt hx in chart. Denies any nausea or vomiting. Willing to trial banatrol for diarrhea and Boost for additional nutrition.     3/24/23 Reports eating 100% meals and with good tolerance. Drinking Boost and tried first banatrol packet yesterday. Had episodes of diarrhea this morning from 8-9am per pt. Encouraged increasing banatrol to TID if possible.     3/28/23 Continue with good appetite and oral intake. Diarrhea improved/resolved.    3/31/23: Pt reports good po intake/appetite.     Anthropometrics    Height: 6' (182.9 cm)    Last Weight: 72.8 kg (160 lb 8 oz) (03/31/23 0514) Weight Method: Bed Scale  BMI (Calculated): 21.8  BMI Classification: underweight (BMI less than 22 if >65 years of age)        Ideal Body Weight (IBW), Male: 178 lb     % Ideal Body Weight, Male (lb): 84.27 %                          Usual Weight Provided By: patient    Wt Readings from Last 5 Encounters:   03/31/23 72.8 kg (160 lb 8 oz)   03/11/19 79.4 kg (175 lb)     Weight Change(s) Since Admission:  Admit Weight: 68 kg (150 lb) (03/18/23 1529)  3/19/23 68kg admit wt   3/24/23 70.4kg  3/28/23 70.9kg  3/31/23 72.8 kg    Estimated Needs    Weight Used For Calorie Calculations: 68 kg (149 lb 14.6 oz)  Energy Calorie Requirements (kcal): 2040kcals/d (30kcals/kg)  Energy Need  Method: Kcal/kg  Weight Used For Protein Calculations: 68 kg (149 lb 14.6 oz)  Protein Requirements: 81-88g/d (1.2-1.3g/kg)  Fluid Requirements (mL): 1700ml fl/d (25ml/kg)  Temp (24hrs), Av.1 °F (36.7 °C), Min:97.7 °F (36.5 °C), Max:98.4 °F (36.9 °C)         Enteral Nutrition    Patient not receiving enteral nutrition at this time.    Parenteral Nutrition    Patient not receiving parenteral nutrition support at this time.    Evaluation of Received Nutrient Intake    Calories: meeting estimated needs  Protein: meeting estimated needs    Patient Education    Not applicable.    Nutrition Diagnosis     PES: Malnutrition related to recurrent c. Diff/inadequate oral intake as evidenced by <75% EER >1 wk, muscle and fat loss. (improving)    Interventions/Goals     Intervention(s): prescription medication and collaboration with other providers  Goal: Maintain weight throughout hospitalization. (goal progressing)    Monitoring & Evaluation     Dietitian will monitor food and beverage intake, weight change, and electrolyte/renal panel.  Nutrition Risk/Follow-Up: moderate (follow-up in 3-5 days)   Please consult if re-assessment needed sooner.

## 2023-03-31 NOTE — PROGRESS NOTES
[facility]  Hospital Medicine Progress Note           Chief Complaint: diarrhea     HPI:   88-year-old male was brought to the ER with significant diarrhea as well as reported bilateral lower extremity swelling.  Daughter was initially present (no longer present inpatient is not a great historian) but explained he has been dealing with diarrhea for several months as well as recurrent C diff infection and was diagnosed recently 03/15/2023 but has not yet started oral vancomycin again.     He arrived to the ER afebrile hemodynamically stable maintaining adequate sats on his baseline 2 L nasal cannula.  Laboratory work showed leukocytosis of 18 K, hypokalemia with a potassium of 2.3, a mildly elevated troponin and BNP, positive stool C diff.      Chest x-ray showed patchy infiltrates and emphysematous changes.  Echo showed preliminary EF of 40%.  He was started on oral vancomycin admitted to the hospitalist service for further management.  Added probiotics and Questran     Patient currently being managed for recurrent C difficile colitis on Dificid - end date 04/02.    Interval Hx:   Patient seen and examined at bedside  Overnight no events reported   Patient has no new complaints   Having 1 bowel movement in the last 24 hours, with formed stool   Vitals reviewed and stable      Objective/physical exam:    General: Appears comfortable, no acute distress.  Integumentary: Warm, dry, intact.  Musculoskeletal: Purposeful movement noted.   Respiratory: No accessory muscle use. Breath sounds are equal.  Cardiovascular: Regular rate. No peripheral edema.      Assessment/Plan:  Recurrent C diff colitis  Hypokalemia , resolved   NSTEMI likely type 2 demand ischemia   AHRF,Newly Diagnosed Systolic HF/EF 40%  Possible Community Acquired Pneumonia and UTI  Leukocytosis  Anemia,Unspecified  Essential hypertension,Hyperlipidemia,COPD on 2 L nasal cannula nightly,CAD        plan  Currently being treated  for C diff, will need to remain hospitalized to complete course of Dificid, with end date of April 2, 2023.          Anticipated discharge and Disposition:     Patient set up with [facility]. could discharge on Sunday- after his last  dose of antibiotic , has a good  family support      All diagnosis and differential diagnosis have been reviewed,  interpreted and communicated appropriately to care team. assessment and plan has been documented; I have personally reviewed the labs and test results that are presently available and pertinent to this hospital course; I have reviewed medical records based upon their availability.     All of the patient's questions have been  addressed and answered. Patient's is agreeable to the above stated plan.   I will continue to monitor closely and make adjustments to medical management as needed.     VITAL SIGNS: 24 HRS MIN & MAX LAST   Temp  Min: 97.7 °F (36.5 °C)  Max: 98.3 °F (36.8 °C) 97.9 °F (36.6 °C)   BP  Min: 101/55  Max: 124/74 (!) 101/55     Pulse  Min: 68  Max: 80  74   Resp  Min: 18  Max: 20 18   SpO2  Min: 95 %  Max: 99 % 98 %       Recent Labs   Lab 03/28/23  0534   WBC 7.9   RBC 3.78*   HGB 10.5*   HCT 33.0*   MCV 87.3   MCH 27.8   MCHC 31.8*   RDW 16.6      MPV 9.5       Recent Labs   Lab 03/28/23  0534 03/30/23  0329   * 130*   K 5.5* 4.6   CO2 26 27   BUN 15.4 18.5   CREATININE 0.74 0.74   CALCIUM 8.0* 7.8*   ALBUMIN 2.3*  --    ALKPHOS 68  --    ALT 18  --    AST 17  --    BILITOT 0.5  --           Microbiology Results (last 7 days)       ** No results found for the last 168 hours. **             See below for Radiology    Scheduled Med:   aspirin  81 mg Oral Daily    atorvastatin  40 mg Oral QHS    enoxaparin  40 mg Subcutaneous Daily    ezetimibe  10 mg Oral QHS    fidaxomicin  200 mg Oral BID    finasteride  5 mg Oral Daily    furosemide  20 mg Oral Daily    Lactobacillus acidophilus  1 capsule Oral TID WM    metoprolol succinate  25 mg  Oral Daily    miconazole NITRATE 2 %   Topical (Top) BID    sacubitriL-valsartan  1 tablet Oral BID    tamsulosin  2 capsule Oral Daily        Continuous Infusions:       PRN Meds:  acetaminophen, acetaminophen, acetaminophen, albuterol, dextrose 10%, dextrose 10%, glucagon (human recombinant), glucose, glucose, melatonin, ondansetron, sodium chloride 0.9%       VTE prophylaxis:     Patient condition:  Stable/Fair/Guarded/ Serious/ Critical    Anticipated discharge and Disposition:         All diagnosis and differential diagnosis have been reviewed; assessment and plan has been documented; I have personally reviewed the labs and test results that are presently available; I have reviewed the patients medication list; I have reviewed the consulting providers response and recommendations. I have reviewed or attempted to review medical records based upon their availability    All of the patient's questions have been  addressed and answered. Patient's is agreeable to the above stated plan. I will continue to monitor closely and make adjustments to medical management as needed.  _____________________________________________________________________    Nutrition Status:    Radiology:  CV Ultrasound doppler venous legs bilat  · There is no evidence of a right lower extremity DVT.  · There is no evidence of a left lower extremity DVT.     Bilateral lower extremities negative for DVT at time of exam.      Johnson Trejo MD   03/31/2023

## 2023-04-01 LAB
ANION GAP SERPL CALC-SCNC: 8 MEQ/L
BUN SERPL-MCNC: 16.5 MG/DL (ref 8.4–25.7)
CALCIUM SERPL-MCNC: 8.3 MG/DL (ref 8.8–10)
CHLORIDE SERPL-SCNC: 97 MMOL/L (ref 98–107)
CO2 SERPL-SCNC: 26 MMOL/L (ref 23–31)
CREAT SERPL-MCNC: 0.83 MG/DL (ref 0.73–1.18)
CREAT/UREA NIT SERPL: 20
GFR SERPLBLD CREATININE-BSD FMLA CKD-EPI: >60 MLS/MIN/1.73/M2
GLUCOSE SERPL-MCNC: 94 MG/DL (ref 82–115)
MAGNESIUM SERPL-MCNC: 1.7 MG/DL (ref 1.6–2.6)
PHOSPHATE SERPL-MCNC: 4.1 MG/DL (ref 2.3–4.7)
POTASSIUM SERPL-SCNC: 4.9 MMOL/L (ref 3.5–5.1)
SODIUM SERPL-SCNC: 131 MMOL/L (ref 136–145)

## 2023-04-01 PROCEDURE — 25000003 PHARM REV CODE 250: Performed by: PHYSICIAN ASSISTANT

## 2023-04-01 PROCEDURE — 83735 ASSAY OF MAGNESIUM: CPT | Performed by: STUDENT IN AN ORGANIZED HEALTH CARE EDUCATION/TRAINING PROGRAM

## 2023-04-01 PROCEDURE — 25000003 PHARM REV CODE 250: Performed by: INTERNAL MEDICINE

## 2023-04-01 PROCEDURE — 63600175 PHARM REV CODE 636 W HCPCS: Performed by: INTERNAL MEDICINE

## 2023-04-01 PROCEDURE — 21400001 HC TELEMETRY ROOM

## 2023-04-01 PROCEDURE — 84100 ASSAY OF PHOSPHORUS: CPT | Performed by: STUDENT IN AN ORGANIZED HEALTH CARE EDUCATION/TRAINING PROGRAM

## 2023-04-01 PROCEDURE — 25000003 PHARM REV CODE 250: Performed by: NURSE PRACTITIONER

## 2023-04-01 PROCEDURE — 80048 BASIC METABOLIC PNL TOTAL CA: CPT | Performed by: STUDENT IN AN ORGANIZED HEALTH CARE EDUCATION/TRAINING PROGRAM

## 2023-04-01 RX ADMIN — FUROSEMIDE 20 MG: 20 TABLET ORAL at 09:04

## 2023-04-01 RX ADMIN — MICONAZOLE NITRATE: 20 POWDER TOPICAL at 09:04

## 2023-04-01 RX ADMIN — FINASTERIDE 5 MG: 5 TABLET, FILM COATED ORAL at 09:04

## 2023-04-01 RX ADMIN — Medication 1 CAPSULE: at 04:04

## 2023-04-01 RX ADMIN — ATORVASTATIN CALCIUM 40 MG: 40 TABLET, FILM COATED ORAL at 08:04

## 2023-04-01 RX ADMIN — SACUBITRIL AND VALSARTAN 1 TABLET: 24; 26 TABLET, FILM COATED ORAL at 09:04

## 2023-04-01 RX ADMIN — ASPIRIN 81 MG: 81 TABLET, COATED ORAL at 09:04

## 2023-04-01 RX ADMIN — ENOXAPARIN SODIUM 40 MG: 40 INJECTION SUBCUTANEOUS at 04:04

## 2023-04-01 RX ADMIN — METOPROLOL SUCCINATE 25 MG: 25 TABLET, EXTENDED RELEASE ORAL at 09:04

## 2023-04-01 RX ADMIN — Medication 1 CAPSULE: at 12:04

## 2023-04-01 RX ADMIN — Medication 1 CAPSULE: at 09:04

## 2023-04-01 RX ADMIN — SACUBITRIL AND VALSARTAN 1 TABLET: 24; 26 TABLET, FILM COATED ORAL at 08:04

## 2023-04-01 RX ADMIN — EZETIMIBE 10 MG: 10 TABLET ORAL at 08:04

## 2023-04-01 RX ADMIN — FIDAXOMICIN 200 MG: 200 TABLET, FILM COATED ORAL at 09:04

## 2023-04-01 RX ADMIN — TAMSULOSIN HYDROCHLORIDE 0.8 MG: 0.4 CAPSULE ORAL at 09:04

## 2023-04-01 RX ADMIN — MICONAZOLE NITRATE: 20 POWDER TOPICAL at 08:04

## 2023-04-01 RX ADMIN — FIDAXOMICIN 200 MG: 200 TABLET, FILM COATED ORAL at 08:04

## 2023-04-01 NOTE — PROGRESS NOTES
[facility]  Hospital Medicine Progress Note        Chief Complaint: diarrhea    HPI:   88-year-old male was brought to the ER with significant diarrhea as well as reported bilateral lower extremity swelling.  Daughter was initially present (no longer present inpatient is not a great historian) but explained he has been dealing with diarrhea for several months as well as recurrent C diff infection and was diagnosed recently 03/15/2023 but has not yet started oral vancomycin again.     He arrived to the ER afebrile hemodynamically stable maintaining adequate sats on his baseline 2 L nasal cannula.  Laboratory work showed leukocytosis of 18 K, hypokalemia with a potassium of 2.3, a mildly elevated troponin and BNP, positive stool C diff.      Chest x-ray showed patchy infiltrates and emphysematous changes.  Echo showed preliminary EF of 40%.  He was started on oral vancomycin admitted to the hospitalist service for further management.  Added probiotics and Questran     Interval Hx:   No overnight events; no new complaints.   Patient resting comfortably.  No family present at bedside.      Objective/physical exam:    General: Appears comfortable, no acute distress.  Integumentary: Warm, dry, intact.  Musculoskeletal: Purposeful movement noted.   Respiratory: No accessory muscle use. Breath sounds are equal.  Cardiovascular: Regular rate. No peripheral edema.    VITAL SIGNS: 24 HRS MIN & MAX LAST   Temp  Min: 97.5 °F (36.4 °C)  Max: 98.3 °F (36.8 °C) 97.5 °F (36.4 °C)   BP  Min: 91/50  Max: 118/76 (!) 105/57   Pulse  Min: 71  Max: 87  72   Resp  Min: 18  Max: 20 20   SpO2  Min: 91 %  Max: 100 % (!) 94 %     CV Ultrasound doppler venous legs bilat  · There is no evidence of a right lower extremity DVT.  · There is no evidence of a left lower extremity DVT.     Bilateral lower extremities negative for DVT at time of exam.    Recent Labs   Lab 03/28/23  0534   WBC 7.9   RBC 3.78*   HGB 10.5*    HCT 33.0*   MCV 87.3   MCH 27.8   MCHC 31.8*   RDW 16.6      MPV 9.5       Recent Labs   Lab 03/28/23  0534 03/30/23  0329   * 130*   K 5.5* 4.6   CO2 26 27   BUN 15.4 18.5   CREATININE 0.74 0.74   CALCIUM 8.0* 7.8*   ALBUMIN 2.3*  --    ALKPHOS 68  --    ALT 18  --    AST 17  --    BILITOT 0.5  --           Microbiology Results (last 7 days)       ** No results found for the last 168 hours. **             See below for Radiology    Scheduled Med:   aspirin  81 mg Oral Daily    atorvastatin  40 mg Oral QHS    enoxaparin  40 mg Subcutaneous Daily    ezetimibe  10 mg Oral QHS    fidaxomicin  200 mg Oral BID    finasteride  5 mg Oral Daily    furosemide  20 mg Oral Daily    Lactobacillus acidophilus  1 capsule Oral TID WM    metoprolol succinate  25 mg Oral Daily    miconazole NITRATE 2 %   Topical (Top) BID    sacubitriL-valsartan  1 tablet Oral BID    tamsulosin  2 capsule Oral Daily        Continuous Infusions:       PRN Meds:  acetaminophen, acetaminophen, acetaminophen, albuterol, dextrose 10%, dextrose 10%, glucagon (human recombinant), glucose, glucose, melatonin, ondansetron, sodium chloride 0.9%     Nutrition Status:      Assessment/Plan:  Recurrent C diff infection-symptomatic  Hypokalemia   NSTEMI likely type 2 demand ischemia   AHRF,Newly Diagnosed Systolic HF/EF 40%  Possible Community Acquired Pneumonia and UTI  Leukocytosis  Anemia,Unspecified  Essential hypertension,Hyperlipidemia,COPD on 2 L nasal cannula nightly,CAD      ----------------------------------------------------------    Currently being treated for C diff, will need to remain hospitalized to complete course of Dificid, with end date of April 2, 2023.    Anticipate discharge tomorrow.    Anticipated discharge and Disposition:    Patient set up with [facility].     All diagnosis and differential diagnosis have been reviewed,  interpreted and communicated appropriately to care team. assessment and plan has been  documented; I have personally reviewed the labs and test results that are presently available and pertinent to this hospital course; I have reviewed medical records based upon their availability.    All of the patient's questions have been  addressed and answered. Patient's is agreeable to the above stated plan.   I will continue to monitor closely and make adjustments to medical management as needed.      Brittany Camilo, DO   04/01/2023        This note was created with the assistance of Dragon voice recognition software. There may be transcription errors as a result of using this technology however minimal. Effort has been made to assure accuracy of transcription but any obvious errors or omissions should be clarified with the author of the document.

## 2023-04-02 VITALS
RESPIRATION RATE: 18 BRPM | WEIGHT: 160.5 LBS | SYSTOLIC BLOOD PRESSURE: 125 MMHG | BODY MASS INDEX: 21.74 KG/M2 | DIASTOLIC BLOOD PRESSURE: 70 MMHG | HEIGHT: 72 IN | HEART RATE: 70 BPM | TEMPERATURE: 98 F | OXYGEN SATURATION: 99 %

## 2023-04-02 LAB
ANION GAP SERPL CALC-SCNC: 3 MEQ/L
BUN SERPL-MCNC: 14.3 MG/DL (ref 8.4–25.7)
CALCIUM SERPL-MCNC: 8.1 MG/DL (ref 8.8–10)
CHLORIDE SERPL-SCNC: 99 MMOL/L (ref 98–107)
CO2 SERPL-SCNC: 30 MMOL/L (ref 23–31)
CREAT SERPL-MCNC: 0.68 MG/DL (ref 0.73–1.18)
CREAT/UREA NIT SERPL: 21
GFR SERPLBLD CREATININE-BSD FMLA CKD-EPI: >60 MLS/MIN/1.73/M2
GLUCOSE SERPL-MCNC: 74 MG/DL (ref 82–115)
POTASSIUM SERPL-SCNC: 4.5 MMOL/L (ref 3.5–5.1)
SODIUM SERPL-SCNC: 132 MMOL/L (ref 136–145)

## 2023-04-02 PROCEDURE — 80048 BASIC METABOLIC PNL TOTAL CA: CPT | Performed by: STUDENT IN AN ORGANIZED HEALTH CARE EDUCATION/TRAINING PROGRAM

## 2023-04-02 PROCEDURE — 25000003 PHARM REV CODE 250: Performed by: INTERNAL MEDICINE

## 2023-04-02 PROCEDURE — 25000003 PHARM REV CODE 250: Performed by: NURSE PRACTITIONER

## 2023-04-02 RX ORDER — METOPROLOL SUCCINATE 25 MG/1
25 TABLET, EXTENDED RELEASE ORAL DAILY
Qty: 30 TABLET | Refills: 0 | Status: SHIPPED | OUTPATIENT
Start: 2023-04-02 | End: 2023-05-01 | Stop reason: SDUPTHER

## 2023-04-02 RX ORDER — FUROSEMIDE 20 MG/1
20 TABLET ORAL DAILY
Qty: 10 TABLET | Refills: 0 | Status: SHIPPED | OUTPATIENT
Start: 2023-04-03 | End: 2023-04-02 | Stop reason: HOSPADM

## 2023-04-02 RX ADMIN — METOPROLOL SUCCINATE 25 MG: 25 TABLET, EXTENDED RELEASE ORAL at 09:04

## 2023-04-02 RX ADMIN — TAMSULOSIN HYDROCHLORIDE 0.8 MG: 0.4 CAPSULE ORAL at 09:04

## 2023-04-02 RX ADMIN — ASPIRIN 81 MG: 81 TABLET, COATED ORAL at 09:04

## 2023-04-02 RX ADMIN — Medication 1 CAPSULE: at 09:04

## 2023-04-02 RX ADMIN — FUROSEMIDE 20 MG: 20 TABLET ORAL at 09:04

## 2023-04-02 RX ADMIN — MICONAZOLE NITRATE: 20 POWDER TOPICAL at 09:04

## 2023-04-02 RX ADMIN — FINASTERIDE 5 MG: 5 TABLET, FILM COATED ORAL at 09:04

## 2023-04-02 NOTE — NURSING
Went over d/c instructions with pt, spouse, and daughter regarding help at home, new prescriptions, and follow up appts. IV removed and tele d/c. No questions or concerns. Stable at d/c.

## 2023-04-02 NOTE — DISCHARGE SUMMARY
[facility] Hospital Medicine Discharge Summary    Admit Date: 3/18/2023  Discharge Date and Time: 4/2/202310:05 AM  Admitting Physician: KELLEE Team  Discharging Physician: Brittany Camilo DO.  Primary Care Physician: Alber Menjivar MD      Discharge Diagnoses:  Recurrent C diff infection-symptomatic  Hypokalemia   NSTEMI likely type 2 demand ischemia   AHRF,Newly Diagnosed Systolic HF/EF 40%  Possible Community Acquired Pneumonia and UTI  Leukocytosis  Anemia,Unspecified  Essential hypertension,Hyperlipidemia,COPD on 2 L nasal cannula nightly,CAD       Hospital Course:   88-year-old male was brought to the ER with significant diarrhea as well as reported bilateral lower extremity swelling.  Daughter was initially present (no longer present inpatient is not a great historian) but explained he has been dealing with diarrhea for several months as well as recurrent C diff infection and was diagnosed recently 03/15/2023 but has not yet started oral vancomycin again.     He arrived to the ER afebrile hemodynamically stable maintaining adequate sats on his baseline 2 L nasal cannula.  Laboratory work showed leukocytosis of 18 K, hypokalemia with a potassium of 2.3, a mildly elevated troponin and BNP, positive stool C diff.      Chest x-ray showed patchy infiltrates and emphysematous changes.  Echo showed preliminary EF of 40%.  He was started on oral vancomycin admitted to the hospitalist service for further management.  Added probiotics and Questran    Completed treatment for C diff colitis, patient without complaints at this time.  Will discharge home with his daughter.  He has oxygen at home for as needed use.    Currently on 1 L of supplemental oxygen without respiratory distress.    Hospital course and discharge care plan has been discussed with patient, patient voices understanding and agreement with plan. All questions have been answered to the best of my ability. Patient is advised  to return to ED or call 911 in case of emergency and or if symptoms worsen.    Discharging with [facility]    Vitals:  VITAL SIGNS: 24 HRS MIN & MAX LAST   Temp  Min: 97.5 °F (36.4 °C)  Max: 98.2 °F (36.8 °C) 97.6 °F (36.4 °C)   BP  Min: 92/54  Max: 112/71 (!) 97/59     Pulse  Min: 62  Max: 88  88   Resp  Min: 18  Max: 18 18   SpO2  Min: 96 %  Max: 100 % 97 %       Physical Exam:    General: Appears comfortable, no acute distress.  Integumentary: Warm, dry, intact.  Musculoskeletal: Purposeful movement noted.   Respiratory: No accessory muscle use. Breath sounds are equal.  Cardiovascular: Regular rate. No peripheral edema.      Procedures Performed: No admission procedures for hospital encounter.     Significant Diagnostic Studies: See Full reports for all details    Recent Labs   Lab 03/28/23  0534   WBC 7.9   RBC 3.78*   HGB 10.5*   HCT 33.0*   MCV 87.3   MCH 27.8   MCHC 31.8*   RDW 16.6      MPV 9.5       Recent Labs   Lab 03/28/23  0534 03/30/23  0329 04/01/23  1458   * 130* 131*   K 5.5* 4.6 4.9   CO2 26 27 26   BUN 15.4 18.5 16.5   CREATININE 0.74 0.74 0.83   CALCIUM 8.0* 7.8* 8.3*   MG  --   --  1.70   ALBUMIN 2.3*  --   --    ALKPHOS 68  --   --    ALT 18  --   --    AST 17  --   --    BILITOT 0.5  --   --         Microbiology Results (last 7 days)       ** No results found for the last 168 hours. **             CV Ultrasound doppler venous legs bilat  · There is no evidence of a right lower extremity DVT.  · There is no evidence of a left lower extremity DVT.     Bilateral lower extremities negative for DVT at time of exam.         Medication List        START taking these medications      metoprolol succinate 25 MG 24 hr tablet  Commonly known as: TOPROL-XL  Take 1 tablet (25 mg total) by mouth once daily.     sacubitriL-valsartan 24-26 mg per tablet  Commonly known as: ENTRESTO  Take 1 tablet by mouth 2 (two) times daily.            CONTINUE taking these medications      aspirin  81 MG EC tablet  Commonly known as: ECOTRIN     atorvastatin 80 MG tablet  Commonly known as: LIPITOR     diclofenac sodium 1 % Gel  Commonly known as: VOLTAREN     ezetimibe 10 mg tablet  Commonly known as: ZETIA     finasteride 5 mg tablet  Commonly known as: PROSCAR     tamsulosin 0.4 mg Cap  Commonly known as: FLOMAX            STOP taking these medications      amLODIPine 5 MG tablet  Commonly known as: NORVASC     amlodipine-atorvastatin 5-80 mg per tablet  Commonly known as: CADUET     oxyCODONE-acetaminophen 5-325 mg per tablet  Commonly known as: PERCOCET     propranoloL 120 MG 24 hr capsule  Commonly known as: INDERAL LA               Where to Get Your Medications        These medications were sent to [facility] - [address]  [address] [facility] [address]      Phone: [phone]   metoprolol succinate 25 MG 24 hr tablet  sacubitriL-valsartan 24-26 mg per tablet          Explained in detail to the patient about the discharge plan, medications, and follow-up visits. Pt understands and agrees with the treatment plan  Discharge Disposition:   Home with home health  Discharged Condition: stable  Diet-   Dietary Orders (From admission, onward)       Start     Ordered    03/20/23 1448  Dietary nutrition supplements Boost Vanilla; BID  Continuous        Question Answer Comment   Select PO Supplement: Boost Vanilla    Frequency: BID        03/20/23 1448    03/19/23 0326  Diet Adult Regular  (Diet/Nutrition [facility])  Diet effective now         03/19/23 0331                   Medications Per DC med rec  Activities as tolerated   Follow-up Information       Dr. Ephraim Ramires Follow up in 1 week(s).    Why: Your follow-up appointment date and time will be Tuesday, April 11th @4 PM.  Contact information:  Phone Number: [phone]             [facility] Follow up.    Specialty: Home Health Services  Why: This is  your home health services.  Contact information:  [place] [address] [phone]                           For further questions contact hospitalist office    Discharge time 33 minutes    For worsening symptoms, chest pain, shortness of breath, increased abdominal pain, high grade fever, stroke or stroke like symptoms, immediately go to the nearest Emergency Room or call 911 as soon as possible.      Brittany Murillo M.D, on 4/2/2023. at 10:05 AM.

## 2023-04-02 NOTE — PLAN OF CARE
Problem: Adult Inpatient Plan of Care  Goal: Plan of Care Review  Outcome: Met  Goal: Patient-Specific Goal (Individualized)  Outcome: Met  Goal: Absence of Hospital-Acquired Illness or Injury  Outcome: Met  Goal: Optimal Comfort and Wellbeing  Outcome: Met  Goal: Readiness for Transition of Care  Outcome: Met     Problem: Skin Injury Risk Increased  Goal: Skin Health and Integrity  Outcome: Met     Problem: Impaired Wound Healing  Goal: Optimal Wound Healing  Outcome: Met     Problem: Fall Injury Risk  Goal: Absence of Fall and Fall-Related Injury  Outcome: Met      normal...

## 2023-04-02 NOTE — PLAN OF CARE
04/02/23 1029   Final Note   Assessment Type Final Discharge Note   Anticipated Discharge Disposition Home-Health   Hospital Resources/Appts/Education Provided Post-Acute resouces added to AVS   Post-Acute Status   Post-Acute Authorization Home Health   Home Health Status Set-up Complete/Auth obtained  ([facility])

## 2023-04-03 DIAGNOSIS — J44.9 CHRONIC OBSTRUCTIVE PULMONARY DISEASE, UNSPECIFIED COPD TYPE: Primary | ICD-10-CM

## 2023-04-03 RX ORDER — EZETIMIBE 10 MG/1
10 TABLET ORAL NIGHTLY
Qty: 30 TABLET | Refills: 0 | Status: SHIPPED | OUTPATIENT
Start: 2023-04-03 | End: 2023-05-01 | Stop reason: SDUPTHER

## 2023-04-03 RX ORDER — ASPIRIN 81 MG/1
81 TABLET ORAL DAILY
Qty: 30 TABLET | Refills: 0 | Status: ON HOLD | OUTPATIENT
Start: 2023-04-03

## 2023-04-03 RX ORDER — FINASTERIDE 5 MG/1
5 TABLET, FILM COATED ORAL DAILY
Qty: 30 TABLET | Refills: 0 | Status: SHIPPED | OUTPATIENT
Start: 2023-04-03 | End: 2023-05-01 | Stop reason: SDUPTHER

## 2023-04-03 RX ORDER — ATORVASTATIN CALCIUM 80 MG/1
80 TABLET, FILM COATED ORAL DAILY
Qty: 30 TABLET | Refills: 0 | Status: SHIPPED | OUTPATIENT
Start: 2023-04-03 | End: 2023-05-01 | Stop reason: SDUPTHER

## 2023-04-03 NOTE — PHYSICIAN QUERY
PT Name: Max Squires  MR #: [id number]     Documentation Clarification      CDS/: Socorro Reyes RN, CDI            Contact information:[email or web address]     This form is a permanent document in the medical record.     Query Date: April 3, 2023    By submitting this query, we are merely seeking further clarification of documentation. Please utilize your independent clinical judgment when addressing the question(s) below.    The Medical Record reflects the following:    Supporting Clinical Findings Location in Medical Record   He arrived to the ER afebrile hemodynamically stable maintaining adequate sats on his baseline 2 L nasal cannula.  Laboratory work showed leukocytosis of 18 K, hypokalemia with a potassium of 2.3, a mildly elevated troponin and BNP, positive stool C diff.      Chest x-ray showed patchy infiltrates and emphysematous changes.  Echo showed preliminary EF of 40%.      Chest: Clear to auscultation bilaterally    VITAL SIGNS: 24 HRS MIN & MAX  Resp  Min: 17  Max: 22   SpO2  Min: 92 %  Max: 100 %     Assessment/Plan:  AHRF,Newly Diagnosed Systolic HF/EF 40%  COPD on 2 L nasal cannula nightly    Echo  · Concentric hypertrophy and low normal systolic function.  · The estimated ejection fraction is 50%.  · Indeterminate left ventricular diastolic function.  · Normal right ventricular size.  · Mild right atrial enlargement.  · Mild mitral regurgitation.  · Mild to moderate tricuspid regurgitation.  · Normal central venous pressure (3 mmHg).  · The estimated PA systolic pressure is 35 mmHg.  · Moderate left atrial enlargement.     PN 3/19                                                                            Provider, please clarify the conflicting documentation associated with above clinical findings.    [x   ] Acute systolic Heart Failure, no Acute Respiratory Failure   [   ] Other clarification (please specify): ____________

## 2023-04-04 ENCOUNTER — PATIENT OUTREACH (OUTPATIENT)
Dept: ADMINISTRATIVE | Facility: CLINIC | Age: 88
End: 2023-04-04
Payer: MEDICARE

## 2023-04-04 ENCOUNTER — TELEPHONE (OUTPATIENT)
Dept: INTERNAL MEDICINE | Facility: CLINIC | Age: 88
End: 2023-04-04
Payer: MEDICARE

## 2023-04-04 NOTE — TELEPHONE ENCOUNTER
----- Message from Lizbeth Rosenberg LPN sent at 4/4/2023  7:30 AM CDT -----  Regarding: indu Mahan 4/11 @4:00  No labs needed.

## 2023-04-04 NOTE — PROGRESS NOTES
C3 nurse spoke with Max Squires's daughter, Cristina for a TCC post hospital discharge follow up call. The patient has a scheduled HOS appointment with Ephraim Ramires MD on  4/11/2023 @4 PM and Jerod Marley MD (Cardiology) on 4/11/23 @ 10:00.    Patient's daughter also stated patient taking furosemide 40mg once daily.  Medication unable to be reconciled as it is not on patient's medication list.

## 2023-04-11 ENCOUNTER — OFFICE VISIT (OUTPATIENT)
Dept: INTERNAL MEDICINE | Facility: CLINIC | Age: 88
End: 2023-04-11
Payer: MEDICARE

## 2023-04-11 VITALS
OXYGEN SATURATION: 96 % | HEIGHT: 72 IN | HEART RATE: 71 BPM | BODY MASS INDEX: 20.77 KG/M2 | WEIGHT: 153.38 LBS | RESPIRATION RATE: 18 BRPM | SYSTOLIC BLOOD PRESSURE: 117 MMHG | DIASTOLIC BLOOD PRESSURE: 63 MMHG

## 2023-04-11 DIAGNOSIS — A04.72 C. DIFFICILE DIARRHEA: ICD-10-CM

## 2023-04-11 DIAGNOSIS — I50.43 ACUTE ON CHRONIC COMBINED SYSTOLIC AND DIASTOLIC CONGESTIVE HEART FAILURE: ICD-10-CM

## 2023-04-11 DIAGNOSIS — I25.10 CORONARY ARTERIOSCLEROSIS: ICD-10-CM

## 2023-04-11 DIAGNOSIS — J44.9 CHRONIC OBSTRUCTIVE PULMONARY DISEASE, UNSPECIFIED COPD TYPE: ICD-10-CM

## 2023-04-11 DIAGNOSIS — I10 BENIGN ESSENTIAL HYPERTENSION: Primary | ICD-10-CM

## 2023-04-11 PROBLEM — F17.200 TOBACCO DEPENDENCE SYNDROME: Status: ACTIVE | Noted: 2023-04-11

## 2023-04-11 PROBLEM — E78.5 HYPERLIPIDEMIA: Status: ACTIVE | Noted: 2023-04-11

## 2023-04-11 PROBLEM — I50.9 CHF (CONGESTIVE HEART FAILURE): Status: ACTIVE | Noted: 2023-04-11

## 2023-04-11 PROBLEM — G44.009 CLUSTER HEADACHE: Status: ACTIVE | Noted: 2023-04-11

## 2023-04-11 PROCEDURE — 99205 OFFICE O/P NEW HI 60 MIN: CPT | Mod: ,,, | Performed by: INTERNAL MEDICINE

## 2023-04-11 PROCEDURE — 1101F PT FALLS ASSESS-DOCD LE1/YR: CPT | Mod: CPTII,,, | Performed by: INTERNAL MEDICINE

## 2023-04-11 PROCEDURE — 3288F PR FALLS RISK ASSESSMENT DOCUMENTED: ICD-10-PCS | Mod: CPTII,,, | Performed by: INTERNAL MEDICINE

## 2023-04-11 PROCEDURE — 1111F DSCHRG MED/CURRENT MED MERGE: CPT | Mod: CPTII,,, | Performed by: INTERNAL MEDICINE

## 2023-04-11 PROCEDURE — 3288F FALL RISK ASSESSMENT DOCD: CPT | Mod: CPTII,,, | Performed by: INTERNAL MEDICINE

## 2023-04-11 PROCEDURE — 1126F PR PAIN SEVERITY QUANTIFIED, NO PAIN PRESENT: ICD-10-PCS | Mod: CPTII,,, | Performed by: INTERNAL MEDICINE

## 2023-04-11 PROCEDURE — 1111F PR DISCHARGE MEDS RECONCILED W/ CURRENT OUTPATIENT MED LIST: ICD-10-PCS | Mod: CPTII,,, | Performed by: INTERNAL MEDICINE

## 2023-04-11 PROCEDURE — 1126F AMNT PAIN NOTED NONE PRSNT: CPT | Mod: CPTII,,, | Performed by: INTERNAL MEDICINE

## 2023-04-11 PROCEDURE — 1159F MED LIST DOCD IN RCRD: CPT | Mod: CPTII,,, | Performed by: INTERNAL MEDICINE

## 2023-04-11 PROCEDURE — 99205 PR OFFICE/OUTPT VISIT, NEW, LEVL V, 60-74 MIN: ICD-10-PCS | Mod: ,,, | Performed by: INTERNAL MEDICINE

## 2023-04-11 PROCEDURE — 1159F PR MEDICATION LIST DOCUMENTED IN MEDICAL RECORD: ICD-10-PCS | Mod: CPTII,,, | Performed by: INTERNAL MEDICINE

## 2023-04-11 PROCEDURE — 1101F PR PT FALLS ASSESS DOC 0-1 FALLS W/OUT INJ PAST YR: ICD-10-PCS | Mod: CPTII,,, | Performed by: INTERNAL MEDICINE

## 2023-04-11 RX ORDER — FUROSEMIDE 40 MG/1
40 TABLET ORAL
COMMUNITY
Start: 2023-04-10 | End: 2023-05-01 | Stop reason: SDUPTHER

## 2023-04-11 NOTE — PROGRESS NOTES
Subjective:       Patient ID: Max Squires is a 88 y.o. male.    Chief Complaint: Follow-up      The patient is an 88-year-old man who is in to get established.  I saw him informally during a recent hospital stay at the family's request.  The patient normally lives in the [facility] and live there for many years but is having health issues and has moved in with his daughter who lives here in town.  His wife also is having health issues and she is also staying with a daughter.  He was doing reasonably well until several weeks ago when he developed a fulminant diarrhea.  He was treated in the hospital in the [facility] for C difficile.  He was discharged but the syndrome recurred and he was hospitalized in hospital locally.  He was treated by the hospitalist and Infectious Disease specialist.  He was also seen by Cardiology because his cardiac enzymes bumped during the hospital stay.  He was treated with Dificid and apparently has had good results.  He went home from the hospital about 8 days ago but did have some diarrhea over the past weekend but none over the past 24 hours.  He did eat some ice cream in the maybe that was a contributing factor.  He is not been consuming dairy.  He was not placed on a probiotic but the daughter will start him on 1 over-the-counter version.    Currently the patient has no complaints.    Medications reviewed     No known allergies     Past medical history notable for COPD.  He also has had a history of back surgery and also a right-sided knee replacement.  This was many years ago.  He has a history of coronary disease and had a stent placed years ago.  He also had an non ST-elevation MI during his recent hospital stay.  History of heart failure and venous insufficiency lower extremities.  Hypertension.  And hyperlipidemia.    Reformed smoker only a few weeks ago.  No alcohol.  Many pack year smoking history    He has been retired many years ago and has been relatively  inactive for quite some time.    Family history noncontributory    Follow-up    Review of Systems is essentially negative.  His weight has been trending down with improvement of edema of his lower extremities.  Overall stable weight however over the past few weeks.  No recent fevers chills or sweats.  No unusual headaches no dysphagia.  Appetite has been good.  No chest pain or shortness of breath.  No cough or wheezing.  No nausea or vomiting.  The diarrhea as mentioned before but no hematochezia.  No urgency frequency dysuria but does have a history of prostatism     Current Outpatient Medications on File Prior to Visit   Medication Sig Dispense Refill    aspirin (ECOTRIN) 81 MG EC tablet Take 1 tablet (81 mg total) by mouth once daily. 30 tablet 0    atorvastatin (LIPITOR) 80 MG tablet Take 1 tablet (80 mg total) by mouth once daily. 30 tablet 0    diclofenac sodium (VOLTAREN) 1 % Gel APPLY 1 TO 2 GRAMS TOPICALLY TO THE AFFECTED AREA FOUR TIMES DAILY AS NEEDED      ezetimibe (ZETIA) 10 mg tablet Take 1 tablet (10 mg total) by mouth every evening. 30 tablet 0    finasteride (PROSCAR) 5 mg tablet Take 1 tablet (5 mg total) by mouth once daily. 30 tablet 0    furosemide (LASIX) 40 MG tablet Take 40 mg by mouth.      metoprolol succinate (TOPROL-XL) 25 MG 24 hr tablet Take 1 tablet (25 mg total) by mouth once daily. 30 tablet 0    sacubitriL-valsartan (ENTRESTO) 24-26 mg per tablet Take 1 tablet by mouth 2 (two) times daily. 60 tablet 0    tamsulosin (FLOMAX) 0.4 mg Cap Take 2 capsules by mouth once daily.       No current facility-administered medications on file prior to visit.     Objective:      /63 (BP Location: Right arm, Patient Position: Sitting, BP Method: Large (Manual))   Pulse 71   Resp 18   Ht 6' (1.829 m)   Wt 69.6 kg (153 lb 6.4 oz)   SpO2 96%   BMI 20.80 kg/m²     Physical Exam  Vitals reviewed.   Constitutional:       Appearance: Normal appearance.   HENT:      Head: Normocephalic and  atraumatic.      Mouth/Throat:      Pharynx: Oropharynx is clear.   Eyes:      Pupils: Pupils are equal, round, and reactive to light.   Cardiovascular:      Rate and Rhythm: Normal rate and regular rhythm.      Pulses: Normal pulses.      Heart sounds: Normal heart sounds.   Pulmonary:      Breath sounds: Normal breath sounds.   Abdominal:      General: Abdomen is flat.      Palpations: Abdomen is soft.   Musculoskeletal:      Cervical back: Neck supple.      Comments: There is 1 to 2+ left pretibial edema and trace to 1+ right pretibial edema.  There is mild kyphosis of the thoracic spine.   Skin:     General: Skin is warm and dry.   Neurological:      Mental Status: He is alert.       Assessment:       1. Benign essential hypertension    2. Acute on chronic combined systolic and diastolic congestive heart failure    3. Chronic obstructive pulmonary disease, unspecified COPD type    4. C. difficile diarrhea    5. Coronary arteriosclerosis      1. Recent hospital stay for C difficile colitis.  This was a 2nd treatment and 2nd hospital stay.  He completed a course of therapy.  He was doing well overall until a few days ago when he had diarrhea for 1 day but that seemed to have resolved.  Perhaps related to dairy that he consumed over the weekend.  Hopefully not a recurrence of the C diff     2. Non ST elevated MI. This was during the recent hospital stay.  He has a history of coronary disease and has a stent.  He did have some degree of heart failure with a mildly diminished EF on echo and possible diastolic dysfunction along with a history of hypertension    3. Hypertension.  Control    4. Hyperlipidemia.  On statin therapy     5. COPD.  Only stopped smoking recently.  On home oxygen due to chronic hypoxemia     6. Cognitive decline.  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  He is however very pleasant and does recognize me.  Plan:       Continue workup per his cardiologist.  Understand there be some  sort of noninvasive stress test along with carotid ultrasound in the near future.    Continue current medications with the addition of a probiotic.      Check stool for C difficile.  Update lab work with a CBC CMP lipid TSH and BNP.  Follow-up with me in 4 weeks.      Patient's daughter requesting a portable concentrator.  He is on 2 L of oxygen per minute with good results

## 2023-04-12 ENCOUNTER — TELEPHONE (OUTPATIENT)
Dept: INTERNAL MEDICINE | Facility: CLINIC | Age: 88
End: 2023-04-12
Payer: MEDICARE

## 2023-04-12 DIAGNOSIS — A04.72 C. DIFFICILE DIARRHEA: Primary | ICD-10-CM

## 2023-04-12 NOTE — TELEPHONE ENCOUNTER
----- Message from Donavon Glover sent at 4/12/2023 11:43 AM CDT -----  ,.Type:  Needs Medical Advice    Who Called: Cristina ham daughter  Symptoms (please be specific):    How long has patient had these symptoms:    Pharmacy name and phone #:    Would the patient rather a call back or a response via [facility]? Call back  Best Call Back Number: [phone]  Additional Information: calling to check on order for stool sample, [facility] states they did not receive the order

## 2023-04-13 ENCOUNTER — TELEPHONE (OUTPATIENT)
Dept: INTERNAL MEDICINE | Facility: CLINIC | Age: 88
End: 2023-04-13
Payer: MEDICARE

## 2023-04-13 ENCOUNTER — LAB REQUISITION (OUTPATIENT)
Dept: LAB | Facility: HOSPITAL | Age: 88
End: 2023-04-13
Payer: MEDICARE

## 2023-04-13 DIAGNOSIS — I50.23 ACUTE ON CHRONIC SYSTOLIC (CONGESTIVE) HEART FAILURE: ICD-10-CM

## 2023-04-13 DIAGNOSIS — N39.0 URINARY TRACT INFECTION, SITE NOT SPECIFIED: ICD-10-CM

## 2023-04-13 DIAGNOSIS — I25.10 ATHEROSCLEROTIC HEART DISEASE OF NATIVE CORONARY ARTERY WITHOUT ANGINA PECTORIS: ICD-10-CM

## 2023-04-13 DIAGNOSIS — I11.0 HYPERTENSIVE HEART DISEASE WITH HEART FAILURE: ICD-10-CM

## 2023-04-13 LAB
ALBUMIN SERPL-MCNC: 2.5 G/DL (ref 3.4–4.8)
ALBUMIN/GLOB SERPL: 0.9 RATIO (ref 1.1–2)
ALP SERPL-CCNC: 64 UNIT/L (ref 40–150)
ALT SERPL-CCNC: 10 UNIT/L (ref 0–55)
AST SERPL-CCNC: 10 UNIT/L (ref 5–34)
BASOPHILS # BLD AUTO: 0.04 X10(3)/MCL (ref 0–0.2)
BASOPHILS NFR BLD AUTO: 0.6 %
BILIRUBIN DIRECT+TOT PNL SERPL-MCNC: 0.5 MG/DL
BNP BLD-MCNC: 365.2 PG/ML
BUN SERPL-MCNC: 15.2 MG/DL (ref 8.4–25.7)
CALCIUM SERPL-MCNC: 8.3 MG/DL (ref 8.8–10)
CHLORIDE SERPL-SCNC: 101 MMOL/L (ref 98–107)
CHOLEST SERPL-MCNC: 122 MG/DL
CHOLEST/HDLC SERPL: 3 {RATIO} (ref 0–5)
CO2 SERPL-SCNC: 28 MMOL/L (ref 23–31)
CREAT SERPL-MCNC: 0.81 MG/DL (ref 0.73–1.18)
EOSINOPHIL # BLD AUTO: 0.17 X10(3)/MCL (ref 0–0.9)
EOSINOPHIL NFR BLD AUTO: 2.5 %
ERYTHROCYTE [DISTWIDTH] IN BLOOD BY AUTOMATED COUNT: 16.7 % (ref 11.5–17)
GFR SERPLBLD CREATININE-BSD FMLA CKD-EPI: >60 MLS/MIN/1.73/M2
GLOBULIN SER-MCNC: 2.8 GM/DL (ref 2.4–3.5)
GLUCOSE SERPL-MCNC: 81 MG/DL (ref 82–115)
HCT VFR BLD AUTO: 33.8 % (ref 42–52)
HDLC SERPL-MCNC: 41 MG/DL (ref 35–60)
HGB BLD-MCNC: 10.8 G/DL (ref 14–18)
IMM GRANULOCYTES # BLD AUTO: 0.03 X10(3)/MCL (ref 0–0.04)
IMM GRANULOCYTES NFR BLD AUTO: 0.4 %
LDLC SERPL CALC-MCNC: 70 MG/DL (ref 50–140)
LYMPHOCYTES # BLD AUTO: 1.84 X10(3)/MCL (ref 0.6–4.6)
LYMPHOCYTES NFR BLD AUTO: 27 %
MCH RBC QN AUTO: 29 PG (ref 27–31)
MCHC RBC AUTO-ENTMCNC: 32 G/DL (ref 33–36)
MCV RBC AUTO: 90.6 FL (ref 80–94)
MONOCYTES # BLD AUTO: 0.33 X10(3)/MCL (ref 0.1–1.3)
MONOCYTES NFR BLD AUTO: 4.8 %
NEUTROPHILS # BLD AUTO: 4.41 X10(3)/MCL (ref 2.1–9.2)
NEUTROPHILS NFR BLD AUTO: 64.7 %
NRBC BLD AUTO-RTO: 0 %
PLATELET # BLD AUTO: 169 X10(3)/MCL (ref 130–400)
PMV BLD AUTO: 10.1 FL (ref 7.4–10.4)
POTASSIUM SERPL-SCNC: 2.5 MMOL/L (ref 3.5–5.1)
PROT SERPL-MCNC: 5.3 GM/DL (ref 5.8–7.6)
RBC # BLD AUTO: 3.73 X10(6)/MCL (ref 4.7–6.1)
SODIUM SERPL-SCNC: 139 MMOL/L (ref 136–145)
TRIGL SERPL-MCNC: 57 MG/DL (ref 34–140)
TSH SERPL-ACNC: 1.99 UIU/ML (ref 0.35–4.94)
VLDLC SERPL CALC-MCNC: 11 MG/DL
WBC # SPEC AUTO: 6.8 X10(3)/MCL (ref 4.5–11.5)

## 2023-04-13 PROCEDURE — 80061 LIPID PANEL: CPT | Performed by: INTERNAL MEDICINE

## 2023-04-13 PROCEDURE — 86318 IA INFECTIOUS AGENT ANTIBODY: CPT | Performed by: INTERNAL MEDICINE

## 2023-04-13 PROCEDURE — 83880 ASSAY OF NATRIURETIC PEPTIDE: CPT | Performed by: INTERNAL MEDICINE

## 2023-04-13 PROCEDURE — 84443 ASSAY THYROID STIM HORMONE: CPT | Performed by: INTERNAL MEDICINE

## 2023-04-13 PROCEDURE — 80053 COMPREHEN METABOLIC PANEL: CPT | Performed by: INTERNAL MEDICINE

## 2023-04-13 PROCEDURE — 85025 COMPLETE CBC W/AUTO DIFF WBC: CPT | Performed by: INTERNAL MEDICINE

## 2023-04-13 NOTE — TELEPHONE ENCOUNTER
----- Message from Tamara Lacey sent at 4/13/2023  4:06 PM CDT -----  Regarding: advice  Type:  Needs Medical Advice    Who Called: Karma from [facility]  Symptoms (please be specific): critical lab   How long has patient had these symptoms:    Pharmacy name and phone #:    Would the patient rather a call back or a response via [facility]?   Best Call Back Number: [phone]  Additional Information:Karma from [facility] called about the pt having a critical lab. Please advise.

## 2023-04-14 ENCOUNTER — TELEPHONE (OUTPATIENT)
Dept: INTERNAL MEDICINE | Facility: CLINIC | Age: 88
End: 2023-04-14
Payer: MEDICARE

## 2023-04-14 ENCOUNTER — DOCUMENTATION ONLY (OUTPATIENT)
Dept: INTERNAL MEDICINE | Facility: CLINIC | Age: 88
End: 2023-04-14
Payer: MEDICARE

## 2023-04-14 DIAGNOSIS — I10 BENIGN ESSENTIAL HYPERTENSION: ICD-10-CM

## 2023-04-14 DIAGNOSIS — A04.72 C. DIFFICILE DIARRHEA: ICD-10-CM

## 2023-04-14 DIAGNOSIS — E87.6 HYPOKALEMIA: Primary | ICD-10-CM

## 2023-04-14 DIAGNOSIS — I25.10 CORONARY ARTERIOSCLEROSIS: ICD-10-CM

## 2023-04-14 DIAGNOSIS — A04.71 RECURRENT COLITIS DUE TO CLOSTRIDIOIDES DIFFICILE: Primary | ICD-10-CM

## 2023-04-14 DIAGNOSIS — J44.9 CHRONIC OBSTRUCTIVE PULMONARY DISEASE, UNSPECIFIED COPD TYPE: Primary | ICD-10-CM

## 2023-04-14 LAB
CLOSTRIDIUM DIFFICILE GDH ANTIGEN (OHS): POSITIVE
CLOSTRIDIUM DIFFICILE TOXIN A/B (OHS): POSITIVE

## 2023-04-14 RX ORDER — VANCOMYCIN HYDROCHLORIDE 125 MG/1
125 CAPSULE ORAL 4 TIMES DAILY
Qty: 40 CAPSULE | Refills: 0 | Status: SHIPPED | OUTPATIENT
Start: 2023-04-14 | End: 2023-04-24

## 2023-04-14 RX ORDER — POTASSIUM CHLORIDE 750 MG/1
10 CAPSULE, EXTENDED RELEASE ORAL 2 TIMES DAILY
Qty: 60 CAPSULE | Status: SHIPPED | OUTPATIENT
Start: 2023-04-14 | End: 2023-05-01 | Stop reason: SDUPTHER

## 2023-04-14 NOTE — TELEPHONE ENCOUNTER
----- Message from Elsie Burgess MA sent at 4/14/2023  8:52 AM CDT -----  Regarding: FW: Needs Medical Advice    ----- Message -----  From: Ayesha Pedersen  Sent: 4/14/2023   8:47 AM CDT  To: [facility] Staff  Subject: Needs Medical Advice                             Type:  Needs Medical Advice    Who Called: Natalie / [facility]    Would the patient rather a call back or a response via [facility]?    Best Call Back Number: [phone]  Additional Information: Natalie lozoya/ [facility] called to see if lab results were received on the pt. She stated the pt had a positive result that needed to be addressed & had no call bk as of yet. Called b/l 2x's & no answer. Please call Natalie.

## 2023-04-14 NOTE — PROGRESS NOTES
Received report of labs showing a 3rd or 4th C Diff recurrence.  He saw Dr. Ramires this week and had diarrhea.  Will treat with vanco for 10 days followed by Rifaximin for 20 days.  Please let patient's family know.  They take the 10 days of vanco, then start the 20 days of rifaximin.

## 2023-04-20 ENCOUNTER — DOCUMENTATION ONLY (OUTPATIENT)
Dept: INTERNAL MEDICINE | Facility: CLINIC | Age: 88
End: 2023-04-20
Payer: MEDICARE

## 2023-05-01 DIAGNOSIS — A04.71 RECURRENT COLITIS DUE TO CLOSTRIDIOIDES DIFFICILE: ICD-10-CM

## 2023-05-01 DIAGNOSIS — E87.6 HYPOKALEMIA: ICD-10-CM

## 2023-05-01 RX ORDER — FINASTERIDE 5 MG/1
5 TABLET, FILM COATED ORAL DAILY
Qty: 30 TABLET | Refills: 0 | Status: SHIPPED | OUTPATIENT
Start: 2023-05-01 | End: 2023-05-01 | Stop reason: SDUPTHER

## 2023-05-01 RX ORDER — POTASSIUM CHLORIDE 750 MG/1
10 CAPSULE, EXTENDED RELEASE ORAL 2 TIMES DAILY
Qty: 60 CAPSULE | Refills: 11 | Status: ON HOLD | OUTPATIENT
Start: 2023-05-01

## 2023-05-01 RX ORDER — FUROSEMIDE 40 MG/1
40 TABLET ORAL DAILY
Qty: 30 TABLET | Refills: 11 | Status: ON HOLD | OUTPATIENT
Start: 2023-05-01

## 2023-05-01 RX ORDER — ATORVASTATIN CALCIUM 80 MG/1
80 TABLET, FILM COATED ORAL DAILY
Qty: 30 TABLET | Refills: 11 | Status: SHIPPED | OUTPATIENT
Start: 2023-05-01 | End: 2023-05-30

## 2023-05-01 RX ORDER — TAMSULOSIN HYDROCHLORIDE 0.4 MG/1
2 CAPSULE ORAL DAILY
Qty: 60 CAPSULE | Refills: 11 | Status: ON HOLD | OUTPATIENT
Start: 2023-05-01

## 2023-05-01 RX ORDER — METOPROLOL SUCCINATE 25 MG/1
25 TABLET, EXTENDED RELEASE ORAL DAILY
Qty: 30 TABLET | Refills: 0 | Status: SHIPPED | OUTPATIENT
Start: 2023-05-01 | End: 2023-05-01 | Stop reason: SDUPTHER

## 2023-05-01 RX ORDER — POTASSIUM CHLORIDE 750 MG/1
10 CAPSULE, EXTENDED RELEASE ORAL 2 TIMES DAILY
Qty: 60 CAPSULE | Status: SHIPPED | OUTPATIENT
Start: 2023-05-01 | End: 2023-05-01 | Stop reason: SDUPTHER

## 2023-05-01 RX ORDER — ATORVASTATIN CALCIUM 80 MG/1
80 TABLET, FILM COATED ORAL DAILY
Qty: 30 TABLET | Refills: 0 | Status: SHIPPED | OUTPATIENT
Start: 2023-05-01 | End: 2023-05-01 | Stop reason: SDUPTHER

## 2023-05-01 RX ORDER — EZETIMIBE 10 MG/1
10 TABLET ORAL NIGHTLY
Qty: 30 TABLET | Refills: 0 | Status: ON HOLD | OUTPATIENT
Start: 2023-05-01

## 2023-05-01 RX ORDER — FINASTERIDE 5 MG/1
5 TABLET, FILM COATED ORAL DAILY
Qty: 30 TABLET | Refills: 11 | Status: SHIPPED | OUTPATIENT
Start: 2023-05-01 | End: 2023-05-30 | Stop reason: SDUPTHER

## 2023-05-01 RX ORDER — METOPROLOL SUCCINATE 25 MG/1
25 TABLET, EXTENDED RELEASE ORAL DAILY
Qty: 30 TABLET | Refills: 11 | Status: SHIPPED | OUTPATIENT
Start: 2023-05-01 | End: 2024-02-28

## 2023-05-01 NOTE — TELEPHONE ENCOUNTER
----- Message from Sadaf Cannon sent at 5/1/2023  2:13 PM CDT -----  Regarding: refills  Type:  RX Refill Request    Who Called: pt's daughter Cristina  Refill or New Rx:refill    Preferred Pharmacy with phone number:[facility] [address]  Local or Mail Order:local  Ordering Provider:Dr Brittany Camilo  Would the patient rather a call back or a response via [facility]? C/b  Best Call Back Number:[phone]  Additional Information: pt was in hospital and released with 6 additional scripts (all on his chart) daughter called to have them all refilled.  Pt will be running out of medications this wed.  They would like PCP to refill.  Please contact daughter if there is a problem

## 2023-05-03 ENCOUNTER — TELEPHONE (OUTPATIENT)
Dept: INTERNAL MEDICINE | Facility: CLINIC | Age: 88
End: 2023-05-03
Payer: MEDICARE

## 2023-05-03 DIAGNOSIS — A04.72 C. DIFFICILE DIARRHEA: Primary | ICD-10-CM

## 2023-05-03 RX ORDER — CHOLESTYRAMINE 4 G/4.8G
4 POWDER, FOR SUSPENSION ORAL DAILY
Qty: 10 PACKET | Refills: 0 | Status: SHIPPED | OUTPATIENT
Start: 2023-05-03 | End: 2023-05-10

## 2023-05-03 RX ORDER — CHOLESTYRAMINE 4 G/4.8G
4 POWDER, FOR SUSPENSION ORAL DAILY
Qty: 10 PACKET | Refills: 0 | Status: SHIPPED | OUTPATIENT
Start: 2023-05-03 | End: 2023-05-03

## 2023-05-03 NOTE — TELEPHONE ENCOUNTER
Called and spoke with qamar from [facility] [place] to let her know rx for cholestyramine was sent to pharmacy but to d/c imodium until c.diff is negative. She verbalized understanding.

## 2023-05-05 PROCEDURE — G0179 PR HOME HEALTH MD RECERTIFICATION: ICD-10-PCS | Mod: ,,, | Performed by: INTERNAL MEDICINE

## 2023-05-05 PROCEDURE — G0179 MD RECERTIFICATION HHA PT: HCPCS | Mod: ,,, | Performed by: INTERNAL MEDICINE

## 2023-05-09 ENCOUNTER — TELEPHONE (OUTPATIENT)
Dept: INTERNAL MEDICINE | Facility: CLINIC | Age: 88
End: 2023-05-09
Payer: MEDICARE

## 2023-05-15 ENCOUNTER — OFFICE VISIT (OUTPATIENT)
Dept: INTERNAL MEDICINE | Facility: CLINIC | Age: 88
End: 2023-05-15
Payer: MEDICARE

## 2023-05-15 ENCOUNTER — TELEPHONE (OUTPATIENT)
Dept: INTERNAL MEDICINE | Facility: CLINIC | Age: 88
End: 2023-05-15

## 2023-05-15 VITALS
HEART RATE: 66 BPM | HEIGHT: 72 IN | SYSTOLIC BLOOD PRESSURE: 120 MMHG | BODY MASS INDEX: 20.8 KG/M2 | OXYGEN SATURATION: 97 % | DIASTOLIC BLOOD PRESSURE: 68 MMHG

## 2023-05-15 DIAGNOSIS — A04.72 C. DIFFICILE DIARRHEA: Primary | ICD-10-CM

## 2023-05-15 DIAGNOSIS — I25.10 CORONARY ARTERIOSCLEROSIS: ICD-10-CM

## 2023-05-15 DIAGNOSIS — I50.43 ACUTE ON CHRONIC COMBINED SYSTOLIC AND DIASTOLIC CONGESTIVE HEART FAILURE: ICD-10-CM

## 2023-05-15 DIAGNOSIS — J44.9 CHRONIC OBSTRUCTIVE PULMONARY DISEASE, UNSPECIFIED COPD TYPE: ICD-10-CM

## 2023-05-15 DIAGNOSIS — I10 BENIGN ESSENTIAL HYPERTENSION: ICD-10-CM

## 2023-05-15 DIAGNOSIS — E78.5 HYPERLIPIDEMIA, UNSPECIFIED HYPERLIPIDEMIA TYPE: ICD-10-CM

## 2023-05-15 PROCEDURE — 99214 PR OFFICE/OUTPT VISIT, EST, LEVL IV, 30-39 MIN: ICD-10-PCS | Mod: ,,, | Performed by: INTERNAL MEDICINE

## 2023-05-15 PROCEDURE — 1101F PT FALLS ASSESS-DOCD LE1/YR: CPT | Mod: CPTII,,, | Performed by: INTERNAL MEDICINE

## 2023-05-15 PROCEDURE — 1159F MED LIST DOCD IN RCRD: CPT | Mod: CPTII,,, | Performed by: INTERNAL MEDICINE

## 2023-05-15 PROCEDURE — 99214 OFFICE O/P EST MOD 30 MIN: CPT | Mod: ,,, | Performed by: INTERNAL MEDICINE

## 2023-05-15 PROCEDURE — 1101F PR PT FALLS ASSESS DOC 0-1 FALLS W/OUT INJ PAST YR: ICD-10-PCS | Mod: CPTII,,, | Performed by: INTERNAL MEDICINE

## 2023-05-15 PROCEDURE — 3288F PR FALLS RISK ASSESSMENT DOCUMENTED: ICD-10-PCS | Mod: CPTII,,, | Performed by: INTERNAL MEDICINE

## 2023-05-15 PROCEDURE — 3288F FALL RISK ASSESSMENT DOCD: CPT | Mod: CPTII,,, | Performed by: INTERNAL MEDICINE

## 2023-05-15 PROCEDURE — 1159F PR MEDICATION LIST DOCUMENTED IN MEDICAL RECORD: ICD-10-PCS | Mod: CPTII,,, | Performed by: INTERNAL MEDICINE

## 2023-05-15 RX ORDER — AMLODIPINE BESYLATE 5 MG/1
5 TABLET ORAL DAILY
Status: ON HOLD | COMMUNITY
Start: 2023-05-13

## 2023-05-15 NOTE — TELEPHONE ENCOUNTER
Spoke with Damaris at [facility], she faxed over results from March,  Results given to Dr. Ramires for review.

## 2023-05-15 NOTE — TELEPHONE ENCOUNTER
----- Message from Verona Bautista MA sent at 5/15/2023  3:16 PM CDT -----  Regarding: [facility] Records  Me and doc both looked in chart and could not find any recent tests from [facility]. Patient and daughter stated he had several tests done within past month. Would you mind looking and see if we missed and call if not.

## 2023-05-15 NOTE — PROGRESS NOTES
Subjective:       Patient ID: Max Squires is a 88 y.o. male.    Chief Complaint: Follow-up (No concerns)      The patient is an 88-year-old man seen in 1 month follow-up.  He feels okay.  He had C difficile colitis that was treated twice.  We feared another recurrence last week when he developed diarrhea but that turned out to be a transient phenomena.      Since I saw him last he did see his cardiologist at [facility].  I have no results but several tests were done and he is going to be treated medically.  His daughter tells me Entresto was discontinued and replaced with amlodipine.  I am not sure why but I presume his blood pressure may have been too low.    Follow-up    Review of Systems     Current Outpatient Medications on File Prior to Visit   Medication Sig Dispense Refill    amLODIPine (NORVASC) 5 MG tablet Take 5 mg by mouth Daily.      aspirin (ECOTRIN) 81 MG EC tablet Take 1 tablet (81 mg total) by mouth once daily. 30 tablet 0    atorvastatin (LIPITOR) 80 MG tablet Take 1 tablet (80 mg total) by mouth once daily. 30 tablet 11    cholestyramine-aspartame (CHOLESTYRAMINE LIGHT) 4 gram PwPk MIX AND DRINK 1 PACKET(4 GRAMS) BY MOUTH EVERY DAY FOR 10 DAYS 60 packet 11    diclofenac sodium (VOLTAREN) 1 % Gel APPLY 1 TO 2 GRAMS TOPICALLY TO THE AFFECTED AREA FOUR TIMES DAILY AS NEEDED      ezetimibe (ZETIA) 10 mg tablet Take 1 tablet (10 mg total) by mouth every evening. 30 tablet 0    finasteride (PROSCAR) 5 mg tablet Take 1 tablet (5 mg total) by mouth once daily. 30 tablet 11    furosemide (LASIX) 40 MG tablet Take 1 tablet (40 mg total) by mouth once daily. 30 tablet 11    metoprolol succinate (TOPROL-XL) 25 MG 24 hr tablet Take 1 tablet (25 mg total) by mouth once daily. 30 tablet 11    potassium chloride (MICRO-K) 10 MEQ CpSR Take 1 capsule (10 mEq total) by mouth 2 (two) times daily. 60 capsule 11    tamsulosin (FLOMAX) 0.4 mg Cap Take 2 capsules (0.8 mg total) by mouth once daily. 60 capsule 11     [DISCONTINUED] sacubitriL-valsartan (ENTRESTO) 24-26 mg per tablet Take 1 tablet by mouth 2 (two) times daily. (Patient not taking: Reported on 5/15/2023) 60 tablet 11     No current facility-administered medications on file prior to visit.     Objective:      /68   Pulse 66   Ht 6' (1.829 m)   SpO2 97%   BMI 20.80 kg/m²     Physical Exam  Vitals reviewed.   Constitutional:       Appearance: Normal appearance.   HENT:      Head: Normocephalic and atraumatic.      Mouth/Throat:      Pharynx: Oropharynx is clear.   Eyes:      Pupils: Pupils are equal, round, and reactive to light.   Cardiovascular:      Rate and Rhythm: Normal rate and regular rhythm.      Pulses: Normal pulses.      Heart sounds: Normal heart sounds.   Pulmonary:      Breath sounds: Normal breath sounds.   Abdominal:      General: Abdomen is flat.      Palpations: Abdomen is soft.   Musculoskeletal:      Cervical back: Neck supple.      Comments: Trace pretibial edema only   Skin:     General: Skin is warm and dry.   Neurological:      Mental Status: He is alert.       Assessment:       1. C difficile diarrhea.  He is had at least 2 episodes treated.  He had a positive test last week with the diarrhea subsided.  He appears to have a cure    2. COPD.  No longer smoking.  Fairly severe but stable     3. Hypertension.  Control    4. Heart failure.  Acute on chronic with both systolic and diastolic dysfunction.  Entresto discontinue per Cardiology.      5. Hyperlipidemia.  Status unknown    6. Abnormal CBC.  The 1st time I see this done via home health after his visit with me a month ago.  I doubt the veracity of the study because his platelet count was 39823 and he is had no unusual bruising or bleeding.  Plan:     Continue same meds.  Have home health do a CBC CMP and BNP now.  Follow-up with me 2 months of those studies okay.      Obtain records from Cardiology.

## 2023-05-30 DIAGNOSIS — E78.5 HYPERLIPIDEMIA, UNSPECIFIED HYPERLIPIDEMIA TYPE: Primary | ICD-10-CM

## 2023-05-30 RX ORDER — FINASTERIDE 5 MG/1
5 TABLET, FILM COATED ORAL DAILY
Qty: 90 TABLET | Refills: 3 | Status: SHIPPED | OUTPATIENT
Start: 2023-05-30 | End: 2024-04-01

## 2023-05-30 RX ORDER — ATORVASTATIN CALCIUM 80 MG/1
TABLET, FILM COATED ORAL
Qty: 90 TABLET | Refills: 3 | Status: ON HOLD | OUTPATIENT
Start: 2023-05-30

## 2023-06-07 ENCOUNTER — EXTERNAL HOME HEALTH (OUTPATIENT)
Dept: HOME HEALTH SERVICES | Facility: HOSPITAL | Age: 88
End: 2023-06-07
Payer: MEDICARE

## 2023-06-21 ENCOUNTER — LAB REQUISITION (OUTPATIENT)
Dept: LAB | Facility: HOSPITAL | Age: 88
End: 2023-06-21
Payer: MEDICARE

## 2023-06-21 DIAGNOSIS — I25.10 CORONARY ARTERIOSCLEROSIS: ICD-10-CM

## 2023-06-21 DIAGNOSIS — E87.6 HYPOKALEMIA: ICD-10-CM

## 2023-06-21 DIAGNOSIS — R06.02 SOB (SHORTNESS OF BREATH): Primary | ICD-10-CM

## 2023-06-21 DIAGNOSIS — I50.43 ACUTE ON CHRONIC COMBINED SYSTOLIC (CONGESTIVE) AND DIASTOLIC (CONGESTIVE) HEART FAILURE: ICD-10-CM

## 2023-06-21 DIAGNOSIS — I10 ESSENTIAL (PRIMARY) HYPERTENSION: ICD-10-CM

## 2023-06-21 DIAGNOSIS — J44.9 CHRONIC OBSTRUCTIVE PULMONARY DISEASE, UNSPECIFIED COPD TYPE: ICD-10-CM

## 2023-06-21 DIAGNOSIS — R79.89 OTHER SPECIFIED ABNORMAL FINDINGS OF BLOOD CHEMISTRY: ICD-10-CM

## 2023-06-21 LAB
ALBUMIN SERPL-MCNC: 3.1 G/DL (ref 3.4–4.8)
ALBUMIN/GLOB SERPL: 1 RATIO (ref 1.1–2)
ALP SERPL-CCNC: 88 UNIT/L (ref 40–150)
ALT SERPL-CCNC: 18 UNIT/L (ref 0–55)
AST SERPL-CCNC: 12 UNIT/L (ref 5–34)
BASOPHILS # BLD AUTO: 0.06 X10(3)/MCL
BASOPHILS NFR BLD AUTO: 0.7 %
BILIRUBIN DIRECT+TOT PNL SERPL-MCNC: 0.4 MG/DL
BNP BLD-MCNC: 111.9 PG/ML
BUN SERPL-MCNC: 18.9 MG/DL (ref 8.4–25.7)
CALCIUM SERPL-MCNC: 8.6 MG/DL (ref 8.8–10)
CHLORIDE SERPL-SCNC: 101 MMOL/L (ref 98–107)
CO2 SERPL-SCNC: 27 MMOL/L (ref 23–31)
CREAT SERPL-MCNC: 0.72 MG/DL (ref 0.73–1.18)
EOSINOPHIL # BLD AUTO: 0.38 X10(3)/MCL (ref 0–0.9)
EOSINOPHIL NFR BLD AUTO: 4.6 %
ERYTHROCYTE [DISTWIDTH] IN BLOOD BY AUTOMATED COUNT: 13.5 % (ref 11.5–17)
GFR SERPLBLD CREATININE-BSD FMLA CKD-EPI: >60 MLS/MIN/1.73/M2
GLOBULIN SER-MCNC: 3.1 GM/DL (ref 2.4–3.5)
GLUCOSE SERPL-MCNC: 95 MG/DL (ref 82–115)
HCT VFR BLD AUTO: 36.1 % (ref 42–52)
HGB BLD-MCNC: 11.6 G/DL (ref 14–18)
IMM GRANULOCYTES # BLD AUTO: 0.03 X10(3)/MCL (ref 0–0.04)
IMM GRANULOCYTES NFR BLD AUTO: 0.4 %
LYMPHOCYTES # BLD AUTO: 2.28 X10(3)/MCL (ref 0.6–4.6)
LYMPHOCYTES NFR BLD AUTO: 27.3 %
MCH RBC QN AUTO: 28.3 PG (ref 27–31)
MCHC RBC AUTO-ENTMCNC: 32.1 G/DL (ref 33–36)
MCV RBC AUTO: 88 FL (ref 80–94)
MONOCYTES # BLD AUTO: 0.64 X10(3)/MCL (ref 0.1–1.3)
MONOCYTES NFR BLD AUTO: 7.7 %
NEUTROPHILS # BLD AUTO: 4.95 X10(3)/MCL (ref 2.1–9.2)
NEUTROPHILS NFR BLD AUTO: 59.3 %
NRBC BLD AUTO-RTO: 0 %
PLATELET # BLD AUTO: 184 X10(3)/MCL (ref 130–400)
PMV BLD AUTO: 10.5 FL (ref 7.4–10.4)
POTASSIUM SERPL-SCNC: 4.1 MMOL/L (ref 3.5–5.1)
PROT SERPL-MCNC: 6.2 GM/DL (ref 5.8–7.6)
RBC # BLD AUTO: 4.1 X10(6)/MCL (ref 4.7–6.1)
SODIUM SERPL-SCNC: 137 MMOL/L (ref 136–145)
WBC # SPEC AUTO: 8.34 X10(3)/MCL (ref 4.5–11.5)

## 2023-06-21 PROCEDURE — 85025 COMPLETE CBC W/AUTO DIFF WBC: CPT | Performed by: INTERNAL MEDICINE

## 2023-06-21 PROCEDURE — 80053 COMPREHEN METABOLIC PANEL: CPT | Performed by: INTERNAL MEDICINE

## 2023-06-21 PROCEDURE — 83880 ASSAY OF NATRIURETIC PEPTIDE: CPT | Performed by: INTERNAL MEDICINE

## 2023-06-22 ENCOUNTER — DOCUMENTATION ONLY (OUTPATIENT)
Dept: INTERNAL MEDICINE | Facility: CLINIC | Age: 88
End: 2023-06-22
Payer: MEDICARE

## 2023-06-22 ENCOUNTER — TELEPHONE (OUTPATIENT)
Dept: INTERNAL MEDICINE | Facility: CLINIC | Age: 88
End: 2023-06-22
Payer: MEDICARE

## 2023-06-22 DIAGNOSIS — R05.9 COUGH, UNSPECIFIED TYPE: Primary | ICD-10-CM

## 2023-06-22 DIAGNOSIS — R05.9 COUGH, UNSPECIFIED TYPE: ICD-10-CM

## 2023-06-22 RX ORDER — AZITHROMYCIN 250 MG/1
TABLET, FILM COATED ORAL
Qty: 6 TABLET | Refills: 0 | Status: SHIPPED | OUTPATIENT
Start: 2023-06-22 | End: 2023-06-22 | Stop reason: SDUPTHER

## 2023-06-22 RX ORDER — AZITHROMYCIN 250 MG/1
TABLET, FILM COATED ORAL
Qty: 6 TABLET | Refills: 0 | Status: SHIPPED | OUTPATIENT
Start: 2023-06-22 | End: 2023-06-27

## 2023-06-22 NOTE — TELEPHONE ENCOUNTER
----- Message from Donavon Adalberto sent at 6/22/2023  1:12 PM CDT -----  .Type:  RX Refill Request    Who Called: pt's daughter  Refill or New Rx:New  RX Name and Strength:azithromycin (Z-INGE) 250 MG tablet  How is the patient currently taking it? (ex. 1XDay):  Is this a 30 day or 90 day RX:  Preferred Pharmacy with phone number:[id number] [place], [place] - [address] [facility] [address] [address] [facility]  Local or Mail Order:local  Ordering Provider:  Would the patient rather a call back or a response via [facility]? Call back  Best Call Back Number:[phone]  Additional Information: prescription sent to wrong pharmacy, was suppose to go to [id number] - [place], [place] - [address] AT [facility].

## 2023-06-22 NOTE — TELEPHONE ENCOUNTER
Karma called stated rx sent to [facility], she requested rx be sent to [facility].  Done per request.

## 2023-06-22 NOTE — TELEPHONE ENCOUNTER
Left message for Karma to return phone call. Per , a z-pack has been sent to the pharmacy for the patient.

## 2023-07-04 PROCEDURE — G0179 PR HOME HEALTH MD RECERTIFICATION: ICD-10-PCS | Mod: ,,, | Performed by: INTERNAL MEDICINE

## 2023-07-04 PROCEDURE — G0179 MD RECERTIFICATION HHA PT: HCPCS | Mod: ,,, | Performed by: INTERNAL MEDICINE

## 2023-07-11 ENCOUNTER — TELEPHONE (OUTPATIENT)
Dept: INTERNAL MEDICINE | Facility: CLINIC | Age: 88
End: 2023-07-11
Payer: MEDICARE

## 2023-07-11 NOTE — TELEPHONE ENCOUNTER
----- Message from Lizbeth Rosenberg LPN sent at 7/11/2023 10:26 AM CDT -----  Regarding: indu Mcginnis 7/18 @3:00  Fasting labs needed.

## 2023-07-13 ENCOUNTER — HOSPITAL ENCOUNTER (OUTPATIENT)
Dept: RADIOLOGY | Facility: HOSPITAL | Age: 88
Discharge: HOME | End: 2023-07-13
Attending: INTERNAL MEDICINE
Payer: MEDICARE

## 2023-07-13 DIAGNOSIS — R06.02 SOB (SHORTNESS OF BREATH): ICD-10-CM

## 2023-07-13 DIAGNOSIS — I25.10 CORONARY ARTERIOSCLEROSIS: ICD-10-CM

## 2023-07-13 DIAGNOSIS — E87.6 HYPOKALEMIA: ICD-10-CM

## 2023-07-13 DIAGNOSIS — J44.9 CHRONIC OBSTRUCTIVE PULMONARY DISEASE, UNSPECIFIED COPD TYPE: ICD-10-CM

## 2023-07-13 PROCEDURE — 71046 X-RAY EXAM CHEST 2 VIEWS: CPT | Mod: TC

## 2023-07-17 ENCOUNTER — EXTERNAL HOME HEALTH (OUTPATIENT)
Dept: HOME HEALTH SERVICES | Facility: HOSPITAL | Age: 88
End: 2023-07-17
Payer: MEDICARE

## 2023-07-18 ENCOUNTER — DOCUMENT SCAN (OUTPATIENT)
Dept: HOME HEALTH SERVICES | Facility: HOSPITAL | Age: 88
End: 2023-07-18
Payer: MEDICARE

## 2023-07-18 ENCOUNTER — OFFICE VISIT (OUTPATIENT)
Dept: INTERNAL MEDICINE | Facility: CLINIC | Age: 88
End: 2023-07-18
Payer: MEDICARE

## 2023-07-18 VITALS
SYSTOLIC BLOOD PRESSURE: 117 MMHG | DIASTOLIC BLOOD PRESSURE: 65 MMHG | BODY MASS INDEX: 21.54 KG/M2 | RESPIRATION RATE: 18 BRPM | OXYGEN SATURATION: 96 % | WEIGHT: 159 LBS | HEIGHT: 72 IN | HEART RATE: 69 BPM

## 2023-07-18 DIAGNOSIS — I25.10 CORONARY ARTERIOSCLEROSIS: ICD-10-CM

## 2023-07-18 DIAGNOSIS — E78.5 HYPERLIPIDEMIA, UNSPECIFIED HYPERLIPIDEMIA TYPE: ICD-10-CM

## 2023-07-18 DIAGNOSIS — A04.72 C. DIFFICILE DIARRHEA: ICD-10-CM

## 2023-07-18 DIAGNOSIS — I50.30 DIASTOLIC CONGESTIVE HEART FAILURE, UNSPECIFIED HF CHRONICITY: ICD-10-CM

## 2023-07-18 DIAGNOSIS — J44.9 CHRONIC OBSTRUCTIVE PULMONARY DISEASE, UNSPECIFIED COPD TYPE: Primary | ICD-10-CM

## 2023-07-18 DIAGNOSIS — I10 BENIGN ESSENTIAL HYPERTENSION: ICD-10-CM

## 2023-07-18 PROCEDURE — 3288F FALL RISK ASSESSMENT DOCD: CPT | Mod: CPTII,,, | Performed by: INTERNAL MEDICINE

## 2023-07-18 PROCEDURE — 99214 PR OFFICE/OUTPT VISIT, EST, LEVL IV, 30-39 MIN: ICD-10-PCS | Mod: ,,, | Performed by: INTERNAL MEDICINE

## 2023-07-18 PROCEDURE — 1126F PR PAIN SEVERITY QUANTIFIED, NO PAIN PRESENT: ICD-10-PCS | Mod: CPTII,,, | Performed by: INTERNAL MEDICINE

## 2023-07-18 PROCEDURE — 1126F AMNT PAIN NOTED NONE PRSNT: CPT | Mod: CPTII,,, | Performed by: INTERNAL MEDICINE

## 2023-07-18 PROCEDURE — 3288F PR FALLS RISK ASSESSMENT DOCUMENTED: ICD-10-PCS | Mod: CPTII,,, | Performed by: INTERNAL MEDICINE

## 2023-07-18 PROCEDURE — 1159F PR MEDICATION LIST DOCUMENTED IN MEDICAL RECORD: ICD-10-PCS | Mod: CPTII,,, | Performed by: INTERNAL MEDICINE

## 2023-07-18 PROCEDURE — 1101F PR PT FALLS ASSESS DOC 0-1 FALLS W/OUT INJ PAST YR: ICD-10-PCS | Mod: CPTII,,, | Performed by: INTERNAL MEDICINE

## 2023-07-18 PROCEDURE — 1101F PT FALLS ASSESS-DOCD LE1/YR: CPT | Mod: CPTII,,, | Performed by: INTERNAL MEDICINE

## 2023-07-18 PROCEDURE — 1159F MED LIST DOCD IN RCRD: CPT | Mod: CPTII,,, | Performed by: INTERNAL MEDICINE

## 2023-07-18 PROCEDURE — 99214 OFFICE O/P EST MOD 30 MIN: CPT | Mod: ,,, | Performed by: INTERNAL MEDICINE

## 2023-07-18 RX ORDER — IPRATROPIUM BROMIDE AND ALBUTEROL SULFATE 2.5; .5 MG/3ML; MG/3ML
3 SOLUTION RESPIRATORY (INHALATION) 4 TIMES DAILY
Qty: 120 EACH | Refills: 11 | Status: ON HOLD | OUTPATIENT
Start: 2023-07-18 | End: 2024-07-17

## 2023-07-18 NOTE — PROGRESS NOTES
Subjective:       Patient ID: Max Squires is a 88 y.o. male.    Chief Complaint: Follow-up      The patient is an 88-year-old man in for follow-up of multiple medical problems.  He feels okay overall.  Does have shortness of breath with minimal exertion.  His daughter is asking about a nebulizer machine.  He has a history of COPD as well as coronary disease.  He did have heart failure but recent testing by his cardiologist showed a good ejection fraction.  He also has single-vessel coronary disease by the study namely right-sided.  It was felt best to be treated medically at least at 1st pain.  No orthopnea PND and occasional pedal edema.    No further diarrhea.  He is eating well.    Follow-up    Review of Systems     Current Outpatient Medications on File Prior to Visit   Medication Sig Dispense Refill    amLODIPine (NORVASC) 5 MG tablet Take 5 mg by mouth Daily.      aspirin (ECOTRIN) 81 MG EC tablet Take 1 tablet (81 mg total) by mouth once daily. 30 tablet 0    atorvastatin (LIPITOR) 80 MG tablet TAKE 1 TABLET(80 MG) BY MOUTH EVERY DAY 90 tablet 3    cholestyramine-aspartame (CHOLESTYRAMINE LIGHT) 4 gram PwPk MIX AND DRINK 1 PACKET(4 GRAMS) BY MOUTH EVERY DAY FOR 10 DAYS 60 packet 11    ezetimibe (ZETIA) 10 mg tablet Take 1 tablet (10 mg total) by mouth every evening. 30 tablet 0    finasteride (PROSCAR) 5 mg tablet Take 1 tablet (5 mg total) by mouth once daily. 90 tablet 3    furosemide (LASIX) 40 MG tablet Take 1 tablet (40 mg total) by mouth once daily. (Patient taking differently: Take 40 mg by mouth as needed.) 30 tablet 11    potassium chloride (MICRO-K) 10 MEQ CpSR Take 1 capsule (10 mEq total) by mouth 2 (two) times daily. 60 capsule 11    tamsulosin (FLOMAX) 0.4 mg Cap Take 2 capsules (0.8 mg total) by mouth once daily. 60 capsule 11    diclofenac sodium (VOLTAREN) 1 % Gel APPLY 1 TO 2 GRAMS TOPICALLY TO THE AFFECTED AREA FOUR TIMES DAILY AS NEEDED      metoprolol succinate (TOPROL-XL) 25 MG 24 hr  tablet Take 1 tablet (25 mg total) by mouth once daily. 30 tablet 11     No current facility-administered medications on file prior to visit.     Objective:      /65 (BP Location: Right arm, Patient Position: Sitting, BP Method: Large (Manual))   Pulse 69   Resp 18   Ht 6' (1.829 m)   Wt 72.1 kg (159 lb)   SpO2 96%   BMI 21.56 kg/m²     Physical Exam  Vitals reviewed.   Constitutional:       Appearance: Normal appearance.   HENT:      Head: Normocephalic and atraumatic.      Mouth/Throat:      Pharynx: Oropharynx is clear.   Eyes:      Pupils: Pupils are equal, round, and reactive to light.   Cardiovascular:      Rate and Rhythm: Normal rate and regular rhythm.      Pulses: Normal pulses.      Heart sounds: Normal heart sounds.   Pulmonary:      Breath sounds: Normal breath sounds.      Comments: Breath sounds are very distant  Abdominal:      General: Abdomen is flat.      Palpations: Abdomen is soft.   Musculoskeletal:      Cervical back: Neck supple.      Comments: Trace pedal edema and no pretibial edema bilaterally   Skin:     General: Skin is warm and dry.   Neurological:      Mental Status: He is alert.       Assessment:       1. Exertional dyspnea.  Likely secondary to COPD    2. Coronary disease.  Appears to be single-vessel disease and being treated medically.  No chest pain    3. Severe recurrent C difficile diarrhea.  This appears to finally resolved    4. Hypertension.  Good control    5. Heart failure.  Essentially resolved.  Now only a diastolic component.  He did have systolic in the past.  Plan:     Add DuoNeb treatments.  Continue oxygen 24 hours a day.  Same medications.  Follow-up with me 3 months.  With CBC CMP lipid TSH prior.

## 2023-07-24 ENCOUNTER — TELEPHONE (OUTPATIENT)
Dept: INTERNAL MEDICINE | Facility: CLINIC | Age: 88
End: 2023-07-24
Payer: MEDICARE

## 2023-07-24 DIAGNOSIS — J44.9 CHRONIC OBSTRUCTIVE PULMONARY DISEASE, UNSPECIFIED COPD TYPE: Primary | ICD-10-CM

## 2023-07-24 DIAGNOSIS — R06.02 SOB (SHORTNESS OF BREATH): ICD-10-CM

## 2023-07-24 NOTE — TELEPHONE ENCOUNTER
----- Message from Sharee Carrasco sent at 7/24/2023 12:19 PM CDT -----  Regarding: Home Health and Breathing Treatment machine  .Type:  Patient Returning Call    Who Called:Cristina Ortiz  Who Left Message for Patient:Cristina  Does the patient know what this is regarding?:Home Health and Breathing treatment  Would the patient rather a call back or a response via [facility]?   Best Call Back Number:[phone]  Additional Information: Please call Cristina back about home health services and Breathing treatment. I did let her know that home health information was sent out. Cristina said that the office she called never got any information

## 2023-07-25 PROCEDURE — G0180 MD CERTIFICATION HHA PATIENT: HCPCS | Mod: ,,, | Performed by: INTERNAL MEDICINE

## 2023-07-25 PROCEDURE — G0180 PR HOME HEALTH MD CERTIFICATION: ICD-10-PCS | Mod: ,,, | Performed by: INTERNAL MEDICINE

## 2023-07-26 DIAGNOSIS — J44.9 CHRONIC OBSTRUCTIVE PULMONARY DISEASE, UNSPECIFIED COPD TYPE: Primary | ICD-10-CM

## 2023-07-26 DIAGNOSIS — R06.02 SOB (SHORTNESS OF BREATH): ICD-10-CM

## 2023-07-27 DIAGNOSIS — R05.9 COUGH, UNSPECIFIED TYPE: Primary | ICD-10-CM

## 2023-07-27 RX ORDER — ALBUTEROL SULFATE 90 UG/1
2 AEROSOL, METERED RESPIRATORY (INHALATION) EVERY 4 HOURS PRN
Qty: 18 G | Refills: 2 | Status: ON HOLD | OUTPATIENT
Start: 2023-07-27

## 2023-08-11 ENCOUNTER — EXTERNAL HOME HEALTH (OUTPATIENT)
Dept: HOME HEALTH SERVICES | Facility: HOSPITAL | Age: 88
End: 2023-08-11
Payer: MEDICARE

## 2023-08-15 ENCOUNTER — DOCUMENT SCAN (OUTPATIENT)
Dept: HOME HEALTH SERVICES | Facility: HOSPITAL | Age: 88
End: 2023-08-15
Payer: MEDICARE

## 2023-08-24 ENCOUNTER — TELEPHONE (OUTPATIENT)
Dept: INTERNAL MEDICINE | Facility: CLINIC | Age: 88
End: 2023-08-24
Payer: MEDICARE

## 2023-08-24 DIAGNOSIS — R06.02 SOB (SHORTNESS OF BREATH): ICD-10-CM

## 2023-08-24 DIAGNOSIS — R05.9 COUGH, UNSPECIFIED TYPE: Primary | ICD-10-CM

## 2023-08-24 NOTE — TELEPHONE ENCOUNTER
----- Message from Donavon Adalberto sent at 8/24/2023  1:46 PM CDT -----  .Type:  Needs Medical Advice    Who Called: [facility]  Symptoms (please be specific):    How long has patient had these symptoms:    Pharmacy name and phone #:    Would the patient rather a call back or a response via [facility]? Call back  Best Call Back Number: [phone]  Additional Information: calling for order clarifications and wanting to attach a strep test onto the covid swab order

## 2023-09-21 ENCOUNTER — TELEPHONE (OUTPATIENT)
Dept: INTERNAL MEDICINE | Facility: CLINIC | Age: 88
End: 2023-09-21
Payer: MEDICARE

## 2023-09-21 ENCOUNTER — DOCUMENTATION ONLY (OUTPATIENT)
Dept: INTERNAL MEDICINE | Facility: CLINIC | Age: 88
End: 2023-09-21
Payer: MEDICARE

## 2023-09-21 DIAGNOSIS — R33.9 URINARY RETENTION: Primary | ICD-10-CM

## 2023-09-21 NOTE — TELEPHONE ENCOUNTER
Cristina advised that orders have been received. [place] [facility] [place]

## 2023-09-21 NOTE — TELEPHONE ENCOUNTER
Daughter advised that order was faxed to [facility] and they would call her to schedule patient appointment

## 2023-09-21 NOTE — TELEPHONE ENCOUNTER
Per , verbal order given for labwork, BMP, paper order will be faxed to . Renée will also find out if a bladder scan can be done at home and let me know

## 2023-09-21 NOTE — TELEPHONE ENCOUNTER
----- Message from Juliette Butts sent at 9/21/2023  8:43 AM CDT -----  .Type:  Needs Medical Advice    Who Called: Kelly Chinchilla  Symptoms (please be specific):    How long has patient had these symptoms:    Pharmacy name and phone #:    Would the patient rather a call back or a response via [facility]?   Best Call Back Number: [phone]  Additional Information: camila called said the pt not output urine 10 hrs the daughter called camila the nurse on her way there now please advice

## 2023-09-21 NOTE — TELEPHONE ENCOUNTER
----- Message from Donavon Glover sent at 9/21/2023  2:27 PM CDT -----  .Type:  Needs Medical Advice    Who Called: pt's daughter Cristina  Symptoms (please be specific):    How long has patient had these symptoms:    Pharmacy name and phone #:    Would the patient rather a call back or a response via [facility]? Call back  Best Call Back Number: [phone]  Additional Information: calling to check on the status of an order for the pt on a bladder imaging

## 2023-09-21 NOTE — TELEPHONE ENCOUNTER
----- Message from Juliette Butts sent at 9/21/2023  4:11 PM CDT -----  .Type:  Needs Medical Advice    Who Called: Cristina  Symptoms (please be specific):    How long has patient had these symptoms:    Pharmacy name and phone #:    Would the patient rather a call back or a response via [facility]?   Best Call Back Number: [phone]  Additional Information: Lizbeth please call Cristina back about orders [facility] in [place] they told Cristina they didn't receive it

## 2023-09-22 ENCOUNTER — TELEPHONE (OUTPATIENT)
Dept: INTERNAL MEDICINE | Facility: CLINIC | Age: 88
End: 2023-09-22
Payer: MEDICARE

## 2023-09-22 DIAGNOSIS — R33.9 URINARY RETENTION: Primary | ICD-10-CM

## 2023-09-22 NOTE — TELEPHONE ENCOUNTER
----- Message from Juliette Benjamín sent at 9/22/2023  9:54 AM CDT -----  .Type:  Needs Medical Advice    Who Called: pt daughter Cristina  Symptoms (please be specific):    How long has patient had these symptoms:    Pharmacy name and phone #:    Would the patient rather a call back or a response via [facility]?   Best Call Back Number: [phone]  Additional Information: pt daughter Cristina had previous conversation about some orders for her dad  it was sent but it's not correct please call her

## 2023-09-22 NOTE — TELEPHONE ENCOUNTER
Spoke with Ryan and correct order has been faxed, also verbal given per ryan request just in case fax does not come through.

## 2023-09-25 ENCOUNTER — DOCUMENTATION ONLY (OUTPATIENT)
Dept: INTERNAL MEDICINE | Facility: CLINIC | Age: 88
End: 2023-09-25
Payer: MEDICARE

## 2023-09-26 ENCOUNTER — TELEPHONE (OUTPATIENT)
Dept: INTERNAL MEDICINE | Facility: CLINIC | Age: 88
End: 2023-09-26
Payer: MEDICARE

## 2023-09-26 NOTE — TELEPHONE ENCOUNTER
Recent test results with patient's daughter.  He does have a high residual urine volume but is voiding now.  Not painful.  Kidney function remains normal and no UTI.  He had seen Dr. Power in the distant past and she made an appointment with him but that will be until November.  Really nothing more to do right now except to try to keep him his active as possible, and avoid antihistamines, and call if he is not able to void completely.

## 2023-09-26 NOTE — TELEPHONE ENCOUNTER
----- Message from Shavonne Arceo sent at 9/26/2023  8:03 AM CDT -----  Regarding: med advice  .Type:  Needs Medical Advice    Who Called: Pt's daughterCristina  Symptoms (please be specific):    How long has patient had these symptoms:    Pharmacy name and phone #:    Would the patient rather a call back or a response via [facility]? Call back  Best Call Back Number: [phone]  Additional Information: Following up on results from pt's scan of his bladder.

## 2023-10-10 ENCOUNTER — TELEPHONE (OUTPATIENT)
Dept: INTERNAL MEDICINE | Facility: CLINIC | Age: 88
End: 2023-10-10
Payer: MEDICARE

## 2023-10-11 ENCOUNTER — TELEPHONE (OUTPATIENT)
Dept: INTERNAL MEDICINE | Facility: CLINIC | Age: 88
End: 2023-10-11
Payer: MEDICARE

## 2023-10-11 NOTE — TELEPHONE ENCOUNTER
----- Message from Lizbeth Rosenberg LPN sent at 10/10/2023  7:52 AM CDT -----  Regarding: indu MEJIA 10/18 @3:00  Fasting labs needed.

## 2023-10-18 ENCOUNTER — OFFICE VISIT (OUTPATIENT)
Dept: INTERNAL MEDICINE | Facility: CLINIC | Age: 88
End: 2023-10-18
Payer: MEDICARE

## 2023-10-18 VITALS
OXYGEN SATURATION: 99 % | SYSTOLIC BLOOD PRESSURE: 131 MMHG | BODY MASS INDEX: 22.21 KG/M2 | HEIGHT: 72 IN | WEIGHT: 164 LBS | HEART RATE: 87 BPM | DIASTOLIC BLOOD PRESSURE: 73 MMHG | RESPIRATION RATE: 18 BRPM

## 2023-10-18 DIAGNOSIS — J44.9 CHRONIC OBSTRUCTIVE PULMONARY DISEASE, UNSPECIFIED COPD TYPE: ICD-10-CM

## 2023-10-18 DIAGNOSIS — I25.10 CORONARY ARTERIOSCLEROSIS: ICD-10-CM

## 2023-10-18 DIAGNOSIS — E78.5 HYPERLIPIDEMIA, UNSPECIFIED HYPERLIPIDEMIA TYPE: ICD-10-CM

## 2023-10-18 DIAGNOSIS — I50.30 DIASTOLIC CONGESTIVE HEART FAILURE, UNSPECIFIED HF CHRONICITY: ICD-10-CM

## 2023-10-18 DIAGNOSIS — I10 BENIGN ESSENTIAL HYPERTENSION: Primary | ICD-10-CM

## 2023-10-18 PROCEDURE — G0008 FLU VACCINE - QUADRIVALENT - ADJUVANTED: ICD-10-PCS | Mod: ,,, | Performed by: INTERNAL MEDICINE

## 2023-10-18 PROCEDURE — 1126F AMNT PAIN NOTED NONE PRSNT: CPT | Mod: CPTII,,, | Performed by: INTERNAL MEDICINE

## 2023-10-18 PROCEDURE — 1126F PR PAIN SEVERITY QUANTIFIED, NO PAIN PRESENT: ICD-10-PCS | Mod: CPTII,,, | Performed by: INTERNAL MEDICINE

## 2023-10-18 PROCEDURE — 99214 OFFICE O/P EST MOD 30 MIN: CPT | Mod: ,,, | Performed by: INTERNAL MEDICINE

## 2023-10-18 PROCEDURE — 1101F PR PT FALLS ASSESS DOC 0-1 FALLS W/OUT INJ PAST YR: ICD-10-PCS | Mod: CPTII,,, | Performed by: INTERNAL MEDICINE

## 2023-10-18 PROCEDURE — G0008 ADMIN INFLUENZA VIRUS VAC: HCPCS | Mod: ,,, | Performed by: INTERNAL MEDICINE

## 2023-10-18 PROCEDURE — 1159F MED LIST DOCD IN RCRD: CPT | Mod: CPTII,,, | Performed by: INTERNAL MEDICINE

## 2023-10-18 PROCEDURE — 1159F PR MEDICATION LIST DOCUMENTED IN MEDICAL RECORD: ICD-10-PCS | Mod: CPTII,,, | Performed by: INTERNAL MEDICINE

## 2023-10-18 PROCEDURE — 90694 VACC AIIV4 NO PRSRV 0.5ML IM: CPT | Mod: ,,, | Performed by: INTERNAL MEDICINE

## 2023-10-18 PROCEDURE — 3288F PR FALLS RISK ASSESSMENT DOCUMENTED: ICD-10-PCS | Mod: CPTII,,, | Performed by: INTERNAL MEDICINE

## 2023-10-18 PROCEDURE — 90694 FLU VACCINE - QUADRIVALENT - ADJUVANTED: ICD-10-PCS | Mod: ,,, | Performed by: INTERNAL MEDICINE

## 2023-10-18 PROCEDURE — 1101F PT FALLS ASSESS-DOCD LE1/YR: CPT | Mod: CPTII,,, | Performed by: INTERNAL MEDICINE

## 2023-10-18 PROCEDURE — 3288F FALL RISK ASSESSMENT DOCD: CPT | Mod: CPTII,,, | Performed by: INTERNAL MEDICINE

## 2023-10-18 PROCEDURE — 99214 PR OFFICE/OUTPT VISIT, EST, LEVL IV, 30-39 MIN: ICD-10-PCS | Mod: ,,, | Performed by: INTERNAL MEDICINE

## 2023-10-18 NOTE — PROGRESS NOTES
Subjective:       Patient ID: Max Squires is a 88 y.o. male.    Chief Complaint: Follow-up      The patient is an 88-year-old man in for follow-up multiple medical problems.  He remains off cigarettes and has been off for nearly a year.  He has chronic shortness of breath that is really unchanged.  No edema.    He does have appointments in the near future with his cardiologist and his urologist.    Follow-up      Review of Systems     Current Outpatient Medications on File Prior to Visit   Medication Sig Dispense Refill    albuterol (PROVENTIL HFA) 90 mcg/actuation inhaler Inhale 2 puffs into the lungs every 4 (four) hours as needed for Wheezing or Shortness of Breath. Rescue 18 g 2    albuterol-ipratropium (DUO-NEB) 2.5 mg-0.5 mg/3 mL nebulizer solution Take 3 mLs by nebulization 4 (four) times daily. Rescue 120 each 11    amLODIPine (NORVASC) 5 MG tablet Take 5 mg by mouth Daily.      aspirin (ECOTRIN) 81 MG EC tablet Take 1 tablet (81 mg total) by mouth once daily. 30 tablet 0    atorvastatin (LIPITOR) 80 MG tablet TAKE 1 TABLET(80 MG) BY MOUTH EVERY DAY 90 tablet 3    cholestyramine-aspartame (CHOLESTYRAMINE LIGHT) 4 gram PwPk MIX AND DRINK 1 PACKET(4 GRAMS) BY MOUTH EVERY DAY FOR 10 DAYS 60 packet 11    diclofenac sodium (VOLTAREN) 1 % Gel APPLY 1 TO 2 GRAMS TOPICALLY TO THE AFFECTED AREA FOUR TIMES DAILY AS NEEDED      ezetimibe (ZETIA) 10 mg tablet Take 1 tablet (10 mg total) by mouth every evening. 30 tablet 0    finasteride (PROSCAR) 5 mg tablet Take 1 tablet (5 mg total) by mouth once daily. 90 tablet 3    furosemide (LASIX) 40 MG tablet Take 1 tablet (40 mg total) by mouth once daily. (Patient taking differently: Take 40 mg by mouth as needed.) 30 tablet 11    potassium chloride (MICRO-K) 10 MEQ CpSR Take 1 capsule (10 mEq total) by mouth 2 (two) times daily. 60 capsule 11    tamsulosin (FLOMAX) 0.4 mg Cap Take 2 capsules (0.8 mg total) by mouth once daily. 60 capsule 11    metoprolol succinate  (TOPROL-XL) 25 MG 24 hr tablet Take 1 tablet (25 mg total) by mouth once daily. 30 tablet 11     No current facility-administered medications on file prior to visit.     Objective:      /73 (BP Location: Right arm, Patient Position: Sitting, BP Method: Large (Manual))   Pulse 87   Resp 18   Ht 6' (1.829 m)   Wt 74.4 kg (164 lb)   SpO2 99%   BMI 22.24 kg/m²     Physical Exam  Vitals reviewed.   Constitutional:       Appearance: Normal appearance.   HENT:      Head: Normocephalic and atraumatic.      Mouth/Throat:      Pharynx: Oropharynx is clear.   Eyes:      Pupils: Pupils are equal, round, and reactive to light.   Cardiovascular:      Rate and Rhythm: Normal rate and regular rhythm.      Pulses: Normal pulses.      Heart sounds: Normal heart sounds.   Pulmonary:      Breath sounds: Normal breath sounds.      Comments: Breath sounds are distant  Abdominal:      General: Abdomen is flat.      Palpations: Abdomen is soft.   Musculoskeletal:      Cervical back: Neck supple.   Skin:     General: Skin is warm and dry.   Neurological:      Mental Status: He is alert.       No lab work done.  Assessment:       1. Hypertension.  Good control    2. COPD.  Severe but stable.  On neb treatments and home oxygen.  Off cigarettes     3. History of coronary disease.  Also history of diastolic dysfunction.  Stable clinically.  Also followed by Cardiology    4. Hyperlipidemia.  On statin therapy.  Blood work not done this time but we will reschedule for next visit      5. History of urinary retention.  Voiding well now.  Plan:     Continue same meds.  Follow-up with me 3 months with CBC CMP lipid TSH prior.

## 2023-10-20 ENCOUNTER — TELEPHONE (OUTPATIENT)
Dept: INTERNAL MEDICINE | Facility: CLINIC | Age: 88
End: 2023-10-20
Payer: MEDICARE

## 2023-10-20 DIAGNOSIS — I50.30 DIASTOLIC CONGESTIVE HEART FAILURE, UNSPECIFIED HF CHRONICITY: ICD-10-CM

## 2023-10-20 DIAGNOSIS — J44.9 CHRONIC OBSTRUCTIVE PULMONARY DISEASE, UNSPECIFIED COPD TYPE: Primary | ICD-10-CM

## 2023-10-20 NOTE — TELEPHONE ENCOUNTER
----- Message from Donavon Glover sent at 10/20/2023 10:53 AM CDT -----  .Type:  Needs Medical Advice    Who Called: Pt's Daughter Cristina  Symptoms (please be specific):    How long has patient had these symptoms:    Pharmacy name and phone #:    Would the patient rather a call back or a response via [facility]? Call back  Best Call Back Number: [phone]  Additional Information: calling to check on status of home health orders

## 2023-10-24 PROCEDURE — G0180 MD CERTIFICATION HHA PATIENT: HCPCS | Mod: ,,, | Performed by: INTERNAL MEDICINE

## 2023-10-24 PROCEDURE — G0180 PR HOME HEALTH MD CERTIFICATION: ICD-10-PCS | Mod: ,,, | Performed by: INTERNAL MEDICINE

## 2023-10-27 ENCOUNTER — DOCUMENT SCAN (OUTPATIENT)
Dept: HOME HEALTH SERVICES | Facility: HOSPITAL | Age: 88
End: 2023-10-27
Payer: MEDICARE

## 2023-11-20 ENCOUNTER — EXTERNAL HOME HEALTH (OUTPATIENT)
Dept: HOME HEALTH SERVICES | Facility: HOSPITAL | Age: 88
End: 2023-11-20
Payer: MEDICARE

## 2023-12-01 ENCOUNTER — DOCUMENT SCAN (OUTPATIENT)
Dept: HOME HEALTH SERVICES | Facility: HOSPITAL | Age: 88
End: 2023-12-01
Payer: MEDICARE

## 2023-12-06 ENCOUNTER — DOCUMENTATION ONLY (OUTPATIENT)
Dept: INTERNAL MEDICINE | Facility: CLINIC | Age: 88
End: 2023-12-06
Payer: MEDICARE

## 2023-12-06 DIAGNOSIS — R06.02 SOB (SHORTNESS OF BREATH): Primary | ICD-10-CM

## 2023-12-08 ENCOUNTER — TELEPHONE (OUTPATIENT)
Dept: INTERNAL MEDICINE | Facility: CLINIC | Age: 88
End: 2023-12-08
Payer: MEDICARE

## 2023-12-20 ENCOUNTER — TELEPHONE (OUTPATIENT)
Dept: INTERNAL MEDICINE | Facility: CLINIC | Age: 88
End: 2023-12-20
Payer: MEDICARE

## 2023-12-20 NOTE — TELEPHONE ENCOUNTER
----- Message from Selena Delacruz sent at 12/20/2023 10:52 AM CST -----  .Type:  Patient Returning Call    Who Called:Ericka from [facility]   Who Left Message for Patient:  Does the patient know what this is regarding?:stat issues   Would the patient rather a call back or a response via [facility]? Call back   Best Call Back Number:[phone]  Additional Information: States states that O2 drops to the 88 and takes a while to come up after 3 minutes to the 93 while on oxygen

## 2023-12-20 NOTE — TELEPHONE ENCOUNTER
Spoke with  Nurse who stated aChest xray was done several weeks ago.  Stated it takes 3-5 min for his Sat to return to 93%.  Patient not c/o dizziness.  Nurse stated this is typical for patient but necessary by protocol to report to PCP.

## 2023-12-21 ENCOUNTER — DOCUMENT SCAN (OUTPATIENT)
Dept: HOME HEALTH SERVICES | Facility: HOSPITAL | Age: 88
End: 2023-12-21
Payer: MEDICARE

## 2023-12-23 PROCEDURE — G0179 MD RECERTIFICATION HHA PT: HCPCS | Mod: ,,, | Performed by: INTERNAL MEDICINE

## 2023-12-28 ENCOUNTER — DOCUMENT SCAN (OUTPATIENT)
Dept: HOME HEALTH SERVICES | Facility: HOSPITAL | Age: 88
End: 2023-12-28
Payer: MEDICARE

## 2024-01-04 ENCOUNTER — DOCUMENT SCAN (OUTPATIENT)
Dept: HOME HEALTH SERVICES | Facility: HOSPITAL | Age: 89
End: 2024-01-04
Payer: MEDICARE

## 2024-01-11 ENCOUNTER — DOCUMENT SCAN (OUTPATIENT)
Dept: HOME HEALTH SERVICES | Facility: HOSPITAL | Age: 89
End: 2024-01-11
Payer: MEDICARE

## 2024-01-17 ENCOUNTER — TELEPHONE (OUTPATIENT)
Dept: INTERNAL MEDICINE | Facility: CLINIC | Age: 89
End: 2024-01-17
Payer: MEDICARE

## 2024-01-17 NOTE — TELEPHONE ENCOUNTER
----- Message from Lara Gautam sent at 1/17/2024  4:29 PM CST -----  .Type:  Needs Medical Advice    Who Called:[facility]   Symptoms (please be specific):    How long has patient had these symptoms:    Pharmacy name and phone #:    Would the patient rather a call back or a response via [facility]?   Best Call Back Number: [phone]  Additional Information: needs lab orders sent to [facility]  [phone]

## 2024-01-18 ENCOUNTER — EXTERNAL HOME HEALTH (OUTPATIENT)
Dept: HOME HEALTH SERVICES | Facility: HOSPITAL | Age: 89
End: 2024-01-18
Payer: MEDICARE

## 2024-01-18 ENCOUNTER — TELEPHONE (OUTPATIENT)
Dept: INTERNAL MEDICINE | Facility: CLINIC | Age: 89
End: 2024-01-18
Payer: MEDICARE

## 2024-01-18 NOTE — TELEPHONE ENCOUNTER
----- Message from Lizbeth Rosenberg LPN sent at 1/17/2024  4:37 PM CST -----  Regarding: indu MEJIA 1/24 @3:00  Fasting labs needed.

## 2024-01-22 ENCOUNTER — DOCUMENTATION ONLY (OUTPATIENT)
Dept: INTERNAL MEDICINE | Facility: CLINIC | Age: 89
End: 2024-01-22
Payer: MEDICARE

## 2024-01-24 ENCOUNTER — DOCUMENT SCAN (OUTPATIENT)
Dept: HOME HEALTH SERVICES | Facility: HOSPITAL | Age: 89
End: 2024-01-24
Payer: MEDICARE

## 2024-02-07 ENCOUNTER — LAB REQUISITION (OUTPATIENT)
Dept: LAB | Facility: HOSPITAL | Age: 89
End: 2024-02-07
Attending: SPECIALIST
Payer: MEDICARE

## 2024-02-07 DIAGNOSIS — R39.9 UNSPECIFIED SYMPTOMS AND SIGNS INVOLVING THE GENITOURINARY SYSTEM: ICD-10-CM

## 2024-02-07 DIAGNOSIS — R31.9 HEMATURIA, UNSPECIFIED: ICD-10-CM

## 2024-02-07 LAB
APPEARANCE UR: ABNORMAL
BACTERIA #/AREA URNS AUTO: ABNORMAL /HPF
BILIRUB UR QL STRIP.AUTO: ABNORMAL
COLOR UR AUTO: ABNORMAL
GLUCOSE UR QL STRIP.AUTO: NEGATIVE
KETONES UR QL STRIP.AUTO: NEGATIVE
LEUKOCYTE ESTERASE UR QL STRIP.AUTO: ABNORMAL
NITRITE UR QL STRIP.AUTO: POSITIVE
PH UR STRIP.AUTO: 6 [PH]
PROT UR QL STRIP.AUTO: >=300
RBC #/AREA URNS AUTO: >100 /HPF
RBC UR QL AUTO: ABNORMAL
SP GR UR STRIP.AUTO: 1.02 (ref 1–1.03)
SQUAMOUS #/AREA URNS AUTO: ABNORMAL /HPF
UROBILINOGEN UR STRIP-ACNC: 0.2
WBC #/AREA URNS AUTO: ABNORMAL /HPF

## 2024-02-07 PROCEDURE — 87086 URINE CULTURE/COLONY COUNT: CPT | Performed by: SPECIALIST

## 2024-02-07 PROCEDURE — 81003 URINALYSIS AUTO W/O SCOPE: CPT | Performed by: SPECIALIST

## 2024-02-10 LAB — BACTERIA UR CULT: ABNORMAL

## 2024-02-19 ENCOUNTER — TELEPHONE (OUTPATIENT)
Dept: INTERNAL MEDICINE | Facility: CLINIC | Age: 89
End: 2024-02-19
Payer: MEDICARE

## 2024-02-19 ENCOUNTER — DOCUMENT SCAN (OUTPATIENT)
Dept: HOME HEALTH SERVICES | Facility: HOSPITAL | Age: 89
End: 2024-02-19
Payer: MEDICARE

## 2024-02-19 NOTE — TELEPHONE ENCOUNTER
----- Message from Lizbeth Rosenberg LPN sent at 2/19/2024 11:52 AM CST -----  Regarding: indu lara 02/26 @3:40  No labs needed

## 2024-02-21 ENCOUNTER — DOCUMENT SCAN (OUTPATIENT)
Dept: HOME HEALTH SERVICES | Facility: HOSPITAL | Age: 89
End: 2024-02-21
Payer: MEDICARE

## 2024-02-21 PROCEDURE — G0179 MD RECERTIFICATION HHA PT: HCPCS | Mod: ,,, | Performed by: INTERNAL MEDICINE

## 2024-02-26 ENCOUNTER — OFFICE VISIT (OUTPATIENT)
Dept: INTERNAL MEDICINE | Facility: CLINIC | Age: 89
End: 2024-02-26
Payer: MEDICARE

## 2024-02-26 VITALS
DIASTOLIC BLOOD PRESSURE: 81 MMHG | SYSTOLIC BLOOD PRESSURE: 137 MMHG | WEIGHT: 168 LBS | RESPIRATION RATE: 18 BRPM | OXYGEN SATURATION: 98 % | HEIGHT: 72 IN | HEART RATE: 68 BPM | BODY MASS INDEX: 22.75 KG/M2

## 2024-02-26 DIAGNOSIS — E78.5 HYPERLIPIDEMIA, UNSPECIFIED HYPERLIPIDEMIA TYPE: ICD-10-CM

## 2024-02-26 DIAGNOSIS — I50.30 DIASTOLIC CONGESTIVE HEART FAILURE, UNSPECIFIED HF CHRONICITY: ICD-10-CM

## 2024-02-26 DIAGNOSIS — J44.9 CHRONIC OBSTRUCTIVE PULMONARY DISEASE, UNSPECIFIED COPD TYPE: Primary | ICD-10-CM

## 2024-02-26 DIAGNOSIS — I25.10 CORONARY ARTERIOSCLEROSIS: ICD-10-CM

## 2024-02-26 DIAGNOSIS — I10 BENIGN ESSENTIAL HYPERTENSION: ICD-10-CM

## 2024-02-26 PROCEDURE — 1126F AMNT PAIN NOTED NONE PRSNT: CPT | Mod: CPTII,,, | Performed by: INTERNAL MEDICINE

## 2024-02-26 PROCEDURE — 1101F PT FALLS ASSESS-DOCD LE1/YR: CPT | Mod: CPTII,,, | Performed by: INTERNAL MEDICINE

## 2024-02-26 PROCEDURE — 1159F MED LIST DOCD IN RCRD: CPT | Mod: CPTII,,, | Performed by: INTERNAL MEDICINE

## 2024-02-26 PROCEDURE — 99214 OFFICE O/P EST MOD 30 MIN: CPT | Mod: ,,, | Performed by: INTERNAL MEDICINE

## 2024-02-26 PROCEDURE — 3288F FALL RISK ASSESSMENT DOCD: CPT | Mod: CPTII,,, | Performed by: INTERNAL MEDICINE

## 2024-02-26 NOTE — PROGRESS NOTES
Subjective:       Patient ID: Max Squires is a 89 y.o. male.    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  Overall he is doing very well except for persistent shortness a breath with minimal exertion.  He does have severe COPD.  He feels his symptoms are stable but not improving.  He has been off cigarettes now for over a year.    Follow-up      Review of Systems     Current Outpatient Medications on File Prior to Visit   Medication Sig Dispense Refill    albuterol (PROVENTIL HFA) 90 mcg/actuation inhaler Inhale 2 puffs into the lungs every 4 (four) hours as needed for Wheezing or Shortness of Breath. Rescue 18 g 2    albuterol-ipratropium (DUO-NEB) 2.5 mg-0.5 mg/3 mL nebulizer solution Take 3 mLs by nebulization 4 (four) times daily. Rescue 120 each 11    amLODIPine (NORVASC) 5 MG tablet Take 5 mg by mouth Daily.      aspirin (ECOTRIN) 81 MG EC tablet Take 1 tablet (81 mg total) by mouth once daily. 30 tablet 0    atorvastatin (LIPITOR) 80 MG tablet TAKE 1 TABLET(80 MG) BY MOUTH EVERY DAY 90 tablet 3    cholestyramine-aspartame (CHOLESTYRAMINE LIGHT) 4 gram PwPk MIX AND DRINK 1 PACKET(4 GRAMS) BY MOUTH EVERY DAY FOR 10 DAYS 60 packet 11    diclofenac sodium (VOLTAREN) 1 % Gel APPLY 1 TO 2 GRAMS TOPICALLY TO THE AFFECTED AREA FOUR TIMES DAILY AS NEEDED      ezetimibe (ZETIA) 10 mg tablet Take 1 tablet (10 mg total) by mouth every evening. 30 tablet 0    finasteride (PROSCAR) 5 mg tablet Take 1 tablet (5 mg total) by mouth once daily. 90 tablet 3    furosemide (LASIX) 40 MG tablet Take 1 tablet (40 mg total) by mouth once daily. (Patient taking differently: Take 40 mg by mouth as needed.) 30 tablet 11    potassium chloride (MICRO-K) 10 MEQ CpSR Take 1 capsule (10 mEq total) by mouth 2 (two) times daily. 60 capsule 11    tamsulosin (FLOMAX) 0.4 mg Cap Take 2 capsules (0.8 mg total) by mouth once daily. 60 capsule 11     metoprolol succinate (TOPROL-XL) 25 MG 24 hr tablet Take 1 tablet (25 mg total) by mouth once daily. 30 tablet 11     No current facility-administered medications on file prior to visit.     Objective:      /81 (BP Location: Right arm, Patient Position: Sitting, BP Method: Large (Manual))   Pulse 68   Resp 18   Ht 6' (1.829 m)   Wt 76.2 kg (168 lb)   SpO2 98%   BMI 22.78 kg/m²     Physical Exam  Vitals reviewed.   Constitutional:       Appearance: Normal appearance.   HENT:      Head: Normocephalic and atraumatic.      Right Ear: Tympanic membrane normal.      Left Ear: Tympanic membrane normal.      Mouth/Throat:      Pharynx: Oropharynx is clear.   Eyes:      Pupils: Pupils are equal, round, and reactive to light.   Neck:      Vascular: No carotid bruit.   Cardiovascular:      Rate and Rhythm: Normal rate and regular rhythm.      Pulses: Normal pulses.      Heart sounds: Normal heart sounds.   Pulmonary:      Effort: Pulmonary effort is normal.      Breath sounds: Normal breath sounds.   Abdominal:      General: Abdomen is flat.      Palpations: Abdomen is soft. There is no mass.      Tenderness: There is no abdominal tenderness. There is no guarding.   Musculoskeletal:         General: No swelling.      Cervical back: Neck supple.      Comments: Trace pretibial edema.   Lymphadenopathy:      Cervical: No cervical adenopathy.   Skin:     General: Skin is warm and dry.   Neurological:      General: No focal deficit present.      Mental Status: He is alert and oriented to person, place, and time.       Lab work reviewed  Assessment:       1. COPD.  Severe but stable.    2. History of diastolic heart failure.  Compensated.  He does see cardiology but has not recently    3. Hypertension.  Good control    4. Hyperlipidemia.  Good control    5. History of coronary disease.  Clinically stable  Plan:     Continue same meds.  Follow-up with me 3 months with CBC CMP lipid TSH prior.  Encouraged increase activity  as able    Living will discussed with caregiver his daughter.  DNR status recommended and accepted.  Forms filled out.

## 2024-02-28 DIAGNOSIS — I10 BENIGN ESSENTIAL HYPERTENSION: Primary | ICD-10-CM

## 2024-02-28 RX ORDER — METOPROLOL SUCCINATE 25 MG/1
25 TABLET, EXTENDED RELEASE ORAL
Qty: 90 TABLET | Refills: 3 | Status: SHIPPED | OUTPATIENT
Start: 2024-02-28

## 2024-03-03 ENCOUNTER — EXTERNAL HOME HEALTH (OUTPATIENT)
Dept: HOME HEALTH SERVICES | Facility: HOSPITAL | Age: 89
End: 2024-03-03
Payer: MEDICARE

## 2024-03-11 ENCOUNTER — DOCUMENT SCAN (OUTPATIENT)
Dept: HOME HEALTH SERVICES | Facility: HOSPITAL | Age: 89
End: 2024-03-11
Payer: MEDICARE

## 2024-03-27 ENCOUNTER — OFFICE VISIT (OUTPATIENT)
Dept: URGENT CARE | Facility: CLINIC | Age: 89
End: 2024-03-27
Payer: MEDICARE

## 2024-03-27 VITALS
HEART RATE: 67 BPM | WEIGHT: 160 LBS | DIASTOLIC BLOOD PRESSURE: 73 MMHG | RESPIRATION RATE: 20 BRPM | SYSTOLIC BLOOD PRESSURE: 121 MMHG | OXYGEN SATURATION: 96 % | BODY MASS INDEX: 21.7 KG/M2 | TEMPERATURE: 98 F

## 2024-03-27 DIAGNOSIS — K59.00 CONSTIPATION, UNSPECIFIED CONSTIPATION TYPE: Primary | ICD-10-CM

## 2024-03-27 PROCEDURE — 99203 OFFICE O/P NEW LOW 30 MIN: CPT | Mod: ,,, | Performed by: FAMILY MEDICINE

## 2024-03-27 RX ORDER — PROPRANOLOL HYDROCHLORIDE 120 MG/1
120 CAPSULE, EXTENDED RELEASE ORAL
Status: ON HOLD | COMMUNITY
Start: 2023-10-16 | End: 2024-04-25 | Stop reason: HOSPADM

## 2024-03-27 NOTE — PROGRESS NOTES
Subjective:      Patient ID: Max Squires is a 89 y.o. male.    Vitals:  weight is 72.6 kg (160 lb). His oral temperature is 97.9 °F (36.6 °C). His blood pressure is 121/73 and his pulse is 67. His respiration is 20 and oxygen saturation is 96%.     Chief Complaint: GI Problem (Pt unable to defecate for 7 days. Has had lower back pain and decrease in appetite.)    89-year-old male presents to clinic complaining of constipation.  States he has not had a bowel movement in 7 days.  States most of his pain is in the lower back area.  Has been eating and drinking normally up until today.  States he has loss of appetite today.  Denies any nausea or vomiting.  Denies any abdominal pain.  Denies any distention.  Daughter states that he tried picking his wife up in strained his back recently and that may be where his back pains coming from.  Denies any rectal pain.  Denies any fever.        Constitution: Negative.   HENT: Negative.     Neck: neck negative.   Cardiovascular: Negative.    Eyes: Negative.    Respiratory: Negative.     Gastrointestinal:  Positive for constipation.   Genitourinary: Negative.    Musculoskeletal: Negative.    Skin: Negative.    Allergic/Immunologic: Negative.    Neurological: Negative.    Hematologic/Lymphatic: Negative.       Objective:     Physical Exam   Constitutional: He is oriented to person, place, and time.  Non-toxic appearance. He does not appear ill. No distress.   Eyes: Conjunctivae are normal.   Abdominal: Normal appearance. He exhibits distension. There is abdominal tenderness (mild tenderness to palpation left lower quadrant).   Neurological: He is alert and oriented to person, place, and time.   Skin: Skin is not diaphoretic.   Psychiatric: His behavior is normal. Mood, judgment and thought content normal.   Vitals reviewed.       Previous History      Review of patient's allergies indicates:  No Known Allergies    Past Medical History:   Diagnosis Date    Arthritis     COPD  (chronic obstructive pulmonary disease)     Emphysema, unspecified     Hypercholesterolemia     Hypertension     Unspecified osteoarthritis, unspecified site      Current Outpatient Medications   Medication Instructions    albuterol (PROVENTIL HFA) 90 mcg/actuation inhaler 2 puffs, Inhalation, Every 4 hours PRN, Rescue    albuterol-ipratropium (DUO-NEB) 2.5 mg-0.5 mg/3 mL nebulizer solution 3 mLs, Nebulization, 4 times daily, Rescue    amLODIPine (NORVASC) 5 mg, Oral, Daily    aspirin (ECOTRIN) 81 mg, Oral, Daily    atorvastatin (LIPITOR) 80 MG tablet TAKE 1 TABLET(80 MG) BY MOUTH EVERY DAY    cholestyramine-aspartame (CHOLESTYRAMINE LIGHT) 4 gram PwPk MIX AND DRINK 1 PACKET(4 GRAMS) BY MOUTH EVERY DAY FOR 10 DAYS    diclofenac sodium (VOLTAREN) 1 % Gel APPLY 1 TO 2 GRAMS TOPICALLY TO THE AFFECTED AREA FOUR TIMES DAILY AS NEEDED    ezetimibe (ZETIA) 10 mg, Oral, Nightly    finasteride (PROSCAR) 5 mg, Oral, Daily    furosemide (LASIX) 40 mg, Oral, Daily    metoprolol succinate (TOPROL-XL) 25 mg, Oral    potassium chloride (MICRO-K) 10 MEQ CpSR 10 mEq, Oral, 2 times daily    propranoloL (INDERAL LA) 120 mg, Oral    tamsulosin (FLOMAX) 0.8 mg, Oral, Daily     Past Surgical History:   Procedure Laterality Date    BACK SURGERY      CORONARY ANGIOPLASTY WITH STENT PLACEMENT      FRACTURE SURGERY      foot    HIP SURGERY Left     Dr. Hoyos    JOINT REPLACEMENT Left     QUETA    JOINT REPLACEMENT Right     TKA     History reviewed. No pertinent family history.    Social History     Tobacco Use    Smoking status: Former     Current packs/day: 1.00     Types: Cigarettes    Smokeless tobacco: Never   Substance Use Topics    Alcohol use: Not Currently     Comment: None    Drug use: Never        Physical Exam      Vital Signs Reviewed   /73   Pulse 67   Temp 97.9 °F (36.6 °C) (Oral)   Resp 20   Wt 72.6 kg (160 lb)   SpO2 96% Comment: With Oxygen  BMI 21.70 kg/m²        Procedures    Procedures     Labs     Results for  orders placed or performed in visit on 02/07/24   Urine culture    Specimen: Urine   Result Value Ref Range    Urine Culture (A)      10,000 - 25,000 colonies/ml Staphylococcus epidermidis       Susceptibility    Staphylococcus epidermidis -  (no method available)     Clindamycin <=0.25 Sensitive      Erythromycin >=8 Resistant      Gentamicin <=0.5 Sensitive      Oxacillin >=4 Resistant      Trimethoprim/Sulfamethoxazole 80 Resistant      Tetracycline >=16 Resistant      Vancomycin 2 Sensitive    Urinalysis   Result Value Ref Range    Color, UA Red (A) Yellow, Light-Yellow, Dark Yellow, Hannah, Straw    Appearance, UA SL CLOUDY (A) Clear    Specific Gravity, UA 1.025 1.005 - 1.030    pH, UA 6.0 5.0 - 8.5    Protein, UA >=300 (A) Negative    Glucose, UA Negative Negative, Normal    Ketones, UA Negative Negative    Blood, UA Large (A) Negative    Bilirubin, UA Small (A) Negative    Urobilinogen, UA 0.2 0.2, 1.0, Normal    Nitrites, UA Positive (A) Negative    Leukocyte Esterase, UA Small (A) Negative   Urinalysis, Microscopic   Result Value Ref Range    Bacteria, UA Few (A) None Seen, Rare, Occasional /HPF    RBC, UA >100 (A) None Seen, 0-2, 3-5, 0-5 /HPF    WBC, UA 11-20 (A) None Seen, 0-2, 3-5, 0-5 /HPF    Squamous Epithelial Cells, UA Occasional None Seen, Rare, Occasional, Occ /HPF         Assessment:     1. Constipation, unspecified constipation type        Plan:   Constipation versus impaction versus obstruction    As discussed patient may be impacted.  The ER can disimpact him.  Obstruction is also a concern.  He may need a CT scan of the abdomen.    Daughter states she would like to try an enema first before taking him to the emergency department.    Recommend magnesium citrate orally and performing a fleets enema.  If this does not produce a large bowel movement and it does not relieve his pain, then it would take him to the emergency department for further evaluation.  Constipation, unspecified constipation  type

## 2024-03-27 NOTE — PATIENT INSTRUCTIONS
Recommend magnesium citrate orally and performing a fleets enema.  If this does not produce a large bowel movement and it does not relieve his pain, then it would take him to the emergency department for further evaluation.

## 2024-03-28 ENCOUNTER — TELEPHONE (OUTPATIENT)
Dept: INTERNAL MEDICINE | Facility: CLINIC | Age: 89
End: 2024-03-28
Payer: MEDICARE

## 2024-03-28 NOTE — TELEPHONE ENCOUNTER
----- Message from Sadaf Cannon sent at 3/28/2024  2:57 PM CDT -----  Regarding: office notes  Type:  Needs Medical Advice    Who Called: guillermo @ [facility]    Best Call Back Number: [phone]   fax# [phone]    Additional Information: in need of last 2 office notes

## 2024-03-29 DIAGNOSIS — E78.5 HYPERLIPIDEMIA, UNSPECIFIED HYPERLIPIDEMIA TYPE: ICD-10-CM

## 2024-04-01 RX ORDER — FINASTERIDE 5 MG/1
5 TABLET, FILM COATED ORAL
Qty: 90 TABLET | Refills: 3 | Status: SHIPPED | OUTPATIENT
Start: 2024-04-01

## 2024-04-03 ENCOUNTER — HOSPITAL ENCOUNTER (INPATIENT)
Facility: HOSPITAL | Age: 89
LOS: 22 days | Discharge: HOSPICE | DRG: 699 | End: 2024-04-25
Attending: STUDENT IN AN ORGANIZED HEALTH CARE EDUCATION/TRAINING PROGRAM | Admitting: INTERNAL MEDICINE
Payer: MEDICARE

## 2024-04-03 DIAGNOSIS — R10.9 ABDOMINAL PAIN: ICD-10-CM

## 2024-04-03 DIAGNOSIS — R07.9 CHEST PAIN, UNSPECIFIED TYPE: ICD-10-CM

## 2024-04-03 DIAGNOSIS — S32.010A CLOSED COMPRESSION FRACTURE OF L1 VERTEBRA, INITIAL ENCOUNTER: ICD-10-CM

## 2024-04-03 DIAGNOSIS — R18.8 PELVIC FLUID COLLECTION: ICD-10-CM

## 2024-04-03 DIAGNOSIS — R06.02 SOB (SHORTNESS OF BREATH): ICD-10-CM

## 2024-04-03 DIAGNOSIS — K59.00 CONSTIPATION, UNSPECIFIED CONSTIPATION TYPE: Primary | ICD-10-CM

## 2024-04-03 PROBLEM — N39.0 URINARY TRACT INFECTION ASSOCIATED WITH INDWELLING URETHRAL CATHETER: Status: ACTIVE | Noted: 2024-04-03

## 2024-04-03 PROBLEM — M54.50 LOW BACK PAIN: Status: ACTIVE | Noted: 2024-04-03

## 2024-04-03 PROBLEM — T83.511A URINARY TRACT INFECTION ASSOCIATED WITH INDWELLING URETHRAL CATHETER: Status: ACTIVE | Noted: 2024-04-03

## 2024-04-03 PROBLEM — R33.9 URINARY RETENTION: Status: ACTIVE | Noted: 2024-04-03

## 2024-04-03 LAB
ALBUMIN SERPL-MCNC: 3.1 G/DL (ref 3.4–4.8)
ALBUMIN/GLOB SERPL: 1 RATIO (ref 1.1–2)
ALP SERPL-CCNC: 115 UNIT/L (ref 40–150)
ALT SERPL-CCNC: 5 UNIT/L (ref 0–55)
AMORPH URATE CRY URNS QL MICRO: ABNORMAL /UL
APPEARANCE UR: ABNORMAL
AST SERPL-CCNC: 8 UNIT/L (ref 5–34)
BACTERIA #/AREA URNS AUTO: ABNORMAL /HPF
BASOPHILS # BLD AUTO: 0.05 X10(3)/MCL
BASOPHILS NFR BLD AUTO: 0.6 %
BILIRUB SERPL-MCNC: 0.5 MG/DL
BILIRUB UR QL STRIP.AUTO: NEGATIVE
BUN SERPL-MCNC: 18.1 MG/DL (ref 8.4–25.7)
CALCIUM SERPL-MCNC: 8.5 MG/DL (ref 8.8–10)
CHLORIDE SERPL-SCNC: 101 MMOL/L (ref 98–107)
CO2 SERPL-SCNC: 25 MMOL/L (ref 23–31)
COLOR UR AUTO: YELLOW
CREAT SERPL-MCNC: 1.01 MG/DL (ref 0.73–1.18)
EOSINOPHIL # BLD AUTO: 0.35 X10(3)/MCL (ref 0–0.9)
EOSINOPHIL NFR BLD AUTO: 4.5 %
ERYTHROCYTE [DISTWIDTH] IN BLOOD BY AUTOMATED COUNT: 15.2 % (ref 11.5–17)
GFR SERPLBLD CREATININE-BSD FMLA CKD-EPI: >60 MLS/MIN/1.73/M2
GLOBULIN SER-MCNC: 3.2 GM/DL (ref 2.4–3.5)
GLUCOSE SERPL-MCNC: 89 MG/DL (ref 82–115)
GLUCOSE UR QL STRIP.AUTO: NORMAL
HCT VFR BLD AUTO: 36.6 % (ref 42–52)
HGB BLD-MCNC: 11.6 G/DL (ref 14–18)
IMM GRANULOCYTES # BLD AUTO: 0.04 X10(3)/MCL (ref 0–0.04)
IMM GRANULOCYTES NFR BLD AUTO: 0.5 %
KETONES UR QL STRIP.AUTO: NEGATIVE
LACTATE SERPL-SCNC: 1.1 MMOL/L (ref 0.5–2.2)
LEUKOCYTE ESTERASE UR QL STRIP.AUTO: 500
LIPASE SERPL-CCNC: 17 U/L
LYMPHOCYTES # BLD AUTO: 1.26 X10(3)/MCL (ref 0.6–4.6)
LYMPHOCYTES NFR BLD AUTO: 16.3 %
MCH RBC QN AUTO: 26.6 PG (ref 27–31)
MCHC RBC AUTO-ENTMCNC: 31.7 G/DL (ref 33–36)
MCV RBC AUTO: 83.9 FL (ref 80–94)
MONOCYTES # BLD AUTO: 0.5 X10(3)/MCL (ref 0.1–1.3)
MONOCYTES NFR BLD AUTO: 6.5 %
MUCOUS THREADS URNS QL MICRO: ABNORMAL /LPF
NEUTROPHILS # BLD AUTO: 5.51 X10(3)/MCL (ref 2.1–9.2)
NEUTROPHILS NFR BLD AUTO: 71.6 %
NITRITE UR QL STRIP.AUTO: NEGATIVE
NRBC BLD AUTO-RTO: 0 %
PH UR STRIP.AUTO: 6 [PH]
PLATELET # BLD AUTO: 175 X10(3)/MCL (ref 130–400)
PMV BLD AUTO: 10.8 FL (ref 7.4–10.4)
POTASSIUM SERPL-SCNC: 4.6 MMOL/L (ref 3.5–5.1)
PROT SERPL-MCNC: 6.3 GM/DL (ref 5.8–7.6)
PROT UR QL STRIP.AUTO: ABNORMAL
RBC # BLD AUTO: 4.36 X10(6)/MCL (ref 4.7–6.1)
RBC #/AREA URNS AUTO: ABNORMAL /HPF
RBC UR QL AUTO: ABNORMAL
SODIUM SERPL-SCNC: 133 MMOL/L (ref 136–145)
SP GR UR STRIP.AUTO: 1.01 (ref 1–1.03)
SQUAMOUS #/AREA URNS LPF: ABNORMAL /HPF
UROBILINOGEN UR STRIP-ACNC: NORMAL
WBC # SPEC AUTO: 7.71 X10(3)/MCL (ref 4.5–11.5)
WBC #/AREA URNS AUTO: >100 /HPF
WBC CLUMPS UR QL AUTO: ABNORMAL

## 2024-04-03 PROCEDURE — 80053 COMPREHEN METABOLIC PANEL: CPT | Performed by: STUDENT IN AN ORGANIZED HEALTH CARE EDUCATION/TRAINING PROGRAM

## 2024-04-03 PROCEDURE — 96360 HYDRATION IV INFUSION INIT: CPT

## 2024-04-03 PROCEDURE — 25000242 PHARM REV CODE 250 ALT 637 W/ HCPCS: Performed by: INTERNAL MEDICINE

## 2024-04-03 PROCEDURE — 99900031 HC PATIENT EDUCATION (STAT)

## 2024-04-03 PROCEDURE — 83605 ASSAY OF LACTIC ACID: CPT | Performed by: STUDENT IN AN ORGANIZED HEALTH CARE EDUCATION/TRAINING PROGRAM

## 2024-04-03 PROCEDURE — 21400001 HC TELEMETRY ROOM

## 2024-04-03 PROCEDURE — 87040 BLOOD CULTURE FOR BACTERIA: CPT | Performed by: STUDENT IN AN ORGANIZED HEALTH CARE EDUCATION/TRAINING PROGRAM

## 2024-04-03 PROCEDURE — 27000221 HC OXYGEN, UP TO 24 HOURS

## 2024-04-03 PROCEDURE — 85025 COMPLETE CBC W/AUTO DIFF WBC: CPT | Performed by: STUDENT IN AN ORGANIZED HEALTH CARE EDUCATION/TRAINING PROGRAM

## 2024-04-03 PROCEDURE — 99285 EMERGENCY DEPT VISIT HI MDM: CPT | Mod: 25

## 2024-04-03 PROCEDURE — 11000001 HC ACUTE MED/SURG PRIVATE ROOM

## 2024-04-03 PROCEDURE — 83690 ASSAY OF LIPASE: CPT | Performed by: STUDENT IN AN ORGANIZED HEALTH CARE EDUCATION/TRAINING PROGRAM

## 2024-04-03 PROCEDURE — 25000003 PHARM REV CODE 250: Performed by: INTERNAL MEDICINE

## 2024-04-03 PROCEDURE — 87077 CULTURE AEROBIC IDENTIFY: CPT | Performed by: STUDENT IN AN ORGANIZED HEALTH CARE EDUCATION/TRAINING PROGRAM

## 2024-04-03 PROCEDURE — 25500020 PHARM REV CODE 255: Performed by: STUDENT IN AN ORGANIZED HEALTH CARE EDUCATION/TRAINING PROGRAM

## 2024-04-03 PROCEDURE — 99223 1ST HOSP IP/OBS HIGH 75: CPT | Mod: AI,,, | Performed by: INTERNAL MEDICINE

## 2024-04-03 PROCEDURE — 63600175 PHARM REV CODE 636 W HCPCS: Performed by: STUDENT IN AN ORGANIZED HEALTH CARE EDUCATION/TRAINING PROGRAM

## 2024-04-03 PROCEDURE — 25000003 PHARM REV CODE 250: Performed by: STUDENT IN AN ORGANIZED HEALTH CARE EDUCATION/TRAINING PROGRAM

## 2024-04-03 PROCEDURE — 99900035 HC TECH TIME PER 15 MIN (STAT)

## 2024-04-03 PROCEDURE — 63600175 PHARM REV CODE 636 W HCPCS: Performed by: INTERNAL MEDICINE

## 2024-04-03 PROCEDURE — 94640 AIRWAY INHALATION TREATMENT: CPT

## 2024-04-03 PROCEDURE — 94760 N-INVAS EAR/PLS OXIMETRY 1: CPT

## 2024-04-03 PROCEDURE — 81001 URINALYSIS AUTO W/SCOPE: CPT | Performed by: STUDENT IN AN ORGANIZED HEALTH CARE EDUCATION/TRAINING PROGRAM

## 2024-04-03 RX ORDER — FUROSEMIDE 40 MG/1
40 TABLET ORAL
Status: DISCONTINUED | OUTPATIENT
Start: 2024-04-03 | End: 2024-04-25 | Stop reason: HOSPADM

## 2024-04-03 RX ORDER — METOPROLOL SUCCINATE 25 MG/1
25 TABLET, EXTENDED RELEASE ORAL DAILY
Status: DISCONTINUED | OUTPATIENT
Start: 2024-04-03 | End: 2024-04-25 | Stop reason: HOSPADM

## 2024-04-03 RX ORDER — POTASSIUM CHLORIDE 750 MG/1
10 TABLET, EXTENDED RELEASE ORAL 2 TIMES DAILY
Status: DISCONTINUED | OUTPATIENT
Start: 2024-04-03 | End: 2024-04-08

## 2024-04-03 RX ORDER — EZETIMIBE 10 MG/1
10 TABLET ORAL NIGHTLY
Status: DISCONTINUED | OUTPATIENT
Start: 2024-04-03 | End: 2024-04-22

## 2024-04-03 RX ORDER — AMLODIPINE BESYLATE 5 MG/1
5 TABLET ORAL DAILY
Status: DISCONTINUED | OUTPATIENT
Start: 2024-04-03 | End: 2024-04-22

## 2024-04-03 RX ORDER — ALPRAZOLAM 0.5 MG/1
0.5 TABLET ORAL 2 TIMES DAILY PRN
Status: DISCONTINUED | OUTPATIENT
Start: 2024-04-03 | End: 2024-04-16

## 2024-04-03 RX ORDER — TAMSULOSIN HYDROCHLORIDE 0.4 MG/1
2 CAPSULE ORAL DAILY
Status: DISCONTINUED | OUTPATIENT
Start: 2024-04-03 | End: 2024-04-25 | Stop reason: HOSPADM

## 2024-04-03 RX ORDER — LACTULOSE 10 G/15ML
30 SOLUTION ORAL 2 TIMES DAILY
Status: DISCONTINUED | OUTPATIENT
Start: 2024-04-03 | End: 2024-04-06

## 2024-04-03 RX ORDER — ASPIRIN 81 MG/1
81 TABLET ORAL DAILY
Status: DISCONTINUED | OUTPATIENT
Start: 2024-04-03 | End: 2024-04-25 | Stop reason: HOSPADM

## 2024-04-03 RX ORDER — DEXAMETHASONE SODIUM PHOSPHATE 4 MG/ML
4 INJECTION, SOLUTION INTRA-ARTICULAR; INTRALESIONAL; INTRAMUSCULAR; INTRAVENOUS; SOFT TISSUE EVERY 12 HOURS
Status: DISCONTINUED | OUTPATIENT
Start: 2024-04-03 | End: 2024-04-04

## 2024-04-03 RX ORDER — IPRATROPIUM BROMIDE AND ALBUTEROL SULFATE 2.5; .5 MG/3ML; MG/3ML
3 SOLUTION RESPIRATORY (INHALATION) 4 TIMES DAILY
Status: DISCONTINUED | OUTPATIENT
Start: 2024-04-03 | End: 2024-04-17

## 2024-04-03 RX ORDER — HYDROCODONE BITARTRATE AND ACETAMINOPHEN 7.5; 325 MG/1; MG/1
1 TABLET ORAL EVERY 6 HOURS PRN
Status: DISCONTINUED | OUTPATIENT
Start: 2024-04-03 | End: 2024-04-09

## 2024-04-03 RX ORDER — ATORVASTATIN CALCIUM 40 MG/1
80 TABLET, FILM COATED ORAL DAILY
Status: DISCONTINUED | OUTPATIENT
Start: 2024-04-03 | End: 2024-04-22

## 2024-04-03 RX ORDER — FINASTERIDE 5 MG/1
5 TABLET, FILM COATED ORAL DAILY
Status: DISCONTINUED | OUTPATIENT
Start: 2024-04-03 | End: 2024-04-25 | Stop reason: HOSPADM

## 2024-04-03 RX ADMIN — IOHEXOL 100 ML: 350 INJECTION, SOLUTION INTRAVENOUS at 07:04

## 2024-04-03 RX ADMIN — NALOXEGOL OXALATE 25 MG: 25 TABLET, FILM COATED ORAL at 10:04

## 2024-04-03 RX ADMIN — FINASTERIDE 5 MG: 5 TABLET, FILM COATED ORAL at 10:04

## 2024-04-03 RX ADMIN — EZETIMIBE 10 MG: 10 TABLET ORAL at 09:04

## 2024-04-03 RX ADMIN — IPRATROPIUM BROMIDE AND ALBUTEROL SULFATE 3 ML: 2.5; .5 SOLUTION RESPIRATORY (INHALATION) at 08:04

## 2024-04-03 RX ADMIN — CEFTRIAXONE SODIUM 1 G: 1 INJECTION, POWDER, FOR SOLUTION INTRAMUSCULAR; INTRAVENOUS at 08:04

## 2024-04-03 RX ADMIN — ATORVASTATIN CALCIUM 80 MG: 40 TABLET, FILM COATED ORAL at 10:04

## 2024-04-03 RX ADMIN — DEXAMETHASONE SODIUM PHOSPHATE 4 MG: 4 INJECTION, SOLUTION INTRA-ARTICULAR; INTRALESIONAL; INTRAMUSCULAR; INTRAVENOUS; SOFT TISSUE at 09:04

## 2024-04-03 RX ADMIN — ALPRAZOLAM 0.5 MG: 0.5 TABLET ORAL at 09:04

## 2024-04-03 RX ADMIN — POTASSIUM CHLORIDE 10 MEQ: 750 TABLET, FILM COATED, EXTENDED RELEASE ORAL at 09:04

## 2024-04-03 RX ADMIN — SODIUM CHLORIDE, POTASSIUM CHLORIDE, SODIUM LACTATE AND CALCIUM CHLORIDE 1000 ML: 600; 310; 30; 20 INJECTION, SOLUTION INTRAVENOUS at 06:04

## 2024-04-03 RX ADMIN — ASPIRIN 81 MG: 81 TABLET, COATED ORAL at 10:04

## 2024-04-03 RX ADMIN — AMLODIPINE BESYLATE 5 MG: 5 TABLET ORAL at 04:04

## 2024-04-03 RX ADMIN — POTASSIUM CHLORIDE 10 MEQ: 750 TABLET, FILM COATED, EXTENDED RELEASE ORAL at 10:04

## 2024-04-03 RX ADMIN — LACTULOSE 30 G: 10 SOLUTION ORAL at 10:04

## 2024-04-03 RX ADMIN — IPRATROPIUM BROMIDE AND ALBUTEROL SULFATE 3 ML: 2.5; .5 SOLUTION RESPIRATORY (INHALATION) at 02:04

## 2024-04-03 RX ADMIN — METOPROLOL SUCCINATE 25 MG: 25 TABLET, EXTENDED RELEASE ORAL at 10:04

## 2024-04-03 RX ADMIN — TAMSULOSIN HYDROCHLORIDE 0.8 MG: 0.4 CAPSULE ORAL at 10:04

## 2024-04-03 NOTE — NURSING
Nurses Note -- 4 Eyes      4/3/2024   3:43 PM      Skin assessed during: Admit      [] No Altered Skin Integrity Present    []Prevention Measures Documented      [x] Yes- Altered Skin Integrity Present or Discovered   [] LDA Added if Not in Epic (Describe Wound)   [x] New Altered Skin Integrity was Present on Admit and Documented in LDA   [] Wound Image Taken    Wound Care Consulted? Yes    Attending Nurse:  Bernie Williamson RN/Staff Member:   MARYAN Locke

## 2024-04-03 NOTE — PROGRESS NOTES
[facility] - Observation Unit  Wound Care    Patient Name:  Max Squires   MRN:  [id number]  Date: 4/3/2024  Diagnosis: Low back pain    History:     Past Medical History:   Diagnosis Date    Arthritis     COPD (chronic obstructive pulmonary disease)     Emphysema, unspecified     Hypercholesterolemia     Hypertension     Unspecified osteoarthritis, unspecified site        Social History     Socioeconomic History    Marital status:    Tobacco Use    Smoking status: Former     Current packs/day: 1.00     Types: Cigarettes    Smokeless tobacco: Never   Substance and Sexual Activity    Alcohol use: Not Currently     Comment: None    Drug use: Never       Precautions:     Allergies as of 04/03/2024    (No Known Allergies)       WOC Assessment Details/Treatment        04/03/24 1615   WOCN Assessment   Visit Date 04/03/24   Visit Time 1615   Consult Type New   Intervention chart review;orders   Teaching on-going   Skin Interventions   Device Skin Pressure Protection absorbent pad utilized/changed        Altered Skin Integrity 04/03/24 1544 Perineum #1   Date First Assessed/Time First Assessed: 04/03/24 1544   Altered Skin Integrity Present on Admission - Did Patient arrive to the hospital with altered skin?: yes  Location: Perineum  Wound Number: #1  Is this injury device related?: No   Wound Image    Dressing Appearance Open to air   Drainage Amount None   Drainage Characteristics/Odor No odor   Appearance Red;Moist   Tissue loss description Partial thickness   Red (%), Wound Tissue Color 100 %   Periwound Area Macerated;Moist;Redness   Wound Edges Irregular     Wound care consulted for perineum. Treatment recommendations put into place.  Perineum: Apply Desitin BID and PRN. Keep areas clean and dry, no adult briefs while in bed. Nursing to continue with turning every two hours, wedge, and floating heels.  Will follow up.    04/03/2024

## 2024-04-03 NOTE — PROGRESS NOTES
Chief complaint:  Severe low back pain    The patient is an 89-year-old man well known to me who presents to the hospital with severe intractable low back pain.  It sounds like this pain began a few weeks ago after he is struggled to pull his wife up off the floor who had fallen.  Following that the pain became increasingly severe in the morning was intractable.  Tylenol and a leave of had minimal benefit.  He was some leftover Norco 5s that seemed to help.  He has been leftover from a urological procedure sometime prior.    To complicate matters has been worsening constipation and also recent trouble voiding.  He saw his urologist last week it he had a urine residual of 500 mL and a Almeida catheter was placed.  His daughter does not know when the last bowel movement was but thinks it has been as long as a week ago.  There has been no blood in his stools.  No tenesmus.  He has had no nausea or vomiting.  No fever or chills.  He has become increasingly weak and having trouble walking, primarily because of the back pain.    Home medications or duo nebs q.i.d. p.r.n., amlodipine 5 daily, aspirin 81 daily, Lipitor 80 daily, Voltaren gel topically p.r.n., Zetia 10 mg daily, Proscar 5 daily, Lasix 40 mg daily p.r.n. swelling,Micro-K 10 mEq b.i.d., Flomax 0.4 b.i.d., in Toprol- daily.    No known allergies    Past medical history notable for severe COPD.  He was on home oxygen.  He also has a history of coronary disease status post stents many years ago.  He was also had issues with diastolic heart failure but that is currently compensated he was longstanding hypertension and hyperlipidemia.  He also was hospitalized a few months ago for recurrent C difficile diarrhea.  That is completely resolved.  He also has had prior back surgery in hip replacement.  He also had a bladder lift procedure done by Dr. Tono al.    He was a reformed smoker.  It has only been about a year that he stopped but he did smoke heavily for  many years prior.  No recent alcohol use.    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  He was now living here with his daughter because of debilitation.  He was very inactive at home.  His wife is even in worse shape.  She was now on hospice.    Review of systems notable for    Family history noncontributory    Review of systems:  No fevers chills or sweats.  Weight seems to be stable overall.  Appetite has been fair.  HEENT history of cluster headaches but not an issue recently.  No history of diabetes or thyroid disease.  No recent chest pain orthopnea PND or undue shortness a breath.  No recent edema.  No change in chronic cough.  No nausea or vomiting.  The above-mentioned constipation without melena nor hematochezia.  He did have urinary retention and has an indwelling Almeida catheter in for the past several days.    He was afebrile.  Blood pressure 158/80, heart rate 68, respiratory rate 18 and O2 sat 96% on 2 L nasal cannula.    He has in no acute distress.  He was fairly appropriate and alert.  HEENT exam unremarkable.  Neck is supple.  Heart has a regular rate and rhythm.  Lungs clear bilaterally.  Abdomen is nontender.  Bowel sounds diminished.  Back without CVAT.  Extremities without clubbing cyanosis or edema.  I could not feel foot pulses on the right but he does have a good pulse on the left.  Capillary refills in the toes are okay and they are warm.    Laboratory studies show a mild anemia with a hemoglobin 11.6 hematocrit 36.6 and a white count of 7000.  Chemistry profile notable for low albumin at 3.1 lactic acid normal.  Urinalysis with over 100 white cells 11-20 red cells occasional bacteria culture pending.    Chest x-ray reveals no acute findings.  CT scan abdomen revealed an 8 cm fluid collection to the right of the rectum.  Limited by artifact from artificial hip.    Assessment:  1.  Severe intractable low back pain.  It seems to be originating from the lumbar  spine.  No evidence of abdominal aortic aneurism by CT scanning.  Consider fracture.  Consider exacerbation of spondylosis.  Consider more ominous pathology such as an occult malignancy.    2. Recent urinary retention in now with evidence of urinary tract infection    3. Recent constipation     4. Possible fluid collection the rectum.  Study somewhat limited.  Clinically no evidence of an abscess     5. Recent constipation.  Perhaps contributing to the urinary retention     6. COPD.  Severe but stable     7. History of coronary disease.  Currently asymptomatic    8. History of diastolic dysfunction and heart failure.  Currently compensated    9. DNR status.  This has been recently reviewed with him in his daughter in the office with living will papers filled out.    Plan:  Will proceed with an MRI of the spine.  Will add antibiotics for the UTI.  Will add Norco for pain relief.  Will add Movantik to help with preventing narcotic induced constipation.  In the meantime add extra laxatives.  Further evaluation of the possible perirectal fluid collection maybe necessary.

## 2024-04-03 NOTE — ED PROVIDER NOTES
"Encounter Date: 4/3/2024    SCRIBE #1 NOTE: I, Neil Mari, am scribing for, and in the presence of,  Ephraim Lane MD. I have scribed the following portions of the note -       History     Chief Complaint   Patient presents with    Back Pain     C/O back pain x 2 weeks after lifting his wife; also C/O constipation x "10-15 days." Arrives with indwelling catheter placed 3/28, draining well. Denies taking anything for pain.      An 88 y/o male with a history of emphysema, hypertension, hypercholesterolemia, and osteoarthritis presents to [facility] ED with back pain secondary to lifting his wife about 2 weeks ago. Patient states that he has had constipation for the past couple weeks and that he has had difficult urinating. Patient has an indwelling bobo catheter at this time that was placed on 03/28/2024.     The history is provided by the patient and medical records.     Review of patient's allergies indicates:  No Known Allergies  Past Medical History:   Diagnosis Date    Arthritis     COPD (chronic obstructive pulmonary disease)     Emphysema, unspecified     Hypercholesterolemia     Hypertension     Unspecified osteoarthritis, unspecified site      Past Surgical History:   Procedure Laterality Date    BACK SURGERY      CORONARY ANGIOPLASTY WITH STENT PLACEMENT      FRACTURE SURGERY      foot    HIP SURGERY Left     Dr. Hoyos    JOINT REPLACEMENT Left     QUETA    JOINT REPLACEMENT Right     TKA     No family history on file.  Social History     Tobacco Use    Smoking status: Former     Current packs/day: 1.00     Types: Cigarettes    Smokeless tobacco: Never   Substance Use Topics    Alcohol use: Not Currently     Comment: None    Drug use: Never     Review of Systems   Constitutional:  Negative for chills and fever.   HENT:  Negative for drooling and sore throat.    Eyes:  Negative for pain and redness.   Respiratory:  Negative for shortness of breath, wheezing and stridor.    Cardiovascular:  Negative " for chest pain, palpitations and leg swelling.   Gastrointestinal:  Positive for constipation. Negative for abdominal pain, nausea and vomiting.   Genitourinary:  Positive for difficulty urinating. Negative for dysuria and hematuria.   Musculoskeletal:  Positive for back pain. Negative for neck pain and neck stiffness.   Skin:  Negative for rash and wound.   Neurological:  Negative for weakness and numbness.   Hematological:  Does not bruise/bleed easily.       Physical Exam     Initial Vitals [04/03/24 0528]   BP Pulse Resp Temp SpO2   117/69 70 16 97.9 °F (36.6 °C) (!) 92 %      MAP       --         Physical Exam    Nursing note and vitals reviewed.  Constitutional: He appears well-developed. Nasal cannula in place.   Eyes: EOM are normal. Pupils are equal, round, and reactive to light.   Cardiovascular:  Normal rate and regular rhythm.           No murmur heard.  Pulmonary/Chest: Breath sounds normal. No respiratory distress. He has no wheezes. He has no rales.   Abdominal: Abdomen is soft. He exhibits no distension. There is no abdominal tenderness.   There is left CVA tenderness.   Genitourinary:    Genitourinary Comments: Almeida catheter in place.      Musculoskeletal:      Comments: Left paraspinal tenderness to palpation.      Neurological: He is alert and oriented to person, place, and time.   Skin: Skin is warm. Capillary refill takes less than 2 seconds. No rash noted.         ED Course   Procedures  Labs Reviewed   COMPREHENSIVE METABOLIC PANEL - Abnormal; Notable for the following components:       Result Value    Sodium Level 133 (*)     Calcium Level Total 8.5 (*)     Albumin Level 3.1 (*)     Albumin/Globulin Ratio 1.0 (*)     All other components within normal limits   URINALYSIS, REFLEX TO URINE CULTURE - Abnormal; Notable for the following components:    Appearance, UA Turbid (*)     Protein, UA Trace (*)     Blood, UA 1+ (*)     Leukocyte Esterase,  (*)     WBC, UA >100 (*)     WBC Clumps,  UA Few (*)     Bacteria, UA Occasional (*)     Mucous, UA Trace (*)     Amorphous Crystal, UA Occasional (*)     RBC, UA 11-20 (*)     All other components within normal limits   CBC WITH DIFFERENTIAL - Abnormal; Notable for the following components:    RBC 4.36 (*)     Hgb 11.6 (*)     Hct 36.6 (*)     MCH 26.6 (*)     MCHC 31.7 (*)     MPV 10.8 (*)     All other components within normal limits   LIPASE - Normal   LACTIC ACID, PLASMA - Normal   CBC W/ AUTO DIFFERENTIAL    Narrative:     The following orders were created for panel order CBC auto differential.  Procedure                               Abnormality         Status                     ---------                               -----------         ------                     CBC with Differential[[id number]]       Abnormal            Final result                 Please view results for these tests on the individual orders.          Imaging Results              MRI Lumbar Spine Without Contrast (Final result)  Result time 04/03/24 12:20:10      Final result by Kiran Scott MD (04/03/24 12:20:10)                   Impression:      1. Acute or subacute superior L1 endplate compression fracture.  2. Suspect acute Schmorl's nodes in the inferior L2 and superior L3 endplates.  3. Multilevel spondylosis, greatest at L2-3 where there is moderate spinal canal stenosis.  4. Multilevel foraminal stenosis, greatest on the right L2-3 where it is moderate.      Electronically signed by: Kiran Scott MD  Date:    04/03/2024  Time:    12:20               Narrative:    EXAMINATION:  MRI LUMBAR SPINE WITHOUT CONTRAST    CLINICAL HISTORY:  Low back pain, progressive neurologic deficit.  ED study.    TECHNIQUE:  Multiplanar, multisequence MR images were acquired from the thoracolumbar junction to the sacrum without the administration of contrast.    COMPARISON:  CT abdomen and pelvis, 04/03/2024.    FINDINGS:  Mild motion degradation.    NOMENCLATURE: Five lumbar type  vertebral bodies.    CORD/CAUDA EQUINA: Conus has normal size and signal and ends at a normal level of L1.    ALIGNMENT: Mild grade 1 anterolisthesis of L3 on L4 on the right    BONES: Left L3 and L4 hemilaminectomies.  Questionable left L5 hemilaminectomy.  Superior L1 endplate compression fracture with minimal height loss and minimal retropulsion.  Prominent type 1 endplate changes at L2-3 surrounding Schmorl's nodes.    PARASPINAL AREA: Prevertebral signal abnormality at the level of the L1 superior endplate fracture.  Trace bilateral dorsal paraspinal muscular STIR signal hyperintensity    LUMBAR DISC LEVELS:    T12-L1: Minimal disc bulge.  Mild bilateral facet hypertrophy.  Ligamentum flavum thickening.  No significant spinal canal or foraminal stenosis.    L1-L2: Mild disc bulge.  Mild bilateral facet hypertrophy.  Ligamentum flavum thickening.  Minimal narrowing of the bilateral lateral recesses.  No significant spinal canal stenosis. Minimal bilateral foraminal stenosis.    L2-L3: Mild disc bulge and posterior osteophytic ridging.  Disc height loss.  Moderate-marked right and moderate left facet hypertrophy.  Ligamentum flavum thickening.  Moderate spinal canal stenosis.  Moderate right and mild left foraminal stenosis.    L3-L4: Left hemilaminectomy.  Mild anterolisthesis on the right.  Mild disc bulge, eccentric to the right.  Disc height loss.  Moderate-marked right and mild-moderate left facet hypertrophy.  Ligamentum flavum thickening.  Moderate right and mild left narrowing of the lateral recesses.  No significant spinal canal stenosis.  Mild-moderate right and mild left foraminal stenosis.    L4-L5: Left hemilaminectomy.  Mild disc bulge.  Disc height loss.  Moderate right and mild-moderate left narrowing of the lateral recesses.  No significant spinal canal stenosis.  Mild-moderate left and mild right foraminal stenosis.    L5-S1: Question left hemilaminectomy.  Mild disc bulge, eccentric to the  right.  Mild bilateral facet hypertrophy.  Mild-moderate right and mild left narrowing of the lateral recesses.  No significant spinal canal or foraminal stenosis.                                       CT Abdomen Pelvis With IV Contrast NO Oral Contrast (Final result)  Result time 04/03/24 07:56:51      Final result by Armando Billingsley MD (04/03/24 07:56:51)                   Impression:      Limited evaluation secondary to metallic artifact from hip prosthesis.  The bladder wall appears markedly thickened with Almeida catheter in place.    8 cm fluid density within the right hemipelvis to the right of the rectum possibly represents abscess. This is not definite with metallic artifact from hip prosthesis. No prior imaging available for comparison.  Recommend comparison to patient's likely outside imaging.      Electronically signed by: Armando Billingsley  Date:    04/03/2024  Time:    07:56               Narrative:    EXAMINATION:  CT ABDOMEN PELVIS WITH IV CONTRAST    CLINICAL HISTORY:  Abdominal pain, acute, nonlocalized;Bowel obstruction suspected;Nausea/vomiting;    TECHNIQUE:  Multidetector IV contrast enhanced axial CT images of the abdomen and pelvis were obtained with coronal and sagittal reconstructions.    Automatic exposure control was utilized to reduce the patient's radiation dose.    DLP= 366    COMPARISON:  No prior imaging available for comparison.    FINDINGS:  01. HEPATOBILIARY: Hyperdense subcentimeter lesion in the inferior aspect of the liver (series 2, image 46) likely represents transient hepatic attenuation difference.  The gallbladder is normal.    02. SPLEEN: Normal    03. PANCREAS: No focal masses or ductal dilatation.    04. ADRENALS: No adrenal nodules.    05. KIDNEYS: The right kidney demonstrates no stone, hydronephrosis, or hydroureter. No focal mass identified. The left kidney demonstrates no stone, hydronephrosis, or hydroureter. No focal mass identified.    06.  LYMPHADENOPATHY/RETROPERITONEUM: There is no retroperitoneal lymphadenopathy. The abdominal aorta is normal in course and caliber. There are diffuse scattered mural atheromatous calcifications in the aortoiliac system.    07. BOWEL: No acute bowel related abnormalities. No evidence of appendiceal inflammation.    08. PELVIC VISCERA: Limited evaluation secondary to metallic artifact from hip prosthesis.  The bladder wall appears markedly thickened with Almeida catheter in place.    09. PELVIC LYMPH NODES: No lymphadenopathy.    10. PERITONEUM/ABDOMINAL WALL: 8 cm fluid density within the right hemipelvis to the right of the rectum possibly represents abscess.  This is not definite with metallic artifact from hip prosthesis.  No prior imaging available for comparison.  Recommend comparison to patient's likely outside imaging.    11. SKELETAL: No aggressive appearing lytic/blastic lesion. No acute fractures, subluxations or dislocations.    12. LUNG BASES: Emphysematous and fibrotic changes of the bilateral lower lobes appear chronic.                                       X-Ray Chest AP Portable (Final result)  Result time 04/03/24 07:10:41      Final result by Armando Billingsley MD (04/03/24 07:10:41)                   Impression:      No acute cardiopulmonary process.  Findings of chronic lung disease.      Electronically signed by: Armando Billingsley  Date:    04/03/2024  Time:    07:10               Narrative:    EXAMINATION:  XR CHEST AP PORTABLE    CLINICAL HISTORY:  Unspecified abdominal pain    TECHNIQUE:  Single view of the chest    COMPARISON:  07/13/2023    FINDINGS:  Prominent interstitial markings with no focal opacification.    The cardiomediastinal silhouette is within normal limits.    No acute osseous abnormality.                                       Medications   naloxegoL (MOVANTIK) tablet 25 mg (25 mg Oral Given 4/23/24 0942)   aspirin EC tablet 81 mg (81 mg Oral Given 4/23/24 0942)   finasteride tablet  5 mg (5 mg Oral Given 4/23/24 0942)   furosemide tablet 40 mg (40 mg Oral Given 4/15/24 0235)   metoprolol succinate (TOPROL-XL) 24 hr tablet 25 mg (25 mg Oral Given 4/23/24 0941)   tamsulosin 24 hr capsule 0.8 mg (0.8 mg Oral Given 4/23/24 0941)   zinc oxide-cod liver oil 40 % paste ( Topical (Top) Given 4/23/24 2147)   fentaNYL (SUBLIMAZE) 50 mcg/mL injection (has no administration in time range)   midazolam (PF) (VERSED) 1 mg/mL injection (has no administration in time range)   lactulose 10 gram/15 ml solution 30 g (30 g Oral Given 4/23/24 0942)   OLANZapine tablet 5 mg (5 mg Oral Given 4/21/24 2323)   docusate sodium capsule 100 mg (100 mg Oral Given 4/23/24 2147)   ipratropium 42 mcg (0.06 %) nasal spray 2 spray (2 sprays Each Nostril Given 4/21/24 0937)   fluticasone furoate 100 mcg/actuation inhaler 1 puff (1 puff Inhalation Not Given 4/22/24 0900)   ALPRAZolam tablet 0.5 mg (0.5 mg Oral Given 4/23/24 2147)   albuterol-ipratropium 2.5 mg-0.5 mg/3 mL nebulizer solution 3 mL (3 mLs Nebulization Given 4/24/24 0404)   bisacodyL EC tablet 5 mg (5 mg Oral Given 4/21/24 1321)   polyethylene glycol packet 17 g (17 g Oral Given 4/21/24 1321)   morphine injection 2 mg (2 mg Intravenous Given 4/22/24 1643)   haloperidol lactate injection 2 mg (2 mg Intravenous Given 4/20/24 1742)   predniSONE tablet 30 mg (30 mg Oral Given 4/23/24 0941)   morphine 10 mg/5 mL solution 5 mg (5 mg Oral Given 4/23/24 2237)   lactated ringers bolus 1,000 mL (0 mLs Intravenous Stopped 4/3/24 0740)   iohexoL (OMNIPAQUE 350) injection 100 mL (100 mLs Intravenous Given 4/3/24 0741)   cefTRIAXone (Rocephin) 1 g in dextrose 5 % in water (D5W) 100 mL IVPB (MB+) (0 g Intravenous Stopped 4/3/24 0929)   vancomycin 1.75 g in 5 % dextrose 500 mL IVPB (0 mg Intravenous Stopped 4/5/24 1122)   midazolam injection (1 mg  Given 4/5/24 1006)   fentaNYL 50 mcg/mL injection (25 mcg Intravenous Given 4/5/24 1007)   LIDOcaine HCL 20 mg/ml (2%) injection (5 mLs  Other Given 4/5/24 1007)   mupirocin 2 % ointment ( Nasal Given 4/10/24 2227)   diatrizoate meglumineand-diatrizoate sodium (GASTROVIEW) solution 30 mL (30 mLs Oral Given 4/10/24 0840)   iohexoL (OMNIPAQUE 350) injection 94 mL (94 mLs Intravenous Given 4/10/24 0840)   furosemide injection 20 mg (20 mg Intravenous Given 4/14/24 1043)   iohexoL (OMNIPAQUE 350) injection 100 mL (100 mLs Intravenous Given 4/15/24 1504)   furosemide injection 20 mg (20 mg Intravenous Given 4/16/24 1626)   OLANZapine tablet 10 mg (10 mg Oral Given 4/16/24 2221)   morphine injection 2 mg (2 mg Intravenous Given 4/19/24 0143)   0.9%  NaCl infusion (1,000 mLs Intravenous New Bag 4/19/24 1406)     Medical Decision Making  Problems Addressed:  Abdominal pain: acute illness or injury  Constipation, unspecified constipation type: acute illness or injury    Amount and/or Complexity of Data Reviewed  External Data Reviewed: labs.  Labs: ordered.  Radiology: ordered. Decision-making details documented in ED Course.    Risk  Prescription drug management.  Decision regarding hospitalization.      Differential diagnosis (includes but is not limited to):   Constipation, impaction, dehydration, kidney injury, electrolyte abnormalities, spinal pathology, malignancy, infection    MDM Narrative  89-year-old male presents for evaluation of persistent back pain as well as persistent constipation over the past several weeks.  He states his pain has significantly worsened and it is affecting his activities of daily living.  Labs are pending.  Imaging pending.  Pain and nausea control as needed.    Update:  Labs reviewed.  Urine with evidence of infection, IV Rocephin ordered.  Lactic acid normal.  WBC normal.  Patient reports continues ongoing pain.  Case discussed with the patient's PCP who will admit for further evaluation and management of the patient's symptoms.  MRI L-spine ordered for further evaluation.    Dispo: Admit    My independent radiology  interpretation: as above  Point of care US (independently performed and interpreted):   Decision rules/clinical scoring:     Sepsis Perfusion Assessment:     Amount and/or Complexity of Data Reviewed  Independent historian: none   Summary of history:   External data reviewed: notes from previous ED visits and notes from clinic visits  Summary of data reviewed: Prior records reviewed  Risk and benefits of testing: discussed   Labs: ordered and reviewed  Radiology: ordered and independent interpretation performed (see above or ED course)  ECG/medicine tests: ordered and independent interpretation performed (see above or ED course)  Discussion of management or test interpretation with external provider(s): discussed with hospitalist physician   Summary of discussion: as above    Risk  Parenteral controlled substances   Drug therapy requiring intense monitoring for toxicity   Decision regarding hospitalization  Shared decision making     Critical Care  none    Data Reviewed/Counseling: I have personally reviewed the patient's vital signs, nursing notes, and other relevant tests, information, and imaging. I had a detailed discussion regarding the historical points, exam findings, and any diagnostic results supporting the discharge diagnosis. I personally performed the history, PE, MDM and procedures as documented above and agree with the scribe's documentation.    Portions of this note were dictated using voice recognition software. Although it was reviewed for accuracy, some inherent voice recognition errors may have occurred and may be present in this document.          Scribe Attestation:   Scribe #1: I performed the above scribed service and the documentation accurately describes the services I performed. I attest to the accuracy of the note.    Attending Attestation:           Physician Attestation for Scribe:  Physician Attestation Statement for Scribe #1: I, Ephraim Lane MD, reviewed documentation, as  scribed by Neil Guerra in my presence, and it is both accurate and complete.             ED Course as of 04/24/24 0558   Wed Apr 03, 2024   0718 X-Ray Chest AP Portable  Independently visualized/reviewed by me during the ED visit.  - No acute lobar consolidation, no PTX [MC]   0812 Discussed with Dr. Ramires, will admit. [MC]      ED Course User Index  [MC] Ephraim Lane MD                           Clinical Impression:  Final diagnoses:  [R10.9] Abdominal pain  [K59.00] Constipation, unspecified constipation type (Primary)          ED Disposition Condition    Admit Stable                Ephraim Lane MD  04/24/24 0559

## 2024-04-04 PROCEDURE — 11000001 HC ACUTE MED/SURG PRIVATE ROOM

## 2024-04-04 PROCEDURE — 63600175 PHARM REV CODE 636 W HCPCS: Performed by: INTERNAL MEDICINE

## 2024-04-04 PROCEDURE — 25000003 PHARM REV CODE 250: Performed by: INTERNAL MEDICINE

## 2024-04-04 PROCEDURE — 99900035 HC TECH TIME PER 15 MIN (STAT)

## 2024-04-04 PROCEDURE — 94760 N-INVAS EAR/PLS OXIMETRY 1: CPT

## 2024-04-04 PROCEDURE — 21400001 HC TELEMETRY ROOM

## 2024-04-04 PROCEDURE — 99222 1ST HOSP IP/OBS MODERATE 55: CPT | Mod: ,,, | Performed by: COLON & RECTAL SURGERY

## 2024-04-04 PROCEDURE — 25000242 PHARM REV CODE 250 ALT 637 W/ HCPCS: Performed by: INTERNAL MEDICINE

## 2024-04-04 PROCEDURE — 99233 SBSQ HOSP IP/OBS HIGH 50: CPT | Mod: ,,, | Performed by: INTERNAL MEDICINE

## 2024-04-04 PROCEDURE — 94640 AIRWAY INHALATION TREATMENT: CPT

## 2024-04-04 PROCEDURE — 27000221 HC OXYGEN, UP TO 24 HOURS

## 2024-04-04 PROCEDURE — 99900031 HC PATIENT EDUCATION (STAT)

## 2024-04-04 RX ADMIN — POTASSIUM CHLORIDE 10 MEQ: 750 TABLET, FILM COATED, EXTENDED RELEASE ORAL at 09:04

## 2024-04-04 RX ADMIN — HYDROCODONE BITARTRATE AND ACETAMINOPHEN 1 TABLET: 7.5; 325 TABLET ORAL at 01:04

## 2024-04-04 RX ADMIN — FINASTERIDE 5 MG: 5 TABLET, FILM COATED ORAL at 10:04

## 2024-04-04 RX ADMIN — NALOXEGOL OXALATE 25 MG: 25 TABLET, FILM COATED ORAL at 10:04

## 2024-04-04 RX ADMIN — POTASSIUM CHLORIDE 10 MEQ: 750 TABLET, FILM COATED, EXTENDED RELEASE ORAL at 10:04

## 2024-04-04 RX ADMIN — IPRATROPIUM BROMIDE AND ALBUTEROL SULFATE 3 ML: 2.5; .5 SOLUTION RESPIRATORY (INHALATION) at 08:04

## 2024-04-04 RX ADMIN — TAMSULOSIN HYDROCHLORIDE 0.8 MG: 0.4 CAPSULE ORAL at 10:04

## 2024-04-04 RX ADMIN — ASPIRIN 81 MG: 81 TABLET, COATED ORAL at 10:04

## 2024-04-04 RX ADMIN — EZETIMIBE 10 MG: 10 TABLET ORAL at 09:04

## 2024-04-04 RX ADMIN — METOPROLOL SUCCINATE 25 MG: 25 TABLET, EXTENDED RELEASE ORAL at 10:04

## 2024-04-04 RX ADMIN — Medication: at 10:04

## 2024-04-04 RX ADMIN — LACTULOSE 30 G: 10 SOLUTION ORAL at 09:04

## 2024-04-04 RX ADMIN — IPRATROPIUM BROMIDE AND ALBUTEROL SULFATE 3 ML: 2.5; .5 SOLUTION RESPIRATORY (INHALATION) at 01:04

## 2024-04-04 RX ADMIN — ALPRAZOLAM 0.5 MG: 0.5 TABLET ORAL at 09:04

## 2024-04-04 RX ADMIN — LACTULOSE 30 G: 10 SOLUTION ORAL at 10:04

## 2024-04-04 RX ADMIN — PIPERACILLIN AND TAZOBACTAM 4.5 G: 4; .5 INJECTION, POWDER, LYOPHILIZED, FOR SOLUTION INTRAVENOUS; PARENTERAL at 11:04

## 2024-04-04 RX ADMIN — IPRATROPIUM BROMIDE AND ALBUTEROL SULFATE 3 ML: 2.5; .5 SOLUTION RESPIRATORY (INHALATION) at 07:04

## 2024-04-04 RX ADMIN — PIPERACILLIN AND TAZOBACTAM 4.5 G: 4; .5 INJECTION, POWDER, LYOPHILIZED, FOR SOLUTION INTRAVENOUS; PARENTERAL at 02:04

## 2024-04-04 RX ADMIN — HYDROCODONE BITARTRATE AND ACETAMINOPHEN 1 TABLET: 7.5; 325 TABLET ORAL at 09:04

## 2024-04-04 RX ADMIN — ATORVASTATIN CALCIUM 80 MG: 40 TABLET, FILM COATED ORAL at 10:04

## 2024-04-04 RX ADMIN — Medication: at 09:04

## 2024-04-04 NOTE — CONSULTS
[facility] - Observation Unit  Neurosurgery  Consult Note    Inpatient consult to Neurosurgery  Consult performed by: Sarah Villela PA  Consult ordered by: Sarah Villela PA        Subjective:     Chief Complaint/Reason for Admission: Back pain    History of Present Illness: Patient is an 89-year-old male with PMHx of COPD (on home oxygen), CAD, DHF, HTN, HLD and recent C diff, who presented to the hospital on 4/3 with c/o severe intractable low back pain. He reports that his back pain began a few weeks ago after he struggled to pull his wife up off the floor after she had fallen. His pain progressively worsened over the last several weeks making it difficult for ADLs. Tylenol and Aleve had minimal benefit.  He was some leftover Norco 5s that seemed to help. To complicate matters he has had worsening constipation and also recent trouble voiding.  He saw his urologist last week it he had a urine residual of 500 mL and a Bobo catheter was placed.  His daughter does not know when the last bowel movement was but thinks it has been as long as a week ago.  There has been no blood in his stools.    CT chest/abd/pelvis was completed upon arrival and showed no acute findings of his thoracic and lumbar spine. MRI of his lumbar spine was ordered and significant for acute to subacute superior L1 VCF. Dr. Vasques was consulted for evaluation and treatment recommendations.    On PE this am he is lying in bed, NAD. His back pain is manageable at time of rounds. He did sit at the side of the bed for a few hours this am which did exacerbate his back pain. He currently denies numbness and tingling in bilateral LE. He denies saddle anesthesia. He currently has a bobo in place.     PTA Medications   Medication Sig    albuterol (PROVENTIL HFA) 90 mcg/actuation inhaler Inhale 2 puffs into the lungs every 4 (four) hours as needed for Wheezing or Shortness of Breath. Rescue (Patient not taking: Reported on  3/27/2024)    albuterol-ipratropium (DUO-NEB) 2.5 mg-0.5 mg/3 mL nebulizer solution Take 3 mLs by nebulization 4 (four) times daily. Rescue    amLODIPine (NORVASC) 5 MG tablet Take 5 mg by mouth Daily.    aspirin (ECOTRIN) 81 MG EC tablet Take 1 tablet (81 mg total) by mouth once daily.    atorvastatin (LIPITOR) 80 MG tablet TAKE 1 TABLET(80 MG) BY MOUTH EVERY DAY    diclofenac sodium (VOLTAREN) 1 % Gel APPLY 1 TO 2 GRAMS TOPICALLY TO THE AFFECTED AREA FOUR TIMES DAILY AS NEEDED    ezetimibe (ZETIA) 10 mg tablet Take 1 tablet (10 mg total) by mouth every evening.    finasteride (PROSCAR) 5 mg tablet TAKE 1 TABLET(5 MG) BY MOUTH EVERY DAY    furosemide (LASIX) 40 MG tablet Take 1 tablet (40 mg total) by mouth once daily. (Patient taking differently: Take 40 mg by mouth as needed.)    metoprolol succinate (TOPROL-XL) 25 MG 24 hr tablet TAKE 1 TABLET(25 MG) BY MOUTH EVERY DAY    potassium chloride (MICRO-K) 10 MEQ CpSR Take 1 capsule (10 mEq total) by mouth 2 (two) times daily.    propranoloL (INDERAL LA) 120 MG 24 hr capsule Take 120 mg by mouth.    tamsulosin (FLOMAX) 0.4 mg Cap Take 2 capsules (0.8 mg total) by mouth once daily.       Review of patient's allergies indicates:  No Known Allergies    Past Medical History:   Diagnosis Date    Arthritis     COPD (chronic obstructive pulmonary disease)     Emphysema, unspecified     Hypercholesterolemia     Hypertension     Unspecified osteoarthritis, unspecified site      Past Surgical History:   Procedure Laterality Date    BACK SURGERY      CORONARY ANGIOPLASTY WITH STENT PLACEMENT      FRACTURE SURGERY      foot    HIP SURGERY Left     Dr. Hoyos    JOINT REPLACEMENT Left     QUETA    JOINT REPLACEMENT Right     TKA     Family History    None       Tobacco Use    Smoking status: Former     Current packs/day: 1.00     Types: Cigarettes    Smokeless tobacco: Never   Substance and Sexual Activity    Alcohol use: Not Currently     Comment: None    Drug use: Never    Sexual  activity: Not on file     Review of Systems  Objective:     Weight: 72.6 kg (160 lb)  Body mass index is 21.7 kg/m².  Vital Signs (Most Recent):  Temp: 97.5 °F (36.4 °C) (04/04/24 0710)  Pulse: 64 (04/04/24 0816)  Resp: 20 (04/04/24 0816)  BP: 108/61 (04/04/24 0710)  SpO2: 98 % (04/04/24 0816) Vital Signs (24h Range):  Temp:  [97.4 °F (36.3 °C)-97.9 °F (36.6 °C)] 97.5 °F (36.4 °C)  Pulse:  [60-74] 64  Resp:  [17-24] 20  SpO2:  [92 %-100 %] 98 %  BP: (108-147)/(61-79) 108/61     Date 04/04/24 0700 - 04/05/24 0659   Shift 8179-9397 2632-7304 6319-9506 24 Hour Total   INTAKE   P.O. 480   480   Shift Total(mL/kg) 480(6.6)   480(6.6)   OUTPUT   Shift Total(mL/kg)       Weight (kg) 72.6 72.6 72.6 72.6                            Physical Exam:  Nursing note and vitals reviewed.    Constitutional: He appears well-developed. No distress.     Eyes: Pupils are equal, round, and reactive to light. EOM are normal.     Cardiovascular: Intact distal pulses.     Abdominal: Soft.     Psych/Behavior: He is alert. He is oriented to person, place, and time. He has a normal mood and affect.   Very hard of hearing     Musculoskeletal:        Neck: Range of motion is full. There is no tenderness.        Back: Range of motion is limited. There is tenderness.        Right Upper Extremities: Range of motion is full.        Left Upper Extremities: Range of motion is full.       Right Lower Extremities: Range of motion is full.        Left Lower Extremities: Range of motion is full.     Neurological:        Sensory: There is no sensory deficit in the trunk. There is no sensory deficit in the extremities.        DTRs: Tricep reflexes are 1+ on the right side and 1+ on the left side. Bicep reflexes are 1+ on the right side and 1+ on the left side. Patellar reflexes are 1+ on the right side and 1+ on the left side. Achilles reflexes are 0 on the right side and 0 on the left side. He displays no Babinski's sign on the right side. He displays no  "Babinski's sign on the left side.        Cranial nerves: Cranial nerve(s) II, III, IV, V, VI, VII, VIII, IX, X, XI and XII are intact.       Significant Labs:  Recent Labs   Lab 04/03/24  0632   *   K 4.6   CO2 25   BUN 18.1   CREATININE 1.01   CALCIUM 8.5*     Recent Labs   Lab 04/03/24  0632   WBC 7.71   HGB 11.6*   HCT 36.6*        No results for input(s): "LABPT", "INR", "APTT" in the last 48 hours.  Microbiology Results (last 7 days)       Procedure Component Value Units Date/Time    Blood Culture #1 **CANNOT BE ORDERED STAT** [[id number]]  (Normal) Collected: 04/03/24 0852    Order Status: Completed Specimen: Blood Updated: 04/04/24 1000     CULTURE, BLOOD ([facility]) No Growth At 24 Hours    Blood Culture #2 **CANNOT BE ORDERED STAT** [[id number]]  (Normal) Collected: 04/03/24 0852    Order Status: Completed Specimen: Blood Updated: 04/04/24 1000     CULTURE, BLOOD ([facility]) No Growth At 24 Hours    Urine culture [[id number]]  (Abnormal) Collected: 04/03/24 0635    Order Status: Completed Specimen: Urine Updated: 04/04/24 0659     Urine Culture >/= 100,000 colonies/ml Gram-positive Cocci            Significant Diagnostics:  MRI Lumbar Spine Without Contrast [[id number]] Resulted: 04/03/24 1220   Order Status: Completed Updated: 04/03/24 1222   Narrative:     EXAMINATION:  MRI LUMBAR SPINE WITHOUT CONTRAST    CLINICAL HISTORY:  Low back pain, progressive neurologic deficit.  ED study.    TECHNIQUE:  Multiplanar, multisequence MR images were acquired from the thoracolumbar junction to the sacrum without the administration of contrast.    COMPARISON:  CT abdomen and pelvis, 04/03/2024.    FINDINGS:  Mild motion degradation.    NOMENCLATURE: Five lumbar type vertebral bodies.    CORD/CAUDA EQUINA: Conus has normal size and signal and ends at a normal level of L1.    ALIGNMENT: Mild grade 1 anterolisthesis of L3 on L4 on the right    BONES: Left L3 and L4 hemilaminectomies.  Questionable left L5 " hemilaminectomy.  Superior L1 endplate compression fracture with minimal height loss and minimal retropulsion.  Prominent type 1 endplate changes at L2-3 surrounding Schmorl's nodes.    PARASPINAL AREA: Prevertebral signal abnormality at the level of the L1 superior endplate fracture.  Trace bilateral dorsal paraspinal muscular STIR signal hyperintensity    LUMBAR DISC LEVELS:    T12-L1: Minimal disc bulge.  Mild bilateral facet hypertrophy.  Ligamentum flavum thickening.  No significant spinal canal or foraminal stenosis.    L1-L2: Mild disc bulge.  Mild bilateral facet hypertrophy.  Ligamentum flavum thickening.  Minimal narrowing of the bilateral lateral recesses.  No significant spinal canal stenosis. Minimal bilateral foraminal stenosis.    L2-L3: Mild disc bulge and posterior osteophytic ridging.  Disc height loss.  Moderate-marked right and moderate left facet hypertrophy.  Ligamentum flavum thickening.  Moderate spinal canal stenosis.  Moderate right and mild left foraminal stenosis.    L3-L4: Left hemilaminectomy.  Mild anterolisthesis on the right.  Mild disc bulge, eccentric to the right.  Disc height loss.  Moderate-marked right and mild-moderate left facet hypertrophy.  Ligamentum flavum thickening.  Moderate right and mild left narrowing of the lateral recesses.  No significant spinal canal stenosis.  Mild-moderate right and mild left foraminal stenosis.    L4-L5: Left hemilaminectomy.  Mild disc bulge.  Disc height loss.  Moderate right and mild-moderate left narrowing of the lateral recesses.  No significant spinal canal stenosis.  Mild-moderate left and mild right foraminal stenosis.    L5-S1: Question left hemilaminectomy.  Mild disc bulge, eccentric to the right.  Mild bilateral facet hypertrophy.  Mild-moderate right and mild left narrowing of the lateral recesses.  No significant spinal canal or foraminal stenosis.   Impression:       1. Acute or subacute superior L1 endplate compression  fracture.  2. Suspect acute Schmorl's nodes in the inferior L2 and superior L3 endplates.  3. Multilevel spondylosis, greatest at L2-3 where there is moderate spinal canal stenosis.  4. Multilevel foraminal stenosis, greatest on the right L2-3 where it is moderate.       Assessment/Plan:     Active Diagnoses:    Diagnosis Date Noted POA    PRINCIPAL PROBLEM:  Low back pain [M54.50] 04/03/2024 Yes    Urinary retention [R33.9] 04/03/2024 Yes    Urinary tract infection associated with indwelling urethral catheter [T83.511A, N39.0] 04/03/2024 Yes    Benign essential hypertension [I10] 04/11/2023 Yes    Coronary arteriosclerosis [I25.10] 04/11/2023 Yes    COPD (chronic obstructive pulmonary disease) [J44.9] 04/11/2023 Yes      Problems Resolved During this Admission:     Acute vs subacute L1 VCF  Lumbar stenosis  Recurrent urinary retention with UTI- currently with bobo  Constipation    He is currently motor intact to bilateral LE.  MRI of L spine shows likely subacute L1 VCF with minimal JOSE JUAN and without retropulsion. He has no radicular complaints from his multilevel stenosis  We discussed proceeding with conservative treatment in a LSO brace at this time.  We will mobilize with PT/OT  Multimodal pain control  Kyphoplasty was discussed but with his age and comorbidities we will try conservative treatment first. He and his daughter voiced understanding.    Thank you for your consult. I will follow-up with patient. Please contact us if you have any additional questions.    ALLEGRA Chandler  Neurosurgery  [facility] - Observation Unit

## 2024-04-04 NOTE — PROGRESS NOTES
Subjective:  The patient did move his bowels yesterday.  His back pain has improved but is still significant.  Now he was pointing to the left upper buttocks as being the area of most intense pain.  He did tolerate a diet yesterday.      Objective:  He was afebrile.  Blood pressure 108/61.  Heart rate 63.  Respiratory rate 20 O2 sat 99% on 3 L nasal cannula.    General appearance is unremarkable.  He was somewhat agitated but sitting up in bed.  In no distress.  Heart has a regular rate and rhythm.  Lungs clear.  Abdomen nontender.  Extremities without clubbing or cyanosis or edema.    Urine cultures growing out greater than 100,000 colonies of Gram-positive cocci.  Blood cultures remain negative.    MRI spine does reveal L1 superior endplate compression fracture.  There was multilevel spondylosis worse at L2-3 where there is moderate spinal canal stenosis.    Assessment:  1.  Low back pain.  Likely caused by L1 compression fracture.    2. Urinary tract infection.  Enterococcus most likely culprit.      3. Possible fluid collection right perirectal area.  Clinically he does not have abscess.    4. Constipation.  Better     5. Urinary retention with recent Almeida catheter placement    6. Compensated congestive heart failure.  He has a history of diastolic dysfunction and stable coronary disease    7. Severe but stable COPD    Plan: Change antibiotics to cover Gram-positive better.  He initially received Rocephin.    We will consult Colorectal surgery for opinion regarding fluid collection    We will consult Neurosurgery for possible treatment of the compression fracture

## 2024-04-04 NOTE — CONSULTS
[facility] - Observation Unit  Colon & Rectal Surgery  Consult Note    Inpatient consult to Colorectal Surgery  Consult performed by: Ephraim Joseph MD  Consult ordered by: Ephraim Ramires MD        Subjective:     Chief Complaint/Reason for Admission:  Perirectal fluid collection    History of Present Illness:  89-year-old white male presented to the ER yesterday morning with low back pain for the past 2 weeks after lifting his wife after she fell.  His wife is present at the bedside and helps provide some of the historical information.  She reports that he has had constipation for the past 10-14 days and has required Almeida catheter placement for urinary retention last week.  He has had 3 BMs since admission and reports some improvement in his back pain.  CT scan abdomen/pelvis yesterday revealed an 8 cm fluid density within the right hemipelvis to the right of the rectum possibly representing an abscess.  He denies abdominal pain and fever.    No current facility-administered medications on file prior to encounter.     Current Outpatient Medications on File Prior to Encounter   Medication Sig    albuterol (PROVENTIL HFA) 90 mcg/actuation inhaler Inhale 2 puffs into the lungs every 4 (four) hours as needed for Wheezing or Shortness of Breath. Rescue (Patient not taking: Reported on 3/27/2024)    albuterol-ipratropium (DUO-NEB) 2.5 mg-0.5 mg/3 mL nebulizer solution Take 3 mLs by nebulization 4 (four) times daily. Rescue    amLODIPine (NORVASC) 5 MG tablet Take 5 mg by mouth Daily.    aspirin (ECOTRIN) 81 MG EC tablet Take 1 tablet (81 mg total) by mouth once daily.    atorvastatin (LIPITOR) 80 MG tablet TAKE 1 TABLET(80 MG) BY MOUTH EVERY DAY    diclofenac sodium (VOLTAREN) 1 % Gel APPLY 1 TO 2 GRAMS TOPICALLY TO THE AFFECTED AREA FOUR TIMES DAILY AS NEEDED    ezetimibe (ZETIA) 10 mg tablet Take 1 tablet (10 mg total) by mouth every evening.    finasteride (PROSCAR) 5 mg tablet TAKE 1  TABLET(5 MG) BY MOUTH EVERY DAY    furosemide (LASIX) 40 MG tablet Take 1 tablet (40 mg total) by mouth once daily. (Patient taking differently: Take 40 mg by mouth as needed.)    metoprolol succinate (TOPROL-XL) 25 MG 24 hr tablet TAKE 1 TABLET(25 MG) BY MOUTH EVERY DAY    potassium chloride (MICRO-K) 10 MEQ CpSR Take 1 capsule (10 mEq total) by mouth 2 (two) times daily.    propranoloL (INDERAL LA) 120 MG 24 hr capsule Take 120 mg by mouth.    tamsulosin (FLOMAX) 0.4 mg Cap Take 2 capsules (0.8 mg total) by mouth once daily.       Review of patient's allergies indicates:  No Known Allergies    Past Medical History:   Diagnosis Date    Arthritis     COPD (chronic obstructive pulmonary disease)     Emphysema, unspecified     Hypercholesterolemia     Hypertension     Unspecified osteoarthritis, unspecified site      Past Surgical History:   Procedure Laterality Date    BACK SURGERY      CORONARY ANGIOPLASTY WITH STENT PLACEMENT      FRACTURE SURGERY      foot    HIP SURGERY Left     Dr. Hoyos    JOINT REPLACEMENT Left     QUETA    JOINT REPLACEMENT Right     TKA     Family History    None       Tobacco Use    Smoking status: Former     Current packs/day: 1.00     Types: Cigarettes    Smokeless tobacco: Never   Substance and Sexual Activity    Alcohol use: Not Currently     Comment: None    Drug use: Never    Sexual activity: Not on file     Review of Systems   Constitutional:  Negative for chills, diaphoresis, fatigue and fever.   HENT:  Negative for facial swelling, rhinorrhea and sore throat.    Eyes:  Negative for redness.   Respiratory:  Negative for cough and shortness of breath.    Cardiovascular:  Negative for chest pain.   Gastrointestinal:  Positive for constipation. Negative for abdominal distention, blood in stool and vomiting.   Genitourinary:  Positive for difficulty urinating. Negative for hematuria.   Musculoskeletal:  Negative for back pain.   Skin:  Negative for rash.   Neurological:  Negative for  dizziness and syncope.     Objective:     Vital Signs (Most Recent):  Temp: 97.5 °F (36.4 °C) (04/04/24 1117)  Pulse: 68 (04/04/24 1117)  Resp: 19 (04/04/24 1117)  BP: 121/71 (04/04/24 1117)  SpO2: 95 % (04/04/24 1117) Vital Signs (24h Range):  Temp:  [97.4 °F (36.3 °C)-97.9 °F (36.6 °C)] 97.5 °F (36.4 °C)  Pulse:  [60-74] 68  Resp:  [17-24] 19  SpO2:  [92 %-100 %] 95 %  BP: (108-147)/(61-79) 121/71     Weight: 72.6 kg (160 lb)  Body mass index is 21.7 kg/m².      Intake/Output Summary (Last 24 hours) at 4/4/2024 1257  Last data filed at 4/4/2024 0928  Gross per 24 hour   Intake 960 ml   Output 1025 ml   Net -65 ml       Physical Exam  Vitals reviewed.   Constitutional:       General: He is not in acute distress.  HENT:      Head: Normocephalic and atraumatic.      Nose: Nose normal.   Eyes:      General: No scleral icterus.  Cardiovascular:      Rate and Rhythm: Normal rate and regular rhythm.   Pulmonary:      Effort: Pulmonary effort is normal. No respiratory distress.   Abdominal:      General: There is no distension.      Palpations: Abdomen is soft.      Tenderness: There is no abdominal tenderness. There is no rebound.   Musculoskeletal:         General: Normal range of motion.   Neurological:      Mental Status: He is alert and oriented to person, place, and time.         Significant Labs:  CBC:   Recent Labs   Lab 04/03/24  0632   WBC 7.71   RBC 4.36*   HGB 11.6*   HCT 36.6*      MCV 83.9   MCH 26.6*   MCHC 31.7*     CMP:   Recent Labs   Lab 04/03/24  0632   CALCIUM 8.5*   ALBUMIN 3.1*   *   K 4.6   CO2 25   BUN 18.1   CREATININE 1.01   ALKPHOS 115   ALT 5   AST 8   BILITOT 0.5       Significant Diagnostics:  CT: I have reviewed all pertinent results/findings within the past 24 hours.  Images reviewed.    Assessment/Plan:     Active Diagnoses:    Diagnosis Date Noted POA    PRINCIPAL PROBLEM:  Low back pain [M54.50] 04/03/2024 Yes    Urinary retention [R33.9] 04/03/2024 Yes    Urinary tract  infection associated with indwelling urethral catheter [T83.511A, N39.0] 04/03/2024 Yes    Benign essential hypertension [I10] 04/11/2023 Yes    Coronary arteriosclerosis [I25.10] 04/11/2023 Yes    COPD (chronic obstructive pulmonary disease) [J44.9] 04/11/2023 Yes      Problems Resolved During this Admission:     The source and etiology of the right pelvic perirectal fluid collection is unclear, nonetheless will consult IR for percutaneous drainage.    Thank you for your consult. I will follow-up with patient. Please contact us if you have any additional questions.    Ephraim Joseph MD  Colon & Rectal Surgery  [facility] - Observation [facility]

## 2024-04-04 NOTE — PT/OT/SLP PROGRESS
Physical Therapy      Patient Name:  Max Squires   MRN:  [id number]    Pt waiting for LSO. Will f/u when able.

## 2024-04-05 LAB
ALBUMIN SERPL-MCNC: 2.8 G/DL (ref 3.4–4.8)
ALBUMIN/GLOB SERPL: 1 RATIO (ref 1.1–2)
ALP SERPL-CCNC: 100 UNIT/L (ref 40–150)
ALT SERPL-CCNC: 7 UNIT/L (ref 0–55)
AST SERPL-CCNC: 7 UNIT/L (ref 5–34)
BASOPHILS # BLD AUTO: 0.04 X10(3)/MCL
BASOPHILS NFR BLD AUTO: 0.5 %
BILIRUB SERPL-MCNC: 0.4 MG/DL
BUN SERPL-MCNC: 18.7 MG/DL (ref 8.4–25.7)
CALCIUM SERPL-MCNC: 8.1 MG/DL (ref 8.8–10)
CHLORIDE SERPL-SCNC: 100 MMOL/L (ref 98–107)
CO2 SERPL-SCNC: 28 MMOL/L (ref 23–31)
CREAT SERPL-MCNC: 1.07 MG/DL (ref 0.73–1.18)
EOSINOPHIL # BLD AUTO: 0.13 X10(3)/MCL (ref 0–0.9)
EOSINOPHIL NFR BLD AUTO: 1.6 %
ERYTHROCYTE [DISTWIDTH] IN BLOOD BY AUTOMATED COUNT: 15.5 % (ref 11.5–17)
GFR SERPLBLD CREATININE-BSD FMLA CKD-EPI: >60 MLS/MIN/1.73/M2
GLOBULIN SER-MCNC: 2.7 GM/DL (ref 2.4–3.5)
GLUCOSE SERPL-MCNC: 105 MG/DL (ref 82–115)
HCT VFR BLD AUTO: 32.8 % (ref 42–52)
HGB BLD-MCNC: 10.4 G/DL (ref 14–18)
IMM GRANULOCYTES # BLD AUTO: 0.05 X10(3)/MCL (ref 0–0.04)
IMM GRANULOCYTES NFR BLD AUTO: 0.6 %
INR PPP: 1
LYMPHOCYTES # BLD AUTO: 1.58 X10(3)/MCL (ref 0.6–4.6)
LYMPHOCYTES NFR BLD AUTO: 19.1 %
MCH RBC QN AUTO: 26.3 PG (ref 27–31)
MCHC RBC AUTO-ENTMCNC: 31.7 G/DL (ref 33–36)
MCV RBC AUTO: 82.8 FL (ref 80–94)
MONOCYTES # BLD AUTO: 0.49 X10(3)/MCL (ref 0.1–1.3)
MONOCYTES NFR BLD AUTO: 5.9 %
NEUTROPHILS # BLD AUTO: 5.97 X10(3)/MCL (ref 2.1–9.2)
NEUTROPHILS NFR BLD AUTO: 72.3 %
NRBC BLD AUTO-RTO: 0 %
PLATELET # BLD AUTO: 191 X10(3)/MCL (ref 130–400)
PMV BLD AUTO: 10.6 FL (ref 7.4–10.4)
POTASSIUM SERPL-SCNC: 4.2 MMOL/L (ref 3.5–5.1)
PROT SERPL-MCNC: 5.5 GM/DL (ref 5.8–7.6)
PROTHROMBIN TIME: 13 SECONDS (ref 12.5–14.5)
RBC # BLD AUTO: 3.96 X10(6)/MCL (ref 4.7–6.1)
SODIUM SERPL-SCNC: 133 MMOL/L (ref 136–145)
WBC # SPEC AUTO: 8.26 X10(3)/MCL (ref 4.5–11.5)

## 2024-04-05 PROCEDURE — 99232 SBSQ HOSP IP/OBS MODERATE 35: CPT | Mod: ,,, | Performed by: INTERNAL MEDICINE

## 2024-04-05 PROCEDURE — 85025 COMPLETE CBC W/AUTO DIFF WBC: CPT | Performed by: INTERNAL MEDICINE

## 2024-04-05 PROCEDURE — 27000221 HC OXYGEN, UP TO 24 HOURS

## 2024-04-05 PROCEDURE — 97161 PT EVAL LOW COMPLEX 20 MIN: CPT

## 2024-04-05 PROCEDURE — 25000242 PHARM REV CODE 250 ALT 637 W/ HCPCS: Performed by: INTERNAL MEDICINE

## 2024-04-05 PROCEDURE — 25000003 PHARM REV CODE 250: Performed by: RADIOLOGY

## 2024-04-05 PROCEDURE — 63600175 PHARM REV CODE 636 W HCPCS: Performed by: RADIOLOGY

## 2024-04-05 PROCEDURE — 99900035 HC TECH TIME PER 15 MIN (STAT)

## 2024-04-05 PROCEDURE — 99232 SBSQ HOSP IP/OBS MODERATE 35: CPT | Mod: ,,, | Performed by: COLON & RECTAL SURGERY

## 2024-04-05 PROCEDURE — 94760 N-INVAS EAR/PLS OXIMETRY 1: CPT

## 2024-04-05 PROCEDURE — 94640 AIRWAY INHALATION TREATMENT: CPT

## 2024-04-05 PROCEDURE — 25000003 PHARM REV CODE 250: Performed by: INTERNAL MEDICINE

## 2024-04-05 PROCEDURE — 63600175 PHARM REV CODE 636 W HCPCS: Performed by: INTERNAL MEDICINE

## 2024-04-05 PROCEDURE — 94761 N-INVAS EAR/PLS OXIMETRY MLT: CPT

## 2024-04-05 PROCEDURE — 11000001 HC ACUTE MED/SURG PRIVATE ROOM

## 2024-04-05 PROCEDURE — 21400001 HC TELEMETRY ROOM

## 2024-04-05 PROCEDURE — 85610 PROTHROMBIN TIME: CPT | Performed by: RADIOLOGY

## 2024-04-05 PROCEDURE — 99900031 HC PATIENT EDUCATION (STAT)

## 2024-04-05 PROCEDURE — 80053 COMPREHEN METABOLIC PANEL: CPT | Performed by: INTERNAL MEDICINE

## 2024-04-05 PROCEDURE — 0WJP3ZZ INSPECTION OF GASTROINTESTINAL TRACT, PERCUTANEOUS APPROACH: ICD-10-PCS | Performed by: RADIOLOGY

## 2024-04-05 RX ORDER — LIDOCAINE HYDROCHLORIDE 20 MG/ML
INJECTION, SOLUTION INFILTRATION; PERINEURAL
Status: COMPLETED | OUTPATIENT
Start: 2024-04-05 | End: 2024-04-05

## 2024-04-05 RX ORDER — FENTANYL CITRATE 50 UG/ML
INJECTION, SOLUTION INTRAMUSCULAR; INTRAVENOUS
Status: DISPENSED
Start: 2024-04-05 | End: 2024-04-05

## 2024-04-05 RX ORDER — FENTANYL CITRATE 50 UG/ML
INJECTION, SOLUTION INTRAMUSCULAR; INTRAVENOUS
Status: COMPLETED | OUTPATIENT
Start: 2024-04-05 | End: 2024-04-05

## 2024-04-05 RX ORDER — MIDAZOLAM HYDROCHLORIDE 1 MG/ML
INJECTION, SOLUTION INTRAMUSCULAR; INTRAVENOUS
Status: COMPLETED | OUTPATIENT
Start: 2024-04-05 | End: 2024-04-05

## 2024-04-05 RX ORDER — MIDAZOLAM HYDROCHLORIDE 2 MG/2ML
INJECTION, SOLUTION INTRAMUSCULAR; INTRAVENOUS
Status: DISPENSED
Start: 2024-04-05 | End: 2024-04-05

## 2024-04-05 RX ADMIN — ALPRAZOLAM 0.5 MG: 0.5 TABLET ORAL at 09:04

## 2024-04-05 RX ADMIN — MIDAZOLAM 1 MG: 1 INJECTION INTRAMUSCULAR; INTRAVENOUS at 10:04

## 2024-04-05 RX ADMIN — LACTULOSE 30 G: 10 SOLUTION ORAL at 11:04

## 2024-04-05 RX ADMIN — IPRATROPIUM BROMIDE AND ALBUTEROL SULFATE 3 ML: 2.5; .5 SOLUTION RESPIRATORY (INHALATION) at 08:04

## 2024-04-05 RX ADMIN — METOPROLOL SUCCINATE 25 MG: 25 TABLET, EXTENDED RELEASE ORAL at 11:04

## 2024-04-05 RX ADMIN — Medication: at 09:04

## 2024-04-05 RX ADMIN — FINASTERIDE 5 MG: 5 TABLET, FILM COATED ORAL at 11:04

## 2024-04-05 RX ADMIN — NALOXEGOL OXALATE 25 MG: 25 TABLET, FILM COATED ORAL at 11:04

## 2024-04-05 RX ADMIN — TAMSULOSIN HYDROCHLORIDE 0.8 MG: 0.4 CAPSULE ORAL at 11:04

## 2024-04-05 RX ADMIN — FENTANYL CITRATE 25 MCG: 50 INJECTION, SOLUTION INTRAMUSCULAR; INTRAVENOUS at 10:04

## 2024-04-05 RX ADMIN — EZETIMIBE 10 MG: 10 TABLET ORAL at 09:04

## 2024-04-05 RX ADMIN — POTASSIUM CHLORIDE 10 MEQ: 750 TABLET, FILM COATED, EXTENDED RELEASE ORAL at 11:04

## 2024-04-05 RX ADMIN — HYDROCODONE BITARTRATE AND ACETAMINOPHEN 1 TABLET: 7.5; 325 TABLET ORAL at 09:04

## 2024-04-05 RX ADMIN — POTASSIUM CHLORIDE 10 MEQ: 750 TABLET, FILM COATED, EXTENDED RELEASE ORAL at 09:04

## 2024-04-05 RX ADMIN — ATORVASTATIN CALCIUM 80 MG: 40 TABLET, FILM COATED ORAL at 11:04

## 2024-04-05 RX ADMIN — AMLODIPINE BESYLATE 5 MG: 5 TABLET ORAL at 04:04

## 2024-04-05 RX ADMIN — LIDOCAINE HYDROCHLORIDE 5 ML: 20 INJECTION, SOLUTION INFILTRATION; PERINEURAL at 10:04

## 2024-04-05 RX ADMIN — ASPIRIN 81 MG: 81 TABLET, COATED ORAL at 11:04

## 2024-04-05 RX ADMIN — VANCOMYCIN HYDROCHLORIDE 1750 MG: 500 INJECTION, POWDER, LYOPHILIZED, FOR SOLUTION INTRAVENOUS at 09:04

## 2024-04-05 RX ADMIN — IPRATROPIUM BROMIDE AND ALBUTEROL SULFATE 3 ML: 2.5; .5 SOLUTION RESPIRATORY (INHALATION) at 07:04

## 2024-04-05 NOTE — PLAN OF CARE
Problem: Adult Inpatient Plan of Care  Goal: Plan of Care Review  Outcome: Ongoing, Progressing  Flowsheets (Taken 4/5/2024 0326)  Plan of Care Reviewed With: patient  Goal: Absence of Hospital-Acquired Illness or Injury  Outcome: Ongoing, Progressing  Goal: Optimal Comfort and Wellbeing  Outcome: Ongoing, Progressing     Problem: Fall Injury Risk  Goal: Absence of Fall and Fall-Related Injury  Outcome: Ongoing, Progressing     Problem: Pain Acute  Goal: Acceptable Pain Control and Functional Ability  Outcome: Ongoing, Progressing

## 2024-04-05 NOTE — PROGRESS NOTES
Subjective:  The patient has no complaints.  His daughter is not with him today.  He tells me tried on the brace last night in his seemed to help.  He was stated that his bowels did move yesterday.    Objective:  He was afebrile.  Blood pressure 114/61, heart rate 63, respiratory rate 19, O2 sat 92% on 2 L nasal cannula.    Telemetry reviewed shows sinus rhythm    General appearance is unremarkable.  He was sleeping when I entered the room but it did wake up easily.  Heart has a regular rate and rhythm.  Lungs clear.  Abdomen nontender.  Extremities without clubbing cyanosis or edema.    Laboratory studies today include an unremarkable CBC and CMP.  Urine cultures growing out over 100,000 colonies of staph epi.  Blood cultures remain negative.    Assessment:  1.  Low back pain secondary to L1 compression fracture.  Being treated conservatively.      2. Urinary tract infection.  Turns out to be staph epi.    3. Right perirectal fluid collection.  Etiology unclear.  Scheduled for IR drainage today     4. Constipation.  Better    5. Recent urinary retention, still with indwelling Almeida catheter.    6. Compensated heart failure    7. Stable COPD    Plan: Discontinue Zosyn and replace it with vancomycin.  That of course is subject to change after the fluid collection has been assessed    Resume physical therapy

## 2024-04-05 NOTE — PROGRESS NOTES
"Pharmacokinetic Initial Assessment: IV Vancomycin    Assessment/Plan:    Initiate intravenous vancomycin with loading dose of 1750 mg once followed by a maintenance dose of vancomycin 1250mg IV every 24 hours  Desired empiric serum trough concentration is 10 to 20 mcg/mL  Draw vancomycin trough level 60 min prior to third dose on 04/07 at approximately 0700.  Pharmacy will continue to follow and monitor vancomycin.      Please contact pharmacy at extension [phone] with any questions regarding this assessment.     Thank you for the consult,   Bright Torrez       Patient brief summary:  Max Squires is a 89 y.o. male initiated on antimicrobial therapy with IV Vancomycin for treatment of suspected urinary tract infection    Drug Allergies:   Review of patient's allergies indicates:  No Known Allergies    Actual Body Weight:   72.6kg    Renal Function:   Estimated Creatinine Clearance: 48.1 mL/min (based on SCr of 1.07 mg/dL).,     Dialysis Method (if applicable):  N/A    CBC (last 72 hours):  Recent Labs   Lab Result Units 04/03/24  0632 04/05/24  0551   WBC x10(3)/mcL 7.71 8.26   Hgb g/dL 11.6* 10.4*   Hct % 36.6* 32.8*   Platelet x10(3)/mcL 175 191   Mono % % 6.5 5.9   Eos % % 4.5 1.6   Basophil % % 0.6 0.5       Metabolic Panel (last 72 hours):  Recent Labs   Lab Result Units 04/03/24  0632 04/03/24  0635 04/05/24  0551   Sodium Level mmol/L 133*  --  133*   Potassium Level mmol/L 4.6  --  4.2   Chloride mmol/L 101  --  100   Carbon Dioxide mmol/L 25  --  28   Glucose Level mg/dL 89  --  105   Glucose, UA   --  Normal  --    Blood Urea Nitrogen mg/dL 18.1  --  18.7   Creatinine mg/dL 1.01  --  1.07   Albumin Level g/dL 3.1*  --  2.8*   Bilirubin Total mg/dL 0.5  --  0.4   Alkaline Phosphatase unit/L 115  --  100   Aspartate Aminotransferase unit/L 8  --  7   Alanine Aminotransferase unit/L 5  --  7       Drug levels (last 3 results):  No results for input(s): "VANCOMYCINRA", "VANCORANDOM", "VANCOMYCINPE", "VANCOPEAK", " ""VANCOMYCINTR", "VANCOTROUGH" in the last 72 hours.    Microbiologic Results:  Microbiology Results (last 7 days)       Procedure Component Value Units Date/Time    Blood Culture #1 **CANNOT BE ORDERED STAT** [[id number]]  (Normal) Collected: 04/03/24 0852    Order Status: Completed Specimen: Blood Updated: 04/04/24 1000     CULTURE, BLOOD ([facility]) No Growth At 24 Hours    Blood Culture #2 **CANNOT BE ORDERED STAT** [[id number]]  (Normal) Collected: 04/03/24 0852    Order Status: Completed Specimen: Blood Updated: 04/04/24 1000     CULTURE, BLOOD ([facility]) No Growth At 24 Hours    Urine culture [[id number]]  (Abnormal) Collected: 04/03/24 0635    Order Status: Completed Specimen: Urine Updated: 04/04/24 0659     Urine Culture >/= 100,000 colonies/ml Gram-positive Cocci            "

## 2024-04-05 NOTE — PLAN OF CARE
Problem: Adult Inpatient Plan of Care  Goal: Plan of Care Review  Outcome: Ongoing, Progressing  Goal: Patient-Specific Goal (Individualized)  Outcome: Ongoing, Progressing  Goal: Absence of Hospital-Acquired Illness or Injury  Outcome: Ongoing, Progressing  Goal: Optimal Comfort and Wellbeing  Outcome: Ongoing, Progressing  Goal: Readiness for Transition of Care  Outcome: Ongoing, Progressing     Problem: Impaired Wound Healing  Goal: Optimal Wound Healing  Outcome: Ongoing, Progressing     Problem: Skin Injury Risk Increased  Goal: Skin Health and Integrity  Outcome: Ongoing, Progressing     Problem: Fall Injury Risk  Goal: Absence of Fall and Fall-Related Injury  Outcome: Ongoing, Progressing     Problem: Pain Acute  Goal: Acceptable Pain Control and Functional Ability  Outcome: Ongoing, Progressing     Problem: Infection  Goal: Absence of Infection Signs and Symptoms  Outcome: Ongoing, Progressing

## 2024-04-05 NOTE — PT/OT/SLP EVAL
Physical Therapy Evaluation    Patient Name:  Max Squires   MRN:  [id number]    Recommendations:     Discharge therapy intensity: Low Intensity Therapy   Discharge Equipment Recommendations: none   Barriers to discharge: None    Assessment:     Max Squires is a 89 y.o. male admitted with a medical diagnosis of Acute vs subacute L1 VCF .  He presents with the following impairments/functional limitations: impaired endurance, impaired self care skills, impaired functional mobility, gait instability .    Rehab Prognosis: Good; patient would benefit from acute skilled PT services to address these deficits and reach maximum level of function.    Recent Surgery: * No surgery found *      Plan:     During this hospitalization, patient to be seen 5 x/week to address the identified rehab impairments via gait training, therapeutic activities, therapeutic exercises and progress toward the following goals:    Plan of Care Expires:       Subjective     Chief Complaint:   Patient/Family Comments/goals:   Pain/Comfort:       Patients cultural, spiritual, Syn[employer] conflicts given the current situation:      Living Environment:  Pt lives with daughter and wife at daughters and there are 5 steps with right sided rail  Prior to admission, patients level of function was ind to mod ind.  Equipment used at home: walker, rolling, rollator, bedside commode.  DME owned (not currently used):  Upon discharge, patient will have assistance from daughter.    Objective:     Communicated with nurse prior to session.  Patient found supine with oxygen, peripheral IV, bobo catheter  upon PT entry to room.    General Precautions: Standard, fall  Orthopedic Precautions:spinal precautions   Braces: LSO  Respiratory Status: Nasal cannula, flow 2 L/min  Blood Pressure:       Exams:  RLE ROM: WFL  RLE Strength: WFL  LLE ROM: WFL  LLE Strength: WFL  Skin integrity: Visible skin intact      Functional Mobility:  Bed Mobility:     Supine to Sit: minimum  assistance  Sit to Supine: minimum assistance  Transfers:     Sit to Stand:  minimum assistance with rolling walker  Bed to Chair: minimum assistance with  rolling walker  using  Step Transfer  Gait: ambulated with rw and o2 for 60ft with cag . He was sob after gait      AM-PAC 6 CLICK MOBILITY  Total Score:        Treatment & Education:      Patient provided with verbal education education regarding PT role/goals/POC.  Understanding was verbalized.     Patient left supine with all lines intact and call button in reach.    GOALS:   Multidisciplinary Problems       Physical Therapy Goals          Problem: Physical Therapy    Goal Priority Disciplines Outcome Goal Variances Interventions   Physical Therapy Goal     PT, PT/OT Ongoing, Progressing     Description: Pt will be seen for the following goals  1. Pt will be sba with bed mobility  2. Pt will be sba with transfers with lso brace on and using a rw  3. Pt will amb with a rw and LSO brace on for 150ft sba                       History:     Past Medical History:   Diagnosis Date    Arthritis     COPD (chronic obstructive pulmonary disease)     Emphysema, unspecified     Hypercholesterolemia     Hypertension     Unspecified osteoarthritis, unspecified site        Past Surgical History:   Procedure Laterality Date    BACK SURGERY      CORONARY ANGIOPLASTY WITH STENT PLACEMENT      FRACTURE SURGERY      foot    HIP SURGERY Left     Dr. Hoyos    JOINT REPLACEMENT Left     QUETA    JOINT REPLACEMENT Right     TKA       Time Tracking:     PT Received On:    PT Start Time: 1255     PT Stop Time: 1315  PT Total Time (min): 20 min     Billable Minutes: Evaluation 20 04/05/2024

## 2024-04-05 NOTE — OP NOTE
Radiology Post-Procedure Note    Pre Op Diagnosis: Reght perirectal collection    Post Op Diagnosis:  As Above    Procedure:  Drain placement unsuccessful.    Procedure performed by: Armando Billingsley M.D.    Written Informed Consent Obtained: Yes    Specimen Removed: No    Estimated Blood Loss: Minimal    Findings:   Drain not placed secondary to inability to access the collection.     Patient tolerated procedure well.    Armando Billingsley MD

## 2024-04-05 NOTE — PROGRESS NOTES
[facility] - Observation Unit  Colon & Rectal Surgery  Progress Note    Subjective:     Interval History:   IR unable to drain the pelvis collection  Hungry for lunch  Only c/o back pain with standing  No BM since enema  No fevers  Good UOP       Medications:  Continuous Infusions:  Scheduled Meds:   albuterol-ipratropium  3 mL Nebulization QID    amLODIPine  5 mg Oral Daily    aspirin  81 mg Oral Daily    atorvastatin  80 mg Oral Daily    ezetimibe  10 mg Oral QHS    fentaNYL        finasteride  5 mg Oral Daily    lactulose 10 gram/15 ml  30 g Oral BID    metoprolol succinate  25 mg Oral Daily    midazolam (PF)        naloxegoL  25 mg Oral Daily    potassium chloride  10 mEq Oral BID    tamsulosin  2 capsule Oral Daily    [START ON 4/6/2024] vancomycin (VANCOCIN) IV (PEDS and ADULTS)  1,250 mg Intravenous Q24H    zinc oxide-cod liver oil   Topical (Top) BID     PRN Meds:ALPRAZolam, fentaNYL, furosemide, HYDROcodone-acetaminophen, midazolam (PF), Pharmacy to dose Vancomycin consult **AND** vancomycin - pharmacy to dose     Objective:     Vital Signs (Most Recent):  Temp: 97.5 °F (36.4 °C) (04/05/24 1105)  Pulse: 68 (04/05/24 1120)  Resp: 19 (04/05/24 1105)  BP: 128/65 (04/05/24 1120)  SpO2: (!) 94 % (notified MELYSSA Anne LPN) (04/05/24 1105) Vital Signs (24h Range):  Temp:  [97.5 °F (36.4 °C)-98.1 °F (36.7 °C)] 97.5 °F (36.4 °C)  Pulse:  [59-81] 68  Resp:  [13-22] 19  SpO2:  [92 %-99 %] 94 %  BP: ()/(52-71) 128/65       Intake/Output Summary (Last 24 hours) at 4/5/2024 1213  Last data filed at 4/5/2024 0300  Gross per 24 hour   Intake 480 ml   Output 1275 ml   Net -795 ml       Physical Exam  Vitals reviewed.   Constitutional:       General: He is not in acute distress.  HENT:      Head: Normocephalic and atraumatic.      Nose: Nose normal.   Eyes:      General: No scleral icterus.  Cardiovascular:      Rate and Rhythm: Normal rate and regular rhythm.   Pulmonary:      Effort: Pulmonary effort is  normal. No respiratory distress.   Abdominal:      General: There is no distension.      Palpations: Abdomen is soft.      Tenderness: There is no abdominal tenderness. There is no rebound.   Musculoskeletal:         General: Normal range of motion.   Neurological:      Mental Status: He is alert and oriented to person, place, and time.         Significant Labs:  CBC:   Recent Labs   Lab 04/05/24  0551   WBC 8.26   RBC 3.96*   HGB 10.4*   HCT 32.8*      MCV 82.8   MCH 26.3*   MCHC 31.7*     CMP:   Recent Labs   Lab 04/05/24  0551   CALCIUM 8.1*   ALBUMIN 2.8*   *   K 4.2   CO2 28   BUN 18.7   CREATININE 1.07   ALKPHOS 100   ALT 7   AST 7   BILITOT 0.4       Significant Diagnostics:  CT: I have reviewed all pertinent results/findings within the past 24 hours. I reviewed the attempted drainage images.    Assessment/Plan:     Active Diagnoses:    Diagnosis Date Noted POA    PRINCIPAL PROBLEM:  Low back pain [M54.50] 04/03/2024 Yes    Urinary retention [R33.9] 04/03/2024 Yes    Urinary tract infection associated with indwelling urethral catheter [T83.511A, N39.0] 04/03/2024 Yes    Benign essential hypertension [I10] 04/11/2023 Yes    Coronary arteriosclerosis [I25.10] 04/11/2023 Yes    COPD (chronic obstructive pulmonary disease) [J44.9] 04/11/2023 Yes      Problems Resolved During this Admission:       - IV antibiotics and reimage sometime next week  - continue laxatives    Ephraim Joseph MD  Colon & Rectal Surgery  [facility] - Observation Unit

## 2024-04-05 NOTE — PLAN OF CARE
04/05/24 1201   Discharge Assessment   Assessment Type Discharge Planning Assessment   Confirmed/corrected address, phone number and insurance Yes   Confirmed Demographics Correct on Facesheet   Source of Information family  (pt's germán, Cristina)   Communicated MATTY with patient/caregiver Date not available/Unable to determine   Reason For Admission L1 compression fx, UTI   People in Home spouse;child(crispin), adult  (Pt and his wife, Ayesha lives wit h their dgtr, Cristina and her boy friend in a mobile home with 5 steps to enter and a rail along the steps)   Do you expect to return to your current living situation? Yes   Do you have help at home or someone to help you manage your care at home? Yes   Who are your caregiver(s) and their phone number(s)? Pt's dgtr will be able to assist pt   Prior to hospitilization cognitive status: Unable to Assess   Current cognitive status: Alert/Oriented  ([place])   Walking or Climbing Stairs Difficulty no   Dressing/Bathing Difficulty no   Equipment Currently Used at Home oxygen;nebulizer  (Pt has a RW, cane)   Readmission within 30 days? No   Patient currently being followed by outpatient case management? No   Do you currently have service(s) that help you manage your care at home? No   Do you take prescription medications? Yes   Do you have prescription coverage? Yes   Coverage [employer]   Who is going to help you get home at discharge? Cristina dunlap   How do you get to doctors appointments? family or friend will provide   Are you on dialysis? No   Discharge Plan A Home with family   Discharge Plan B Home Health   DME Needed Upon Discharge  other (see comments)  (TBD)   Discharge Plan discussed with: Adult children  (Cristina dunlap)   Transition of Care Barriers None   Housing Stability   In the last 12 months, was there a time when you were not able to pay the mortgage or rent on time? N   Transportation Needs   In the past 12 months, has lack of transportation kept you from medical appointments or  from getting medications? no   Food Insecurity   Within the past 12 months, you worried that your food would run out before you got the money to buy more. Never true   OTHER   Name(s) of People in Home Pt's wife, Ayesha, Cristina dunlap and Cristina's boy friend     Pt's PCP is Dr Ramires. His  is his dgtr, Cristina ([phone]). Pt has had HH services in the past. Pt uses [facility] pharmacy off of [address]. Pt does not drive. CM to follow

## 2024-04-05 NOTE — PLAN OF CARE
Problem: Physical Therapy  Goal: Physical Therapy Goal  Description: Pt will be seen for the following goals  1. Pt will be sba with bed mobility  2. Pt will be sba with transfers with lso brace on and using a rw  3. Pt will amb with a rw and LSO brace on for 150ft sba  Outcome: Ongoing, Progressing

## 2024-04-06 LAB — BACTERIA UR CULT: ABNORMAL

## 2024-04-06 PROCEDURE — 99900031 HC PATIENT EDUCATION (STAT)

## 2024-04-06 PROCEDURE — 94640 AIRWAY INHALATION TREATMENT: CPT

## 2024-04-06 PROCEDURE — 11000001 HC ACUTE MED/SURG PRIVATE ROOM

## 2024-04-06 PROCEDURE — 25000242 PHARM REV CODE 250 ALT 637 W/ HCPCS: Performed by: INTERNAL MEDICINE

## 2024-04-06 PROCEDURE — 94760 N-INVAS EAR/PLS OXIMETRY 1: CPT

## 2024-04-06 PROCEDURE — 25000003 PHARM REV CODE 250: Performed by: INTERNAL MEDICINE

## 2024-04-06 PROCEDURE — 63600175 PHARM REV CODE 636 W HCPCS: Performed by: INTERNAL MEDICINE

## 2024-04-06 PROCEDURE — 99233 SBSQ HOSP IP/OBS HIGH 50: CPT | Mod: ,,, | Performed by: INTERNAL MEDICINE

## 2024-04-06 PROCEDURE — 21400001 HC TELEMETRY ROOM

## 2024-04-06 PROCEDURE — 27000221 HC OXYGEN, UP TO 24 HOURS

## 2024-04-06 PROCEDURE — 94761 N-INVAS EAR/PLS OXIMETRY MLT: CPT

## 2024-04-06 PROCEDURE — 99900035 HC TECH TIME PER 15 MIN (STAT)

## 2024-04-06 RX ORDER — LACTULOSE 10 G/15ML
30 SOLUTION ORAL DAILY PRN
Status: DISCONTINUED | OUTPATIENT
Start: 2024-04-06 | End: 2024-04-09

## 2024-04-06 RX ORDER — MUPIROCIN 20 MG/G
OINTMENT TOPICAL 2 TIMES DAILY
Status: COMPLETED | OUTPATIENT
Start: 2024-04-06 | End: 2024-04-10

## 2024-04-06 RX ADMIN — IPRATROPIUM BROMIDE AND ALBUTEROL SULFATE 3 ML: 2.5; .5 SOLUTION RESPIRATORY (INHALATION) at 04:04

## 2024-04-06 RX ADMIN — Medication: at 09:04

## 2024-04-06 RX ADMIN — EZETIMIBE 10 MG: 10 TABLET ORAL at 09:04

## 2024-04-06 RX ADMIN — AMLODIPINE BESYLATE 5 MG: 5 TABLET ORAL at 04:04

## 2024-04-06 RX ADMIN — NALOXEGOL OXALATE 25 MG: 25 TABLET, FILM COATED ORAL at 08:04

## 2024-04-06 RX ADMIN — IPRATROPIUM BROMIDE AND ALBUTEROL SULFATE 3 ML: 2.5; .5 SOLUTION RESPIRATORY (INHALATION) at 12:04

## 2024-04-06 RX ADMIN — IPRATROPIUM BROMIDE AND ALBUTEROL SULFATE 3 ML: 2.5; .5 SOLUTION RESPIRATORY (INHALATION) at 09:04

## 2024-04-06 RX ADMIN — ATORVASTATIN CALCIUM 80 MG: 40 TABLET, FILM COATED ORAL at 08:04

## 2024-04-06 RX ADMIN — MUPIROCIN: 20 OINTMENT TOPICAL at 09:04

## 2024-04-06 RX ADMIN — TAMSULOSIN HYDROCHLORIDE 0.8 MG: 0.4 CAPSULE ORAL at 08:04

## 2024-04-06 RX ADMIN — METOPROLOL SUCCINATE 25 MG: 25 TABLET, EXTENDED RELEASE ORAL at 08:04

## 2024-04-06 RX ADMIN — HYDROCODONE BITARTRATE AND ACETAMINOPHEN 1 TABLET: 7.5; 325 TABLET ORAL at 11:04

## 2024-04-06 RX ADMIN — DEXTROSE MONOHYDRATE 1250 MG: 50 INJECTION, SOLUTION INTRAVENOUS at 08:04

## 2024-04-06 RX ADMIN — IPRATROPIUM BROMIDE AND ALBUTEROL SULFATE 3 ML: 2.5; .5 SOLUTION RESPIRATORY (INHALATION) at 07:04

## 2024-04-06 RX ADMIN — ASPIRIN 81 MG: 81 TABLET, COATED ORAL at 08:04

## 2024-04-06 RX ADMIN — POTASSIUM CHLORIDE 10 MEQ: 750 TABLET, FILM COATED, EXTENDED RELEASE ORAL at 08:04

## 2024-04-06 RX ADMIN — POTASSIUM CHLORIDE 10 MEQ: 750 TABLET, FILM COATED, EXTENDED RELEASE ORAL at 09:04

## 2024-04-06 RX ADMIN — FINASTERIDE 5 MG: 5 TABLET, FILM COATED ORAL at 08:04

## 2024-04-06 RX ADMIN — HYDROCODONE BITARTRATE AND ACETAMINOPHEN 1 TABLET: 7.5; 325 TABLET ORAL at 06:04

## 2024-04-06 NOTE — PROGRESS NOTES
[facility] Hospital Medicine Progress Note        Chief Complaint: Inpatient Follow-up for UTI and perirectal fluid collection    HPI:  Reviewed and noted in detail    Interval Hx:   Patient was seen and examined this morning.  No family member at bedside.  Nurse was alongside at the time.  Had just finished eating his breakfast.  Stated he was hungry from being NPO yesterday.  Did have a very large loose bowel movement on 04/05/2024.  Currently on lactulose, will scale back to p.r.n. dosing.  No other acute needs or complaints.  Unsuccessful drainage of the perirectal fluid collection per IR.  Continue with vancomycin    Case was discussed with patient's nurse     Objective/physical exam:  General: In no acute distress, afebrile, pleasant, elderly  Chest: Clear to auscultation bilaterally  Heart: RRR, +S1, S2  Abdomen: Soft, nontender, BS +  Neurologic: Alert and oriented x4, nonfocal     VITAL SIGNS: 24 HRS MIN & MAX LAST   Temp  Min: 97.4 °F (36.3 °C)  Max: 98.1 °F (36.7 °C) 98.1 °F (36.7 °C)   BP  Min: 104/58  Max: 132/63 (!) 104/58   Pulse  Min: 65  Max: 81  66   Resp  Min: 13  Max: 22 16   SpO2  Min: 92 %  Max: 99 % 95 %     I have reviewed the following labs:  Recent Labs   Lab 04/03/24  0632 04/05/24  0551   WBC 7.71 8.26   RBC 4.36* 3.96*   HGB 11.6* 10.4*   HCT 36.6* 32.8*   MCV 83.9 82.8   MCH 26.6* 26.3*   MCHC 31.7* 31.7*   RDW 15.2 15.5    191   MPV 10.8* 10.6*     Recent Labs   Lab 04/03/24  0632 04/05/24  0551   * 133*   K 4.6 4.2   CO2 25 28   BUN 18.1 18.7   CREATININE 1.01 1.07   CALCIUM 8.5* 8.1*   ALBUMIN 3.1* 2.8*   ALKPHOS 115 100   ALT 5 7   AST 8 7   BILITOT 0.5 0.4     Microbiology Results (last 7 days)       Procedure Component Value Units Date/Time    Urine culture [[id number]]  (Abnormal) Collected: 04/03/24 0635    Order Status: Completed Specimen: Urine Updated: 04/05/24 1242     Urine Culture >/= 100,000 colonies/ml Enterococcus faecalis     Anaerobic Culture [[id number]] Collected: 04/05/24 1012    Order Status: Canceled Specimen: Body Fluid from Pelvis     Body Fluid Culture [[id number]] Collected: 04/05/24 1012    Order Status: Canceled Specimen: Body Fluid from Pelvis     Fungal Culture [[id number]] Collected: 04/05/24 1012    Order Status: Canceled Specimen: Body Fluid from Pelvis     Gram Stain [[id number]] Collected: 04/05/24 1012    Order Status: Canceled Specimen: Body Fluid from Pelvis     Blood Culture #1 **CANNOT BE ORDERED STAT** [[id number]]  (Normal) Collected: 04/03/24 0852    Order Status: Completed Specimen: Blood Updated: 04/05/24 1000     CULTURE, BLOOD ([facility]) No Growth At 48 Hours    Blood Culture #2 **CANNOT BE ORDERED STAT** [[id number]]  (Normal) Collected: 04/03/24 0852    Order Status: Completed Specimen: Blood Updated: 04/05/24 1000     CULTURE, BLOOD ([facility]) No Growth At 48 Hours             See below for Radiology    Scheduled Med:   albuterol-ipratropium  3 mL Nebulization QID    amLODIPine  5 mg Oral Daily    aspirin  81 mg Oral Daily    atorvastatin  80 mg Oral Daily    ezetimibe  10 mg Oral QHS    finasteride  5 mg Oral Daily    lactulose 10 gram/15 ml  30 g Oral BID    metoprolol succinate  25 mg Oral Daily    naloxegoL  25 mg Oral Daily    potassium chloride  10 mEq Oral BID    tamsulosin  2 capsule Oral Daily    vancomycin (VANCOCIN) IV (PEDS and ADULTS)  1,250 mg Intravenous Q24H    zinc oxide-cod liver oil   Topical (Top) BID      Continuous Infusions:     PRN Meds:  ALPRAZolam, furosemide, HYDROcodone-acetaminophen, Pharmacy to dose Vancomycin consult **AND** vancomycin - pharmacy to dose     Assessment/Plan:  Staph epi UTI with urinary retention   - was on Zosyn and vancomycin, now on vancomycin, Zosyn was discontinued on 04/05/2024   -remains clinically stable with no acute issues     2. Perirectal fluid collection   -unsuccessful drainage per IR  -conservative treatment with continuation of antibiotics     3.  Constipation  -resolved, now with loose bowels, scale back on lactulose to only p.r.n. dosing     4. Clinically compensated history of heart failure  -medically optimized     Five.  COPD, no acute exacerbation       Patient condition:  Stable  Anticipated discharge and Disposition:   Possibly home with family      All diagnosis and differential diagnosis have been reviewed; assessment and plan has been documented; I have personally reviewed the labs and test results that are presently available; I have reviewed the patients medication list; I have reviewed the consulting providers response and recommendations. I have reviewed or attempted to review medical records based upon their availability    All of the patient's questions have been  addressed and answered. Patient's is agreeable to the above stated plan. I will continue to monitor closely and make adjustments to medical management as needed.  _____________________________________________________________________    Nutrition Status:    Radiology:  I have personally reviewed the following imaging and agree with the radiologist.     CT Guided Abscess Drainage With Catheter  Narrative: EXAMINATION:  CT GUIDED ABSCESS DRAINAGE WITH CATHETER    CLINICAL HISTORY:  pelvic fluid collection;    TECHNIQUE:  CT guided drainage of a right perirectal collection.    DLP: 771    COMPARISON:  No relevant prior available for comparison.    FINDINGS:  The risks, benefits, and potential complications were discussed including bleeding, infection, end organ damage. Informed consent was obtained.    The patient was positioned in the prone position. The collection was localized with CT.  The skin was marked and the area was prepped and draped in a sterile fashion.    The site was anesthetized with 1% lidocaine solution. A 19 gauge, 10 cm coaxial needle was attempted to be advanced into the collection along the mid, inferior, and superior aspect.  The collection was unable to be  accessed.    The patient tolerated the procedure without difficulty. Post-procedure imaging demonstrated unchanged collection.  There were no immediate post-procedure complications. The patient was discharged to his room in stable condition.  Impression: Collection was unable to be accessed for percutaneous drainage.    Electronically signed by: Armando Billingsley  Date:    04/05/2024  Time:    11:23      Nel Lira MD  Department of Hospital Medicine   [facility]   04/06/2024

## 2024-04-06 NOTE — PLAN OF CARE
Problem: Adult Inpatient Plan of Care  Goal: Plan of Care Review  Outcome: Ongoing, Progressing  Goal: Absence of Hospital-Acquired Illness or Injury  Outcome: Ongoing, Progressing  Goal: Optimal Comfort and Wellbeing  Outcome: Ongoing, Progressing     Problem: Skin Injury Risk Increased  Goal: Skin Health and Integrity  Outcome: Ongoing, Progressing     Problem: Fall Injury Risk  Goal: Absence of Fall and Fall-Related Injury  Outcome: Ongoing, Progressing     Problem: Pain Acute  Goal: Acceptable Pain Control and Functional Ability  Outcome: Ongoing, Progressing

## 2024-04-07 LAB
ALBUMIN SERPL-MCNC: 3.2 G/DL (ref 3.4–4.8)
ALBUMIN/GLOB SERPL: 1 RATIO (ref 1.1–2)
ALP SERPL-CCNC: 117 UNIT/L (ref 40–150)
ALT SERPL-CCNC: 11 UNIT/L (ref 0–55)
AST SERPL-CCNC: 10 UNIT/L (ref 5–34)
BASOPHILS # BLD AUTO: 0.08 X10(3)/MCL
BASOPHILS NFR BLD AUTO: 0.9 %
BILIRUB SERPL-MCNC: 0.4 MG/DL
BUN SERPL-MCNC: 15.9 MG/DL (ref 8.4–25.7)
CALCIUM SERPL-MCNC: 9 MG/DL (ref 8.8–10)
CHLORIDE SERPL-SCNC: 100 MMOL/L (ref 98–107)
CO2 SERPL-SCNC: 26 MMOL/L (ref 23–31)
CREAT SERPL-MCNC: 0.88 MG/DL (ref 0.73–1.18)
EOSINOPHIL # BLD AUTO: 0.66 X10(3)/MCL (ref 0–0.9)
EOSINOPHIL NFR BLD AUTO: 7.3 %
ERYTHROCYTE [DISTWIDTH] IN BLOOD BY AUTOMATED COUNT: 15.8 % (ref 11.5–17)
GFR SERPLBLD CREATININE-BSD FMLA CKD-EPI: >60 MLS/MIN/1.73/M2
GLOBULIN SER-MCNC: 3.2 GM/DL (ref 2.4–3.5)
GLUCOSE SERPL-MCNC: 92 MG/DL (ref 82–115)
HCT VFR BLD AUTO: 36.6 % (ref 42–52)
HGB BLD-MCNC: 11.7 G/DL (ref 14–18)
IMM GRANULOCYTES # BLD AUTO: 0.06 X10(3)/MCL (ref 0–0.04)
IMM GRANULOCYTES NFR BLD AUTO: 0.7 %
LYMPHOCYTES # BLD AUTO: 1.77 X10(3)/MCL (ref 0.6–4.6)
LYMPHOCYTES NFR BLD AUTO: 19.7 %
MCH RBC QN AUTO: 26.4 PG (ref 27–31)
MCHC RBC AUTO-ENTMCNC: 32 G/DL (ref 33–36)
MCV RBC AUTO: 82.4 FL (ref 80–94)
MONOCYTES # BLD AUTO: 0.52 X10(3)/MCL (ref 0.1–1.3)
MONOCYTES NFR BLD AUTO: 5.8 %
NEUTROPHILS # BLD AUTO: 5.89 X10(3)/MCL (ref 2.1–9.2)
NEUTROPHILS NFR BLD AUTO: 65.6 %
NRBC BLD AUTO-RTO: 0 %
PLATELET # BLD AUTO: 208 X10(3)/MCL (ref 130–400)
PMV BLD AUTO: 9.9 FL (ref 7.4–10.4)
POTASSIUM SERPL-SCNC: 4.8 MMOL/L (ref 3.5–5.1)
PROT SERPL-MCNC: 6.4 GM/DL (ref 5.8–7.6)
RBC # BLD AUTO: 4.44 X10(6)/MCL (ref 4.7–6.1)
SODIUM SERPL-SCNC: 134 MMOL/L (ref 136–145)
VANCOMYCIN TROUGH SERPL-MCNC: 7.9 UG/ML (ref 15–20)
WBC # SPEC AUTO: 8.98 X10(3)/MCL (ref 4.5–11.5)

## 2024-04-07 PROCEDURE — 21400001 HC TELEMETRY ROOM

## 2024-04-07 PROCEDURE — 63600175 PHARM REV CODE 636 W HCPCS: Performed by: INTERNAL MEDICINE

## 2024-04-07 PROCEDURE — 80053 COMPREHEN METABOLIC PANEL: CPT | Performed by: INTERNAL MEDICINE

## 2024-04-07 PROCEDURE — 99233 SBSQ HOSP IP/OBS HIGH 50: CPT | Mod: ,,, | Performed by: INTERNAL MEDICINE

## 2024-04-07 PROCEDURE — 94760 N-INVAS EAR/PLS OXIMETRY 1: CPT

## 2024-04-07 PROCEDURE — 80202 ASSAY OF VANCOMYCIN: CPT | Performed by: INTERNAL MEDICINE

## 2024-04-07 PROCEDURE — 25000003 PHARM REV CODE 250: Performed by: INTERNAL MEDICINE

## 2024-04-07 PROCEDURE — 11000001 HC ACUTE MED/SURG PRIVATE ROOM

## 2024-04-07 PROCEDURE — 27000221 HC OXYGEN, UP TO 24 HOURS

## 2024-04-07 PROCEDURE — 25000242 PHARM REV CODE 250 ALT 637 W/ HCPCS: Performed by: INTERNAL MEDICINE

## 2024-04-07 PROCEDURE — 85025 COMPLETE CBC W/AUTO DIFF WBC: CPT | Performed by: INTERNAL MEDICINE

## 2024-04-07 PROCEDURE — 99900035 HC TECH TIME PER 15 MIN (STAT)

## 2024-04-07 PROCEDURE — 94640 AIRWAY INHALATION TREATMENT: CPT

## 2024-04-07 PROCEDURE — 99900031 HC PATIENT EDUCATION (STAT)

## 2024-04-07 RX ADMIN — Medication: at 08:04

## 2024-04-07 RX ADMIN — EZETIMIBE 10 MG: 10 TABLET ORAL at 08:04

## 2024-04-07 RX ADMIN — HYDROCODONE BITARTRATE AND ACETAMINOPHEN 1 TABLET: 7.5; 325 TABLET ORAL at 05:04

## 2024-04-07 RX ADMIN — TAMSULOSIN HYDROCHLORIDE 0.8 MG: 0.4 CAPSULE ORAL at 08:04

## 2024-04-07 RX ADMIN — ATORVASTATIN CALCIUM 80 MG: 40 TABLET, FILM COATED ORAL at 08:04

## 2024-04-07 RX ADMIN — IPRATROPIUM BROMIDE AND ALBUTEROL SULFATE 3 ML: 2.5; .5 SOLUTION RESPIRATORY (INHALATION) at 07:04

## 2024-04-07 RX ADMIN — HYDROCODONE BITARTRATE AND ACETAMINOPHEN 1 TABLET: 7.5; 325 TABLET ORAL at 10:04

## 2024-04-07 RX ADMIN — NALOXEGOL OXALATE 25 MG: 25 TABLET, FILM COATED ORAL at 08:04

## 2024-04-07 RX ADMIN — LACTULOSE 30 G: 10 SOLUTION ORAL at 10:04

## 2024-04-07 RX ADMIN — IPRATROPIUM BROMIDE AND ALBUTEROL SULFATE 3 ML: 2.5; .5 SOLUTION RESPIRATORY (INHALATION) at 09:04

## 2024-04-07 RX ADMIN — VANCOMYCIN HYDROCHLORIDE 1500 MG: 1.5 INJECTION, POWDER, LYOPHILIZED, FOR SOLUTION INTRAVENOUS at 10:04

## 2024-04-07 RX ADMIN — AMLODIPINE BESYLATE 5 MG: 5 TABLET ORAL at 04:04

## 2024-04-07 RX ADMIN — IPRATROPIUM BROMIDE AND ALBUTEROL SULFATE 3 ML: 2.5; .5 SOLUTION RESPIRATORY (INHALATION) at 04:04

## 2024-04-07 RX ADMIN — MUPIROCIN: 20 OINTMENT TOPICAL at 08:04

## 2024-04-07 RX ADMIN — POTASSIUM CHLORIDE 10 MEQ: 750 TABLET, FILM COATED, EXTENDED RELEASE ORAL at 08:04

## 2024-04-07 RX ADMIN — METOPROLOL SUCCINATE 25 MG: 25 TABLET, EXTENDED RELEASE ORAL at 08:04

## 2024-04-07 RX ADMIN — ASPIRIN 81 MG: 81 TABLET, COATED ORAL at 08:04

## 2024-04-07 RX ADMIN — FINASTERIDE 5 MG: 5 TABLET, FILM COATED ORAL at 08:04

## 2024-04-07 RX ADMIN — IPRATROPIUM BROMIDE AND ALBUTEROL SULFATE 3 ML: 2.5; .5 SOLUTION RESPIRATORY (INHALATION) at 11:04

## 2024-04-07 NOTE — PROGRESS NOTES
[facility] - Observation Unit  Neurosurgery  Progress Note    Subjective:     Interval History: He is lying in bed, NAD. He continues with back pain but this has improved. HE does feel more comfortable in the brace. He denies LE complaints. XR L spine completed.    Post-Op Info:  * No surgery found *          Medications:  Continuous Infusions:  Scheduled Meds:   albuterol-ipratropium  3 mL Nebulization QID    amLODIPine  5 mg Oral Daily    aspirin  81 mg Oral Daily    atorvastatin  80 mg Oral Daily    ezetimibe  10 mg Oral QHS    finasteride  5 mg Oral Daily    metoprolol succinate  25 mg Oral Daily    mupirocin   Nasal BID    naloxegoL  25 mg Oral Daily    potassium chloride  10 mEq Oral BID    tamsulosin  2 capsule Oral Daily    vancomycin (VANCOCIN) IV (PEDS and ADULTS)  1,500 mg Intravenous Q24H    zinc oxide-cod liver oil   Topical (Top) BID     PRN Meds:ALPRAZolam, furosemide, HYDROcodone-acetaminophen, lactulose 10 gram/15 ml, Pharmacy to dose Vancomycin consult **AND** vancomycin - pharmacy to dose     Review of Systems  Objective:     Weight: 72.6 kg (160 lb)  Body mass index is 21.7 kg/m².  Vital Signs (Most Recent):  Temp: 98.2 °F (36.8 °C) (04/07/24 0730)  Pulse: 80 (04/07/24 0730)  Resp: 16 (04/07/24 0416)  BP: 112/60 (04/07/24 0730)  SpO2: (!) 89 % (04/07/24 0730) Vital Signs (24h Range):  Temp:  [97.7 °F (36.5 °C)-98.2 °F (36.8 °C)] 98.2 °F (36.8 °C)  Pulse:  [66-80] 80  Resp:  [16-18] 16  SpO2:  [89 %-96 %] 89 %  BP: (102-128)/(55-86) 112/60                              Urethral Catheter 04/03/24 1900 (Active)   Site Assessment Clean;Intact 04/06/24 2000   Collection Container Standard drainage bag 04/06/24 2000   Securement Method secured to top of thigh w/ adhesive device 04/06/24 2000   Catheter Care Performed yes 04/06/24 0400   Reason for Continuing Urinary Catheterization Urinary retention 04/06/24 2000       Neurosurgery Physical Exam  Constitutional: He appears well-developed.  "No distress.      Eyes: Pupils are equal, round, and reactive to light. EOM are normal.      Cardiovascular: Intact distal pulses.      Abdominal: Soft.      Psych/Behavior: He is alert. He is oriented to person, place, and time. He has a normal mood and affect.   Very hard of hearing      Musculoskeletal:        Neck: Range of motion is full. There is no tenderness.        Back: Range of motion is limited. There is tenderness.        Right Upper Extremities: Range of motion is full.        Left Upper Extremities: Range of motion is full.       Right Lower Extremities: Range of motion is full.        Left Lower Extremities: Range of motion is full.      Neurological:        Sensory: There is no sensory deficit in the trunk. There is no sensory deficit in the extremities.        DTRs: Tricep reflexes are 1+ on the right side and 1+ on the left side. Bicep reflexes are 1+ on the right side and 1+ on the left side. Patellar reflexes are 1+ on the right side and 1+ on the left side. Achilles reflexes are 0 on the right side and 0 on the left side. He displays no Babinski's sign on the right side. He displays no Babinski's sign on the left side.        Cranial nerves: Cranial nerve(s) II, III, IV, V, VI, VII, VIII, IX, X, XI and XII are intact.     Significant Labs:  Recent Labs   Lab 04/07/24  0653   *   K 4.8   CO2 26   BUN 15.9   CREATININE 0.88   CALCIUM 9.0     Recent Labs   Lab 04/07/24  0653   WBC 8.98   HGB 11.7*   HCT 36.6*        No results for input(s): "LABPT", "INR", "APTT" in the last 48 hours.  Microbiology Results (last 7 days)       Procedure Component Value Units Date/Time    Urine culture [[id number]]  (Abnormal)  (Susceptibility) Collected: 04/03/24 0635    Order Status: Completed Specimen: Urine Updated: 04/06/24 1049     Urine Culture >/= 100,000 colonies/ml Enterococcus faecalis     Comment: Ampicillin results may be used to predict susceptibility to amoxicillin-clavulanate, " ampicillin-sulbactam, and piperacillin-tazobactam.       Blood Culture #1 **CANNOT BE ORDERED STAT** [[id number]]  (Normal) Collected: 04/03/24 0852    Order Status: Completed Specimen: Blood Updated: 04/06/24 1001     CULTURE, BLOOD ([facility]) No Growth At 72 Hours    Blood Culture #2 **CANNOT BE ORDERED STAT** [[id number]]  (Normal) Collected: 04/03/24 0852    Order Status: Completed Specimen: Blood Updated: 04/06/24 1001     CULTURE, BLOOD ([facility]) No Growth At 72 Hours    Anaerobic Culture [[id number]] Collected: 04/05/24 1012    Order Status: Canceled Specimen: Body Fluid from Pelvis     Body Fluid Culture [[id number]] Collected: 04/05/24 1012    Order Status: Canceled Specimen: Body Fluid from Pelvis     Fungal Culture [[id number]] Collected: 04/05/24 1012    Order Status: Canceled Specimen: Body Fluid from Pelvis     Gram Stain [[id number]] Collected: 04/05/24 1012    Order Status: Canceled Specimen: Body Fluid from Pelvis             Significant Diagnostics:  X-Ray Lumbar Spine 2 Or 3 Views [[id number]] Resulted: 04/06/24 2000   Order Status: Completed Updated: 04/06/24 2002   Narrative:     EXAMINATION:  XR LUMBAR SPINE 2 OR 3 VIEWS    CLINICAL HISTORY:  Fracture stability;    FINDINGS:  Exam is of limited quality. The superior half of L1 is not well visualized. There degenerative changes at L3-4 L4-5 and L5-S1. There is an anterolisthesis of L3 on L4.   Impression:       Degenerative changes.       Assessment/Plan:     Active Diagnoses:    Diagnosis Date Noted POA    PRINCIPAL PROBLEM:  Low back pain [M54.50] 04/03/2024 Yes    Urinary retention [R33.9] 04/03/2024 Yes    Urinary tract infection associated with indwelling urethral catheter [T83.511A, N39.0] 04/03/2024 Yes    Benign essential hypertension [I10] 04/11/2023 Yes    Coronary arteriosclerosis [I25.10] 04/11/2023 Yes    COPD (chronic obstructive pulmonary disease) [J44.9] 04/11/2023 Yes      Problems Resolved During this Admission:     Acute vs subacute L1  VCF  Lumbar stenosis  Recurrent urinary retention with UTI- currently with bobo  Constipation     His back pain is currently controlled. He is comfortable in the brace.  L1 fx appears stable in upright xray  Continue to treat conservatively in the LSO  OK to mobilize in the brace  Repeat xrays in 3-4 weeks outpatient either with PCP or Dr. Vasques  We will sign off    ALLEGRA Chandler  Neurosurgery  [facility] - Observation Unit

## 2024-04-07 NOTE — PROGRESS NOTES
[facility] - Observation Unit  Neurosurgery  Progress Note    Subjective:     Interval History: He is lying in bed, NAD. He continues with back pain but this has improved. HE does feel more comfortable in the brace. He denies LE complaints.    Post-Op Info:  * No surgery found *          Medications:  Continuous Infusions:  Scheduled Meds:   albuterol-ipratropium  3 mL Nebulization QID    amLODIPine  5 mg Oral Daily    aspirin  81 mg Oral Daily    atorvastatin  80 mg Oral Daily    ezetimibe  10 mg Oral QHS    finasteride  5 mg Oral Daily    metoprolol succinate  25 mg Oral Daily    mupirocin   Nasal BID    naloxegoL  25 mg Oral Daily    potassium chloride  10 mEq Oral BID    tamsulosin  2 capsule Oral Daily    vancomycin (VANCOCIN) IV (PEDS and ADULTS)  1,250 mg Intravenous Q24H    zinc oxide-cod liver oil   Topical (Top) BID     PRN Meds:ALPRAZolam, furosemide, HYDROcodone-acetaminophen, lactulose 10 gram/15 ml, Pharmacy to dose Vancomycin consult **AND** vancomycin - pharmacy to dose     Review of Systems  Objective:     Weight: 72.6 kg (160 lb)  Body mass index is 21.7 kg/m².  Vital Signs (Most Recent):  Temp: 97.8 °F (36.6 °C) (04/06/24 1545)  Pulse: 75 (04/06/24 1604)  Resp: 18 (04/06/24 1842)  BP: 126/86 (04/06/24 1545)  SpO2: 96 % (04/06/24 1604) Vital Signs (24h Range):  Temp:  [97.4 °F (36.3 °C)-98.1 °F (36.7 °C)] 97.8 °F (36.6 °C)  Pulse:  [65-81] 75  Resp:  [16-20] 18  SpO2:  [94 %-97 %] 96 %  BP: (104-128)/(58-86) 126/86     Date 04/06/24 0700 - 04/07/24 0659   Shift 3120-1936 5846-3924 9807-4124 24 Hour Total   INTAKE   P.O. 540   540   Shift Total(mL/kg) 540(7.4)   540(7.4)   OUTPUT   Urine(mL/kg/hr) 400(0.7) 1150  1550   Shift Total(mL/kg) 400(5.5) 1150(15.8)  1550(21.4)   Weight (kg) 72.6 72.6 72.6 72.6                            Urethral Catheter 04/03/24 1900 (Active)   Site Assessment Clean;Intact 04/06/24 0753   Collection Container Standard drainage bag 04/06/24 0753   Securement  Method secured to top of thigh w/ adhesive device 04/06/24 0753   Catheter Care Performed yes 04/06/24 0400   Reason for Continuing Urinary Catheterization Urinary retention 04/06/24 0753       Neurosurgery Physical Exam  Nursing note and vitals reviewed.     Constitutional: He appears well-developed. No distress.      Eyes: Pupils are equal, round, and reactive to light. EOM are normal.      Cardiovascular: Intact distal pulses.      Abdominal: Soft.      Psych/Behavior: He is alert. He is oriented to person, place, and time. He has a normal mood and affect.   Very hard of hearing      Musculoskeletal:        Neck: Range of motion is full. There is no tenderness.        Back: Range of motion is limited. There is tenderness.        Right Upper Extremities: Range of motion is full.        Left Upper Extremities: Range of motion is full.       Right Lower Extremities: Range of motion is full.        Left Lower Extremities: Range of motion is full.      Neurological:        Sensory: There is no sensory deficit in the trunk. There is no sensory deficit in the extremities.        DTRs: Tricep reflexes are 1+ on the right side and 1+ on the left side. Bicep reflexes are 1+ on the right side and 1+ on the left side. Patellar reflexes are 1+ on the right side and 1+ on the left side. Achilles reflexes are 0 on the right side and 0 on the left side. He displays no Babinski's sign on the right side. He displays no Babinski's sign on the left side.        Cranial nerves: Cranial nerve(s) II, III, IV, V, VI, VII, VIII, IX, X, XI and XII are intact.     Significant Labs:  Recent Labs   Lab 04/05/24  0551   *   K 4.2   CO2 28   BUN 18.7   CREATININE 1.07   CALCIUM 8.1*     Recent Labs   Lab 04/05/24  0551   WBC 8.26   HGB 10.4*   HCT 32.8*        Recent Labs   Lab 04/05/24  0756   INR 1.0     Microbiology Results (last 7 days)       Procedure Component Value Units Date/Time    Urine culture [[id number]]  (Abnormal)   (Susceptibility) Collected: 04/03/24 0635    Order Status: Completed Specimen: Urine Updated: 04/06/24 1049     Urine Culture >/= 100,000 colonies/ml Enterococcus faecalis     Comment: Ampicillin results may be used to predict susceptibility to amoxicillin-clavulanate, ampicillin-sulbactam, and piperacillin-tazobactam.       Blood Culture #1 **CANNOT BE ORDERED STAT** [[id number]]  (Normal) Collected: 04/03/24 0852    Order Status: Completed Specimen: Blood Updated: 04/06/24 1001     CULTURE, BLOOD ([facility]) No Growth At 72 Hours    Blood Culture #2 **CANNOT BE ORDERED STAT** [[id number]]  (Normal) Collected: 04/03/24 0852    Order Status: Completed Specimen: Blood Updated: 04/06/24 1001     CULTURE, BLOOD ([facility]) No Growth At 72 Hours    Anaerobic Culture [[id number]] Collected: 04/05/24 1012    Order Status: Canceled Specimen: Body Fluid from Pelvis     Body Fluid Culture [[id number]] Collected: 04/05/24 1012    Order Status: Canceled Specimen: Body Fluid from Pelvis     Fungal Culture [[id number]] Collected: 04/05/24 1012    Order Status: Canceled Specimen: Body Fluid from Pelvis     Gram Stain [[id number]] Collected: 04/05/24 1012    Order Status: Canceled Specimen: Body Fluid from Pelvis             Significant Diagnostics:      Assessment/Plan:     Active Diagnoses:    Diagnosis Date Noted POA    PRINCIPAL PROBLEM:  Low back pain [M54.50] 04/03/2024 Yes    Urinary retention [R33.9] 04/03/2024 Yes    Urinary tract infection associated with indwelling urethral catheter [T83.511A, N39.0] 04/03/2024 Yes    Benign essential hypertension [I10] 04/11/2023 Yes    Coronary arteriosclerosis [I25.10] 04/11/2023 Yes    COPD (chronic obstructive pulmonary disease) [J44.9] 04/11/2023 Yes      Problems Resolved During this Admission:     Acute vs subacute L1 VCF  Lumbar stenosis  Recurrent urinary retention with UTI- currently with bobo  Constipation    His back pain is currently controlled. He is comfortable in the brace.  We  plan to continue to treat conservatively in the LSO  We will obtain upright xrays to assess fx stability  OK to mobilize in the brace  Further recs to follow imaging    ALLEGRA Chandler  Neurosurgery  [facility] - Observation Unit

## 2024-04-07 NOTE — PROGRESS NOTES
Pharmacokinetic Assessment Follow Up: IV Vancomycin    Vancomycin serum concentration assessment(s):    The trough level was drawn correctly and can be used to guide therapy at this time. The measurement is below the desired definitive target range of 10 to 20 mcg/mL.    Vancomycin Regimen Plan:    Change regimen to Vancomycin 1500 mg IV every 24 hours with next serum trough concentration measured at 0900 prior to 3rd dose on 4/9      Drug levels (last 3 results):  Recent Labs   Lab Result Units 04/07/24  0653   Vancomycin Trough ug/ml 7.9*       Vancomycin Administrations:  vancomycin given in the last 96 hours                     vancomycin 1.25 g in dextrose 5% 250 mL IVPB (ready to mix) (mg) 1,250 mg New Bag 04/06/24 0835    vancomycin 1.75 g in 5 % dextrose 500 mL IVPB (mg) 1,750 mg New Bag 04/05/24 0922                    Pharmacy will continue to follow and monitor vancomycin.    Please contact pharmacy at extension [phone] for questions regarding this assessment.    Thank you for the consult,   Suzanne Power       Patient brief summary:  Max Squires is a 89 y.o. male initiated on antimicrobial therapy with IV Vancomycin for treatment of urinary tract infection    Drug Allergies:   Review of patient's allergies indicates:  No Known Allergies    Actual Body Weight:  Wt Readings from Last 1 Encounters:   04/06/24 72.6 kg (160 lb)       Renal Function:   Estimated Creatinine Clearance: 58.4 mL/min (based on SCr of 0.88 mg/dL).,     Dialysis Method (if applicable):  N/A    CBC (last 72 hours):  Recent Labs   Lab Result Units 04/05/24  0551 04/07/24  0653   WBC x10(3)/mcL 8.26 8.98   Hgb g/dL 10.4* 11.7*   Hct % 32.8* 36.6*   Platelet x10(3)/mcL 191 208   Mono % % 5.9 5.8   Eos % % 1.6 7.3   Basophil % % 0.5 0.9       Metabolic Panel (last 72 hours):  Recent Labs   Lab Result Units 04/05/24  0551 04/07/24  0653   Sodium Level mmol/L 133* 134*   Potassium Level mmol/L 4.2 4.8   Chloride mmol/L 100 100   Carbon  Dioxide mmol/L 28 26   Glucose Level mg/dL 105 92   Blood Urea Nitrogen mg/dL 18.7 15.9   Creatinine mg/dL 1.07 0.88   Albumin Level g/dL 2.8* 3.2*   Bilirubin Total mg/dL 0.4 0.4   Alkaline Phosphatase unit/L 100 117   Aspartate Aminotransferase unit/L 7 10   Alanine Aminotransferase unit/L 7 11       Microbiologic Results:  Microbiology Results (last 7 days)       Procedure Component Value Units Date/Time    Urine culture [[id number]]  (Abnormal)  (Susceptibility) Collected: 04/03/24 0635    Order Status: Completed Specimen: Urine Updated: 04/06/24 1049     Urine Culture >/= 100,000 colonies/ml Enterococcus faecalis     Comment: Ampicillin results may be used to predict susceptibility to amoxicillin-clavulanate, ampicillin-sulbactam, and piperacillin-tazobactam.       Blood Culture #1 **CANNOT BE ORDERED STAT** [[id number]]  (Normal) Collected: 04/03/24 0852    Order Status: Completed Specimen: Blood Updated: 04/06/24 1001     CULTURE, BLOOD ([facility]) No Growth At 72 Hours    Blood Culture #2 **CANNOT BE ORDERED STAT** [[id number]]  (Normal) Collected: 04/03/24 0852    Order Status: Completed Specimen: Blood Updated: 04/06/24 1001     CULTURE, BLOOD ([facility]) No Growth At 72 Hours    Anaerobic Culture [[id number]] Collected: 04/05/24 1012    Order Status: Canceled Specimen: Body Fluid from Pelvis     Body Fluid Culture [[id number]] Collected: 04/05/24 1012    Order Status: Canceled Specimen: Body Fluid from Pelvis     Fungal Culture [[id number]] Collected: 04/05/24 1012    Order Status: Canceled Specimen: Body Fluid from Pelvis     Gram Stain [[id number]] Collected: 04/05/24 1012    Order Status: Canceled Specimen: Body Fluid from Pelvis

## 2024-04-07 NOTE — PROGRESS NOTES
[facility] Hospital Medicine Progress Note        Chief Complaint: Inpatient Follow-up for UTI and perirectal fluid, back pain    HPI:  Reviewed and noted in detail    Interval Hx:   The patient was seen and examined.  Sitting up in the recliner.  Other than back pain, no other acute needs or complaints.  Will get a heating pad and patient is in agreement.  Monitor of the lumbar spine was completed that was consistent with acute to subacute superior L1 endplate compression fracture.  This has been evaluated by Neurosurgery and noted to be stable with no acute intervention deemed necessary and patient cleared for ambulation with a brace.  Other details of the MRI as noted with multilevel spondylosis and foraminal stenosis    Objective/physical exam:  General: In no acute distress, afebrile, pleasant  Chest: Clear to auscultation bilaterally  Heart: RRR, +S1, S2, no appreciable murmur  Abdomen: Soft, nontender, BS +  Neurologic: Alert and oriented x4, nonfocal     VITAL SIGNS: 24 HRS MIN & MAX LAST   Temp  Min: 97.7 °F (36.5 °C)  Max: 98.2 °F (36.8 °C) 98.2 °F (36.8 °C)   BP  Min: 102/55  Max: 128/73 112/60   Pulse  Min: 66  Max: 80  80   Resp  Min: 16  Max: 18 16   SpO2  Min: 89 %  Max: 96 % (!) 92 %     I have reviewed the following labs:  Recent Labs   Lab 04/03/24 0632 04/05/24  0551 04/07/24  0653   WBC 7.71 8.26 8.98   RBC 4.36* 3.96* 4.44*   HGB 11.6* 10.4* 11.7*   HCT 36.6* 32.8* 36.6*   MCV 83.9 82.8 82.4   MCH 26.6* 26.3* 26.4*   MCHC 31.7* 31.7* 32.0*   RDW 15.2 15.5 15.8    191 208   MPV 10.8* 10.6* 9.9     Recent Labs   Lab 04/03/24 0632 04/05/24  0551 04/07/24  0653   * 133* 134*   K 4.6 4.2 4.8   CO2 25 28 26   BUN 18.1 18.7 15.9   CREATININE 1.01 1.07 0.88   CALCIUM 8.5* 8.1* 9.0   ALBUMIN 3.1* 2.8* 3.2*   ALKPHOS 115 100 117   ALT 5 7 11   AST 8 7 10   BILITOT 0.5 0.4 0.4     Microbiology Results (last 7 days)       Procedure Component Value Units  Date/Time    Urine culture [[id number]]  (Abnormal)  (Susceptibility) Collected: 04/03/24 0635    Order Status: Completed Specimen: Urine Updated: 04/06/24 1049     Urine Culture >/= 100,000 colonies/ml Enterococcus faecalis     Comment: Ampicillin results may be used to predict susceptibility to amoxicillin-clavulanate, ampicillin-sulbactam, and piperacillin-tazobactam.       Blood Culture #1 **CANNOT BE ORDERED STAT** [[id number]]  (Normal) Collected: 04/03/24 0852    Order Status: Completed Specimen: Blood Updated: 04/06/24 1001     CULTURE, BLOOD ([facility]) No Growth At 72 Hours    Blood Culture #2 **CANNOT BE ORDERED STAT** [[id number]]  (Normal) Collected: 04/03/24 0852    Order Status: Completed Specimen: Blood Updated: 04/06/24 1001     CULTURE, BLOOD ([facility]) No Growth At 72 Hours    Anaerobic Culture [[id number]] Collected: 04/05/24 1012    Order Status: Canceled Specimen: Body Fluid from Pelvis     Body Fluid Culture [[id number]] Collected: 04/05/24 1012    Order Status: Canceled Specimen: Body Fluid from Pelvis     Fungal Culture [[id number]] Collected: 04/05/24 1012    Order Status: Canceled Specimen: Body Fluid from Pelvis     Gram Stain [[id number]] Collected: 04/05/24 1012    Order Status: Canceled Specimen: Body Fluid from Pelvis              See below for Radiology    Scheduled Med:   albuterol-ipratropium  3 mL Nebulization QID    amLODIPine  5 mg Oral Daily    aspirin  81 mg Oral Daily    atorvastatin  80 mg Oral Daily    ezetimibe  10 mg Oral QHS    finasteride  5 mg Oral Daily    metoprolol succinate  25 mg Oral Daily    mupirocin   Nasal BID    naloxegoL  25 mg Oral Daily    potassium chloride  10 mEq Oral BID    tamsulosin  2 capsule Oral Daily    vancomycin (VANCOCIN) IV (PEDS and ADULTS)  1,500 mg Intravenous Q24H    zinc oxide-cod liver oil   Topical (Top) BID      Continuous Infusions:     PRN Meds:  ALPRAZolam, furosemide, HYDROcodone-acetaminophen, lactulose 10 gram/15 ml, Pharmacy to dose  Vancomycin consult **AND** vancomycin - pharmacy to dose     Assessment/Plan:    Staph epi UTI with urinary retention   - was on Zosyn and vancomycin, now on vancomycin, Zosyn was discontinued on 04/05/2024   -remains clinically stable with no acute issues      2. Perirectal fluid collection   -unsuccessful drainage per IR  -conservative treatment with continuation of antibiotics      3. Constipation  -resolved, now with loose bowels, scale back on lactulose to only p.r.n. dosing      4. Clinically compensated history of heart failure  -medically optimized      5.  COPD, no acute exacerbation     6. Lumbar stenosis with L1 compression fracture  -stable   -has been evaluated by Neurosurgery and no acute intervention   -okay to ambulate with brace  -heating pad ordered    Patient condition:  Stable    Anticipated discharge and Disposition:         All diagnosis and differential diagnosis have been reviewed; assessment and plan has been documented; I have personally reviewed the labs and test results that are presently available; I have reviewed the patients medication list; I have reviewed the consulting providers response and recommendations. I have reviewed or attempted to review medical records based upon their availability    All of the patient's questions have been  addressed and answered. Patient's is agreeable to the above stated plan. I will continue to monitor closely and make adjustments to medical management as needed.  _____________________________________________________________________    Nutrition Status:    Radiology:  I have personally reviewed the following imaging and agree with the radiologist.     X-Ray Lumbar Spine 2 Or 3 Views  Narrative: EXAMINATION:  XR LUMBAR SPINE 2 OR 3 VIEWS    CLINICAL HISTORY:  Fracture stability;    FINDINGS:  Exam is of limited quality. The superior half of L1 is not well visualized. There degenerative changes at L3-4 L4-5 and L5-S1. There is an anterolisthesis of L3  on L4.  Impression: Degenerative changes.    Limited study as described above    Electronically signed by: Cristo Rodriguez MD  Date:    04/06/2024  Time:    20:00      Nel Lira MD  Department of Hospital Medicine   [facility]   04/07/2024

## 2024-04-08 LAB
ALBUMIN SERPL-MCNC: 3.2 G/DL (ref 3.4–4.8)
ALBUMIN/GLOB SERPL: 0.9 RATIO (ref 1.1–2)
ALP SERPL-CCNC: 114 UNIT/L (ref 40–150)
ALT SERPL-CCNC: 12 UNIT/L (ref 0–55)
AST SERPL-CCNC: 16 UNIT/L (ref 5–34)
BACTERIA BLD CULT: NORMAL
BACTERIA BLD CULT: NORMAL
BILIRUB SERPL-MCNC: 0.4 MG/DL
BUN SERPL-MCNC: 15.6 MG/DL (ref 8.4–25.7)
CALCIUM SERPL-MCNC: 8.9 MG/DL (ref 8.8–10)
CHLORIDE SERPL-SCNC: 100 MMOL/L (ref 98–107)
CO2 SERPL-SCNC: 21 MMOL/L (ref 23–31)
CREAT SERPL-MCNC: 0.88 MG/DL (ref 0.73–1.18)
GFR SERPLBLD CREATININE-BSD FMLA CKD-EPI: >60 MLS/MIN/1.73/M2
GLOBULIN SER-MCNC: 3.7 GM/DL (ref 2.4–3.5)
GLUCOSE SERPL-MCNC: 88 MG/DL (ref 82–115)
POTASSIUM SERPL-SCNC: 5.6 MMOL/L (ref 3.5–5.1)
PROT SERPL-MCNC: 6.9 GM/DL (ref 5.8–7.6)
SODIUM SERPL-SCNC: 133 MMOL/L (ref 136–145)

## 2024-04-08 PROCEDURE — 63600175 PHARM REV CODE 636 W HCPCS: Performed by: INTERNAL MEDICINE

## 2024-04-08 PROCEDURE — 11000001 HC ACUTE MED/SURG PRIVATE ROOM

## 2024-04-08 PROCEDURE — 80053 COMPREHEN METABOLIC PANEL: CPT | Performed by: INTERNAL MEDICINE

## 2024-04-08 PROCEDURE — 94640 AIRWAY INHALATION TREATMENT: CPT

## 2024-04-08 PROCEDURE — 94760 N-INVAS EAR/PLS OXIMETRY 1: CPT

## 2024-04-08 PROCEDURE — 21400001 HC TELEMETRY ROOM

## 2024-04-08 PROCEDURE — 97116 GAIT TRAINING THERAPY: CPT | Mod: CQ

## 2024-04-08 PROCEDURE — 97530 THERAPEUTIC ACTIVITIES: CPT | Mod: CQ

## 2024-04-08 PROCEDURE — 99900035 HC TECH TIME PER 15 MIN (STAT)

## 2024-04-08 PROCEDURE — 99900031 HC PATIENT EDUCATION (STAT)

## 2024-04-08 PROCEDURE — 25000242 PHARM REV CODE 250 ALT 637 W/ HCPCS: Performed by: INTERNAL MEDICINE

## 2024-04-08 PROCEDURE — 25000003 PHARM REV CODE 250: Performed by: INTERNAL MEDICINE

## 2024-04-08 PROCEDURE — 99233 SBSQ HOSP IP/OBS HIGH 50: CPT | Mod: ,,, | Performed by: INTERNAL MEDICINE

## 2024-04-08 PROCEDURE — 27000221 HC OXYGEN, UP TO 24 HOURS

## 2024-04-08 RX ADMIN — ATORVASTATIN CALCIUM 80 MG: 40 TABLET, FILM COATED ORAL at 08:04

## 2024-04-08 RX ADMIN — TAMSULOSIN HYDROCHLORIDE 0.8 MG: 0.4 CAPSULE ORAL at 08:04

## 2024-04-08 RX ADMIN — EZETIMIBE 10 MG: 10 TABLET ORAL at 08:04

## 2024-04-08 RX ADMIN — IPRATROPIUM BROMIDE AND ALBUTEROL SULFATE 3 ML: 2.5; .5 SOLUTION RESPIRATORY (INHALATION) at 08:04

## 2024-04-08 RX ADMIN — Medication: at 08:04

## 2024-04-08 RX ADMIN — IPRATROPIUM BROMIDE AND ALBUTEROL SULFATE 3 ML: 2.5; .5 SOLUTION RESPIRATORY (INHALATION) at 11:04

## 2024-04-08 RX ADMIN — MUPIROCIN: 20 OINTMENT TOPICAL at 08:04

## 2024-04-08 RX ADMIN — ASPIRIN 81 MG: 81 TABLET, COATED ORAL at 08:04

## 2024-04-08 RX ADMIN — HYDROCODONE BITARTRATE AND ACETAMINOPHEN 1 TABLET: 7.5; 325 TABLET ORAL at 02:04

## 2024-04-08 RX ADMIN — METOPROLOL SUCCINATE 25 MG: 25 TABLET, EXTENDED RELEASE ORAL at 08:04

## 2024-04-08 RX ADMIN — HYDROCODONE BITARTRATE AND ACETAMINOPHEN 1 TABLET: 7.5; 325 TABLET ORAL at 08:04

## 2024-04-08 RX ADMIN — NALOXEGOL OXALATE 25 MG: 25 TABLET, FILM COATED ORAL at 08:04

## 2024-04-08 RX ADMIN — VANCOMYCIN HYDROCHLORIDE 1500 MG: 1.5 INJECTION, POWDER, LYOPHILIZED, FOR SOLUTION INTRAVENOUS at 08:04

## 2024-04-08 RX ADMIN — IPRATROPIUM BROMIDE AND ALBUTEROL SULFATE 3 ML: 2.5; .5 SOLUTION RESPIRATORY (INHALATION) at 04:04

## 2024-04-08 RX ADMIN — FINASTERIDE 5 MG: 5 TABLET, FILM COATED ORAL at 08:04

## 2024-04-08 RX ADMIN — AMLODIPINE BESYLATE 5 MG: 5 TABLET ORAL at 05:04

## 2024-04-08 NOTE — PROGRESS NOTES
[facility] - Observation Unit  Colon & Rectal Surgery  Progress Note      Subjective:       Interval History:     Sitting up in bed with lumbar brace on  Reports severe back pain this morning but improved with pain medications  No fevers  BM last on 4/6, reports passing gas    No current facility-administered medications on file prior to encounter.     Current Outpatient Medications on File Prior to Encounter   Medication Sig    albuterol (PROVENTIL HFA) 90 mcg/actuation inhaler Inhale 2 puffs into the lungs every 4 (four) hours as needed for Wheezing or Shortness of Breath. Rescue (Patient not taking: Reported on 3/27/2024)    albuterol-ipratropium (DUO-NEB) 2.5 mg-0.5 mg/3 mL nebulizer solution Take 3 mLs by nebulization 4 (four) times daily. Rescue    amLODIPine (NORVASC) 5 MG tablet Take 5 mg by mouth Daily.    aspirin (ECOTRIN) 81 MG EC tablet Take 1 tablet (81 mg total) by mouth once daily.    atorvastatin (LIPITOR) 80 MG tablet TAKE 1 TABLET(80 MG) BY MOUTH EVERY DAY    diclofenac sodium (VOLTAREN) 1 % Gel APPLY 1 TO 2 GRAMS TOPICALLY TO THE AFFECTED AREA FOUR TIMES DAILY AS NEEDED    ezetimibe (ZETIA) 10 mg tablet Take 1 tablet (10 mg total) by mouth every evening.    finasteride (PROSCAR) 5 mg tablet TAKE 1 TABLET(5 MG) BY MOUTH EVERY DAY    furosemide (LASIX) 40 MG tablet Take 1 tablet (40 mg total) by mouth once daily. (Patient taking differently: Take 40 mg by mouth as needed.)    metoprolol succinate (TOPROL-XL) 25 MG 24 hr tablet TAKE 1 TABLET(25 MG) BY MOUTH EVERY DAY    potassium chloride (MICRO-K) 10 MEQ CpSR Take 1 capsule (10 mEq total) by mouth 2 (two) times daily.    propranoloL (INDERAL LA) 120 MG 24 hr capsule Take 120 mg by mouth.    tamsulosin (FLOMAX) 0.4 mg Cap Take 2 capsules (0.8 mg total) by mouth once daily.       Review of patient's allergies indicates:  No Known Allergies    Past Medical History:   Diagnosis Date    Arthritis     COPD (chronic obstructive pulmonary  disease)     Emphysema, unspecified     Hypercholesterolemia     Hypertension     Unspecified osteoarthritis, unspecified site      Past Surgical History:   Procedure Laterality Date    BACK SURGERY      CORONARY ANGIOPLASTY WITH STENT PLACEMENT      FRACTURE SURGERY      foot    HIP SURGERY Left     Dr. Hoyos    JOINT REPLACEMENT Left     QUETA    JOINT REPLACEMENT Right     TKA     Family History    None       Tobacco Use    Smoking status: Former     Current packs/day: 1.00     Types: Cigarettes    Smokeless tobacco: Never   Substance and Sexual Activity    Alcohol use: Not Currently     Comment: None    Drug use: Never    Sexual activity: Not on file     Review of Systems  Objective:     Vital Signs (Most Recent):  Temp: 98.1 °F (36.7 °C) (04/08/24 0711)  Pulse: 73 (04/08/24 0820)  Resp: 20 (04/08/24 0820)  BP: 137/77 (04/08/24 0820)  SpO2: 97 % (04/08/24 0820) Vital Signs (24h Range):  Temp:  [98 °F (36.7 °C)-98.4 °F (36.9 °C)] 98.1 °F (36.7 °C)  Pulse:  [67-81] 73  Resp:  [15-20] 20  SpO2:  [93 %-97 %] 97 %  BP: ()/(54-77) 137/77     Weight: 72.6 kg (160 lb)  Body mass index is 21.7 kg/m².      Intake/Output Summary (Last 24 hours) at 4/8/2024 0957  Last data filed at 4/8/2024 0623  Gross per 24 hour   Intake 1640 ml   Output 3150 ml   Net -1510 ml       Physical Exam  Vitals reviewed.   Constitutional:       General: He is not in acute distress.  HENT:      Head: Normocephalic and atraumatic.      Nose: Nose normal.   Eyes:      General: No scleral icterus.  Cardiovascular:      Rate and Rhythm: Normal rate and regular rhythm.   Pulmonary:      Effort: Pulmonary effort is normal. No respiratory distress.   Abdominal:      General: There is no distension.      Palpations: Abdomen is soft.      Tenderness: There is no abdominal tenderness. There is no rebound.   Musculoskeletal:         General: Normal range of motion.   Neurological:      Mental Status: He is alert and oriented to person, place, and time.          Significant Labs:  CBC:   Recent Labs   Lab 04/07/24  0653   WBC 8.98   RBC 4.44*   HGB 11.7*   HCT 36.6*      MCV 82.4   MCH 26.4*   MCHC 32.0*     CMP:   Recent Labs   Lab 04/08/24  0528   CALCIUM 8.9   ALBUMIN 3.2*   *   K 5.6*   CO2 21*   BUN 15.6   CREATININE 0.88   ALKPHOS 114   ALT 12   AST 16   BILITOT 0.4         Assessment/Plan:     Active Diagnoses:    Diagnosis Date Noted POA    PRINCIPAL PROBLEM:  Low back pain [M54.50] 04/03/2024 Yes    Urinary retention [R33.9] 04/03/2024 Yes    Urinary tract infection associated with indwelling urethral catheter [T83.511A, N39.0] 04/03/2024 Yes    Benign essential hypertension [I10] 04/11/2023 Yes    Coronary arteriosclerosis [I25.10] 04/11/2023 Yes    COPD (chronic obstructive pulmonary disease) [J44.9] 04/11/2023 Yes      Problems Resolved During this Admission:     - Continue IV Antibiotics and pain management  - Repeat CT abdomen in 2 days to evaluate pelvis collection    ARSENIO Iglesias  Colon & Rectal Surgery  [facility] - Observation Unit

## 2024-04-08 NOTE — PROGRESS NOTES
Events of the weekend reviewed.    Subjective:  The patient was complaining of persistent back pain that it was more severe than it has been.  He sitting up and stating that he could not even eat breakfast (his plate was clean).  Also complaining lower abdominal pain.  No nausea or vomiting.  Last bowel movement was about 2 days ago.    Objective:  He was afebrile.  Blood pressure 137/77, heart rate 76, respiratory rate 18 and O2 sat 95% on 2 L nasal cannula.    General appearance is unremarkable except for some mild agitation.  Heart has a regular rate and rhythm.  Lungs clear but distant breath sounds.  Abdomen nontender.  Extremities without clubbing cyanosis or edema.    Today's laboratory studies include an abnormal BNP with a potassium of 5.6.  BUN 15 creatinine 0.8.  Bicarb of 21.    Urine cultures now is also growing out Enterococcus faecalis as well as staph epi.  He has been on vancomycin since last week in his should cover both bugs.    Assessment:  1.  Subacute fracture L1.  Primary source of back pain.  He has been treated conservatively.    2. Suspected pelvic abscess.  Attempt at IR drainage 3 days ago was unsuccessful.      3. UTI and pre-existing urinary retention with Almeida    4. Severe but stable COPD.      5. Stable coronary disease.  Compensated heart failure    6. Hyperkalemia.    7. History of fairly resistant C difficile colitis.    Plan:  Continue current antibiotics.  Repeat CT scan pelvis in 48 hours.  Continue physical therapy.  Continue pain meds.    We will consult his urologist for further direction regarding the indwelling Almeida catheter.      Discontinue potassium supplement and update blood work tomorrow

## 2024-04-08 NOTE — PT/OT/SLP PROGRESS
Physical Therapy Treatment    Patient Name:  Max Squires   MRN:  [id number]    Recommendations:     Discharge therapy intensity: Low Intensity Therapy   Discharge Equipment Recommendations: none  Barriers to discharge: Impaired mobility and Ongoing medical needs    Assessment:     Max Squires is a 89 y.o. male admitted with a medical diagnosis of Acute vs subacute L1 VCF.  He presents with the following impairments/functional limitations: impaired endurance, impaired self care skills, impaired functional mobility, gait instability.    Patient required seated rest break to catch breath after ambulation.     Rehab Prognosis: Good; patient would benefit from acute skilled PT services to address these deficits and reach maximum level of function.    Recent Surgery: * No surgery found *      Plan:     During this hospitalization, patient to be seen 5 x/week to address the identified rehab impairments via gait training, therapeutic activities, therapeutic exercises and progress toward the following goals:    Plan of Care Expires:       Subjective     Chief Complaint: pain during ambulation  Patient/Family Comments/goals: none stated  Pain/Comfort:  Pain Rating 1: 0/10      Objective:     Communicated with patient, nurse prior to session.  Patient found HOB elevated with telemetry, pulse ox (continuous), oxygen, bobo catheter upon PT entry to room.     General Precautions: Standard, fall  Orthopedic Precautions: spinal precautions  Braces: LSO  Respiratory Status: Nasal cannula, flow 2.5 L/min  Blood Pressure: NT  Skin Integrity: Visible skin intact      Functional Mobility:  Bed Mobility:     Rolling Right: supervision  Scooting: supervision  Supine to Sit: supervision  Transfers:     Sit to Stand:  contact guard assistance with rolling walker  Gait: patient amb 95ft with rolling walker with CGA-SBA with step through gait pattern. Patient required verbal cues for upright postures and maintain close proximity to rolling  walker however patient able to correct with verbal cues.     Education:  Patient provided with verbal education education regarding PT role/goals/POC, post-op precautions, fall prevention, safety awareness, and discharge/DME recommendations.  Understanding was verbalized.     Patient left up in chair with all lines intact and call button in reach    GOALS:   Multidisciplinary Problems       Physical Therapy Goals          Problem: Physical Therapy    Goal Priority Disciplines Outcome Goal Variances Interventions   Physical Therapy Goal     PT, PT/OT Ongoing, Progressing     Description: Pt will be seen for the following goals  1. Pt will be sba with bed mobility  2. Pt will be sba with transfers with lso brace on and using a rw  3. Pt will amb with a rw and LSO brace on for 150ft sba                       Time Tracking:     PT Received On: 04/08/24  PT Start Time: 1120     PT Stop Time: 1146  PT Total Time (min): 26 min     Billable Minutes: Gait Training 13 and Therapeutic Activity 13    Treatment Type: Treatment  PT/PTA: PTA     Number of PTA visits since last PT visit: 1 04/08/2024

## 2024-04-08 NOTE — CONSULTS
UROLOGY CONSULTATION      PRIMARY CARE PHYSICIAN  Ephraim Ramires MD  REASON FOR CONSULTATION  Urinary retention    HISTORY OF PRESENT ILLNESS:  Mr. Max Squires is a 89 y.o.  male, for evaluation of the above    Pt has a back injury with severe pain and sever constipation  He was not able to urinate with in and out bobo over the last 2 days.    Currently has bobo in place.    He was start on flomax  PAST MEDICAL HISTORY  Past Medical History:   Diagnosis Date    Arthritis     COPD (chronic obstructive pulmonary disease)     Emphysema, unspecified     Hypercholesterolemia     Hypertension     Unspecified osteoarthritis, unspecified site      PAST SURGICAL HISTORY  Past Surgical History:   Procedure Laterality Date    BACK SURGERY      CORONARY ANGIOPLASTY WITH STENT PLACEMENT      FRACTURE SURGERY      foot    HIP SURGERY Left     Dr. Hoyos    JOINT REPLACEMENT Left     QUETA    JOINT REPLACEMENT Right     TKA     MEDICATIONS    Current Facility-Administered Medications:     albuterol-ipratropium 2.5 mg-0.5 mg/3 mL nebulizer solution 3 mL, 3 mL, Nebulization, QID, Ephraim Ramires MD, 3 mL at 04/08/24 1150    ALPRAZolam tablet 0.5 mg, 0.5 mg, Oral, BID PRN, Abel Salinas II, MD, 0.5 mg at 04/05/24 2104    amLODIPine tablet 5 mg, 5 mg, Oral, Daily, Ephraim Ramires MD, 5 mg at 04/07/24 1619    aspirin EC tablet 81 mg, 81 mg, Oral, Daily, Ephraim Ramires MD, 81 mg at 04/08/24 0820    atorvastatin tablet 80 mg, 80 mg, Oral, Daily, Ephraim Ramires MD, 80 mg at 04/08/24 0820    ezetimibe tablet 10 mg, 10 mg, Oral, QHS, Ephraim Ramires MD, 10 mg at 04/07/24 2007    finasteride tablet 5 mg, 5 mg, Oral, Daily, Ephraim Ramires MD, 5 mg at 04/08/24 0820    furosemide tablet 40 mg, 40 mg, Oral, PRN, Ephraim Ramires MD    HYDROcodone-acetaminophen 7.5-325 mg per tablet 1 tablet, 1 tablet, Oral, Q6H PRN, Ephraim Ramires MD, 1 tablet at 04/08/24 1424    lactulose  10 gram/15 ml solution 30 g, 30 g, Oral, Daily PRN, Nel Lira MD, 30 g at 04/07/24 1017    metoprolol succinate (TOPROL-XL) 24 hr tablet 25 mg, 25 mg, Oral, Daily, Ephraim Ramires MD, 25 mg at 04/08/24 0820    mupirocin 2 % ointment, , Nasal, BID, Ephraim Ramires MD, Given at 04/08/24 0821    naloxegoL (MOVANTIK) tablet 25 mg, 25 mg, Oral, Daily, Ephraim Ramires MD, 25 mg at 04/08/24 0820    tamsulosin 24 hr capsule 0.8 mg, 2 capsule, Oral, Daily, Ephraim Ramires MD, 0.8 mg at 04/08/24 0820    Pharmacy to dose Vancomycin consult, , , Once **AND** vancomycin - pharmacy to dose, , Intravenous, pharmacy to manage frequency, Ephraim Ramires MD    vancomycin 1.5 g in dextrose 5 % 250 mL IVPB (ready to mix), 1,500 mg, Intravenous, Q24H, Ephraim Ramires MD, Stopped at 04/08/24 1011    zinc oxide-cod liver oil 40 % paste, , Topical (Top), BID, Ephraim Ramires MD, Given at 04/08/24 0821  Medications Prior to Admission   Medication Sig Dispense Refill Last Dose    albuterol (PROVENTIL HFA) 90 mcg/actuation inhaler Inhale 2 puffs into the lungs every 4 (four) hours as needed for Wheezing or Shortness of Breath. Rescue (Patient not taking: Reported on 3/27/2024) 18 g 2     albuterol-ipratropium (DUO-NEB) 2.5 mg-0.5 mg/3 mL nebulizer solution Take 3 mLs by nebulization 4 (four) times daily. Rescue 120 each 11     amLODIPine (NORVASC) 5 MG tablet Take 5 mg by mouth Daily.       aspirin (ECOTRIN) 81 MG EC tablet Take 1 tablet (81 mg total) by mouth once daily. 30 tablet 0     atorvastatin (LIPITOR) 80 MG tablet TAKE 1 TABLET(80 MG) BY MOUTH EVERY DAY 90 tablet 3     diclofenac sodium (VOLTAREN) 1 % Gel APPLY 1 TO 2 GRAMS TOPICALLY TO THE AFFECTED AREA FOUR TIMES DAILY AS NEEDED       ezetimibe (ZETIA) 10 mg tablet Take 1 tablet (10 mg total) by mouth every evening. 30 tablet 0     finasteride (PROSCAR) 5 mg tablet TAKE 1 TABLET(5 MG) BY MOUTH EVERY DAY 90 tablet 3      furosemide (LASIX) 40 MG tablet Take 1 tablet (40 mg total) by mouth once daily. (Patient taking differently: Take 40 mg by mouth as needed.) 30 tablet 11     metoprolol succinate (TOPROL-XL) 25 MG 24 hr tablet TAKE 1 TABLET(25 MG) BY MOUTH EVERY DAY 90 tablet 3     potassium chloride (MICRO-K) 10 MEQ CpSR Take 1 capsule (10 mEq total) by mouth 2 (two) times daily. 60 capsule 11     propranoloL (INDERAL LA) 120 MG 24 hr capsule Take 120 mg by mouth.       tamsulosin (FLOMAX) 0.4 mg Cap Take 2 capsules (0.8 mg total) by mouth once daily. 60 capsule 11      ALLERGIES  Review of patient's allergies indicates:  No Known Allergies  FAMILY HISTORY  History reviewed. No pertinent family history.  SOCIAL HISTORY  Social History     Tobacco Use    Smoking status: Former     Current packs/day: 1.00     Types: Cigarettes    Smokeless tobacco: Never   Substance Use Topics    Alcohol use: Not Currently     Comment: None       REVIEW OF SYSTEMS:  CONSTITUTIONAL: No weight loss, fever, chills, weakness or fatigue.  HEENT: Eyes: No visual loss, blurred vision, double vision or yellow sclerae. Ears, Nose,  Throat: No hearing loss, sneezing, congestion, runny nose or sore throat.  SKIN: No rash or itching.  CARDIOVASCULAR: No chest pain, chest pressure or chest discomfort. No palpitations or  edema.  RESPIRATORY: No shortness of breath, cough or sputum.  GASTROINTESTINAL: No anorexia, nausea, vomiting or diarrhea. No abdominal pain or blood.  GENITOURINARY: See HPI  NEUROLOGICAL: No headache, dizziness, syncope, paralysis, ataxia, numbness or tingling in  the extremities.  MUSCULOSKELETAL: No muscle, back pain, joint pain or stiffness.  HEMATOLOGIC: No anemia, bleeding or bruising.  LYMPHATICS: No enlarged nodes. No history of splenectomy.  PSYCHIATRIC: No history of depression or anxiety.  ENDOCRINOLOGIC: No reports of sweating, cold or heat intolerance. No polyuria or polydipsia.  ALLERGIES: No history of asthma, hives, eczema or  rhinitis.    PHYSICAL EXAMINATION:  VITAL SIGNS: 24 HR MIN & MAX LAST    Temp  Min: 97.8 °F (36.6 °C)  Max: 98.4 °F (36.9 °C)  97.8 °F (36.6 °C)        BP  Min: 99/54  Max: 137/77  133/69     Pulse  Min: 67  Max: 79  75     Resp  Min: 15  Max: 20  18    SpO2  Min: 93 %  Max: 98 %  95 %      Vitals:    04/08/24 0820 04/08/24 1150 04/08/24 1203 04/08/24 1424   BP: 137/77  133/69    Pulse: 73 74 75    Resp: 20 18  18   Temp:   97.8 °F (36.6 °C)    TempSrc:   Axillary    SpO2: 97% 98% 95%    Weight:       Height:           Intake/Output:    Intake/Output Summary (Last 24 hours) at 4/8/2024 1523  Last data filed at 4/8/2024 1405  Gross per 24 hour   Intake 1640 ml   Output 4150 ml   Net -2510 ml    I/O last 3 completed shifts:  In: 1880 [P.O.:1880]  Out: 4950 [Urine:4950]    I/O this shift:  In: -   Out: 1000 [Urine:1000] Diet Heart Healthy       GENERAL DESCRIPTION: Well-developed, well-nourished male who is awake, alert,  oriented x3 in no acute distress.  HEENT: Normocephalic, atraumatic. PERRLA. EOMI. Anicteric. Oropharynx clear.  NECK: Supple. No JVD, carotid bruit, or lymphadenopathy.  CARDIOVASCULAR: Regular rate and rhythm without murmurs, gallops, clicks, or rubs.  LUNGS: Clear to auscultation bilaterally. No wheezes, rales, or rhonchi.  ABDOMEN: Soft, nontender, nondistended. Normoactive bowel sounds. No masses, hernias,  or hepatosplenomegaly.  BACK: No CVA tenderness. Normal to inspection and palpation, nontender.  /PELVIC: deferred  RECTAL: deferred  EXTREMITIES: No cyanosis, clubbing, or edema.  SKIN: Warm, dry. Good color and turgor.  PSYCHIATRIC: Mood, affect, judgment, and insight appropriate.  NEUROLOGIC: Grossly intact. Nonfocal. Alert and oriented x3.    Recent Results (from the past 24 hour(s))   Comprehensive Metabolic Panel    Collection Time: 04/08/24  5:28 AM   Result Value Ref Range    Sodium Level 133 (L) 136 - 145 mmol/L    Potassium Level 5.6 (H) 3.5 - 5.1 mmol/L    Chloride 100 98 - 107  "mmol/L    Carbon Dioxide 21 (L) 23 - 31 mmol/L    Glucose Level 88 82 - 115 mg/dL    Blood Urea Nitrogen 15.6 8.4 - 25.7 mg/dL    Creatinine 0.88 0.73 - 1.18 mg/dL    Calcium Level Total 8.9 8.8 - 10.0 mg/dL    Protein Total 6.9 5.8 - 7.6 gm/dL    Albumin Level 3.2 (L) 3.4 - 4.8 g/dL    Globulin 3.7 (H) 2.4 - 3.5 gm/dL    Albumin/Globulin Ratio 0.9 (L) 1.1 - 2.0 ratio    Bilirubin Total 0.4 <=1.5 mg/dL    Alkaline Phosphatase 114 40 - 150 unit/L    Alanine Aminotransferase 12 0 - 55 unit/L    Aspartate Aminotransferase 16 5 - 34 unit/L    eGFR >60 mls/min/1.73/m2       @[facility]@    No results found for: "PSA"    No components found for: "UA"    X-Ray Lumbar Spine 2 Or 3 Views   Final Result      Degenerative changes.      Limited study as described above         Electronically signed by: Cristo Rodriguez MD   Date:    04/06/2024   Time:    20:00      CT Guided Abscess Drainage With Catheter   Final Result      Collection was unable to be accessed for percutaneous drainage.         Electronically signed by: Armando Billingsley   Date:    04/05/2024   Time:    11:23      MRI Lumbar Spine Without Contrast   Final Result      1. Acute or subacute superior L1 endplate compression fracture.   2. Suspect acute Schmorl's nodes in the inferior L2 and superior L3 endplates.   3. Multilevel spondylosis, greatest at L2-3 where there is moderate spinal canal stenosis.   4. Multilevel foraminal stenosis, greatest on the right L2-3 where it is moderate.         Electronically signed by: Kiran Scott MD   Date:    04/03/2024   Time:    12:20      CT Abdomen Pelvis With IV Contrast NO Oral Contrast   Final Result      Limited evaluation secondary to metallic artifact from hip prosthesis.  The bladder wall appears markedly thickened with Almeida catheter in place.      8 cm fluid density within the right hemipelvis to the right of the rectum possibly represents abscess. This is not definite with metallic artifact from hip prosthesis. No " prior imaging available for comparison.  Recommend comparison to patient's likely outside imaging.         Electronically signed by: Armando Billingsley   Date:    04/03/2024   Time:    07:56      X-Ray Chest AP Portable   Final Result      No acute cardiopulmonary process.  Findings of chronic lung disease.         Electronically signed by: Armando Billingsley   Date:    04/03/2024   Time:    07:10          IMAGING:  no    Hospital Day :5  Active Hospital Problems    Diagnosis  POA    *Low back pain [M54.50]  Yes    Urinary retention [R33.9]  Yes    Urinary tract infection associated with indwelling urethral catheter [T83.511A, N39.0]  Yes    Benign essential hypertension [I10]  Yes    Coronary arteriosclerosis [I25.10]  Yes    COPD (chronic obstructive pulmonary disease) [J44.9]  Yes      Resolved Hospital Problems   No resolved problems to display.       PLAN:    I discussed urinary retetnion with patient in detial  In his setting I recommend for continued in dwelling bobo and voiding trial in 2-4weeks when his constipation and mobility and pain are better.    Continue with flomax  Voidign trial can be done at rehab or my office.

## 2024-04-09 PROBLEM — E44.0 MODERATE MALNUTRITION: Status: ACTIVE | Noted: 2024-04-09

## 2024-04-09 PROBLEM — R18.8 PELVIC FLUID COLLECTION: Status: ACTIVE | Noted: 2024-04-09

## 2024-04-09 LAB
ALBUMIN SERPL-MCNC: 3 G/DL (ref 3.4–4.8)
ALBUMIN/GLOB SERPL: 1 RATIO (ref 1.1–2)
ALP SERPL-CCNC: 102 UNIT/L (ref 40–150)
ALT SERPL-CCNC: 9 UNIT/L (ref 0–55)
AST SERPL-CCNC: 10 UNIT/L (ref 5–34)
BASOPHILS # BLD AUTO: 0.08 X10(3)/MCL
BASOPHILS NFR BLD AUTO: 0.9 %
BILIRUB SERPL-MCNC: 0.4 MG/DL
BUN SERPL-MCNC: 11.5 MG/DL (ref 8.4–25.7)
CALCIUM SERPL-MCNC: 8.5 MG/DL (ref 8.8–10)
CHLORIDE SERPL-SCNC: 100 MMOL/L (ref 98–107)
CO2 SERPL-SCNC: 28 MMOL/L (ref 23–31)
CREAT SERPL-MCNC: 0.87 MG/DL (ref 0.73–1.18)
EOSINOPHIL # BLD AUTO: 0.68 X10(3)/MCL (ref 0–0.9)
EOSINOPHIL NFR BLD AUTO: 8 %
ERYTHROCYTE [DISTWIDTH] IN BLOOD BY AUTOMATED COUNT: 15.6 % (ref 11.5–17)
GFR SERPLBLD CREATININE-BSD FMLA CKD-EPI: >60 MLS/MIN/1.73/M2
GLOBULIN SER-MCNC: 3 GM/DL (ref 2.4–3.5)
GLUCOSE SERPL-MCNC: 104 MG/DL (ref 82–115)
HCT VFR BLD AUTO: 34.4 % (ref 42–52)
HGB BLD-MCNC: 10.9 G/DL (ref 14–18)
IMM GRANULOCYTES # BLD AUTO: 0.08 X10(3)/MCL (ref 0–0.04)
IMM GRANULOCYTES NFR BLD AUTO: 0.9 %
LYMPHOCYTES # BLD AUTO: 1.45 X10(3)/MCL (ref 0.6–4.6)
LYMPHOCYTES NFR BLD AUTO: 17 %
MCH RBC QN AUTO: 26.6 PG (ref 27–31)
MCHC RBC AUTO-ENTMCNC: 31.7 G/DL (ref 33–36)
MCV RBC AUTO: 83.9 FL (ref 80–94)
MONOCYTES # BLD AUTO: 0.71 X10(3)/MCL (ref 0.1–1.3)
MONOCYTES NFR BLD AUTO: 8.3 %
NEUTROPHILS # BLD AUTO: 5.52 X10(3)/MCL (ref 2.1–9.2)
NEUTROPHILS NFR BLD AUTO: 64.9 %
NRBC BLD AUTO-RTO: 0 %
PLATELET # BLD AUTO: 194 X10(3)/MCL (ref 130–400)
PMV BLD AUTO: 9.9 FL (ref 7.4–10.4)
POTASSIUM SERPL-SCNC: 4.4 MMOL/L (ref 3.5–5.1)
PROT SERPL-MCNC: 6 GM/DL (ref 5.8–7.6)
RBC # BLD AUTO: 4.1 X10(6)/MCL (ref 4.7–6.1)
SODIUM SERPL-SCNC: 133 MMOL/L (ref 136–145)
VANCOMYCIN TROUGH SERPL-MCNC: 11 UG/ML (ref 15–20)
WBC # SPEC AUTO: 8.52 X10(3)/MCL (ref 4.5–11.5)

## 2024-04-09 PROCEDURE — 21400001 HC TELEMETRY ROOM

## 2024-04-09 PROCEDURE — 99232 SBSQ HOSP IP/OBS MODERATE 35: CPT | Mod: ,,, | Performed by: INTERNAL MEDICINE

## 2024-04-09 PROCEDURE — 80053 COMPREHEN METABOLIC PANEL: CPT | Performed by: INTERNAL MEDICINE

## 2024-04-09 PROCEDURE — 25000003 PHARM REV CODE 250: Performed by: INTERNAL MEDICINE

## 2024-04-09 PROCEDURE — 99900035 HC TECH TIME PER 15 MIN (STAT)

## 2024-04-09 PROCEDURE — 94640 AIRWAY INHALATION TREATMENT: CPT

## 2024-04-09 PROCEDURE — 85025 COMPLETE CBC W/AUTO DIFF WBC: CPT | Performed by: INTERNAL MEDICINE

## 2024-04-09 PROCEDURE — 25000242 PHARM REV CODE 250 ALT 637 W/ HCPCS: Performed by: INTERNAL MEDICINE

## 2024-04-09 PROCEDURE — 99900031 HC PATIENT EDUCATION (STAT)

## 2024-04-09 PROCEDURE — 97116 GAIT TRAINING THERAPY: CPT

## 2024-04-09 PROCEDURE — 94760 N-INVAS EAR/PLS OXIMETRY 1: CPT

## 2024-04-09 PROCEDURE — 63600175 PHARM REV CODE 636 W HCPCS: Performed by: INTERNAL MEDICINE

## 2024-04-09 PROCEDURE — 97162 PT EVAL MOD COMPLEX 30 MIN: CPT

## 2024-04-09 PROCEDURE — 27000221 HC OXYGEN, UP TO 24 HOURS

## 2024-04-09 PROCEDURE — 80202 ASSAY OF VANCOMYCIN: CPT | Performed by: INTERNAL MEDICINE

## 2024-04-09 PROCEDURE — 11000001 HC ACUTE MED/SURG PRIVATE ROOM

## 2024-04-09 RX ORDER — LACTULOSE 10 G/15ML
30 SOLUTION ORAL DAILY
Status: DISCONTINUED | OUTPATIENT
Start: 2024-04-09 | End: 2024-04-25 | Stop reason: HOSPADM

## 2024-04-09 RX ORDER — OXYCODONE AND ACETAMINOPHEN 10; 325 MG/1; MG/1
1 TABLET ORAL EVERY 4 HOURS PRN
Status: DISCONTINUED | OUTPATIENT
Start: 2024-04-09 | End: 2024-04-11

## 2024-04-09 RX ADMIN — OXYCODONE AND ACETAMINOPHEN 1 TABLET: 10; 325 TABLET ORAL at 03:04

## 2024-04-09 RX ADMIN — MUPIROCIN: 20 OINTMENT TOPICAL at 09:04

## 2024-04-09 RX ADMIN — EZETIMIBE 10 MG: 10 TABLET ORAL at 08:04

## 2024-04-09 RX ADMIN — VANCOMYCIN HYDROCHLORIDE 1500 MG: 1.5 INJECTION, POWDER, LYOPHILIZED, FOR SOLUTION INTRAVENOUS at 10:04

## 2024-04-09 RX ADMIN — ATORVASTATIN CALCIUM 80 MG: 40 TABLET, FILM COATED ORAL at 09:04

## 2024-04-09 RX ADMIN — OXYCODONE AND ACETAMINOPHEN 1 TABLET: 10; 325 TABLET ORAL at 08:04

## 2024-04-09 RX ADMIN — Medication: at 09:04

## 2024-04-09 RX ADMIN — FINASTERIDE 5 MG: 5 TABLET, FILM COATED ORAL at 09:04

## 2024-04-09 RX ADMIN — AMLODIPINE BESYLATE 5 MG: 5 TABLET ORAL at 04:04

## 2024-04-09 RX ADMIN — HYDROCODONE BITARTRATE AND ACETAMINOPHEN 1 TABLET: 7.5; 325 TABLET ORAL at 11:04

## 2024-04-09 RX ADMIN — IPRATROPIUM BROMIDE AND ALBUTEROL SULFATE 3 ML: 2.5; .5 SOLUTION RESPIRATORY (INHALATION) at 11:04

## 2024-04-09 RX ADMIN — MUPIROCIN: 20 OINTMENT TOPICAL at 08:04

## 2024-04-09 RX ADMIN — ASPIRIN 81 MG: 81 TABLET, COATED ORAL at 09:04

## 2024-04-09 RX ADMIN — METOPROLOL SUCCINATE 25 MG: 25 TABLET, EXTENDED RELEASE ORAL at 09:04

## 2024-04-09 RX ADMIN — IPRATROPIUM BROMIDE AND ALBUTEROL SULFATE 3 ML: 2.5; .5 SOLUTION RESPIRATORY (INHALATION) at 08:04

## 2024-04-09 RX ADMIN — HYDROCODONE BITARTRATE AND ACETAMINOPHEN 1 TABLET: 7.5; 325 TABLET ORAL at 04:04

## 2024-04-09 RX ADMIN — IPRATROPIUM BROMIDE AND ALBUTEROL SULFATE 3 ML: 2.5; .5 SOLUTION RESPIRATORY (INHALATION) at 07:04

## 2024-04-09 RX ADMIN — LACTULOSE 30 G: 10 SOLUTION ORAL at 09:04

## 2024-04-09 RX ADMIN — NALOXEGOL OXALATE 25 MG: 25 TABLET, FILM COATED ORAL at 09:04

## 2024-04-09 RX ADMIN — TAMSULOSIN HYDROCHLORIDE 0.8 MG: 0.4 CAPSULE ORAL at 09:04

## 2024-04-09 RX ADMIN — IPRATROPIUM BROMIDE AND ALBUTEROL SULFATE 3 ML: 2.5; .5 SOLUTION RESPIRATORY (INHALATION) at 04:04

## 2024-04-09 NOTE — PROGRESS NOTES
Pharmacokinetic Assessment Follow Up: IV Vancomycin    Vancomycin serum concentration assessment(s):    The trough level was drawn correctly and can be used to guide therapy at this time. The measurement is within the desired definitive target range of 10 to 20 mcg/mL.    Vancomycin Regimen Plan:    Continue regimen to Vancomycin 1500 mg IV every 24 hours with next serum trough concentration measured at 0800 prior to the dose on 04/12    Scheduled Administration Times    0900    Drug levels (last 3 results):  Recent Labs   Lab Result Units 04/07/24  0653 04/09/24  0814   Vancomycin Trough ug/ml 7.9* 11.0*       Vancomycin Administrations:  vancomycin given in the last 96 hours                     vancomycin 1.5 g in dextrose 5 % 250 mL IVPB (ready to mix) (mg) 1,500 mg New Bag 04/08/24 0841     1,500 mg New Bag 04/07/24 1014    vancomycin 1.25 g in dextrose 5% 250 mL IVPB (ready to mix) (mg) 1,250 mg New Bag 04/06/24 0835    vancomycin 1.75 g in 5 % dextrose 500 mL IVPB (mg) 1,750 mg New Bag 04/05/24 0922                    Pharmacy will continue to follow and monitor vancomycin.    Please contact pharmacy at extension [phone] for questions regarding this assessment.    Thank you for the consult,   Chantal Johnson       Patient brief summary:  Max Squires is a 89 y.o. male initiated on antimicrobial therapy with IV Vancomycin for treatment of urinary tract infection    The patient's current regimen is 1500mg q24h    Drug Allergies:   Review of patient's allergies indicates:  No Known Allergies    Actual Body Weight:  Wt Readings from Last 1 Encounters:   04/06/24 72.6 kg (160 lb)       Renal Function:   Estimated Creatinine Clearance: 59.1 mL/min (based on SCr of 0.87 mg/dL).,     Dialysis Method (if applicable):  N/A    CBC (last 72 hours):  Recent Labs   Lab Result Units 04/07/24  0653 04/09/24  0502   WBC x10(3)/mcL 8.98 8.52   Hgb g/dL 11.7* 10.9*   Hct % 36.6* 34.4*   Platelet x10(3)/mcL 208 194   Mono % % 5.8  8.3   Eos % % 7.3 8.0   Basophil % % 0.9 0.9       Metabolic Panel (last 72 hours):  Recent Labs   Lab Result Units 04/07/24  0653 04/08/24  0528 04/09/24  0502   Sodium Level mmol/L 134* 133* 133*   Potassium Level mmol/L 4.8 5.6* 4.4   Chloride mmol/L 100 100 100   Carbon Dioxide mmol/L 26 21* 28   Glucose Level mg/dL 92 88 104   Blood Urea Nitrogen mg/dL 15.9 15.6 11.5   Creatinine mg/dL 0.88 0.88 0.87   Albumin Level g/dL 3.2* 3.2* 3.0*   Bilirubin Total mg/dL 0.4 0.4 0.4   Alkaline Phosphatase unit/L 117 114 102   Aspartate Aminotransferase unit/L 10 16 10   Alanine Aminotransferase unit/L 11 12 9       Microbiologic Results:  Microbiology Results (last 7 days)       Procedure Component Value Units Date/Time    Blood Culture #1 **CANNOT BE ORDERED STAT** [[id number]]  (Normal) Collected: 04/03/24 0852    Order Status: Completed Specimen: Blood Updated: 04/08/24 1001     CULTURE, BLOOD ([facility]) No Growth at 5 days    Blood Culture #2 **CANNOT BE ORDERED STAT** [[id number]]  (Normal) Collected: 04/03/24 0852    Order Status: Completed Specimen: Blood Updated: 04/08/24 1001     CULTURE, BLOOD ([facility]) No Growth at 5 days    Urine culture [[id number]]  (Abnormal)  (Susceptibility) Collected: 04/03/24 0635    Order Status: Completed Specimen: Urine Updated: 04/06/24 1049     Urine Culture >/= 100,000 colonies/ml Enterococcus faecalis     Comment: Ampicillin results may be used to predict susceptibility to amoxicillin-clavulanate, ampicillin-sulbactam, and piperacillin-tazobactam.       Anaerobic Culture [[id number]] Collected: 04/05/24 1012    Order Status: Canceled Specimen: Body Fluid from Pelvis     Body Fluid Culture [[id number]] Collected: 04/05/24 1012    Order Status: Canceled Specimen: Body Fluid from Pelvis     Fungal Culture [[id number]] Collected: 04/05/24 1012    Order Status: Canceled Specimen: Body Fluid from Pelvis     Gram Stain [[id number]] Collected: 04/05/24 1012    Order Status: Canceled Specimen: Body  Fluid from Pelvis

## 2024-04-09 NOTE — PROGRESS NOTES
[facility] - Observation Unit  Colon & Rectal Surgery  Progress Note    Consults  Subjective:     Interval History:     - Sitting in chair  - Reports large bowel movement after Fleet Enema  - Afebrile  - Back pain and abdominal pain controlled with pain medications    No current facility-administered medications on file prior to encounter.     Current Outpatient Medications on File Prior to Encounter   Medication Sig    albuterol (PROVENTIL HFA) 90 mcg/actuation inhaler Inhale 2 puffs into the lungs every 4 (four) hours as needed for Wheezing or Shortness of Breath. Rescue (Patient not taking: Reported on 3/27/2024)    albuterol-ipratropium (DUO-NEB) 2.5 mg-0.5 mg/3 mL nebulizer solution Take 3 mLs by nebulization 4 (four) times daily. Rescue    amLODIPine (NORVASC) 5 MG tablet Take 5 mg by mouth Daily.    aspirin (ECOTRIN) 81 MG EC tablet Take 1 tablet (81 mg total) by mouth once daily.    atorvastatin (LIPITOR) 80 MG tablet TAKE 1 TABLET(80 MG) BY MOUTH EVERY DAY    diclofenac sodium (VOLTAREN) 1 % Gel APPLY 1 TO 2 GRAMS TOPICALLY TO THE AFFECTED AREA FOUR TIMES DAILY AS NEEDED    ezetimibe (ZETIA) 10 mg tablet Take 1 tablet (10 mg total) by mouth every evening.    finasteride (PROSCAR) 5 mg tablet TAKE 1 TABLET(5 MG) BY MOUTH EVERY DAY    furosemide (LASIX) 40 MG tablet Take 1 tablet (40 mg total) by mouth once daily. (Patient taking differently: Take 40 mg by mouth as needed.)    metoprolol succinate (TOPROL-XL) 25 MG 24 hr tablet TAKE 1 TABLET(25 MG) BY MOUTH EVERY DAY    potassium chloride (MICRO-K) 10 MEQ CpSR Take 1 capsule (10 mEq total) by mouth 2 (two) times daily.    propranoloL (INDERAL LA) 120 MG 24 hr capsule Take 120 mg by mouth.    tamsulosin (FLOMAX) 0.4 mg Cap Take 2 capsules (0.8 mg total) by mouth once daily.       Review of patient's allergies indicates:  No Known Allergies    Past Medical History:   Diagnosis Date    Arthritis     COPD (chronic obstructive pulmonary disease)      Emphysema, unspecified     Hypercholesterolemia     Hypertension     Unspecified osteoarthritis, unspecified site      Past Surgical History:   Procedure Laterality Date    BACK SURGERY      CORONARY ANGIOPLASTY WITH STENT PLACEMENT      FRACTURE SURGERY      foot    HIP SURGERY Left     Dr. Hoyos    JOINT REPLACEMENT Left     QUETA    JOINT REPLACEMENT Right     TKA     Family History    None       Tobacco Use    Smoking status: Former     Current packs/day: 1.00     Types: Cigarettes    Smokeless tobacco: Never   Substance and Sexual Activity    Alcohol use: Not Currently     Comment: None    Drug use: Never    Sexual activity: Not on file     Review of Systems  Objective:     Vital Signs (Most Recent):  Temp: 97.8 °F (36.6 °C) (04/09/24 1114)  Pulse: 76 (04/09/24 1122)  Resp: 18 (04/09/24 1124)  BP: 119/72 (04/09/24 1114)  SpO2: 95 % (04/09/24 1122) Vital Signs (24h Range):  Temp:  [97.5 °F (36.4 °C)-98.4 °F (36.9 °C)] 97.8 °F (36.6 °C)  Pulse:  [64-80] 76  Resp:  [16-27] 18  SpO2:  [91 %-97 %] 95 %  BP: (103-131)/(54-81) 119/72     Weight: 72.6 kg (160 lb)  Body mass index is 21.7 kg/m².      Intake/Output Summary (Last 24 hours) at 4/9/2024 1455  Last data filed at 4/9/2024 1436  Gross per 24 hour   Intake 480 ml   Output 2950 ml   Net -2470 ml       Physical Exam  Vitals reviewed.   Constitutional:       General: He is not in acute distress.  HENT:      Head: Normocephalic and atraumatic.      Nose: Nose normal.   Eyes:      General: No scleral icterus.  Cardiovascular:      Rate and Rhythm: Normal rate and regular rhythm.   Pulmonary:      Effort: Pulmonary effort is normal. No respiratory distress.   Abdominal:      General: Bowel sounds are normal. There is no distension.      Palpations: Abdomen is soft.      Tenderness: There is no abdominal tenderness. There is no rebound.   Musculoskeletal:         General: Normal range of motion.   Neurological:      Mental Status: He is alert and oriented to person,  place, and time.         Significant Labs:  CBC:   Recent Labs   Lab 04/09/24  0502   WBC 8.52   RBC 4.10*   HGB 10.9*   HCT 34.4*      MCV 83.9   MCH 26.6*   MCHC 31.7*     CMP:   Recent Labs   Lab 04/09/24  0502   CALCIUM 8.5*   ALBUMIN 3.0*   *   K 4.4   CO2 28   BUN 11.5   CREATININE 0.87   ALKPHOS 102   ALT 9   AST 10   BILITOT 0.4       Significant Diagnostics:  None    Assessment/Plan:     Active Diagnoses:    Diagnosis Date Noted POA    PRINCIPAL PROBLEM:  Low back pain [M54.50] 04/03/2024 Yes    Moderate malnutrition [E44.0] 04/09/2024 Yes    Urinary retention [R33.9] 04/03/2024 Yes    Urinary tract infection associated with indwelling urethral catheter [T83.511A, N39.0] 04/03/2024 Yes    Benign essential hypertension [I10] 04/11/2023 Yes    Coronary arteriosclerosis [I25.10] 04/11/2023 Yes    COPD (chronic obstructive pulmonary disease) [J44.9] 04/11/2023 Yes      Problems Resolved During this Admission:       - CT abdomen tomorrow to evaluate pelvis collection  - Continue IV Antibiotics    ARSENIO Iglesias  Colon & Rectal Surgery  [facility]

## 2024-04-09 NOTE — PT/OT/SLP PROGRESS
Physical Therapy Treatment    Patient Name:  Max Squires   MRN:  [id number]    Recommendations:     Discharge therapy intensity: Low Intensity Therapy   Discharge Equipment Recommendations: none  Barriers to discharge: Impaired mobility    Assessment:     Max Squires is a 89 y.o. male admitted with a medical diagnosis of Acute vs subacute L1 VCF .  He presents with the following impairments/functional limitations: weakness, gait instability, impaired balance, impaired endurance, decreased safety awareness, impaired functional mobility. The pt tolerated session well. He is able to ambulate with CGA/min A and RW.    Rehab Prognosis: Good; patient would benefit from acute skilled PT services to address these deficits and reach maximum level of function.    Recent Surgery: * No surgery found *      Plan:     During this hospitalization, patient to be seen 5 x/week to address the identified rehab impairments via gait training, therapeutic activities, therapeutic exercises and progress toward the following goals:    Plan of Care Expires:  05/09/24    Subjective     Chief Complaint: pain  Patient/Family Comments/goals: return to PLOF  Pain/Comfort:  Location 1: abdomen  Pain Addressed 1: Reposition, Distraction  Location 2: back  Pain Addressed 2: Reposition, Distraction      Objective:     Communicated with NSG prior to session.  Patient found supine with telemetry, pulse ox (continuous), oxygen, bobo catheter upon PT entry to room.     General Precautions: Standard, fall  Orthopedic Precautions: spinal precautions  Braces: LSO  Respiratory Status: 3L NC  Blood Pressure: NA  Skin Integrity: Visible skin intact      Functional Mobility:  Transfers:     Sit to Stand:  minimum assistance with rolling walker  Gait: pt ambulates x 60 feet with min A and Rw, decreased knee extension with ambulation. Pt's O2 sats decreased to 83% with ambulation on 3L NC.      Education:  Patient provided with verbal education education  regarding PT role/goals/POC, safety awareness, and discharge/DME recommendations.  Understanding was verbalized.     Patient left supine with all lines intact, call button in reach, and NSG notified    GOALS:   Multidisciplinary Problems       Physical Therapy Goals          Problem: Physical Therapy    Goal Priority Disciplines Outcome Goal Variances Interventions   Physical Therapy Goal     PT, PT/OT Ongoing, Progressing     Description: Pt will be seen for the following goals  1. Pt will be sba with bed mobility  2. Pt will be sba with transfers with lso brace on and using a rw  3. Pt will amb with a rw and LSO brace on for 150ft sba                       Time Tracking:     PT Received On: 04/09/24  PT Start Time: 1030     PT Stop Time: 1044  PT Total Time (min): 14 min     Billable Minutes: Gait Training 14    Treatment Type: Treatment  PT/PTA: PT     Number of PTA visits since last PT visit: 2     04/09/2024

## 2024-04-09 NOTE — PROGRESS NOTES
Inpatient Nutrition Assessment    Admit Date: 4/3/2024   Total duration of encounter: 6 days   Patient Age: 89 y.o.    Nutrition Recommendation/Prescription     Continue heart healthy diet as tolerated  Boost BID to provide 240 kcal and 10 g protein per serving  Continue bowel regimen  Monitor labs, intake and weight    Communication of Recommendations: reviewed with nurse and reviewed with patient    Nutrition Assessment     Malnutrition Assessment/Nutrition-Focused Physical Exam    Malnutrition Context: chronic illness (04/09/24 1040)  Malnutrition Level: moderate (04/09/24 1040)  Energy Intake (Malnutrition): other (see comments) (does not meet criteria) (04/09/24 1040)  Weight Loss (Malnutrition): other (see comments) (does not meet criteria) (04/09/24 1040)  Subcutaneous Fat (Malnutrition): mild depletion (04/09/24 1040)  Orbital Region (Subcutaneous Fat Loss): mild depletion  Upper Arm Region (Subcutaneous Fat Loss): mild depletion     Muscle Mass (Malnutrition): moderate depletion (04/09/24 1040)  [place] Region (Muscle Loss): moderate depletion     Clavicle and Acromion Bone Region (Muscle Loss): moderate depletion     Dorsal Hand (Muscle Loss): moderate depletion                    A minimum of two characteristics is recommended for diagnosis of either severe or non-severe malnutrition.    Chart Review    Reason Seen: length of stay    Malnutrition Screening Tool Results   Have you recently lost weight without trying?: No  Have you been eating poorly because of a decreased appetite?: No   MST Score: 0   Diagnosis:  Subacute fracture L1   Suspected pelvic abscess   UTI and pre-existing urinary retention with Almeida catheter  Hyperkalemia. Corrected   Constipation     Relevant Medical History: COPD, CAD, HF    Scheduled Medications:  albuterol-ipratropium, 3 mL, QID  amLODIPine, 5 mg, Daily  aspirin, 81 mg, Daily  atorvastatin, 80 mg, Daily  ezetimibe, 10 mg, QHS  finasteride, 5 mg, Daily  lactulose 10 gram/15  ml, 30 g, Daily  metoprolol succinate, 25 mg, Daily  mupirocin, , BID  naloxegoL, 25 mg, Daily  tamsulosin, 2 capsule, Daily  vancomycin (VANCOCIN) IV (PEDS and ADULTS), 1,500 mg, Q24H  zinc oxide-cod liver oil, , BID    Continuous Infusions:   PRN Medications: ALPRAZolam, furosemide, HYDROcodone-acetaminophen, Pharmacy to dose Vancomycin consult **AND** vancomycin - pharmacy to dose    Calorie Containing IV Medications: no significant kcals from medications at this time    Recent Labs   Lab 04/03/24  0632 04/05/24  0551 04/07/24  0653 04/08/24  0528 04/09/24  0502   * 133* 134* 133* 133*   K 4.6 4.2 4.8 5.6* 4.4   CALCIUM 8.5* 8.1* 9.0 8.9 8.5*   CHLORIDE 101 100 100 100 100   CO2 25 28 26 21* 28   BUN 18.1 18.7 15.9 15.6 11.5   CREATININE 1.01 1.07 0.88 0.88 0.87   EGFRNORACEVR >60 >60 >60 >60 >60   GLUCOSE 89 105 92 88 104   BILITOT 0.5 0.4 0.4 0.4 0.4   ALKPHOS 115 100 117 114 102   ALT 5 7 11 12 9   AST 8 7 10 16 10   ALBUMIN 3.1* 2.8* 3.2* 3.2* 3.0*   LIPASE 17  --   --   --   --    WBC 7.71 8.26 8.98  --  8.52   HGB 11.6* 10.4* 11.7*  --  10.9*   HCT 36.6* 32.8* 36.6*  --  34.4*     Nutrition Orders:  Diet Heart Healthy  Dietary nutrition supplements Boost Original Nutritional Drink - Vanilla; BID    Appetite/Oral Intake: good/% of meals  Factors Affecting Nutritional Intake: constipation  Food/Religion/Cultural Preferences: none reported  Food Allergies: no known food allergies  Last Bowel Movement: 04/05/24  Wound(s):     Altered Skin Integrity 04/03/24 1544 Perineum #1-Tissue loss description: Partial thickness     Comments    Comments    4/9: Pt reports good appetite, eating 100% of meals. Good appetite PTA. Denies N/V, reports constipation. Last BM 4/3, meds noted. UBW reported ~170# with unintentional weight loss past year. Per EMR weight history, 4.6% weight loss in 2 months (not significant). Pt with moderate muscle and mild fat depletion per NFPE. Agreed to trial  ONS.    Anthropometrics    Height: 6' (182.9 cm), Height Method: Stated  Last Weight: 72.6 kg (160 lb) (24 0950), Weight Method: Bed Scale  BMI (Calculated): 21.7  BMI Classification: underweight (BMI less than 22 if >65 years of age)        Ideal Body Weight (IBW), Male: 178 lb     % Ideal Body Weight, Male (lb): 89.89 %                 Usual Body Weight (UBW), k.2 kg  % Usual Body Weight: 95.44     Usual Weight Provided By: patient and EMR weight history    Wt Readings from Last 5 Encounters:   24 72.6 kg (160 lb)   24 72.6 kg (160 lb)   24 76.2 kg (168 lb)   10/18/23 74.4 kg (164 lb)   23 72.1 kg (159 lb)     Weight Change(s) Since Admission:   Wt Readings from Last 1 Encounters:   24 0950 72.6 kg (160 lb)   24 0528 72.6 kg (160 lb)   Admit Weight: 72.6 kg (160 lb) (24 0528), Weight Method: Stated    Estimated Needs    Weight Used For Calorie Calculations: 72.6 kg (160 lb 0.9 oz)  Energy Calorie Requirements (kcal): 1572 kcal (1.1 stress factor)  Energy Need Method: Elk-St Jeor  Weight Used For Protein Calculations: 72.6 kg (160 lb 0.9 oz)  Protein Requirements: 80-87 g (1.1-1.2 g/kg)  Fluid Requirements (mL): 1572 ml (1 ml/kcal)    Enteral Nutrition     Patient not receiving enteral nutrition at this time.    Parenteral Nutrition     Patient not receiving parenteral nutrition support at this time.    Evaluation of Received Nutrient Intake    Calories: meeting estimated needs  Protein: meeting estimated needs    Patient Education     Not applicable.    Nutrition Diagnosis     PES: Moderate chronic disease or condition related malnutrition related to inability to consume sufficient nutrients as evidenced by mild fat depletion and moderate muscle depletion. (new)    Nutrition Interventions     Intervention(s): general/healthful diet, commercial beverage, prescription medication, and collaboration with other providers    Goal: Consume % of oral  supplements by follow-up. (new)  Goal: Maintain weight throughout hospitalization. (new)    Nutrition Goals & Monitoring     Dietitian will monitor: food and beverage intake, weight, and electrolyte/renal panel    Nutrition Risk/Follow-Up: moderate (follow-up in 3-5 days)   Please consult if re-assessment needed sooner.

## 2024-04-09 NOTE — PROGRESS NOTES
Subjective:  Patient complaining of persistent back pain.  Also complaining of lack of bowel movement for the last 2 days.  He did walk in physical therapy yesterday.    Objective:  He was afebrile.  Blood pressure 114/65.  Heart rate 77.  Respiratory rate 20 O2 sat 95% on nasal cannula.    General appearance is unremarkable.  He was sitting up in his chair eating breakfast.  Heart has a regular rate and rhythm.  Telemetry reveals sinus rhythm.  Lungs essentially clear.  Abdomen nontender.  Extremities without cyanosis or edema.    Laboratory studies include a stable CBC.  BNP shows normal potassium at 4.4.  Normal kidney function.  Low albumin at 3.0.  Liver bracket normal.    Assessment:  1. Subacute fracture L1.  Being treated conservatively.    2. Suspected pelvic abscess.  He has been on IV antibiotics.  Staph epi and Enterococcus has been isolated from the urine    3. UTI and pre-existing (prior to admission) urinary retention with Almeida catheter.      4. Severe but stable COPD     5. History of coronary disease and compensated heart failure.  Stable     6. Hyperkalemia.  Corrected    7. Constipation.    Plan:  Resume lactulose and we will give Fleet's enema today.      CT of the abdomen pelvis tomorrow to reassess the possible abscess.    Continue therapy and pain meds    Continue Almeida catheter.

## 2024-04-10 PROCEDURE — 99900035 HC TECH TIME PER 15 MIN (STAT)

## 2024-04-10 PROCEDURE — 99232 SBSQ HOSP IP/OBS MODERATE 35: CPT | Mod: ,,, | Performed by: INTERNAL MEDICINE

## 2024-04-10 PROCEDURE — 25500020 PHARM REV CODE 255: Performed by: INTERNAL MEDICINE

## 2024-04-10 PROCEDURE — 25000003 PHARM REV CODE 250: Performed by: INTERNAL MEDICINE

## 2024-04-10 PROCEDURE — 99232 SBSQ HOSP IP/OBS MODERATE 35: CPT | Mod: ,,, | Performed by: COLON & RECTAL SURGERY

## 2024-04-10 PROCEDURE — 27000221 HC OXYGEN, UP TO 24 HOURS

## 2024-04-10 PROCEDURE — 94760 N-INVAS EAR/PLS OXIMETRY 1: CPT

## 2024-04-10 PROCEDURE — 11000001 HC ACUTE MED/SURG PRIVATE ROOM

## 2024-04-10 PROCEDURE — 63600175 PHARM REV CODE 636 W HCPCS: Performed by: INTERNAL MEDICINE

## 2024-04-10 PROCEDURE — 99900031 HC PATIENT EDUCATION (STAT)

## 2024-04-10 PROCEDURE — 21400001 HC TELEMETRY ROOM

## 2024-04-10 PROCEDURE — 94640 AIRWAY INHALATION TREATMENT: CPT

## 2024-04-10 PROCEDURE — 25000242 PHARM REV CODE 250 ALT 637 W/ HCPCS: Performed by: INTERNAL MEDICINE

## 2024-04-10 RX ADMIN — OXYCODONE AND ACETAMINOPHEN 1 TABLET: 10; 325 TABLET ORAL at 06:04

## 2024-04-10 RX ADMIN — MUPIROCIN: 20 OINTMENT TOPICAL at 09:04

## 2024-04-10 RX ADMIN — IOHEXOL 94 ML: 350 INJECTION, SOLUTION INTRAVENOUS at 08:04

## 2024-04-10 RX ADMIN — ASPIRIN 81 MG: 81 TABLET, COATED ORAL at 09:04

## 2024-04-10 RX ADMIN — Medication: at 10:04

## 2024-04-10 RX ADMIN — IPRATROPIUM BROMIDE AND ALBUTEROL SULFATE 3 ML: 2.5; .5 SOLUTION RESPIRATORY (INHALATION) at 03:04

## 2024-04-10 RX ADMIN — IPRATROPIUM BROMIDE AND ALBUTEROL SULFATE 3 ML: 2.5; .5 SOLUTION RESPIRATORY (INHALATION) at 12:04

## 2024-04-10 RX ADMIN — METOPROLOL SUCCINATE 25 MG: 25 TABLET, EXTENDED RELEASE ORAL at 09:04

## 2024-04-10 RX ADMIN — FINASTERIDE 5 MG: 5 TABLET, FILM COATED ORAL at 09:04

## 2024-04-10 RX ADMIN — IPRATROPIUM BROMIDE AND ALBUTEROL SULFATE 3 ML: 2.5; .5 SOLUTION RESPIRATORY (INHALATION) at 08:04

## 2024-04-10 RX ADMIN — AMLODIPINE BESYLATE 5 MG: 5 TABLET ORAL at 05:04

## 2024-04-10 RX ADMIN — OXYCODONE AND ACETAMINOPHEN 1 TABLET: 10; 325 TABLET ORAL at 02:04

## 2024-04-10 RX ADMIN — ATORVASTATIN CALCIUM 80 MG: 40 TABLET, FILM COATED ORAL at 09:04

## 2024-04-10 RX ADMIN — EZETIMIBE 10 MG: 10 TABLET ORAL at 10:04

## 2024-04-10 RX ADMIN — LACTULOSE 30 G: 10 SOLUTION ORAL at 09:04

## 2024-04-10 RX ADMIN — MUPIROCIN: 20 OINTMENT TOPICAL at 10:04

## 2024-04-10 RX ADMIN — TAMSULOSIN HYDROCHLORIDE 0.8 MG: 0.4 CAPSULE ORAL at 09:04

## 2024-04-10 RX ADMIN — OXYCODONE AND ACETAMINOPHEN 1 TABLET: 10; 325 TABLET ORAL at 10:04

## 2024-04-10 RX ADMIN — Medication: at 09:04

## 2024-04-10 RX ADMIN — NALOXEGOL OXALATE 25 MG: 25 TABLET, FILM COATED ORAL at 09:04

## 2024-04-10 RX ADMIN — VANCOMYCIN HYDROCHLORIDE 1500 MG: 1.5 INJECTION, POWDER, LYOPHILIZED, FOR SOLUTION INTRAVENOUS at 10:04

## 2024-04-10 RX ADMIN — DIATRIZOATE MEGLUMINE AND DIATRIZOATE SODIUM 30 ML: 600; 100 SOLUTION ORAL; RECTAL at 08:04

## 2024-04-10 NOTE — PROGRESS NOTES
Subjective:  Patient has a large bowel movement yesterday and a smaller 1 today.  Still with back pain despite increasing pain meds from Norco 7.52 to Percocet 7.5.    Objective:  He was afebrile.  Vital signs are stable.  Telemetry reveals sinus rhythm.    General appearance unremarkable.  He has in no distress.  Heart has a regular rate and rhythm.  Lungs clear.  Abdomen nontender.  Extremities without edema.    No new lab results to report     Assessment:  1.  Status post subacute fracture L1.  Still with significant pain but overall stable     2. Pelvic mass/fluid collection.  On IV antibiotics.    3. Enterococcus UTI.  Also had staph epi isolated from urine.      4. Urinary retention     5. Stable other medical problems    6. Constipation.  Much improved    7. Generalized debilitation.    Plan:  CT scan today.  Further plans hinge on  results.

## 2024-04-10 NOTE — NURSING
1 episode of vomiting this evening. Pt did not having anything to eat today. IV Zofran administered

## 2024-04-10 NOTE — PLAN OF CARE
Repeat CT today pt resides with his wife Ayesha and they both live with their dgtr. Cristina . Pt has O2 at home he uses [facility].  Has a RW, and cane at home.  Dischcharge plan to be determined

## 2024-04-10 NOTE — PT/OT/SLP PROGRESS
Physical Therapy Treatment    Patient Name:  Max Squires   MRN:  [id number]    No treatment due to going to CT for pelvis.

## 2024-04-10 NOTE — PT/OT/SLP PROGRESS
Physical Therapy Treatment    Patient Name:  Max Squires   MRN:  [id number]    Pt declined due to going to OR for possible washout/ toe amp?

## 2024-04-11 PROCEDURE — 27000221 HC OXYGEN, UP TO 24 HOURS

## 2024-04-11 PROCEDURE — 99900035 HC TECH TIME PER 15 MIN (STAT)

## 2024-04-11 PROCEDURE — 94760 N-INVAS EAR/PLS OXIMETRY 1: CPT

## 2024-04-11 PROCEDURE — 21400001 HC TELEMETRY ROOM

## 2024-04-11 PROCEDURE — 99900031 HC PATIENT EDUCATION (STAT)

## 2024-04-11 PROCEDURE — 25000242 PHARM REV CODE 250 ALT 637 W/ HCPCS: Performed by: INTERNAL MEDICINE

## 2024-04-11 PROCEDURE — 99233 SBSQ HOSP IP/OBS HIGH 50: CPT | Mod: ,,, | Performed by: INTERNAL MEDICINE

## 2024-04-11 PROCEDURE — 94640 AIRWAY INHALATION TREATMENT: CPT

## 2024-04-11 PROCEDURE — 25000003 PHARM REV CODE 250: Performed by: INTERNAL MEDICINE

## 2024-04-11 PROCEDURE — 11000001 HC ACUTE MED/SURG PRIVATE ROOM

## 2024-04-11 RX ORDER — OXYCODONE AND ACETAMINOPHEN 10; 325 MG/1; MG/1
1 TABLET ORAL EVERY 6 HOURS PRN
Status: DISCONTINUED | OUTPATIENT
Start: 2024-04-11 | End: 2024-04-23

## 2024-04-11 RX ORDER — OLANZAPINE 5 MG/1
5 TABLET ORAL NIGHTLY PRN
Status: DISCONTINUED | OUTPATIENT
Start: 2024-04-11 | End: 2024-04-25 | Stop reason: HOSPADM

## 2024-04-11 RX ADMIN — AMLODIPINE BESYLATE 5 MG: 5 TABLET ORAL at 04:04

## 2024-04-11 RX ADMIN — TAMSULOSIN HYDROCHLORIDE 0.8 MG: 0.4 CAPSULE ORAL at 09:04

## 2024-04-11 RX ADMIN — LACTULOSE 30 G: 10 SOLUTION ORAL at 09:04

## 2024-04-11 RX ADMIN — Medication: at 09:04

## 2024-04-11 RX ADMIN — IPRATROPIUM BROMIDE AND ALBUTEROL SULFATE 3 ML: 2.5; .5 SOLUTION RESPIRATORY (INHALATION) at 04:04

## 2024-04-11 RX ADMIN — IPRATROPIUM BROMIDE AND ALBUTEROL SULFATE 3 ML: 2.5; .5 SOLUTION RESPIRATORY (INHALATION) at 07:04

## 2024-04-11 RX ADMIN — ATORVASTATIN CALCIUM 80 MG: 40 TABLET, FILM COATED ORAL at 09:04

## 2024-04-11 RX ADMIN — OXYCODONE HYDROCHLORIDE AND ACETAMINOPHEN 1 TABLET: 10; 325 TABLET ORAL at 11:04

## 2024-04-11 RX ADMIN — OXYCODONE AND ACETAMINOPHEN 1 TABLET: 10; 325 TABLET ORAL at 02:04

## 2024-04-11 RX ADMIN — METOPROLOL SUCCINATE 25 MG: 25 TABLET, EXTENDED RELEASE ORAL at 09:04

## 2024-04-11 RX ADMIN — EZETIMIBE 10 MG: 10 TABLET ORAL at 09:04

## 2024-04-11 RX ADMIN — ASPIRIN 81 MG: 81 TABLET, COATED ORAL at 09:04

## 2024-04-11 RX ADMIN — FINASTERIDE 5 MG: 5 TABLET, FILM COATED ORAL at 09:04

## 2024-04-11 RX ADMIN — NALOXEGOL OXALATE 25 MG: 25 TABLET, FILM COATED ORAL at 09:04

## 2024-04-11 RX ADMIN — IPRATROPIUM BROMIDE AND ALBUTEROL SULFATE 3 ML: 2.5; .5 SOLUTION RESPIRATORY (INHALATION) at 11:04

## 2024-04-11 RX ADMIN — ALPRAZOLAM 0.5 MG: 0.5 TABLET ORAL at 09:04

## 2024-04-11 NOTE — PROGRESS NOTES
Subjective:  The patient continues to have back pain.  Did have a large bowel movement this morning.  Some vague lower abdominal discomfort as well.  He did not get much therapy yesterday because of all the CT scans that were done.    Objective:  He was afebrile.  Blood pressure 125/69, heart rate 71, respiratory rate 17 and O2 sat 95% on nasal cannula.      General appearance is unremarkable.  Speech is slightly slurred.  Heart has a regular rate and rhythm.  Lungs clear.  Abdomen nontender.  Extremities without cyanosis or edema.    No new lab results to report     CT scanning yesterday included a CT of the abdomen and pelvis with contrast.  There were suspicions that the pelvic mass/fluid collection was actually a bladder diverticulum.  A delayed scan without additional contrast was done later in the day after the Almeida catheter was clamped for an hour or more.  This confirmed the diagnosis of a large bladder diverticulum and not an abscess or mass.    Assessment: 1.  Status post fracture L1.  Very symptomatic.  Pain more less controlled on high-dose Percocet.    2. Pelvic mass/fluid collection.  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  This will need to be addressed later by his urologist.    3. Urinary tract infection.  He was should have had enough antibiotics by now and they has been discontinued as of last night.  Enterococcus was isolated     4. Urinary retention.  Multifactorial, this was present prior to admission      5. Constipation.  Improving with Movantik and lactulose     6. Severe COPD, currently stable.    7. History of coronary disease    8. Severe generalized debilitation.    Plan:  I think the best option for him will be a skilled nursing facility for the next few weeks to help control his pain, and increase his activity with consistent physical therapy.  In the past he would became very agitated when he went to a skilled nursing facility (this has was when he was still living in [place]).  I did discuss this with the patient's daughter then she felt like if she could have a sleeping medicine while there he would probably tolerate it well.    Therefore  will be consulted for skilled nursing facility placement.  His antibiotics has been discontinued.  I will plan on checking another blood test and urine culture tomorrow.  We will change Percocet to q.6 hours.

## 2024-04-11 NOTE — PROGRESS NOTES
Inpatient Nutrition Assessment    Admit Date: 4/3/2024   Total duration of encounter: 8 days   Patient Age: 89 y.o.    Nutrition Recommendation/Prescription     Continue heart healthy diet as tolerated. Encourage small, frequent meals for nausea.   Continue Boost BID to provide 240 kcal and 10 g protein per serving  Continue bowel regimen. Medical management of n/v per MD.   Monitor labs, intake and weight    Communication of Recommendations: reviewed with patient    Nutrition Assessment     Malnutrition Assessment/Nutrition-Focused Physical Exam    Malnutrition Context: chronic illness (04/09/24 1040)  Malnutrition Level: moderate (04/09/24 1040)  Energy Intake (Malnutrition): other (see comments) (does not meet criteria) (04/09/24 1040)  Weight Loss (Malnutrition): other (see comments) (does not meet criteria) (04/09/24 1040)  Subcutaneous Fat (Malnutrition): mild depletion (04/09/24 1040)  Orbital Region (Subcutaneous Fat Loss): mild depletion  Upper Arm Region (Subcutaneous Fat Loss): mild depletion     Muscle Mass (Malnutrition): moderate depletion (04/09/24 1040)  [place] Region (Muscle Loss): moderate depletion     Clavicle and Acromion Bone Region (Muscle Loss): moderate depletion     Dorsal Hand (Muscle Loss): moderate depletion                    A minimum of two characteristics is recommended for diagnosis of either severe or non-severe malnutrition.    Chart Review    Reason Seen: length of stay and follow-up    Malnutrition Screening Tool Results   Have you recently lost weight without trying?: No  Have you been eating poorly because of a decreased appetite?: No   MST Score: 0   Diagnosis:  Subacute fracture L1   Suspected pelvic abscess   UTI and pre-existing urinary retention with Alme[place] catheter  Hyperkalemia. Corrected   Constipation     Relevant Medical History: COPD, CAD, HF    Scheduled Medications:  albuterol-ipratropium, 3 mL, QID  amLODIPine, 5 mg, Daily  aspirin, 81 mg, Daily  atorvastatin, 80  mg, Daily  ezetimibe, 10 mg, QHS  finasteride, 5 mg, Daily  lactulose 10 gram/15 ml, 30 g, Daily  metoprolol succinate, 25 mg, Daily  naloxegoL, 25 mg, Daily  tamsulosin, 2 capsule, Daily  zinc oxide-cod liver oil, , BID    Continuous Infusions:   PRN Medications: ALPRAZolam, furosemide, OLANZapine, oxyCODONE-acetaminophen    Calorie Containing IV Medications: no significant kcals from medications at this time    Recent Labs   Lab 04/05/24  0551 04/07/24  0653 04/08/24  0528 04/09/24  0502   * 134* 133* 133*   K 4.2 4.8 5.6* 4.4   CALCIUM 8.1* 9.0 8.9 8.5*   CHLORIDE 100 100 100 100   CO2 28 26 21* 28   BUN 18.7 15.9 15.6 11.5   CREATININE 1.07 0.88 0.88 0.87   EGFRNORACEVR >60 >60 >60 >60   GLUCOSE 105 92 88 104   BILITOT 0.4 0.4 0.4 0.4   ALKPHOS 100 117 114 102   ALT 7 11 12 9   AST 7 10 16 10   ALBUMIN 2.8* 3.2* 3.2* 3.0*   WBC 8.26 8.98  --  8.52   HGB 10.4* 11.7*  --  10.9*   HCT 32.8* 36.6*  --  34.4*       Nutrition Orders:  Diet Heart Healthy  Dietary nutrition supplements Boost Original Nutritional Drink - Vanilla; BID    Appetite/Oral Intake: poor/25-50% of meals  Factors Affecting Nutritional Intake: altered gastrointestinal function  Food/Mandaeism/Cultural Preferences: none reported  Food Allergies: no known food allergies  Last Bowel Movement: 04/10/24  Wound(s):     Altered Skin Integrity 04/03/24 1544 Perineum #1-Tissue loss description: Partial thickness     Comments    Comments    4/9: Pt reports good appetite, eating 100% of meals. Good appetite PTA. Denies N/V, reports constipation. Last BM 4/3, meds noted. UBW reported ~170# with unintentional weight loss past year. Per EMR weight history, 4.6% weight loss in 2 months (not significant). Pt with moderate muscle and mild fat depletion per NFPE. Agreed to trial ONS.    4/11: Pt reports poor po intake of meals; states that he has been having an upset stomach with some n/v; reports that he is drinking his Boost.     Anthropometrics    Height:  6' (182.9 cm), Height Method: Stated  Last Weight: 72.6 kg (160 lb) (24 0950), Weight Method: Bed Scale  BMI (Calculated): 21.7  BMI Classification: underweight (BMI less than 22 if >65 years of age)        Ideal Body Weight (IBW), Male: 178 lb     % Ideal Body Weight, Male (lb): 89.89 %                 Usual Body Weight (UBW), k.2 kg  % Usual Body Weight: 95.44     Usual Weight Provided By: patient and EMR weight history    Wt Readings from Last 5 Encounters:   24 72.6 kg (160 lb)   24 72.6 kg (160 lb)   24 76.2 kg (168 lb)   10/18/23 74.4 kg (164 lb)   23 72.1 kg (159 lb)     Weight Change(s) Since Admission:   Wt Readings from Last 1 Encounters:   24 0950 72.6 kg (160 lb)   24 0528 72.6 kg (160 lb)   Admit Weight: 72.6 kg (160 lb) (24 0528), Weight Method: Stated  : no new wt noted    Estimated Needs    Weight Used For Calorie Calculations: 72.6 kg (160 lb 0.9 oz)  Energy Calorie Requirements (kcal): 1572 kcal (1.1 stress factor)  Energy Need Method: [place]-St Jeor  Weight Used For Protein Calculations: 72.6 kg (160 lb 0.9 oz)  Protein Requirements: 80-87 g (1.1-1.2 g/kg)  Fluid Requirements (mL): 1572 ml (1 ml/kcal)    Enteral Nutrition     Patient not receiving enteral nutrition at this time.    Parenteral Nutrition     Patient not receiving parenteral nutrition support at this time.    Evaluation of Received Nutrient Intake    Calories: not meeting estimated needs  Protein: not meeting estimated needs    Patient Education     Not applicable.    Nutrition Diagnosis     PES: Moderate chronic disease or condition related malnutrition related to inability to consume sufficient nutrients as evidenced by mild fat depletion and moderate muscle depletion. (active)    Nutrition Interventions     Intervention(s): general/healthful diet, commercial beverage, prescription medication, and collaboration with other providers    Goal: Consume % of oral  supplements by follow-up. (new)  Goal: Maintain weight throughout hospitalization. (new)    Nutrition Goals & Monitoring     Dietitian will monitor: food and beverage intake, weight, and electrolyte/renal panel    Nutrition Risk/Follow-Up: moderate (follow-up in 3-5 days)   Please consult if re-assessment needed sooner.

## 2024-04-11 NOTE — PT/OT/SLP PROGRESS
"Physical Therapy Treatment    Patient Name:  Max Squires   MRN:  [id number]    Pt persuaded to participate but prior to mobilizing he stated "I hate when people tell me what to do when I don't feel well." Treatment deferred until tomorrow. His complaint was that he felt like he had to have a BM and I educated that mobility will assist with that. Continue treatment tomorrow.       "

## 2024-04-11 NOTE — PHYSICIAN QUERY
PT Name: Max Squires  MR #: [id number]     DOCUMENTATION CLARIFICATION     CDS/: Concha Orlando RN BSN            Contact information:[email or web address]  This form is a permanent document in the medical record.     Query Date: April 11, 2024    By submitting this query, we are merely seeking further clarification of documentation.  Please utilize your independent clinical judgment when addressing the question(s) below.  The Medical Record contains the following   Indicators   Supporting Clinical Findings Location in Medical Record    Respiratory failure      Subjective Respiratory Signs/Symptoms: SOB, CASTRO, Cough, etc.      Objective Respiratory Signs/Symptoms: Respiratory distress, Accessory muscle use, tachypnea, wheezing, etc.     x RR         O2 sat         O2 use 4/3 O2 sat 92% NC 3 L/min     R-27--28  4/3 O2 sat 91% NC 1 L/min  4/5 O2 sat 93%--92%--94% NC 2 L/min    R-20 4/3-4/5/24  VS    Blood Gas (ABG or VBG)      Hypoxia/Hypercapnia      BiPAP/Intubation/Mechanical Ventilation     x Home O2, Oxygen Dependence 4/3 Hospital Medicine PN  Past medical history notable for severe COPD. He was on home oxygen.  4/3/24 Progress note    Radiology findings      Acute/Chronic Illness     x Treatment Orders  4/3 Continuous oxygen    albuterol-ipratropium 2.5 mg-0.5 mg/3 mL nebulizer solution 3 mL  Dose: 3 mL  Freq: 4 times daily Route: NEBULIZATION  Start: 04/03/24 0930 4/3/24 Orders      4/3/24 Medication record    Other         The clinical guidelines noted are only a system guideline. It does not replace the providers clinical judgment.      [facility] Approved Diagnostic Criteria      Acute Respiratory Failure    Hypoxic: ABG pO2<60 mmHg or O2 sat of <91% on RA   AND/OR   Hypercapnic: ABG pCO2>50 mmHg with pH <7.35   AND   Respiratory symptoms documented (Subjective: SOB; Objective: Tachypnea, respiratory distress, increased work of breathing, unable to speak in complete sentences, labored  breathing, use of accessory muscles, RR>26, cyanosis, dyspnea, wheezing, stridor, lethargy)      Chronic Respiratory Failure   Hypoxic: Continuous home oxygen    AND/OR   Hypercapnic: Normal pH with high CO2 (ex. COPD)      Acute on Chronic Respiratory Failure   Hypoxic: ABG pO2>10mmHg below baseline OR ABG pO2<60 mmHg OR SpO2<91% on usual home O2  OR O2>2L/min over baseline home O2   AND/OR   Hypercapnic: ABG pCO2>50 mmHg OR pCO2>10mmHg over baseline and pH <7.35   AND   Respiratory symptoms documented      Acute Respiratory Distress Syndrome (ARDS) - an acute, diffuse, inflammatory form of lung injury. Suspect with progressive dyspnea, hypoxemic respiratory failure, and bilateral alveolar infiltrates on chest imaging within 6 to 72 hours of an inciting event.   Acute Respiratory Distress - Generally describes less severe respiratory symptoms (tachypnea, in respiratory distress, increased work of breathing, unable to speak in complete sentences, labored breathing, use of accessory muscles, RR> 24, cyanosis, dyspnea, wheezing, stridor, lethargy) without sufficient measurements (pO2, SpO2, pH, and pCO2) to meet criteria for respiratory failure)   Acute Respiratory Insufficiency - Generally describes less severe respiratory symptoms and measurements (pO2, SpO2, pH, and pCO2) not meeting criteria for respiratory failure         Provider, please specify the diagnosis associated with above clinical findings.   [  x  ] Chronic Respiratory Failure with Hypoxia   [    ] Other Respiratory Diagnosis (please specify): _________________         Please document in your progress notes daily for the duration of treatment until resolved and include in your discharge summary.     Reference:    MATT Luz MD. (2020, March 13). Acute respiratory distress syndrome: Clinical features, diagnosis, and complications in adults ([id number] BULMARO Mora MD & [id number] IRON Lara MD, Eds.). Retrieved November  13, 2020, from [email or web address]-[email or web address]-distress-syndrome-clinical-features-diagnosis-and-complications-[email or web address]-adults?search=ards&source=search_result&selectedTitle=1~150&usage_type=default&display_rank=1  Form No. 39221

## 2024-04-12 PROBLEM — S32.010A CLOSED COMPRESSION FRACTURE OF L1 VERTEBRA: Status: ACTIVE | Noted: 2024-04-12

## 2024-04-12 PROBLEM — E87.1 HYPONATREMIA: Status: ACTIVE | Noted: 2024-04-12

## 2024-04-12 LAB
ALBUMIN SERPL-MCNC: 3.1 G/DL (ref 3.4–4.8)
ALBUMIN/GLOB SERPL: 0.9 RATIO (ref 1.1–2)
ALP SERPL-CCNC: 99 UNIT/L (ref 40–150)
ALT SERPL-CCNC: 11 UNIT/L (ref 0–55)
AMORPH URATE CRY URNS QL MICRO: ABNORMAL /UL
APPEARANCE UR: ABNORMAL
AST SERPL-CCNC: 23 UNIT/L (ref 5–34)
BACTERIA #/AREA URNS AUTO: ABNORMAL /HPF
BASOPHILS # BLD AUTO: 0.05 X10(3)/MCL
BASOPHILS NFR BLD AUTO: 0.5 %
BILIRUB SERPL-MCNC: 0.5 MG/DL
BILIRUB UR QL STRIP.AUTO: NEGATIVE
BUN SERPL-MCNC: 18.4 MG/DL (ref 8.4–25.7)
CALCIUM SERPL-MCNC: 8.5 MG/DL (ref 8.8–10)
CHLORIDE SERPL-SCNC: 94 MMOL/L (ref 98–107)
CO2 SERPL-SCNC: 24 MMOL/L (ref 23–31)
COLOR UR AUTO: YELLOW
CREAT SERPL-MCNC: 0.93 MG/DL (ref 0.73–1.18)
EOSINOPHIL # BLD AUTO: 0.36 X10(3)/MCL (ref 0–0.9)
EOSINOPHIL NFR BLD AUTO: 3.3 %
ERYTHROCYTE [DISTWIDTH] IN BLOOD BY AUTOMATED COUNT: 15.6 % (ref 11.5–17)
GFR SERPLBLD CREATININE-BSD FMLA CKD-EPI: >60 MLS/MIN/1.73/M2
GLOBULIN SER-MCNC: 3.6 GM/DL (ref 2.4–3.5)
GLUCOSE SERPL-MCNC: 91 MG/DL (ref 82–115)
GLUCOSE UR QL STRIP.AUTO: NORMAL
HCT VFR BLD AUTO: 34.2 % (ref 42–52)
HGB BLD-MCNC: 10.8 G/DL (ref 14–18)
HYALINE CASTS #/AREA URNS LPF: ABNORMAL /LPF
IMM GRANULOCYTES # BLD AUTO: 0.06 X10(3)/MCL (ref 0–0.04)
IMM GRANULOCYTES NFR BLD AUTO: 0.5 %
KETONES UR QL STRIP.AUTO: NEGATIVE
LEUKOCYTE ESTERASE UR QL STRIP.AUTO: 500
LYMPHOCYTES # BLD AUTO: 1.05 X10(3)/MCL (ref 0.6–4.6)
LYMPHOCYTES NFR BLD AUTO: 9.6 %
MCH RBC QN AUTO: 26 PG (ref 27–31)
MCHC RBC AUTO-ENTMCNC: 31.6 G/DL (ref 33–36)
MCV RBC AUTO: 82.4 FL (ref 80–94)
MONOCYTES # BLD AUTO: 0.68 X10(3)/MCL (ref 0.1–1.3)
MONOCYTES NFR BLD AUTO: 6.2 %
MUCOUS THREADS URNS QL MICRO: ABNORMAL /LPF
NEUTROPHILS # BLD AUTO: 8.71 X10(3)/MCL (ref 2.1–9.2)
NEUTROPHILS NFR BLD AUTO: 79.9 %
NITRITE UR QL STRIP.AUTO: NEGATIVE
NRBC BLD AUTO-RTO: 0 %
PH UR STRIP.AUTO: 6 [PH]
PLATELET # BLD AUTO: 216 X10(3)/MCL (ref 130–400)
PMV BLD AUTO: 10.9 FL (ref 7.4–10.4)
POTASSIUM SERPL-SCNC: 5.8 MMOL/L (ref 3.5–5.1)
PROT SERPL-MCNC: 6.7 GM/DL (ref 5.8–7.6)
PROT UR QL STRIP.AUTO: ABNORMAL
RBC # BLD AUTO: 4.15 X10(6)/MCL (ref 4.7–6.1)
RBC #/AREA URNS AUTO: >100 /HPF
RBC UR QL AUTO: ABNORMAL
SODIUM SERPL-SCNC: 127 MMOL/L (ref 136–145)
SP GR UR STRIP.AUTO: 1.03 (ref 1–1.03)
SQUAMOUS #/AREA URNS LPF: ABNORMAL /HPF
UROBILINOGEN UR STRIP-ACNC: NORMAL
WBC # SPEC AUTO: 10.91 X10(3)/MCL (ref 4.5–11.5)
WBC #/AREA URNS AUTO: >100 /HPF

## 2024-04-12 PROCEDURE — 11000001 HC ACUTE MED/SURG PRIVATE ROOM

## 2024-04-12 PROCEDURE — 94640 AIRWAY INHALATION TREATMENT: CPT

## 2024-04-12 PROCEDURE — 94760 N-INVAS EAR/PLS OXIMETRY 1: CPT

## 2024-04-12 PROCEDURE — 21400001 HC TELEMETRY ROOM

## 2024-04-12 PROCEDURE — 25000242 PHARM REV CODE 250 ALT 637 W/ HCPCS: Performed by: INTERNAL MEDICINE

## 2024-04-12 PROCEDURE — 80053 COMPREHEN METABOLIC PANEL: CPT | Performed by: INTERNAL MEDICINE

## 2024-04-12 PROCEDURE — 97116 GAIT TRAINING THERAPY: CPT | Mod: CQ

## 2024-04-12 PROCEDURE — 81001 URINALYSIS AUTO W/SCOPE: CPT | Performed by: INTERNAL MEDICINE

## 2024-04-12 PROCEDURE — 99900031 HC PATIENT EDUCATION (STAT)

## 2024-04-12 PROCEDURE — 87086 URINE CULTURE/COLONY COUNT: CPT | Performed by: INTERNAL MEDICINE

## 2024-04-12 PROCEDURE — 94761 N-INVAS EAR/PLS OXIMETRY MLT: CPT

## 2024-04-12 PROCEDURE — 99233 SBSQ HOSP IP/OBS HIGH 50: CPT | Mod: ,,, | Performed by: INTERNAL MEDICINE

## 2024-04-12 PROCEDURE — 25000003 PHARM REV CODE 250: Performed by: INTERNAL MEDICINE

## 2024-04-12 PROCEDURE — 85025 COMPLETE CBC W/AUTO DIFF WBC: CPT | Performed by: INTERNAL MEDICINE

## 2024-04-12 PROCEDURE — 27000221 HC OXYGEN, UP TO 24 HOURS

## 2024-04-12 PROCEDURE — 99900035 HC TECH TIME PER 15 MIN (STAT)

## 2024-04-12 RX ORDER — SODIUM CHLORIDE 9 MG/ML
INJECTION, SOLUTION INTRAVENOUS CONTINUOUS
Status: DISCONTINUED | OUTPATIENT
Start: 2024-04-12 | End: 2024-04-13

## 2024-04-12 RX ADMIN — IPRATROPIUM BROMIDE AND ALBUTEROL SULFATE 3 ML: 2.5; .5 SOLUTION RESPIRATORY (INHALATION) at 07:04

## 2024-04-12 RX ADMIN — IPRATROPIUM BROMIDE AND ALBUTEROL SULFATE 3 ML: 2.5; .5 SOLUTION RESPIRATORY (INHALATION) at 12:04

## 2024-04-12 RX ADMIN — IPRATROPIUM BROMIDE AND ALBUTEROL SULFATE 3 ML: 2.5; .5 SOLUTION RESPIRATORY (INHALATION) at 08:04

## 2024-04-12 RX ADMIN — Medication: at 09:04

## 2024-04-12 RX ADMIN — TAMSULOSIN HYDROCHLORIDE 0.8 MG: 0.4 CAPSULE ORAL at 09:04

## 2024-04-12 RX ADMIN — METOPROLOL SUCCINATE 25 MG: 25 TABLET, EXTENDED RELEASE ORAL at 09:04

## 2024-04-12 RX ADMIN — ASPIRIN 81 MG: 81 TABLET, COATED ORAL at 09:04

## 2024-04-12 RX ADMIN — SODIUM CHLORIDE: 9 INJECTION, SOLUTION INTRAVENOUS at 10:04

## 2024-04-12 RX ADMIN — AMLODIPINE BESYLATE 5 MG: 5 TABLET ORAL at 04:04

## 2024-04-12 RX ADMIN — ATORVASTATIN CALCIUM 80 MG: 40 TABLET, FILM COATED ORAL at 09:04

## 2024-04-12 RX ADMIN — EZETIMIBE 10 MG: 10 TABLET ORAL at 09:04

## 2024-04-12 RX ADMIN — FINASTERIDE 5 MG: 5 TABLET, FILM COATED ORAL at 09:04

## 2024-04-12 NOTE — PROGRESS NOTES
[facility] - Observation Unit  Wound Care    Patient Name:  Max Squires   MRN:  [id number]  Date: 4/12/2024  Diagnosis: Low back pain    History:     Past Medical History:   Diagnosis Date    Arthritis     COPD (chronic obstructive pulmonary disease)     Emphysema, unspecified     Hypercholesterolemia     Hypertension     Unspecified osteoarthritis, unspecified site        Social History     Socioeconomic History    Marital status:    Tobacco Use    Smoking status: Former     Current packs/day: 1.00     Types: Cigarettes    Smokeless tobacco: Never   Substance and Sexual Activity    Alcohol use: Not Currently     Comment: None    Drug use: Never     Social Determinants of Health     Financial Resource Strain: Low Risk  (4/4/2024)    Overall Financial Resource Strain (CARDIA)     Difficulty of Paying Living Expenses: Not hard at all   Food Insecurity: No Food Insecurity (4/5/2024)    Hunger Vital Sign     Worried About Running Out of Food in the Last Year: Never true     Ran Out of Food in the Last Year: Never true   Transportation Needs: No Transportation Needs (4/5/2024)    PRAPARE - Transportation     Lack of Transportation (Medical): No     Lack of Transportation (Non-Medical): No   Stress: Stress Concern Present (4/4/2024)    [employer] [place] [employer] - Occupational Stress Questionnaire     Feeling of Stress : To some extent   Housing Stability: Low Risk  (4/5/2024)    Housing Stability Vital Sign     Unable to Pay for Housing in the Last Year: No     Number of Places Lived in the Last Year: 1     Unstable Housing in the Last Year: No       Precautions:     Allergies as of 04/03/2024    (No Known Allergies)       WOC Assessment Details/Treatment        04/12/24 0952   WOCN Assessment   Visit Date 04/12/24   Visit Time 0952   Consult Type Follow Up   Intervention assessed   Teaching on-going   Skin Interventions   Device Skin Pressure Protection absorbent pad utilized/changed         Altered Skin Integrity 04/03/24 1544 Perineum #1   Date First Assessed/Time First Assessed: 04/03/24 1544   Altered Skin Integrity Present on Admission - Did Patient arrive to the hospital with altered skin?: yes  Location: Perineum  Wound Number: #1  Is this injury device related?: No   Wound Image    Dressing Appearance Open to air   Drainage Amount None   Appearance Moist;Intact   Tissue loss description Not applicable   Periwound Area Intact;Moist   Wound Edges Irregular   Care Cleansed with:;Sterile normal saline   Dressing Applied     Woundcare follow up for perineum. No family at bedside. Educated patient on the importance of turning every 2 hours. She voiced understanding. Continue with current treatment recommendations.  Apply Desitin BID and PRN. Keep areas clean and dry, no adult briefs while in bed. Nursing to continue with turning every two hours, wedge, and floating heels.  Head of bed elevated, bed in lowest position, and call bell within reach. Will follow up    04/12/2024

## 2024-04-12 NOTE — PLAN OF CARE
Pt is in agreement with SNF.  [facility] for [facility].  I spoke to his dgtr. Cristina she resides in [place] and she is in agreement with [facility] swing bed.  Referral sent.  Will f/u Monday.  Dgtr states last time he was in a nsg home setting he did not do well.  Hopefully this setting (hospital) will be easier for him to adapt.

## 2024-04-12 NOTE — ASSESSMENT & PLAN NOTE
Patient has hyponatremia which is uncontrolled,We will aim to correct the sodium by 4-6mEq in 24 hours. We will monitor sodium Daily. The hyponatremia is due to Dehydration/hypovolemia. We will obtain the following studies: bmp in am. We will treat the hyponatremia with IV fluids as follows: Normal Saline at 75 ml /hr. The patient's sodium results have been reviewed and are listed below.  Recent Labs   Lab 04/12/24  0523   *

## 2024-04-12 NOTE — ASSESSMENT & PLAN NOTE
Nutrition consulted. Most recent weight and BMI monitored-     Measurements:  Wt Readings from Last 1 Encounters:   04/06/24 72.6 kg (160 lb)   Body mass index is 21.7 kg/m².    Patient has been screened and assessed by RD.    Malnutrition Type:  Context: chronic illness  Level: moderate    Malnutrition Characteristic Summary:  Weight Loss (Malnutrition): other (see comments) (does not meet criteria)  Energy Intake (Malnutrition): other (see comments) (does not meet criteria)  Subcutaneous Fat (Malnutrition): mild depletion  Muscle Mass (Malnutrition): moderate depletion    Interventions/Recommendations (treatment strategy):     I asked the nurse to see about getting him some meal supplements like Ensure or Boost today.

## 2024-04-12 NOTE — SUBJECTIVE & OBJECTIVE
Interval History: Mr. Squires reports he is feeling groggy this am.  His breakfast is at his bedside untouched.  Her reports he doesn't have much appetitie.  No family at bedside.    Review of Systems  Objective:     Vital Signs (Most Recent):  Temp: 97.9 °F (36.6 °C) (04/12/24 0735)  Pulse: 72 (04/12/24 0820)  Resp: 18 (04/12/24 0820)  BP: 112/66 (04/12/24 0735)  SpO2: 97 % (04/12/24 0820) Vital Signs (24h Range):  Temp:  [97.7 °F (36.5 °C)-98.7 °F (37.1 °C)] 97.9 °F (36.6 °C)  Pulse:  [66-83] 72  Resp:  [16-22] 18  SpO2:  [90 %-97 %] 97 %  BP: (105-129)/(62-73) 112/66     Weight: 72.6 kg (160 lb)  Body mass index is 21.7 kg/m².    Intake/Output Summary (Last 24 hours) at 4/12/2024 0826  Last data filed at 4/12/2024 0359  Gross per 24 hour   Intake 360 ml   Output 600 ml   Net -240 ml         Physical Exam  Constitutional:       Comments: Awakens to my voice and gentle stimulation, answers questions appropriately, but falls back to sleep.   HENT:      Head: Normocephalic and atraumatic.   Cardiovascular:      Rate and Rhythm: Normal rate and regular rhythm.      Heart sounds: Normal heart sounds.   Pulmonary:      Effort: Pulmonary effort is normal.      Breath sounds: Normal breath sounds.   Abdominal:      General: Abdomen is flat.      Palpations: Abdomen is soft.   Skin:     General: Skin is warm and dry.   Neurological:      General: No focal deficit present.             Significant Labs: All pertinent labs within the past 24 hours have been reviewed.  BMP:   Recent Labs   Lab 04/12/24 0523   *   K 5.8*   CO2 24   BUN 18.4   CREATININE 0.93   CALCIUM 8.5*     CBC:   Recent Labs   Lab 04/12/24 0523   WBC 10.91   HGB 10.8*   HCT 34.2*          Significant Imaging: I have reviewed all pertinent imaging results/findings within the past 24 hours.  CT: I have reviewed all pertinent results/findings within the past 24 hours and my personal findings are:  bladder diverticulum.

## 2024-04-12 NOTE — PT/OT/SLP PROGRESS
Physical Therapy Treatment    Patient Name:  Max Squires   MRN:  [id number]    Recommendations:     Discharge therapy intensity: Low Intensity Therapy   Discharge Equipment Recommendations: none  Barriers to discharge: Ongoing medical needs    Assessment:     Max Squires is a 89 y.o. male.  He presents with the following impairments/functional limitations: weakness, gait instability, impaired balance, impaired endurance, decreased safety awareness, impaired functional mobility.    Rehab Prognosis: Good; patient would benefit from acute skilled PT services to address these deficits and reach maximum level of function.    Recent Surgery: * No surgery found *      Plan:     During this hospitalization, patient to be seen 5 x/week to address the identified rehab impairments via gait training, therapeutic activities, therapeutic exercises and progress toward the following goals:    Plan of Care Expires:  05/09/24    Subjective     Chief Complaint: SOB with mobility    Objective:     Communicated with nurse prior to session.  Patient found supine with   upon PT entry to room.     General Precautions: Standard, fall  Orthopedic Precautions: spinal precautions  Braces: LSO  Respiratory Status: Nasal cannula, flow 4 L/min  Blood Pressure:   Skin Integrity: Visible skin intact      Functional Mobility:  SBA to get EOB and SBA STS  Gait 100 ft and 50 ft RW SBA. Cues for postural correction to improve gait stability and cues for proper breating.   Sat dropped to 84 with mobility and >90% rest.     Education Provided:  Role and goals of PT, transfer training, bed mobility, gait training, balance training, safety awareness, assistive device, strengthening exercises, and importance of participating in PT to return to PLOF.    Patient left up in chair with all lines intact and call button in reach    GOALS:   Multidisciplinary Problems       Physical Therapy Goals          Problem: Physical Therapy    Goal Priority Disciplines  Outcome Goal Variances Interventions   Physical Therapy Goal     PT, PT/OT Ongoing, Progressing     Description: Pt will be seen for the following goals  1. Pt will be sba with bed mobility  2. Pt will be sba with transfers with lso brace on and using a rw  3. Pt will amb with a rw and LSO brace on for 150ft sba                       Time Tracking:     Billable Minutes: Gait Training 23    Treatment Type: Treatment  PT/PTA: PTA     Number of PTA visits since last PT visit: 3     04/12/2024

## 2024-04-12 NOTE — PROGRESS NOTES
[facility] Hospital Medicine  Progress Note    Patient Name: Max Squires  MRN: [id number]  Patient Class: IP- Inpatient   Admission Date: 4/3/2024  Length of Stay: 9 days  Attending Physician: Ephraim Ramires MD  Primary Care Provider: Ephraim Ramires MD        Subjective:     Principal Problem:Low back pain        HPI:  No notes on file    Overview/Hospital Course:  No notes on file    Interval History: Mr. Squires reports he is feeling groggy this am.  His breakfast is at his bedside untouched.  Her reports he doesn't have much appetitie.  No family at bedside.    Review of Systems  Objective:     Vital Signs (Most Recent):  Temp: 97.9 °F (36.6 °C) (04/12/24 0735)  Pulse: 72 (04/12/24 0820)  Resp: 18 (04/12/24 0820)  BP: 112/66 (04/12/24 0735)  SpO2: 97 % (04/12/24 0820) Vital Signs (24h Range):  Temp:  [97.7 °F (36.5 °C)-98.7 °F (37.1 °C)] 97.9 °F (36.6 °C)  Pulse:  [66-83] 72  Resp:  [16-22] 18  SpO2:  [90 %-97 %] 97 %  BP: (105-129)/(62-73) 112/66     Weight: 72.6 kg (160 lb)  Body mass index is 21.7 kg/m².    Intake/Output Summary (Last 24 hours) at 4/12/2024 0826  Last data filed at 4/12/2024 0359  Gross per 24 hour   Intake 360 ml   Output 600 ml   Net -240 ml         Physical Exam  Constitutional:       Comments: Awakens to my voice and gentle stimulation, answers questions appropriately, but falls back to sleep.   HENT:      Head: Normocephalic and atraumatic.   Cardiovascular:      Rate and Rhythm: Normal rate and regular rhythm.      Heart sounds: Normal heart sounds.   Pulmonary:      Effort: Pulmonary effort is normal.      Breath sounds: Normal breath sounds.   Abdominal:      General: Abdomen is flat.      Palpations: Abdomen is soft.   Skin:     General: Skin is warm and dry.   Neurological:      General: No focal deficit present.             Significant Labs: All pertinent labs within the past 24 hours have been reviewed.  BMP:   Recent Labs   Lab  04/12/24  0523   *   K 5.8*   CO2 24   BUN 18.4   CREATININE 0.93   CALCIUM 8.5*     CBC:   Recent Labs   Lab 04/12/24  0523   WBC 10.91   HGB 10.8*   HCT 34.2*          Significant Imaging: I have reviewed all pertinent imaging results/findings within the past 24 hours.  CT: I have reviewed all pertinent results/findings within the past 24 hours and my personal findings are:  bladder diverticulum.    Assessment/Plan:      Closed compression fracture of L1 vertebra  Pain controlled with percocet and getting PT, plans for SNF.      Hyponatremia  Patient has hyponatremia which is uncontrolled,We will aim to correct the sodium by 4-6mEq in 24 hours. We will monitor sodium Daily. The hyponatremia is due to Dehydration/hypovolemia. We will obtain the following studies: bmp in am. We will treat the hyponatremia with IV fluids as follows: Normal Saline at 75 ml /hr. The patient's sodium results have been reviewed and are listed below.  Recent Labs   Lab 04/12/24  0523   *       Pelvic fluid collection  He has a large bladder diverticulum.      Moderate malnutrition  Nutrition consulted. Most recent weight and BMI monitored-     Measurements:  Wt Readings from Last 1 Encounters:   04/06/24 72.6 kg (160 lb)   Body mass index is 21.7 kg/m².    Patient has been screened and assessed by RD.    Malnutrition Type:  Context: chronic illness  Level: moderate    Malnutrition Characteristic Summary:  Weight Loss (Malnutrition): other (see comments) (does not meet criteria)  Energy Intake (Malnutrition): other (see comments) (does not meet criteria)  Subcutaneous Fat (Malnutrition): mild depletion  Muscle Mass (Malnutrition): moderate depletion    Interventions/Recommendations (treatment strategy):     Continue Boost BID.    Urinary tract infection associated with indwelling urethral catheter  He completed a course of abx.      Urinary retention  Continue bobo.      COPD (chronic obstructive pulmonary  disease)  This is chronic and stable.    Coronary arteriosclerosis  stable    Benign essential hypertension  Chronic, controlled. Continue current meds -- amlodipine, metoprolol        VTE Risk Mitigation (From admission, onward)      None            Discharge Planning   MATTY:      Code Status: DNR   Is the patient medically ready for discharge?:     Reason for patient still in hospital (select all that apply): Patient trending condition and Pending disposition  Discharge Plan A: Home with family                  Althea Lundberg MD  Department of Hospital Medicine   [facility]

## 2024-04-13 LAB
ANION GAP SERPL CALC-SCNC: 8 MEQ/L
BASOPHILS # BLD AUTO: 0.04 X10(3)/MCL
BASOPHILS NFR BLD AUTO: 0.4 %
BUN SERPL-MCNC: 14.4 MG/DL (ref 8.4–25.7)
CALCIUM SERPL-MCNC: 8.5 MG/DL (ref 8.8–10)
CHLORIDE SERPL-SCNC: 97 MMOL/L (ref 98–107)
CO2 SERPL-SCNC: 26 MMOL/L (ref 23–31)
CREAT SERPL-MCNC: 0.73 MG/DL (ref 0.73–1.18)
CREAT/UREA NIT SERPL: 20
EOSINOPHIL # BLD AUTO: 0.32 X10(3)/MCL (ref 0–0.9)
EOSINOPHIL NFR BLD AUTO: 3 %
ERYTHROCYTE [DISTWIDTH] IN BLOOD BY AUTOMATED COUNT: 15.6 % (ref 11.5–17)
GFR SERPLBLD CREATININE-BSD FMLA CKD-EPI: >60 MLS/MIN/1.73/M2
GLUCOSE SERPL-MCNC: 97 MG/DL (ref 82–115)
HCT VFR BLD AUTO: 31.6 % (ref 42–52)
HGB BLD-MCNC: 10.4 G/DL (ref 14–18)
IMM GRANULOCYTES # BLD AUTO: 0.06 X10(3)/MCL (ref 0–0.04)
IMM GRANULOCYTES NFR BLD AUTO: 0.6 %
LYMPHOCYTES # BLD AUTO: 1.01 X10(3)/MCL (ref 0.6–4.6)
LYMPHOCYTES NFR BLD AUTO: 9.6 %
MCH RBC QN AUTO: 27 PG (ref 27–31)
MCHC RBC AUTO-ENTMCNC: 32.9 G/DL (ref 33–36)
MCV RBC AUTO: 82.1 FL (ref 80–94)
MONOCYTES # BLD AUTO: 0.58 X10(3)/MCL (ref 0.1–1.3)
MONOCYTES NFR BLD AUTO: 5.5 %
NEUTROPHILS # BLD AUTO: 8.49 X10(3)/MCL (ref 2.1–9.2)
NEUTROPHILS NFR BLD AUTO: 80.9 %
NRBC BLD AUTO-RTO: 0 %
PLATELET # BLD AUTO: 205 X10(3)/MCL (ref 130–400)
PMV BLD AUTO: 10.1 FL (ref 7.4–10.4)
POTASSIUM SERPL-SCNC: 4.5 MMOL/L (ref 3.5–5.1)
RBC # BLD AUTO: 3.85 X10(6)/MCL (ref 4.7–6.1)
SODIUM SERPL-SCNC: 131 MMOL/L (ref 136–145)
WBC # SPEC AUTO: 10.5 X10(3)/MCL (ref 4.5–11.5)

## 2024-04-13 PROCEDURE — 25000242 PHARM REV CODE 250 ALT 637 W/ HCPCS: Performed by: INTERNAL MEDICINE

## 2024-04-13 PROCEDURE — 27000221 HC OXYGEN, UP TO 24 HOURS

## 2024-04-13 PROCEDURE — 25000003 PHARM REV CODE 250: Performed by: INTERNAL MEDICINE

## 2024-04-13 PROCEDURE — 11000001 HC ACUTE MED/SURG PRIVATE ROOM

## 2024-04-13 PROCEDURE — 94640 AIRWAY INHALATION TREATMENT: CPT

## 2024-04-13 PROCEDURE — 85025 COMPLETE CBC W/AUTO DIFF WBC: CPT | Performed by: INTERNAL MEDICINE

## 2024-04-13 PROCEDURE — 80048 BASIC METABOLIC PNL TOTAL CA: CPT | Performed by: INTERNAL MEDICINE

## 2024-04-13 PROCEDURE — 21400001 HC TELEMETRY ROOM

## 2024-04-13 PROCEDURE — 99900035 HC TECH TIME PER 15 MIN (STAT)

## 2024-04-13 PROCEDURE — 99900031 HC PATIENT EDUCATION (STAT)

## 2024-04-13 PROCEDURE — 97110 THERAPEUTIC EXERCISES: CPT | Mod: CQ

## 2024-04-13 PROCEDURE — 94760 N-INVAS EAR/PLS OXIMETRY 1: CPT

## 2024-04-13 PROCEDURE — 99233 SBSQ HOSP IP/OBS HIGH 50: CPT | Mod: ,,, | Performed by: INTERNAL MEDICINE

## 2024-04-13 PROCEDURE — 97116 GAIT TRAINING THERAPY: CPT | Mod: CQ

## 2024-04-13 RX ORDER — DOCUSATE SODIUM 100 MG/1
100 CAPSULE, LIQUID FILLED ORAL 2 TIMES DAILY
Status: DISCONTINUED | OUTPATIENT
Start: 2024-04-13 | End: 2024-04-25 | Stop reason: HOSPADM

## 2024-04-13 RX ORDER — IPRATROPIUM BROMIDE 42 UG/1
2 SPRAY, METERED NASAL 4 TIMES DAILY PRN
Status: DISCONTINUED | OUTPATIENT
Start: 2024-04-13 | End: 2024-04-25 | Stop reason: HOSPADM

## 2024-04-13 RX ADMIN — LACTULOSE 30 G: 10 SOLUTION ORAL at 09:04

## 2024-04-13 RX ADMIN — METOPROLOL SUCCINATE 25 MG: 25 TABLET, EXTENDED RELEASE ORAL at 09:04

## 2024-04-13 RX ADMIN — SODIUM CHLORIDE: 9 INJECTION, SOLUTION INTRAVENOUS at 12:04

## 2024-04-13 RX ADMIN — TAMSULOSIN HYDROCHLORIDE 0.8 MG: 0.4 CAPSULE ORAL at 09:04

## 2024-04-13 RX ADMIN — OXYCODONE HYDROCHLORIDE AND ACETAMINOPHEN 1 TABLET: 10; 325 TABLET ORAL at 04:04

## 2024-04-13 RX ADMIN — ASPIRIN 81 MG: 81 TABLET, COATED ORAL at 09:04

## 2024-04-13 RX ADMIN — EZETIMIBE 10 MG: 10 TABLET ORAL at 08:04

## 2024-04-13 RX ADMIN — DOCUSATE SODIUM 100 MG: 100 CAPSULE, LIQUID FILLED ORAL at 08:04

## 2024-04-13 RX ADMIN — IPRATROPIUM BROMIDE AND ALBUTEROL SULFATE 3 ML: 2.5; .5 SOLUTION RESPIRATORY (INHALATION) at 09:04

## 2024-04-13 RX ADMIN — ATORVASTATIN CALCIUM 80 MG: 40 TABLET, FILM COATED ORAL at 09:04

## 2024-04-13 RX ADMIN — IPRATROPIUM BROMIDE AND ALBUTEROL SULFATE 3 ML: 2.5; .5 SOLUTION RESPIRATORY (INHALATION) at 08:04

## 2024-04-13 RX ADMIN — OXYCODONE HYDROCHLORIDE AND ACETAMINOPHEN 1 TABLET: 10; 325 TABLET ORAL at 09:04

## 2024-04-13 RX ADMIN — Medication: at 09:04

## 2024-04-13 RX ADMIN — AMLODIPINE BESYLATE 5 MG: 5 TABLET ORAL at 04:04

## 2024-04-13 RX ADMIN — NALOXEGOL OXALATE 25 MG: 25 TABLET, FILM COATED ORAL at 09:04

## 2024-04-13 RX ADMIN — OXYCODONE HYDROCHLORIDE AND ACETAMINOPHEN 1 TABLET: 10; 325 TABLET ORAL at 03:04

## 2024-04-13 RX ADMIN — Medication: at 08:04

## 2024-04-13 RX ADMIN — FINASTERIDE 5 MG: 5 TABLET, FILM COATED ORAL at 09:04

## 2024-04-13 RX ADMIN — OXYCODONE HYDROCHLORIDE AND ACETAMINOPHEN 1 TABLET: 10; 325 TABLET ORAL at 11:04

## 2024-04-13 NOTE — SUBJECTIVE & OBJECTIVE
Interval History: Mr. Squires started is day off with some issues with pain control and nasal congestion.  He seems to be in better spirits now.  He thinks his bowels have slowed a bit.    Review of Systems  Objective:     Vital Signs (Most Recent):  Temp: 97.7 °F (36.5 °C) (04/13/24 1117)  Pulse: 80 (04/13/24 1117)  Resp: 20 (04/13/24 0922)  BP: 97/66 (04/13/24 1117)  SpO2: 95 % (04/13/24 1117) Vital Signs (24h Range):  Temp:  [97.7 °F (36.5 °C)-97.9 °F (36.6 °C)] 97.7 °F (36.5 °C)  Pulse:  [] 80  Resp:  [18-26] 20  SpO2:  [92 %-98 %] 95 %  BP: ()/(59-78) 97/66     Weight: 72.6 kg (160 lb)  Body mass index is 21.7 kg/m².    Intake/Output Summary (Last 24 hours) at 4/13/2024 1237  Last data filed at 4/13/2024 0621  Gross per 24 hour   Intake 700 ml   Output 1750 ml   Net -1050 ml         Physical Exam  Constitutional:       General: He is not in acute distress.     Appearance: Normal appearance. He is not toxic-appearing.   HENT:      Head: Normocephalic and atraumatic.   Eyes:      General: No scleral icterus.  Cardiovascular:      Rate and Rhythm: Normal rate and regular rhythm.      Heart sounds: Normal heart sounds.   Pulmonary:      Effort: Pulmonary effort is normal.      Breath sounds: Wheezes: occ exp wheeze, o/w cta bilaterally.   Abdominal:      General: Abdomen is flat. There is no distension.      Palpations: Abdomen is soft.      Tenderness: There is no abdominal tenderness. There is no guarding or rebound.   Skin:     General: Skin is warm and dry.   Neurological:      General: No focal deficit present.      Mental Status: He is alert and oriented to person, place, and time. Mental status is at baseline.      Gait: Gait is intact.   Psychiatric:      Comments: Slightly confused               Significant Labs: All pertinent labs within the past 24 hours have been reviewed.  BMP:   Recent Labs   Lab 04/13/24  0459   *   K 4.5   CO2 26   BUN 14.4   CREATININE 0.73   CALCIUM 8.5*        Significant Imaging: I have reviewed all pertinent imaging results/findings within the past 24 hours.

## 2024-04-13 NOTE — PT/OT/SLP PROGRESS
Physical Therapy Treatment    Patient Name:  Max Squires   MRN:  [id number]    Recommendations:     Discharge therapy intensity: Low Intensity Therapy   Discharge Equipment Recommendations: none  Barriers to discharge: None    Assessment:     Max Squires is a 89 y.o. male.  He presents with the following impairments/functional limitations: weakness, gait instability, impaired balance, impaired endurance, decreased safety awareness, impaired functional mobility.    Rehab Prognosis: Good; patient would benefit from acute skilled PT services to address these deficits and reach maximum level of function.    Recent Surgery: * No surgery found *      Plan:     During this hospitalization, patient to be seen 5 x/week to address the identified rehab impairments via gait training, therapeutic activities, therapeutic exercises and progress toward the following goals:    Plan of Care Expires:  05/09/24    Subjective     Chief Complaint: SOB    Objective:     Communicated with nurse prior to session.  Patient found supine with   upon PT entry to room.     General Precautions: Standard, fall  Orthopedic Precautions: spinal precautions  Braces: LSO  Respiratory Status: Nasal cannula, flow . L/min  Blood Pressure:   Skin Integrity: Visible skin intact      Functional Mobility:  Supervision to get EOB and CGA STS  Gait 115 ft CGA using RW and sats drop with mobility.  Pt performed LE PRE's to increase strenth, ROM, and endurance to improve overall independence.    Education Provided:  Role and goals of PT, transfer training, bed mobility, gait training, balance training, safety awareness, assistive device, strengthening exercises, and importance of participating in PT to return to PLOF.    Patient left up in chair with all lines intact and call button in reach    GOALS:   Multidisciplinary Problems       Physical Therapy Goals          Problem: Physical Therapy    Goal Priority Disciplines Outcome Goal Variances Interventions    Physical Therapy Goal     PT, PT/OT Ongoing, Progressing     Description: Pt will be seen for the following goals  1. Pt will be sba with bed mobility  2. Pt will be sba with transfers with lso brace on and using a rw  3. Pt will amb with a rw and LSO brace on for 150ft sba                       Time Tracking:     Billable Minutes: Gait Training 12 and Therapeutic Exercise 12    Treatment Type: Treatment  PT/PTA: PTA     Number of PTA visits since last PT visit: 4     04/13/2024

## 2024-04-13 NOTE — PROGRESS NOTES
[facility] Hospital Medicine  Progress Note    Patient Name: Max Squires  MRN: [id number]  Patient Class: IP- Inpatient   Admission Date: 4/3/2024  Length of Stay: 10 days  Attending Physician: Ephraim Ramires MD  Primary Care Provider: Ephraim Ramires MD        Subjective:     Principal Problem:Low back pain        HPI:  No notes on file    Overview/Hospital Course:  No notes on file    Interval History: Mr. Squires started is day off with some issues with pain control and nasal congestion.  He seems to be in better spirits now.  He thinks his bowels have slowed a bit.    Review of Systems  Objective:     Vital Signs (Most Recent):  Temp: 97.7 °F (36.5 °C) (04/13/24 1117)  Pulse: 80 (04/13/24 1117)  Resp: 20 (04/13/24 0922)  BP: 97/66 (04/13/24 1117)  SpO2: 95 % (04/13/24 1117) Vital Signs (24h Range):  Temp:  [97.7 °F (36.5 °C)-97.9 °F (36.6 °C)] 97.7 °F (36.5 °C)  Pulse:  [] 80  Resp:  [18-26] 20  SpO2:  [92 %-98 %] 95 %  BP: ()/(59-78) 97/66     Weight: 72.6 kg (160 lb)  Body mass index is 21.7 kg/m².    Intake/Output Summary (Last 24 hours) at 4/13/2024 1237  Last data filed at 4/13/2024 0621  Gross per 24 hour   Intake 700 ml   Output 1750 ml   Net -1050 ml         Physical Exam  Constitutional:       General: He is not in acute distress.     Appearance: Normal appearance. He is not toxic-appearing.   HENT:      Head: Normocephalic and atraumatic.   Eyes:      General: No scleral icterus.  Cardiovascular:      Rate and Rhythm: Normal rate and regular rhythm.      Heart sounds: Normal heart sounds.   Pulmonary:      Effort: Pulmonary effort is normal.      Breath sounds: Wheezes: occ exp wheeze, o/w cta bilaterally.   Abdominal:      General: Abdomen is flat. There is no distension.      Palpations: Abdomen is soft.      Tenderness: There is no abdominal tenderness. There is no guarding or rebound.   Skin:     General: Skin is warm and dry.    Neurological:      General: No focal deficit present.      Mental Status: He is alert and oriented to person, place, and time. Mental status is at baseline.      Gait: Gait is intact.   Psychiatric:      Comments: Slightly confused               Significant Labs: All pertinent labs within the past 24 hours have been reviewed.  BMP:   Recent Labs   Lab 04/13/24  0459   *   K 4.5   CO2 26   BUN 14.4   CREATININE 0.73   CALCIUM 8.5*       Significant Imaging: I have reviewed all pertinent imaging results/findings within the past 24 hours.    Assessment/Plan:      Closed compression fracture of L1 vertebra  Pain controlled with percocet and getting PT, plans for SNF.      Hyponatremia  Improving with iv fluids    Pelvic fluid collection  He has a large bladder diverticulum.      Moderate malnutrition  Nutrition consulted. Most recent weight and BMI monitored-     Measurements:  Wt Readings from Last 1 Encounters:   04/06/24 72.6 kg (160 lb)   Body mass index is 21.7 kg/m².    Patient has been screened and assessed by RD.    Malnutrition Type:  Context: chronic illness  Level: moderate    Malnutrition Characteristic Summary:  Weight Loss (Malnutrition): other (see comments) (does not meet criteria)  Energy Intake (Malnutrition): other (see comments) (does not meet criteria)  Subcutaneous Fat (Malnutrition): mild depletion  Muscle Mass (Malnutrition): moderate depletion    Interventions/Recommendations (treatment strategy):     I asked the nurse to see about getting him some meal supplements like Ensure or Boost today.      Urinary tract infection associated with indwelling urethral catheter  He completed a course of abx.      Urinary retention  Continue bobo.      COPD (chronic obstructive pulmonary disease)  This is chronic and stable.    Coronary arteriosclerosis  stable    Benign essential hypertension  Chronic, controlled. Continue current meds -- amlodipine, metoprolol        VTE Risk Mitigation (From  admission, onward)      None            Discharge Planning   MATTY:      Code Status: DNR   Is the patient medically ready for discharge?:     Reason for patient still in hospital (select all that apply): Patient trending condition and Pending disposition  Discharge Plan A: Home with family                  Althea Lundberg MD  Department of Hospital Medicine   [facility]

## 2024-04-13 NOTE — PLAN OF CARE
Problem: Adult Inpatient Plan of Care  Goal: Optimal Comfort and Wellbeing  Outcome: Ongoing, Progressing     Problem: Adult Inpatient Plan of Care  Goal: Readiness for Transition of Care  Outcome: Ongoing, Progressing     Problem: Impaired Wound Healing  Goal: Optimal Wound Healing  Outcome: Ongoing, Progressing     Problem: Skin Injury Risk Increased  Goal: Skin Health and Integrity  Outcome: Ongoing, Progressing     Problem: Fall Injury Risk  Goal: Absence of Fall and Fall-Related Injury  Outcome: Ongoing, Progressing

## 2024-04-14 PROBLEM — R06.02 SOB (SHORTNESS OF BREATH): Status: ACTIVE | Noted: 2024-04-14

## 2024-04-14 LAB
ANION GAP SERPL CALC-SCNC: 10 MEQ/L
BACTERIA UR CULT: NO GROWTH
BNP BLD-MCNC: 209.2 PG/ML
BUN SERPL-MCNC: 14.6 MG/DL (ref 8.4–25.7)
CALCIUM SERPL-MCNC: 8.5 MG/DL (ref 8.8–10)
CHLORIDE SERPL-SCNC: 99 MMOL/L (ref 98–107)
CO2 SERPL-SCNC: 23 MMOL/L (ref 23–31)
CREAT SERPL-MCNC: 0.78 MG/DL (ref 0.73–1.18)
CREAT/UREA NIT SERPL: 19
GFR SERPLBLD CREATININE-BSD FMLA CKD-EPI: >60 MLS/MIN/1.73/M2
GLUCOSE SERPL-MCNC: 93 MG/DL (ref 82–115)
POTASSIUM SERPL-SCNC: 4.7 MMOL/L (ref 3.5–5.1)
SODIUM SERPL-SCNC: 132 MMOL/L (ref 136–145)

## 2024-04-14 PROCEDURE — 25000003 PHARM REV CODE 250: Performed by: INTERNAL MEDICINE

## 2024-04-14 PROCEDURE — 83880 ASSAY OF NATRIURETIC PEPTIDE: CPT | Performed by: INTERNAL MEDICINE

## 2024-04-14 PROCEDURE — 21400001 HC TELEMETRY ROOM

## 2024-04-14 PROCEDURE — 94760 N-INVAS EAR/PLS OXIMETRY 1: CPT

## 2024-04-14 PROCEDURE — 94640 AIRWAY INHALATION TREATMENT: CPT

## 2024-04-14 PROCEDURE — 80048 BASIC METABOLIC PNL TOTAL CA: CPT | Performed by: INTERNAL MEDICINE

## 2024-04-14 PROCEDURE — 99900035 HC TECH TIME PER 15 MIN (STAT)

## 2024-04-14 PROCEDURE — 99900031 HC PATIENT EDUCATION (STAT)

## 2024-04-14 PROCEDURE — 25000242 PHARM REV CODE 250 ALT 637 W/ HCPCS: Performed by: INTERNAL MEDICINE

## 2024-04-14 PROCEDURE — 63600175 PHARM REV CODE 636 W HCPCS: Performed by: INTERNAL MEDICINE

## 2024-04-14 PROCEDURE — 99233 SBSQ HOSP IP/OBS HIGH 50: CPT | Mod: ,,, | Performed by: INTERNAL MEDICINE

## 2024-04-14 PROCEDURE — 27000221 HC OXYGEN, UP TO 24 HOURS

## 2024-04-14 PROCEDURE — 11000001 HC ACUTE MED/SURG PRIVATE ROOM

## 2024-04-14 RX ORDER — FUROSEMIDE 10 MG/ML
20 INJECTION INTRAMUSCULAR; INTRAVENOUS ONCE
Status: COMPLETED | OUTPATIENT
Start: 2024-04-14 | End: 2024-04-14

## 2024-04-14 RX ORDER — ENOXAPARIN SODIUM 100 MG/ML
40 INJECTION SUBCUTANEOUS EVERY 24 HOURS
Status: DISCONTINUED | OUTPATIENT
Start: 2024-04-14 | End: 2024-04-18

## 2024-04-14 RX ADMIN — OXYCODONE HYDROCHLORIDE AND ACETAMINOPHEN 1 TABLET: 10; 325 TABLET ORAL at 05:04

## 2024-04-14 RX ADMIN — EZETIMIBE 10 MG: 10 TABLET ORAL at 08:04

## 2024-04-14 RX ADMIN — DOCUSATE SODIUM 100 MG: 100 CAPSULE, LIQUID FILLED ORAL at 09:04

## 2024-04-14 RX ADMIN — FUROSEMIDE 20 MG: 10 INJECTION, SOLUTION INTRAMUSCULAR; INTRAVENOUS at 10:04

## 2024-04-14 RX ADMIN — ENOXAPARIN SODIUM 40 MG: 40 INJECTION SUBCUTANEOUS at 05:04

## 2024-04-14 RX ADMIN — FINASTERIDE 5 MG: 5 TABLET, FILM COATED ORAL at 10:04

## 2024-04-14 RX ADMIN — METOPROLOL SUCCINATE 25 MG: 25 TABLET, EXTENDED RELEASE ORAL at 09:04

## 2024-04-14 RX ADMIN — IPRATROPIUM BROMIDE 2 SPRAY: 42 SPRAY, METERED NASAL at 10:04

## 2024-04-14 RX ADMIN — IPRATROPIUM BROMIDE AND ALBUTEROL SULFATE 3 ML: 2.5; .5 SOLUTION RESPIRATORY (INHALATION) at 08:04

## 2024-04-14 RX ADMIN — ASPIRIN 81 MG: 81 TABLET, COATED ORAL at 09:04

## 2024-04-14 RX ADMIN — Medication: at 09:04

## 2024-04-14 RX ADMIN — NALOXEGOL OXALATE 25 MG: 25 TABLET, FILM COATED ORAL at 09:04

## 2024-04-14 RX ADMIN — TAMSULOSIN HYDROCHLORIDE 0.8 MG: 0.4 CAPSULE ORAL at 09:04

## 2024-04-14 RX ADMIN — FLUTICASONE FUROATE 1 PUFF: 100 POWDER RESPIRATORY (INHALATION) at 10:04

## 2024-04-14 RX ADMIN — AMLODIPINE BESYLATE 5 MG: 5 TABLET ORAL at 05:04

## 2024-04-14 RX ADMIN — Medication: at 08:04

## 2024-04-14 RX ADMIN — LACTULOSE 30 G: 10 SOLUTION ORAL at 09:04

## 2024-04-14 RX ADMIN — IPRATROPIUM BROMIDE AND ALBUTEROL SULFATE 3 ML: 2.5; .5 SOLUTION RESPIRATORY (INHALATION) at 12:04

## 2024-04-14 RX ADMIN — ATORVASTATIN CALCIUM 80 MG: 40 TABLET, FILM COATED ORAL at 09:04

## 2024-04-14 RX ADMIN — DOCUSATE SODIUM 100 MG: 100 CAPSULE, LIQUID FILLED ORAL at 08:04

## 2024-04-14 NOTE — SUBJECTIVE & OBJECTIVE
Interval History: Patient c/o sob today -- has to sit up to help him feel more comfortable.  Says he had a bad morning because he was awoken at 4 am.  He had a BM yesterday.    Review of Systems  Objective:     Vital Signs (Most Recent):  Temp: 97.7 °F (36.5 °C) (04/14/24 0806)  Pulse: 75 (04/14/24 0817)  Resp: 16 (04/14/24 0817)  BP: 131/80 (04/14/24 0806)  SpO2: 99 % (04/14/24 0817) Vital Signs (24h Range):  Temp:  [97.7 °F (36.5 °C)-98.5 °F (36.9 °C)] 97.7 °F (36.5 °C)  Pulse:  [] 75  Resp:  [16-26] 16  SpO2:  [87 %-99 %] 99 %  BP: ()/(58-80) 131/80     Weight: 72.6 kg (160 lb)  Body mass index is 21.7 kg/m².    Intake/Output Summary (Last 24 hours) at 4/14/2024 0847  Last data filed at 4/14/2024 0640  Gross per 24 hour   Intake 600 ml   Output 950 ml   Net -350 ml         Physical Exam  Constitutional:       Appearance: He is ill-appearing.   Cardiovascular:      Rate and Rhythm: Normal rate and regular rhythm.      Heart sounds: Normal heart sounds.   Pulmonary:      Breath sounds: Wheezing (bilateral exp wheezing) present. No rhonchi or rales.   Abdominal:      General: Abdomen is flat.      Palpations: Abdomen is soft.   Musculoskeletal:      Right lower leg: No edema.      Left lower leg: No edema.   Skin:     General: Skin is warm and dry.   Neurological:      General: No focal deficit present.      Mental Status: He is alert.             Significant Labs: All pertinent labs within the past 24 hours have been reviewed.  BMP:   Recent Labs   Lab 04/14/24  0401   *   K 4.7   CO2 23   BUN 14.6   CREATININE 0.78   CALCIUM 8.5*       Significant Imaging: I have reviewed all pertinent imaging results/findings within the past 24 hours.

## 2024-04-14 NOTE — ASSESSMENT & PLAN NOTE
He is wheezing and does have a h/o copd.  But he is having orthonpnea.  Looks like JVP is elevated.  Will get a cxr, check a bnp and try a small dose of lasix.

## 2024-04-14 NOTE — PROGRESS NOTES
[facility] Hospital Medicine  Progress Note    Patient Name: Max Squires  MRN: [id number]  Patient Class: IP- Inpatient   Admission Date: 4/3/2024  Length of Stay: 11 days  Attending Physician: Ephraim Ramires MD  Primary Care Provider: Ephraim Ramires MD        Subjective:     Principal Problem:Low back pain        HPI:  No notes on file    Overview/Hospital Course:  No notes on file    Interval History: Patient c/o sob today -- has to sit up to help him feel more comfortable.  Says he had a bad morning because he was awoken at 4 am.  He had a BM yesterday.    Review of Systems  Objective:     Vital Signs (Most Recent):  Temp: 97.7 °F (36.5 °C) (04/14/24 0806)  Pulse: 75 (04/14/24 0817)  Resp: 16 (04/14/24 0817)  BP: 131/80 (04/14/24 0806)  SpO2: 99 % (04/14/24 0817) Vital Signs (24h Range):  Temp:  [97.7 °F (36.5 °C)-98.5 °F (36.9 °C)] 97.7 °F (36.5 °C)  Pulse:  [] 75  Resp:  [16-26] 16  SpO2:  [87 %-99 %] 99 %  BP: ()/(58-80) 131/80     Weight: 72.6 kg (160 lb)  Body mass index is 21.7 kg/m².    Intake/Output Summary (Last 24 hours) at 4/14/2024 0847  Last data filed at 4/14/2024 0640  Gross per 24 hour   Intake 600 ml   Output 950 ml   Net -350 ml         Physical Exam  Constitutional:       Appearance: He is ill-appearing.   Cardiovascular:      Rate and Rhythm: Normal rate and regular rhythm.      Heart sounds: Normal heart sounds.   Pulmonary:      Breath sounds: Wheezing (bilateral exp wheezing) present. No rhonchi or rales.   Abdominal:      General: Abdomen is flat.      Palpations: Abdomen is soft.   Musculoskeletal:      Right lower leg: No edema.      Left lower leg: No edema.   Skin:     General: Skin is warm and dry.   Neurological:      General: No focal deficit present.      Mental Status: He is alert.             Significant Labs: All pertinent labs within the past 24 hours have been reviewed.  BMP:   Recent Labs   Lab 04/14/24  0401   NA  132*   K 4.7   CO2 23   BUN 14.6   CREATININE 0.78   CALCIUM 8.5*       Significant Imaging: I have reviewed all pertinent imaging results/findings within the past 24 hours.    Assessment/Plan:      Closed compression fracture of L1 vertebra  Pain controlled with percocet and getting PT, plans for SNF.      Hyponatremia  Improving with iv fluids.  I stopped the fluids yesterday.    SOB (shortness of breath)  He is wheezing and does have a h/o copd.  But he is having orthonpnea.  Looks like JVP is elevated.  Will get a cxr, check a bnp and try a small dose of lasix.      Pelvic fluid collection  He has a large bladder diverticulum.      Moderate malnutrition  Nutrition consulted. Most recent weight and BMI monitored-     Measurements:  Wt Readings from Last 1 Encounters:   04/06/24 72.6 kg (160 lb)   Body mass index is 21.7 kg/m².    Patient has been screened and assessed by RD.    Malnutrition Type:  Context: chronic illness  Level: moderate    Malnutrition Characteristic Summary:  Weight Loss (Malnutrition): other (see comments) (does not meet criteria)  Energy Intake (Malnutrition): other (see comments) (does not meet criteria)  Subcutaneous Fat (Malnutrition): mild depletion  Muscle Mass (Malnutrition): moderate depletion    Interventions/Recommendations (treatment strategy):  He is getting supplements and seems to be eating better.    Urinary tract infection associated with indwelling urethral catheter  He completed a course of abx.      Urinary retention  Continue bobo.      COPD (chronic obstructive pulmonary disease)  He is getting neb treatments.    Coronary arteriosclerosis  stable    Benign essential hypertension  Chronic, controlled. Continue current meds -- amlodipine, metoprolol        VTE Risk Mitigation (From admission, onward)      None            Discharge Planning   MATTY:      Code Status: DNR   Is the patient medically ready for discharge?:     Reason for patient still in hospital (select all that  apply): Patient trending condition and Pending disposition  Discharge Plan A: Home with family                  Althea Lundberg MD  Department of Hospital Medicine   [facility]

## 2024-04-15 LAB
AV INDEX (PROSTH): 0.71
AV MEAN GRADIENT: 5 MMHG
AV PEAK GRADIENT: 9 MMHG
AV VALVE AREA BY VELOCITY RATIO: 2.46 CM²
AV VALVE AREA: 2.24 CM²
AV VELOCITY RATIO: 0.78
BSA FOR ECHO PROCEDURE: 1.92 M2
D DIMER PPP IA.FEU-MCNC: 1.42 UG/ML FEU (ref 0–0.5)
DOP CALC AO PEAK VEL: 1.53 M/S
DOP CALC AO VTI: 32.6 CM
DOP CALC LVOT AREA: 3.1 CM2
DOP CALC LVOT DIAMETER: 2 CM
DOP CALC LVOT PEAK VEL: 1.2 M/S
DOP CALC LVOT STROKE VOLUME: 73.16 CM3
DOP CALC MV VTI: 38.9 CM
DOP CALCLVOT PEAK VEL VTI: 23.3 CM
E WAVE DECELERATION TIME: 224 MSEC
E/A RATIO: 0.55
E/E' RATIO: 11.17 M/S
LEFT ATRIUM SIZE: 4.2 CM
LEFT VENTRICLE DIASTOLIC VOLUME INDEX: 34.43 ML/M2
LEFT VENTRICLE DIASTOLIC VOLUME: 66.8 ML
LEFT VENTRICLE SYSTOLIC VOLUME INDEX: 15.6 ML/M2
LEFT VENTRICLE SYSTOLIC VOLUME: 30.2 ML
LV LATERAL E/E' RATIO: 9.57 M/S
LV SEPTAL E/E' RATIO: 13.4 M/S
LVOT MG: 3 MMHG
LVOT MV: 0.77 CM/S
MV MEAN GRADIENT: 4 MMHG
MV PEAK A VEL: 1.21 M/S
MV PEAK E VEL: 0.67 M/S
MV PEAK GRADIENT: 6 MMHG
MV STENOSIS PRESSURE HALF TIME: 59 MS
MV VALVE AREA BY CONTINUITY EQUATION: 1.88 CM2
MV VALVE AREA P 1/2 METHOD: 3.73 CM2
OHS LV EJECTION FRACTION SIMPSONS BIPLANE MOD: 55 %
PISA TR MAX VEL: 2.35 M/S
PV PEAK GRADIENT: 3 MMHG
PV PEAK VELOCITY: 0.87 M/S
RA PRESSURE ESTIMATED: 8 MMHG
RV TB RVSP: 10 MMHG
TDI LATERAL: 0.07 M/S
TDI SEPTAL: 0.05 M/S
TDI: 0.06 M/S
TR MAX PG: 22 MMHG
TRICUSPID ANNULAR PLANE SYSTOLIC EXCURSION: 1.95 CM
TV REST PULMONARY ARTERY PRESSURE: 30 MMHG

## 2024-04-15 PROCEDURE — 99233 SBSQ HOSP IP/OBS HIGH 50: CPT | Mod: ,,, | Performed by: INTERNAL MEDICINE

## 2024-04-15 PROCEDURE — 25000242 PHARM REV CODE 250 ALT 637 W/ HCPCS: Performed by: INTERNAL MEDICINE

## 2024-04-15 PROCEDURE — 94761 N-INVAS EAR/PLS OXIMETRY MLT: CPT

## 2024-04-15 PROCEDURE — 99900035 HC TECH TIME PER 15 MIN (STAT)

## 2024-04-15 PROCEDURE — 97116 GAIT TRAINING THERAPY: CPT | Mod: CQ

## 2024-04-15 PROCEDURE — 94760 N-INVAS EAR/PLS OXIMETRY 1: CPT

## 2024-04-15 PROCEDURE — 99900031 HC PATIENT EDUCATION (STAT)

## 2024-04-15 PROCEDURE — 21400001 HC TELEMETRY ROOM

## 2024-04-15 PROCEDURE — 25000003 PHARM REV CODE 250: Performed by: INTERNAL MEDICINE

## 2024-04-15 PROCEDURE — 63600175 PHARM REV CODE 636 W HCPCS: Performed by: INTERNAL MEDICINE

## 2024-04-15 PROCEDURE — 27000221 HC OXYGEN, UP TO 24 HOURS

## 2024-04-15 PROCEDURE — 25500020 PHARM REV CODE 255: Performed by: INTERNAL MEDICINE

## 2024-04-15 PROCEDURE — 11000001 HC ACUTE MED/SURG PRIVATE ROOM

## 2024-04-15 PROCEDURE — 94640 AIRWAY INHALATION TREATMENT: CPT

## 2024-04-15 PROCEDURE — 85379 FIBRIN DEGRADATION QUANT: CPT | Performed by: INTERNAL MEDICINE

## 2024-04-15 RX ORDER — PREDNISONE 20 MG/1
40 TABLET ORAL DAILY
Status: DISCONTINUED | OUTPATIENT
Start: 2024-04-15 | End: 2024-04-23

## 2024-04-15 RX ADMIN — IPRATROPIUM BROMIDE AND ALBUTEROL SULFATE 3 ML: 2.5; .5 SOLUTION RESPIRATORY (INHALATION) at 09:04

## 2024-04-15 RX ADMIN — FINASTERIDE 5 MG: 5 TABLET, FILM COATED ORAL at 10:04

## 2024-04-15 RX ADMIN — FUROSEMIDE 40 MG: 40 TABLET ORAL at 02:04

## 2024-04-15 RX ADMIN — ENOXAPARIN SODIUM 40 MG: 40 INJECTION SUBCUTANEOUS at 06:04

## 2024-04-15 RX ADMIN — AMLODIPINE BESYLATE 5 MG: 5 TABLET ORAL at 06:04

## 2024-04-15 RX ADMIN — METOPROLOL SUCCINATE 25 MG: 25 TABLET, EXTENDED RELEASE ORAL at 10:04

## 2024-04-15 RX ADMIN — ASPIRIN 81 MG: 81 TABLET, COATED ORAL at 10:04

## 2024-04-15 RX ADMIN — Medication: at 09:04

## 2024-04-15 RX ADMIN — NALOXEGOL OXALATE 25 MG: 25 TABLET, FILM COATED ORAL at 10:04

## 2024-04-15 RX ADMIN — IPRATROPIUM BROMIDE AND ALBUTEROL SULFATE 3 ML: 2.5; .5 SOLUTION RESPIRATORY (INHALATION) at 12:04

## 2024-04-15 RX ADMIN — IPRATROPIUM BROMIDE AND ALBUTEROL SULFATE 3 ML: 2.5; .5 SOLUTION RESPIRATORY (INHALATION) at 08:04

## 2024-04-15 RX ADMIN — TAMSULOSIN HYDROCHLORIDE 0.8 MG: 0.4 CAPSULE ORAL at 10:04

## 2024-04-15 RX ADMIN — Medication: at 10:04

## 2024-04-15 RX ADMIN — DOCUSATE SODIUM 100 MG: 100 CAPSULE, LIQUID FILLED ORAL at 10:04

## 2024-04-15 RX ADMIN — DOCUSATE SODIUM 100 MG: 100 CAPSULE, LIQUID FILLED ORAL at 09:04

## 2024-04-15 RX ADMIN — OXYCODONE HYDROCHLORIDE AND ACETAMINOPHEN 1 TABLET: 10; 325 TABLET ORAL at 09:04

## 2024-04-15 RX ADMIN — IOHEXOL 100 ML: 350 INJECTION, SOLUTION INTRAVENOUS at 03:04

## 2024-04-15 RX ADMIN — LACTULOSE 30 G: 10 SOLUTION ORAL at 10:04

## 2024-04-15 RX ADMIN — IPRATROPIUM BROMIDE AND ALBUTEROL SULFATE 3 ML: 2.5; .5 SOLUTION RESPIRATORY (INHALATION) at 05:04

## 2024-04-15 RX ADMIN — OLANZAPINE 5 MG: 5 TABLET, FILM COATED ORAL at 11:04

## 2024-04-15 RX ADMIN — PREDNISONE 40 MG: 20 TABLET ORAL at 10:04

## 2024-04-15 RX ADMIN — OXYCODONE HYDROCHLORIDE AND ACETAMINOPHEN 1 TABLET: 10; 325 TABLET ORAL at 03:04

## 2024-04-15 RX ADMIN — FLUTICASONE FUROATE 1 PUFF: 100 POWDER RESPIRATORY (INHALATION) at 10:04

## 2024-04-15 RX ADMIN — EZETIMIBE 10 MG: 10 TABLET ORAL at 09:04

## 2024-04-15 RX ADMIN — ATORVASTATIN CALCIUM 80 MG: 40 TABLET, FILM COATED ORAL at 10:04

## 2024-04-15 NOTE — PROGRESS NOTES
Inpatient Nutrition Assessment    Admit Date: 4/3/2024   Total duration of encounter: 12 days   Patient Age: 89 y.o.    Nutrition Recommendation/Prescription     Continue heart healthy diet as tolerated.   Continue Boost BID to provide 240 kcal and 10 g protein per serving  Continue bowel regimen.   Monitor labs, intake and weight    Communication of Recommendations: reviewed with patient    Nutrition Assessment     Malnutrition Assessment/Nutrition-Focused Physical Exam    Malnutrition Context: chronic illness (04/09/24 1040)  Malnutrition Level: moderate (04/09/24 1040)  Energy Intake (Malnutrition): other (see comments) (does not meet criteria) (04/09/24 1040)  Weight Loss (Malnutrition): other (see comments) (does not meet criteria) (04/09/24 1040)  Subcutaneous Fat (Malnutrition): mild depletion (04/09/24 1040)  Orbital Region (Subcutaneous Fat Loss): mild depletion  Upper Arm Region (Subcutaneous Fat Loss): mild depletion     Muscle Mass (Malnutrition): moderate depletion (04/09/24 1040)  [place] Region (Muscle Loss): moderate depletion     Clavicle and Acromion Bone Region (Muscle Loss): moderate depletion     Dorsal Hand (Muscle Loss): moderate depletion                    A minimum of two characteristics is recommended for diagnosis of either severe or non-severe malnutrition.    Chart Review    Reason Seen: length of stay and follow-up    Malnutrition Screening Tool Results   Have you recently lost weight without trying?: No  Have you been eating poorly because of a decreased appetite?: No   MST Score: 0   Diagnosis:  Subacute fracture L1   Suspected pelvic abscess   UTI and pre-existing urinary retention with [place] catheter  Hyperkalemia. Corrected   Constipation     Relevant Medical History: COPD, CAD, HF    Scheduled Medications:  albuterol-ipratropium, 3 mL, QID  amLODIPine, 5 mg, Daily  aspirin, 81 mg, Daily  atorvastatin, 80 mg, Daily  docusate sodium, 100 mg, BID  enoxparin, 40 mg, Q24H (prophylaxis,  1700)  ezetimibe, 10 mg, QHS  finasteride, 5 mg, Daily  fluticasone furoate, 1 puff, Daily  lactulose 10 gram/15 ml, 30 g, Daily  metoprolol succinate, 25 mg, Daily  naloxegoL, 25 mg, Daily  predniSONE, 40 mg, Daily  tamsulosin, 2 capsule, Daily  zinc oxide-cod liver oil, , BID    Continuous Infusions:   PRN Medications:   Current Facility-Administered Medications   Medication Dose Route Frequency Provider Last Rate Last Admin    albuterol-ipratropium 2.5 mg-0.5 mg/3 mL nebulizer solution 3 mL  3 mL Nebulization QID Ephraim Ramires MD   3 mL at 04/15/24 0856    ALPRAZolam tablet 0.5 mg  0.5 mg Oral BID PRN Abel Salinas II, MD   0.5 mg at 04/11/24 2133    amLODIPine tablet 5 mg  5 mg Oral Daily Ephraim Ramires MD   5 mg at 04/14/24 1732    aspirin EC tablet 81 mg  81 mg Oral Daily Ephraim Ramires MD   81 mg at 04/14/24 0950    atorvastatin tablet 80 mg  80 mg Oral Daily Ephraim Ramires MD   80 mg at 04/14/24 0950    docusate sodium capsule 100 mg  100 mg Oral BID Althea Lundberg MD   100 mg at 04/14/24 2035    enoxaparin injection 40 mg  40 mg Subcutaneous Q24H (prophylaxis, 1700) Althea Lundberg MD   40 mg at 04/14/24 1732    ezetimibe tablet 10 mg  10 mg Oral QHS Ephraim Ramires MD   10 mg at 04/14/24 2035    finasteride tablet 5 mg  5 mg Oral Daily Ephraim Ramires MD   5 mg at 04/14/24 1022    fluticasone furoate 100 mcg/actuation inhaler 1 puff  1 puff Inhalation Daily Althea Lundberg MD   1 puff at 04/14/24 1022    furosemide tablet 40 mg  40 mg Oral PRN Ephraim Ramires MD   40 mg at 04/15/24 0235    ipratropium 42 mcg (0.06 %) nasal spray 2 spray  2 spray Each Nostril QID PRN Althea Lundberg MD   2 spray at 04/14/24 1022    lactulose 10 gram/15 ml solution 30 g  30 g Oral Daily Ephraim Ramires MD   30 g at 04/14/24 0949    metoprolol succinate (TOPROL-XL) 24 hr tablet 25 mg  25 mg Oral Daily Ephraim Ramires MD   25 mg at 04/14/24 0950    naloxegoL  (MOVANTIK) tablet 25 mg  25 mg Oral Daily Ephraim Ramires MD   25 mg at 04/14/24 0950    OLANZapine tablet 5 mg  5 mg Oral Nightly PRN Ephraim Ramires MD        oxyCODONE-acetaminophen  mg per tablet 1 tablet  1 tablet Oral Q6H PRN Ephraim Ramires MD   1 tablet at 04/14/24 1732    predniSONE tablet 40 mg  40 mg Oral Daily Althea Lundberg MD        tamsulosin 24 hr capsule 0.8 mg  2 capsule Oral Daily Ephraim Ramires MD   0.8 mg at 04/14/24 0950    zinc oxide-cod liver oil 40 % paste   Topical (Top) BID Ephraim Ramires MD   Given at 04/14/24 2035       Calorie Containing IV Medications: no significant kcals from medications at this time    Recent Labs   Lab 04/09/24  0502 04/12/24  0523 04/13/24  0459 04/14/24  0401   * 127* 131* 132*   K 4.4 5.8* 4.5 4.7   CALCIUM 8.5* 8.5* 8.5* 8.5*   CHLORIDE 100 94* 97* 99   CO2 28 24 26 23   BUN 11.5 18.4 14.4 14.6   CREATININE 0.87 0.93 0.73 0.78   EGFRNORACEVR >60 >60 >60 >60   GLUCOSE 104 91 97 93   BILITOT 0.4 0.5  --   --    ALKPHOS 102 99  --   --    ALT 9 11  --   --    AST 10 23  --   --    ALBUMIN 3.0* 3.1*  --   --    WBC 8.52 10.91 10.50  --    HGB 10.9* 10.8* 10.4*  --    HCT 34.4* 34.2* 31.6*  --      Nutrition Orders:  Diet Heart Healthy  Dietary nutrition supplements Boost Original Nutritional Drink - Vanilla; BID    Appetite/Oral Intake: fair/50-75% of meals  Factors Affecting Nutritional Intake: altered gastrointestinal function  Food/[place]/Cultural Preferences: none reported  Food Allergies: no known food allergies  Last Bowel Movement: 04/13/24  Wound(s):     Altered Skin Integrity 04/03/24 1544 Perineum #1-Tissue loss description: Not applicable     Comments    Comments    4/9: Pt reports good appetite, eating 100% of meals. Good appetite PTA. Denies N/V, reports constipation. Last BM 4/3, meds noted. UBW reported ~170# with unintentional weight loss past year. Per EMR weight history, 4.6% weight loss in 2 months  (not significant). Pt with moderate muscle and mild fat depletion per NFPE. Agreed to trial ONS.    : Pt reports poor po intake of meals; states that he has been having an upset stomach with some n/v; reports that he is drinking his Boost.     4/15: Patient reports fair oral intake, drinking Boost nutrition supplements. Patient denies any nausea/vomiting/diarrhea/constipation.     Anthropometrics    Height: 6' (182.9 cm), Height Method: Stated  Last Weight: 72.6 kg (160 lb) (24 0950), Weight Method: Bed Scale  BMI (Calculated): 21.7  BMI Classification: underweight (BMI less than 22 if >65 years of age)        Ideal Body Weight (IBW), Male: 178 lb     % Ideal Body Weight, Male (lb): 89.89 %                 Usual Body Weight (UBW), k.2 kg  % Usual Body Weight: 95.44     Usual Weight Provided By: patient and EMR weight history    Wt Readings from Last 5 Encounters:   24 72.6 kg (160 lb)   24 72.6 kg (160 lb)   24 76.2 kg (168 lb)   10/18/23 74.4 kg (164 lb)   23 72.1 kg (159 lb)     Weight Change(s) Since Admission:   Wt Readings from Last 1 Encounters:   24 0950 72.6 kg (160 lb)   24 0528 72.6 kg (160 lb)   Admit Weight: 72.6 kg (160 lb) (24 0528), Weight Method: Stated  : no new wt noted    Estimated Needs    Weight Used For Calorie Calculations: 72.6 kg (160 lb 0.9 oz)  Energy Calorie Requirements (kcal): 1572 kcal (1.1 stress factor)  [facility]  Weight Used For Protein Calculations: 72.6 kg (160 lb 0.9 oz)  Protein Requirements: 80-87 g (1.1-1.2 g/kg)  Fluid Requirements (mL): 1572 ml (1 ml/kcal)    Enteral Nutrition     Patient not receiving enteral nutrition at this time.    Parenteral Nutrition     Patient not receiving parenteral nutrition support at this time.    Evaluation of Received Nutrient Intake    Calories: not meeting estimated needs  Protein: not meeting estimated needs    Patient Education     Not  applicable.    Nutrition Diagnosis     PES: Moderate chronic disease or condition related malnutrition related to inability to consume sufficient nutrients as evidenced by mild fat depletion and moderate muscle depletion. (active)    Nutrition Interventions     Intervention(s): general/healthful diet, commercial beverage, prescription medication, and collaboration with other providers    Goal: Consume % of oral supplements by follow-up. (goal met)  Goal: Maintain weight throughout hospitalization. (goal progressing)    Nutrition Goals & Monitoring     Dietitian will monitor: food and beverage intake, weight, and electrolyte/renal panel    Nutrition Risk/Follow-Up: moderate (follow-up in 3-5 days)   Please consult if re-assessment needed sooner.

## 2024-04-15 NOTE — PT/OT/SLP PROGRESS
Physical Therapy Treatment    Patient Name:  Max Squires   MRN:  [id number]    Recommendations:     Discharge therapy intensity: Low Intensity Therapy   Discharge Equipment Recommendations: none  Barriers to discharge: None    Assessment:     Max Squires is a 89 y.o. male.  He presents with the following impairments/functional limitations: weakness, gait instability, impaired balance, impaired endurance, decreased safety awareness, impaired functional mobility.    Rehab Prognosis: Good; patient would benefit from acute skilled PT services to address these deficits and reach maximum level of function.    Recent Surgery: * No surgery found *      Plan:     During this hospitalization, patient to be seen 5 x/week to address the identified rehab impairments via gait training, therapeutic activities, therapeutic exercises and progress toward the following goals:    Plan of Care Expires:  05/09/24    Subjective     Chief Complaint: SOB and low endurance    Objective:     Communicated with nurse prior to session.  Patient found up in chair with   upon PT entry to room.     General Precautions: Standard, fall  Orthopedic Precautions: spinal precautions  Braces: LSO  Respiratory Status: Nasal cannula, flow 3 L/min  Blood Pressure:   Skin Integrity: Visible skin intact      Functional Mobility:  Supervision assist with transfers  Gait 115 ft and 100 ft with RW SBA. Flexed posture and cues given to correct as well as walker proximity to increase gait stability.   SOB and pt did not have telemetry monitor to check sats but last week he did drop with mobility.     Education Provided:  Role and goals of PT, transfer training, bed mobility, gait training, balance training, safety awareness, assistive device, strengthening exercises, and importance of participating in PT to return to PLOF.    Patient left up in chair with all lines intact and call button in reach    GOALS:   Multidisciplinary Problems       Physical Therapy Goals           Problem: Physical Therapy    Goal Priority Disciplines Outcome Goal Variances Interventions   Physical Therapy Goal     PT, PT/OT Ongoing, Progressing     Description: Pt will be seen for the following goals  1. Pt will be sba with bed mobility  2. Pt will be sba with transfers with lso brace on and using a rw  3. Pt will amb with a rw and LSO brace on for 150ft sba                       Time Tracking:       Billable Minutes: Gait Training 25    Treatment Type: Treatment  PT/PTA: PTA     Number of PTA visits since last PT visit: 5     04/15/2024

## 2024-04-15 NOTE — PROGRESS NOTES
[facility] Hospital Medicine  Progress Note    Patient Name: Max Squires  MRN: [id number]  Patient Class: IP- Inpatient   Admission Date: 4/3/2024  Length of Stay: 12 days  Attending Physician: Ephraim Ramires MD  Primary Care Provider: Ephraim Ramires MD        Subjective:     Principal Problem:Low back pain        HPI:  No notes on file    Overview/Hospital Course:  No notes on file    Interval History: Patient c/o worsening sob.  Having trouble just trying to eat or walk to the bathroom.  He is coughing some, but it is dry.    Review of Systems  Objective:     Vital Signs (Most Recent):  Temp: 97.8 °F (36.6 °C) (04/15/24 0340)  Pulse: 71 (04/15/24 0340)  Resp: 20 (04/14/24 2021)  BP: 120/71 (04/15/24 0340)  SpO2: (!) 93 % (04/15/24 0340) Vital Signs (24h Range):  Temp:  [97.7 °F (36.5 °C)-98.6 °F (37 °C)] 97.8 °F (36.6 °C)  Pulse:  [67-86] 71  Resp:  [16-22] 20  SpO2:  [87 %-99 %] 93 %  BP: (103-131)/(56-80) 120/71     Weight: 72.6 kg (160 lb)  Body mass index is 21.7 kg/m².    Intake/Output Summary (Last 24 hours) at 4/15/2024 0757  Last data filed at 4/15/2024 0420  Gross per 24 hour   Intake 480 ml   Output 2125 ml   Net -1645 ml         Physical Exam  Constitutional:       Appearance: He is ill-appearing. He is not toxic-appearing.   HENT:      Head: Normocephalic and atraumatic.   Cardiovascular:      Rate and Rhythm: Normal rate and regular rhythm.   Pulmonary:      Comments: Mildly tachynpneic, bilateral exp wheezing and diminished breath sounds.  No rales.  No edema in LE bilaterally.  Abdominal:      General: Abdomen is flat.      Palpations: Abdomen is soft.   Skin:     General: Skin is warm and dry.   Neurological:      General: No focal deficit present.      Mental Status: He is alert.             Significant Labs: All pertinent labs within the past 24 hours have been reviewed.  BMP:   Recent Labs   Lab 04/14/24  0401   *   K 4.7   CO2 23   BUN 14.6    CREATININE 0.78   CALCIUM 8.5*     Cardiac Markers:   Recent Labs   Lab 04/14/24  0940   .2*       Significant Imaging: I have reviewed all pertinent imaging results/findings within the past 24 hours.  CXR: I have reviewed all pertinent results/findings within the past 24 hours and my personal findings are:  hyperinflation, no infiltrates.    Assessment/Plan:      Closed compression fracture of L1 vertebra  Pain seems better.  Continue pt as tolerated.    Hyponatremia  Improved    SOB (shortness of breath)  As stated below      Pelvic fluid collection  He has a large bladder diverticulum.      Moderate malnutrition  Nutrition consulted. Most recent weight and BMI monitored-     Measurements:  Wt Readings from Last 1 Encounters:   04/06/24 72.6 kg (160 lb)   Body mass index is 21.7 kg/m².    Patient has been screened and assessed by RD.    Malnutrition Type:  Context: chronic illness  Level: moderate    Malnutrition Characteristic Summary:  Weight Loss (Malnutrition): other (see comments) (does not meet criteria)  Energy Intake (Malnutrition): other (see comments) (does not meet criteria)  Subcutaneous Fat (Malnutrition): mild depletion  Muscle Mass (Malnutrition): moderate depletion    Interventions/Recommendations (treatment strategy):     I asked the nurse to see about getting him some meal supplements like Ensure or Boost today.      Urinary tract infection associated with indwelling urethral catheter  He completed a course of abx.      Urinary retention  Continue bobo.      COPD (chronic obstructive pulmonary disease)  His breathing is getting worse.  Will check a d-dimer.  If that is +, will get CT to r/o PE.  Add prednsone.    Coronary arteriosclerosis  stable    Benign essential hypertension  Chronic, controlled. Continue current meds -- amlodipine, metoprolol        VTE Risk Mitigation (From admission, onward)           Ordered     enoxaparin injection 40 mg  Every 24 hours         04/14/24 0859                     Discharge Planning   MATTY:      Code Status: DNR   Is the patient medically ready for discharge?:     Reason for patient still in hospital (select all that apply): Patient new problem  Discharge Plan A: Home with family                  Althea Lundberg MD  Department of [facility] Medicine   [facility]

## 2024-04-15 NOTE — ASSESSMENT & PLAN NOTE
His breathing is getting worse.  Will check a d-dimer.  If that is +, will get CT to r/o PE.  Add prednsone.

## 2024-04-15 NOTE — SUBJECTIVE & OBJECTIVE
Interval History: Patient c/o worsening sob.  Having trouble just trying to eat or walk to the bathroom.  He is coughing some, but it is dry.    Review of Systems  Objective:     Vital Signs (Most Recent):  Temp: 97.8 °F (36.6 °C) (04/15/24 0340)  Pulse: 71 (04/15/24 0340)  Resp: 20 (04/14/24 2021)  BP: 120/71 (04/15/24 0340)  SpO2: (!) 93 % (04/15/24 0340) Vital Signs (24h Range):  Temp:  [97.7 °F (36.5 °C)-98.6 °F (37 °C)] 97.8 °F (36.6 °C)  Pulse:  [67-86] 71  Resp:  [16-22] 20  SpO2:  [87 %-99 %] 93 %  BP: (103-131)/(56-80) 120/71     Weight: 72.6 kg (160 lb)  Body mass index is 21.7 kg/m².    Intake/Output Summary (Last 24 hours) at 4/15/2024 0757  Last data filed at 4/15/2024 0420  Gross per 24 hour   Intake 480 ml   Output 2125 ml   Net -1645 ml         Physical Exam  Constitutional:       Appearance: He is ill-appearing. He is not toxic-appearing.   HENT:      Head: Normocephalic and atraumatic.   Cardiovascular:      Rate and Rhythm: Normal rate and regular rhythm.   Pulmonary:      Comments: Mildly tachynpneic, bilateral exp wheezing and diminished breath sounds.  No rales.  No edema in LE bilaterally.  Abdominal:      General: Abdomen is flat.      Palpations: Abdomen is soft.   Skin:     General: Skin is warm and dry.   Neurological:      General: No focal deficit present.      Mental Status: He is alert.             Significant Labs: All pertinent labs within the past 24 hours have been reviewed.  BMP:   Recent Labs   Lab 04/14/24  0401   *   K 4.7   CO2 23   BUN 14.6   CREATININE 0.78   CALCIUM 8.5*     Cardiac Markers:   Recent Labs   Lab 04/14/24  0940   .2*       Significant Imaging: I have reviewed all pertinent imaging results/findings within the past 24 hours.  CXR: I have reviewed all pertinent results/findings within the past 24 hours and my personal findings are:  hyperinflation, no infiltrates.

## 2024-04-16 PROCEDURE — 21400001 HC TELEMETRY ROOM

## 2024-04-16 PROCEDURE — 25000003 PHARM REV CODE 250: Performed by: INTERNAL MEDICINE

## 2024-04-16 PROCEDURE — 27000221 HC OXYGEN, UP TO 24 HOURS

## 2024-04-16 PROCEDURE — 99233 SBSQ HOSP IP/OBS HIGH 50: CPT | Mod: ,,, | Performed by: INTERNAL MEDICINE

## 2024-04-16 PROCEDURE — 94760 N-INVAS EAR/PLS OXIMETRY 1: CPT

## 2024-04-16 PROCEDURE — 25000242 PHARM REV CODE 250 ALT 637 W/ HCPCS: Performed by: INTERNAL MEDICINE

## 2024-04-16 PROCEDURE — 94761 N-INVAS EAR/PLS OXIMETRY MLT: CPT

## 2024-04-16 PROCEDURE — 11000001 HC ACUTE MED/SURG PRIVATE ROOM

## 2024-04-16 PROCEDURE — 94640 AIRWAY INHALATION TREATMENT: CPT

## 2024-04-16 PROCEDURE — 63600175 PHARM REV CODE 636 W HCPCS: Performed by: INTERNAL MEDICINE

## 2024-04-16 PROCEDURE — 99900035 HC TECH TIME PER 15 MIN (STAT)

## 2024-04-16 PROCEDURE — 99900031 HC PATIENT EDUCATION (STAT)

## 2024-04-16 RX ORDER — FUROSEMIDE 10 MG/ML
20 INJECTION INTRAMUSCULAR; INTRAVENOUS ONCE
Status: COMPLETED | OUTPATIENT
Start: 2024-04-16 | End: 2024-04-16

## 2024-04-16 RX ORDER — OLANZAPINE 5 MG/1
10 TABLET ORAL ONCE
Status: COMPLETED | OUTPATIENT
Start: 2024-04-16 | End: 2024-04-16

## 2024-04-16 RX ORDER — ALPRAZOLAM 0.5 MG/1
0.5 TABLET ORAL EVERY 4 HOURS PRN
Status: DISCONTINUED | OUTPATIENT
Start: 2024-04-16 | End: 2024-04-25 | Stop reason: HOSPADM

## 2024-04-16 RX ADMIN — ENOXAPARIN SODIUM 40 MG: 40 INJECTION SUBCUTANEOUS at 04:04

## 2024-04-16 RX ADMIN — Medication: at 10:04

## 2024-04-16 RX ADMIN — PREDNISONE 40 MG: 20 TABLET ORAL at 09:04

## 2024-04-16 RX ADMIN — ALPRAZOLAM 0.5 MG: 0.5 TABLET ORAL at 06:04

## 2024-04-16 RX ADMIN — FINASTERIDE 5 MG: 5 TABLET, FILM COATED ORAL at 09:04

## 2024-04-16 RX ADMIN — IPRATROPIUM BROMIDE AND ALBUTEROL SULFATE 3 ML: 2.5; .5 SOLUTION RESPIRATORY (INHALATION) at 08:04

## 2024-04-16 RX ADMIN — OXYCODONE HYDROCHLORIDE AND ACETAMINOPHEN 1 TABLET: 10; 325 TABLET ORAL at 12:04

## 2024-04-16 RX ADMIN — LACTULOSE 30 G: 10 SOLUTION ORAL at 09:04

## 2024-04-16 RX ADMIN — FLUTICASONE FUROATE 1 PUFF: 100 POWDER RESPIRATORY (INHALATION) at 09:04

## 2024-04-16 RX ADMIN — AMLODIPINE BESYLATE 5 MG: 5 TABLET ORAL at 04:04

## 2024-04-16 RX ADMIN — EZETIMIBE 10 MG: 10 TABLET ORAL at 10:04

## 2024-04-16 RX ADMIN — OXYCODONE HYDROCHLORIDE AND ACETAMINOPHEN 1 TABLET: 10; 325 TABLET ORAL at 08:04

## 2024-04-16 RX ADMIN — DOCUSATE SODIUM 100 MG: 100 CAPSULE, LIQUID FILLED ORAL at 09:04

## 2024-04-16 RX ADMIN — Medication: at 09:04

## 2024-04-16 RX ADMIN — ATORVASTATIN CALCIUM 80 MG: 40 TABLET, FILM COATED ORAL at 09:04

## 2024-04-16 RX ADMIN — NALOXEGOL OXALATE 25 MG: 25 TABLET, FILM COATED ORAL at 09:04

## 2024-04-16 RX ADMIN — OLANZAPINE 10 MG: 5 TABLET, FILM COATED ORAL at 10:04

## 2024-04-16 RX ADMIN — FUROSEMIDE 20 MG: 10 INJECTION, SOLUTION INTRAMUSCULAR; INTRAVENOUS at 04:04

## 2024-04-16 RX ADMIN — TAMSULOSIN HYDROCHLORIDE 0.8 MG: 0.4 CAPSULE ORAL at 09:04

## 2024-04-16 RX ADMIN — METOPROLOL SUCCINATE 25 MG: 25 TABLET, EXTENDED RELEASE ORAL at 09:04

## 2024-04-16 RX ADMIN — IPRATROPIUM BROMIDE AND ALBUTEROL SULFATE 3 ML: 2.5; .5 SOLUTION RESPIRATORY (INHALATION) at 11:04

## 2024-04-16 RX ADMIN — IPRATROPIUM BROMIDE AND ALBUTEROL SULFATE 3 ML: 2.5; .5 SOLUTION RESPIRATORY (INHALATION) at 04:04

## 2024-04-16 RX ADMIN — IPRATROPIUM BROMIDE AND ALBUTEROL SULFATE 3 ML: 2.5; .5 SOLUTION RESPIRATORY (INHALATION) at 06:04

## 2024-04-16 RX ADMIN — ASPIRIN 81 MG: 81 TABLET, COATED ORAL at 09:04

## 2024-04-16 NOTE — ASSESSMENT & PLAN NOTE
His breathing is getting worse.  I added prednisone yesterday.  Will consult pulmonary to see if they have any other recommendations.  Will get ST eval.  Echo shows normal systolic function and some diastolic dysfunction.  Will try another dose of lasix.

## 2024-04-16 NOTE — ASSESSMENT & PLAN NOTE
There were some issues with his bobo this am -- it was irritated, maybe was pulled during the night.  The nurse removed it this am.  He is having some bleeding.  I have instructed her to replace the bobo if no void over the next 6 hours.

## 2024-04-16 NOTE — PT/OT/SLP PROGRESS
Physical Therapy Treatment    Patient Name:  Max Squires   MRN:  [id number]    Three attempts to see patient today. 1st attempt, patient refused 2/2 fatigue. 2nd attempt,  cleaning room. 3rd attempt, nurse asked to hold PT 2/2 issues with catheter. PT will f/u tomorrow.

## 2024-04-16 NOTE — SUBJECTIVE & OBJECTIVE
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  He is still c/o worsening sob.  Yesterday he was having trouble eating because of the sob.  Today he is c/o trouble talking because of the sob.  He was also c/o some issues with his bobo catheter -- feels like he can't urinate.    Review of Systems  Objective:     Vital Signs (Most Recent):  Temp: 97.6 °F (36.4 °C) (04/16/24 1059)  Pulse: 84 (04/16/24 1147)  Resp: 18 (04/16/24 1253)  BP: (!) 155/73 (04/16/24 1059)  SpO2: (!) 93 % (04/16/24 1059) Vital Signs (24h Range):  Temp:  [97.6 °F (36.4 °C)-98.5 °F (36.9 °C)] 97.6 °F (36.4 °C)  Pulse:  [61-84] 84  Resp:  [18-26] 18  SpO2:  [90 %-94 %] 93 %  BP: (111-155)/(56-79) 155/73     Weight: 72.6 kg (160 lb)  Body mass index is 21.7 kg/m².    Intake/Output Summary (Last 24 hours) at 4/16/2024 1411  Last data filed at 4/16/2024 1300  Gross per 24 hour   Intake 480 ml   Output 1600 ml   Net -1120 ml         Physical Exam  Constitutional:       General: He is in acute distress (mild).   Cardiovascular:      Rate and Rhythm: Normal rate and regular rhythm.   Pulmonary:      Comments: Tachypneic, without any accessory muscle use, some pursed lip breathing, lungs with diminished breath sounds throughout but some exp wheezing bilaterally, no rales.  Musculoskeletal:      Right lower leg: No edema.      Left lower leg: No edema.   Skin:     General: Skin is warm and dry.   Neurological:      General: No focal deficit present.      Mental Status: He is alert.      Comments: Mild confusion             Significant Labs: All pertinent labs within the past 24 hours have been reviewed.    Significant Imaging: I have reviewed all pertinent imaging results/findings within the past 24 hours.  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

## 2024-04-16 NOTE — PLAN OF CARE
Problem: Adult Inpatient Plan of Care  Goal: Absence of Hospital-Acquired Illness or Injury  Outcome: Ongoing, Progressing  Goal: Optimal Comfort and Wellbeing  Outcome: Ongoing, Progressing  Goal: Readiness for Transition of Care  Outcome: Ongoing, Progressing     Problem: Fall Injury Risk  Goal: Absence of Fall and Fall-Related Injury  Outcome: Ongoing, Progressing     Problem: Pain Acute  Goal: Acceptable Pain Control and Functional Ability  Outcome: Ongoing, Progressing

## 2024-04-16 NOTE — CONSULTS
[facility] - Observation [facility]  Pulmonary Critical Care Note    Patient Name: Max Squires  MRN: [id number]  Admission Date: 4/3/2024  Hospital Length of Stay: 13 days  Code Status: DNR  Attending Provider: Ephraim Ramires MD  Primary Care Provider: Ephraim Ramires MD     Subjective:     HPI:   This is an 89-year-old male with a past medical history of COPD/emphysema, coronary artery disease status post stents, diastolic heart failure, hypertension, hyperlipidemia, and recent urinary retention who was admitted on 04/03/2024 with intractable back pain and constipation.  A CT scan of the abdomen pelvis revealed an 8 cm fluid density within the right hemipelvis to the right of the rectum possibly representing an abscess.  He underwent  attempted IR drain placement of right perirectal fluid collection on 04/05/2024 that was unsuccessful.  Repeat imaging on 04/10 revealed the fluid collection in the right hemipelvis was consistent with a bladder diverticulum and not an abscess.  Over the past few days he has had worsening shortness of breath.  A CTA of the chest was obtained yesterday on 04/15/2024 which revealed no pulmonary embolus but findings consistent with COPD and emphysema bilaterally with some interstitial fibrotic changes in the lung bases and scattered nodules measuring up to 8 mm in size.  Imaging was also consistent for moderate-sized hiatal hernia with dilated fluid-filled esophagus. Pulmonary is being consulted for assistance.    Hospital Course/Significant events:  As above    24 Hour Interval History:  He is lying in bed on 3L NC. Reports his shortness of breath is stable but he is getting very winded with exertion. Also having some constipation develop again over the past 2 days.     Past Medical History:   Diagnosis Date    Arthritis     COPD (chronic obstructive pulmonary disease)     Emphysema, unspecified     Hypercholesterolemia     Hypertension     Unspecified  osteoarthritis, unspecified site        Past Surgical History:   Procedure Laterality Date    BACK SURGERY      CORONARY ANGIOPLASTY WITH STENT PLACEMENT      FRACTURE SURGERY      foot    HIP SURGERY Left     Dr. Hoyos    JOINT REPLACEMENT Left     QUETA    JOINT REPLACEMENT Right     TKA       Social History     Socioeconomic History    Marital status:    Tobacco Use    Smoking status: Former     Current packs/day: 1.00     Types: Cigarettes    Smokeless tobacco: Never   Substance and Sexual Activity    Alcohol use: Not Currently     Comment: None    Drug use: Never     Social Determinants of Health     Financial Resource Strain: Low Risk  (4/4/2024)    Overall Financial Resource Strain (CARDIA)     Difficulty of Paying Living Expenses: Not hard at all   Food Insecurity: No Food Insecurity (4/5/2024)    Hunger Vital Sign     Worried About Running Out of Food in the Last Year: Never true     Ran Out of Food in the Last Year: Never true   Transportation Needs: No Transportation Needs (4/5/2024)    PRAPARE - Transportation     Lack of Transportation (Medical): No     Lack of Transportation (Non-Medical): No   Stress: Stress Concern Present (4/4/2024)    [employer] - Occupational Stress Questionnaire     Feeling of Stress : To some extent   Housing Stability: Low Risk  (4/5/2024)    Housing Stability Vital Sign     Unable to Pay for Housing in the Last Year: No     Number of Places Lived in the Last Year: 1     Unstable Housing in the Last Year: No           Current Outpatient Medications   Medication Instructions    albuterol (PROVENTIL HFA) 90 mcg/actuation inhaler 2 puffs, Inhalation, Every 4 hours PRN, Rescue    albuterol-ipratropium (DUO-NEB) 2.5 mg-0.5 mg/3 mL nebulizer solution 3 mLs, Nebulization, 4 times daily, Rescue    amLODIPine (NORVASC) 5 mg, Oral, Daily    aspirin (ECOTRIN) 81 mg, Oral, Daily    atorvastatin (LIPITOR) 80 MG tablet TAKE 1 TABLET(80 MG) BY MOUTH EVERY  DAY    diclofenac sodium (VOLTAREN) 1 % Gel APPLY 1 TO 2 GRAMS TOPICALLY TO THE AFFECTED AREA FOUR TIMES DAILY AS NEEDED    ezetimibe (ZETIA) 10 mg, Oral, Nightly    finasteride (PROSCAR) 5 mg, Oral    furosemide (LASIX) 40 mg, Oral, Daily    metoprolol succinate (TOPROL-XL) 25 mg, Oral    potassium chloride (MICRO-K) 10 MEQ CpSR 10 mEq, Oral, 2 times daily    propranoloL (INDERAL LA) 120 mg, Oral    tamsulosin (FLOMAX) 0.8 mg, Oral, Daily       Current Inpatient Medications  Current Facility-Administered Medications   Medication Dose Route Frequency Provider Last Rate Last Admin    albuterol-ipratropium 2.5 mg-0.5 mg/3 mL nebulizer solution 3 mL  3 mL Nebulization QID Ephraim Ramires MD   3 mL at 04/16/24 1147    ALPRAZolam tablet 0.5 mg  0.5 mg Oral BID PRN Abel Salinas II, MD   0.5 mg at 04/11/24 2133    amLODIPine tablet 5 mg  5 mg Oral Daily Ephraim Ramires MD   5 mg at 04/15/24 1835    aspirin EC tablet 81 mg  81 mg Oral Daily Ephraim Ramires MD   81 mg at 04/16/24 0914    atorvastatin tablet 80 mg  80 mg Oral Daily Ephraim Ramires MD   80 mg at 04/16/24 0913    docusate sodium capsule 100 mg  100 mg Oral BID Althea Lundberg MD   100 mg at 04/16/24 0913    enoxaparin injection 40 mg  40 mg Subcutaneous Q24H (prophylaxis, 1700) Althea Lundberg MD   40 mg at 04/15/24 1835    ezetimibe tablet 10 mg  10 mg Oral QHS Ephraim Ramires MD   10 mg at 04/15/24 2119    finasteride tablet 5 mg  5 mg Oral Daily Ephraim Ramires MD   5 mg at 04/16/24 0914    fluticasone furoate 100 mcg/actuation inhaler 1 puff  1 puff Inhalation Daily Althea Lundberg MD   1 puff at 04/16/24 0914    furosemide tablet 40 mg  40 mg Oral PRN Ephraim Ramires MD   40 mg at 04/15/24 0235    ipratropium 42 mcg (0.06 %) nasal spray 2 spray  2 spray Each Nostril QID PRN Althea Lundberg MD   2 spray at 04/14/24 1022    lactulose 10 gram/15 ml solution 30 g  30 g Oral Daily Ephraim Ramires MD   30 g  at 04/16/24 0913    metoprolol succinate (TOPROL-XL) 24 hr tablet 25 mg  25 mg Oral Daily Ephraim Ramires MD   25 mg at 04/16/24 0914    naloxegoL (MOVANTIK) tablet 25 mg  25 mg Oral Daily Ephraim Ramires MD   25 mg at 04/16/24 0914    OLANZapine tablet 5 mg  5 mg Oral Nightly PRN Ephraim Ramires MD   5 mg at 04/15/24 2329    oxyCODONE-acetaminophen  mg per tablet 1 tablet  1 tablet Oral Q6H PRN Ephraim Ramires MD   1 tablet at 04/16/24 1253    predniSONE tablet 40 mg  40 mg Oral Daily Althea Lundberg MD   40 mg at 04/16/24 0913    tamsulosin 24 hr capsule 0.8 mg  2 capsule Oral Daily Ephraim Ramires MD   0.8 mg at 04/16/24 0913    zinc oxide-cod liver oil 40 % paste   Topical (Top) BID Ephraim Ramires MD   Given at 04/16/24 0915       Current Intravenous Infusions  Current Facility-Administered Medications   Medication Dose Route Frequency Provider Last Rate Last Admin    albuterol-ipratropium 2.5 mg-0.5 mg/3 mL nebulizer solution 3 mL  3 mL Nebulization QID Ephraim Ramires MD   3 mL at 04/16/24 1147    ALPRAZolam tablet 0.5 mg  0.5 mg Oral BID PRN Abel Salinas II, MD   0.5 mg at 04/11/24 2133    amLODIPine tablet 5 mg  5 mg Oral Daily Ephraim Ramires MD   5 mg at 04/15/24 1835    aspirin EC tablet 81 mg  81 mg Oral Daily Ephraim Ramires MD   81 mg at 04/16/24 0914    atorvastatin tablet 80 mg  80 mg Oral Daily Ephraim Ramires MD   80 mg at 04/16/24 0913    docusate sodium capsule 100 mg  100 mg Oral BID Althea Lundberg MD   100 mg at 04/16/24 0913    enoxaparin injection 40 mg  40 mg Subcutaneous Q24H (prophylaxis, 1700) Althea Ludnberg MD   40 mg at 04/15/24 1835    ezetimibe tablet 10 mg  10 mg Oral QHS Ephraim Ramires MD   10 mg at 04/15/24 2119    finasteride tablet 5 mg  5 mg Oral Daily Ephraim Ramires MD   5 mg at 04/16/24 0914    fluticasone furoate 100 mcg/actuation inhaler 1 puff  1 puff Inhalation Daily Althea Lundberg,  MD   1 puff at 04/16/24 0914    furosemide tablet 40 mg  40 mg Oral PRN Ephraim Ramires MD   40 mg at 04/15/24 0235    ipratropium 42 mcg (0.06 %) nasal spray 2 spray  2 spray Each Nostril QID PRN Althea Lundberg MD   2 spray at 04/14/24 1022    lactulose 10 gram/15 ml solution 30 g  30 g Oral Daily Ephraim Ramires MD   30 g at 04/16/24 0913    metoprolol succinate (TOPROL-XL) 24 hr tablet 25 mg  25 mg Oral Daily Ephraim Ramires MD   25 mg at 04/16/24 0914    naloxegoL (MOVANTIK) tablet 25 mg  25 mg Oral Daily Ephraim Ramires MD   25 mg at 04/16/24 0914    OLANZapine tablet 5 mg  5 mg Oral Nightly PRN Ephraim Ramires MD   5 mg at 04/15/24 2329    oxyCODONE-acetaminophen  mg per tablet 1 tablet  1 tablet Oral Q6H PRN Ephraim Ramires MD   1 tablet at 04/16/24 1253    predniSONE tablet 40 mg  40 mg Oral Daily Althea Lundberg MD   40 mg at 04/16/24 0913    tamsulosin 24 hr capsule 0.8 mg  2 capsule Oral Daily Ephraim Ramires MD   0.8 mg at 04/16/24 0913    zinc oxide-cod liver oil 40 % paste   Topical (Top) BID Ephraim Ramires MD   Given at 04/16/24 0915         Review of Systems   Respiratory:  Positive for shortness of breath.           Objective:       Intake/Output Summary (Last 24 hours) at 4/16/2024 1419  Last data filed at 4/16/2024 1300  Gross per 24 hour   Intake 480 ml   Output 1600 ml   Net -1120 ml         Vital Signs (Most Recent):  Temp: 97.6 °F (36.4 °C) (04/16/24 1059)  Pulse: 84 (04/16/24 1147)  Resp: 18 (04/16/24 1253)  BP: (!) 155/73 (04/16/24 1059)  SpO2: (!) 93 % (04/16/24 1059)  Body mass index is 21.7 kg/m².  Weight: 72.6 kg (160 lb) Vital Signs (24h Range):  Temp:  [97.6 °F (36.4 °C)-98.5 °F (36.9 °C)] 97.6 °F (36.4 °C)  Pulse:  [61-84] 84  Resp:  [18-26] 18  SpO2:  [90 %-94 %] 93 %  BP: (111-155)/(56-79) 155/73     Physical Exam  Vitals reviewed.   Constitutional:       Appearance: Normal appearance.   HENT:      Head: Normocephalic and  "atraumatic.   Cardiovascular:      Rate and Rhythm: Normal rate.      Heart sounds: Normal heart sounds.   Pulmonary:      Effort: Pulmonary effort is normal.      Breath sounds: Normal breath sounds.   Abdominal:      Palpations: Abdomen is soft.   Musculoskeletal:      Cervical back: Neck supple.      Right lower leg: No edema.      Left lower leg: No edema.   Neurological:      General: No focal deficit present.      Mental Status: He is alert and oriented to person, place, and time.           Lines/Drains/Airways       Drain  Duration                  Urethral Catheter 04/03/24 1900 12 days              Peripheral Intravenous Line  Duration                  Peripheral IV - Single Lumen 04/12/24 1059 22 G Posterior;Right Forearm 4 days         Peripheral IV - Single Lumen 04/15/24 1314 20 G Anterior;Left Forearm 1 day                    Significant Labs:    Lab Results   Component Value Date    WBC 10.50 04/13/2024    HGB 10.4 (L) 04/13/2024    HCT 31.6 (L) 04/13/2024    MCV 82.1 04/13/2024     04/13/2024           BMP  Lab Results   Component Value Date     (L) 04/14/2024    K 4.7 04/14/2024    CHLORIDE 99 04/14/2024    CO2 23 04/14/2024    BUN 14.6 04/14/2024    CREATININE 0.78 04/14/2024    CALCIUM 8.5 (L) 04/14/2024    AGAP 10.0 04/14/2024         ABG  No results for input(s): "PH", "PO2", "PCO2", "HCO3", "POCBASEDEF" in the last 168 hours.      Significant Imaging:  Echo Saline Bubble? No    Left Ventricle: There is low normal systolic function with a visually   estimated ejection fraction of 50 - 55%. There is diastolic dysfunction.    Right Ventricle: Right ventricular enlargement. Systolic function is   normal.    Left Atrium: Left atrium is dilated.    Right Atrium: Right atrium is dilated.    Mitral Valve: There is bileaflet sclerosis. There is mitral annular   calcification present.    IVC/SVC: Intermediate venous pressure at 8 mmHg.  CTA Chest Non-Coronary (PE Studies)  Narrative: " EXAMINATION:  CTA CHEST NON CORONARY (PE STUDIES)    CLINICAL HISTORY:  Pulmonary embolism (PE) suspected, positive D-dimer;Chest pain, unspecified    TECHNIQUE:  Low dose axial images, sagittal and coronal reformations were obtained from the thoracic inlet to the lung bases following the IV administration of contrast..  Contrast timing was optimized to evaluate the pulmonary arteries.  MIP images were performed.  Automated exposure control was utilized    COMPARISON:  None    FINDINGS:  There are changes seen consistent with emphysema and COPD in the lungs bilaterally.  Some interstitial fibrotic changes are seen as well.  Some scattered areas of pulmonary nodularity are seen in the lungs for example 8 mm nodule in the right upper lobe image 72 series 12 there is a 7 mm nodule in the left lingula image 68 series 12.  No infiltrate is seen.  No mass is seen.  No pleural effusion is seen.  No pleural thickening is seen.  No pneumothorax is seen.    No pulmonary embolism is seen.    The thoracic aorta appears normal.  No mediastinal lymphadenopathy is seen.  The heart appears normal.    There is a moderate size hiatal hernia seen with dilated fluid-filled esophagus.  Underlying esophagitis should be excluded..  Impression: No pulmonary embolism seen    Findings consistent with COPD and emphysema in the lungs bilaterally with some interstitial fibrotic changes seen in the lung bases.  There is scattered nodules seen in the lungs bilaterally some measuring up to 8 mm in size.  For multiple solid nodules with any 6 mm or greater, Fleischner Society guidelines recommend follow up with non-contrast chest CT at 3-6 months and 18-24 months after discovery.    Electronically signed by: Joey Manzano  Date:    04/15/2024  Time:    15:12            Assessment/Plan:     Assessment  COPD/emphysema - no PFTs for review  Pulmonary nodules - largest 8mm  Urinary retention and bladder diverticulum   Constipation  Hiatal hernia and  fluid filled esopahgus       Plan  Continue nebs and trial of steroids  Encouraged mobilization and IS as much as possible  Wean o2 as tolerated  MD to review CT scan for any further recs       ARSENIO Melo  Pulmonary Critical Care Medicine  [facility]  DOS: 04/16/2024

## 2024-04-16 NOTE — PROGRESS NOTES
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  He is still c/o worsening sob.  Yesterday he was having trouble eating because of the sob.  Today he is c/o trouble talking because of the sob.  He was also c/o some issues with his bobo catheter -- feels like he can't urinate.    Review of Systems  Objective:     Vital Signs (Most Recent):  Temp: 97.6 °F (36.4 °C) (04/16/24 1059)  Pulse: 84 (04/16/24 1147)  Resp: 18 (04/16/24 1253)  BP: (!) 155/73 (04/16/24 1059)  SpO2: (!) 93 % (04/16/24 1059) Vital Signs (24h Range):  Temp:  [97.6 °F (36.4 °C)-98.5 °F (36.9 °C)] 97.6 °F (36.4 °C)  Pulse:  [61-84] 84  Resp:  [18-26] 18  SpO2:  [90 %-94 %] 93 %  BP: (111-155)/(56-79) 155/73     Weight: 72.6 kg (160 lb)  Body mass index is 21.7 kg/m².    Intake/Output Summary (Last 24 hours) at 4/16/2024 1411  Last data filed at 4/16/2024 1300  Gross per 24 hour   Intake 480 ml   Output 1600 ml   Net -1120 ml         Physical Exam  Constitutional:       General: He is in acute distress (mild).   Cardiovascular:      Rate and Rhythm: Normal rate and regular rhythm.   Pulmonary:      Comments: Tachypneic, without any accessory muscle use, some pursed lip breathing, lungs with diminished breath sounds throughout but some exp wheezing bilaterally, no rales.  Musculoskeletal:      Right lower leg: No edema.      Left lower leg: No edema.   Skin:     General: Skin is warm and dry.   Neurological:      General: No focal deficit present.      Mental Status: He is alert.       Comments: Mild confusion             Significant Labs: All pertinent labs within the past 24 hours have been reviewed.    Significant Imaging: I have reviewed all pertinent imaging results/findings within the past 24 hours.  CT: I have reviewed all pertinent results/findings within the past 24 hours and my personal findings are:  copd/emphysema, no pe    Assessment/Plan:      Closed compression fracture of L1 vertebra  Pain seems better.      Hyponatremia  Improved    Pelvic fluid collection  He has a large bladder diverticulum.      Moderate malnutrition  Nutrition consulted. Most recent weight and BMI monitored-     Measurements:  Wt Readings from Last 1 Encounters:   04/06/24 72.6 kg (160 lb)   Body mass index is 21.7 kg/m².    Patient has been screened and assessed by RD.    Malnutrition Type:  Context: chronic illness  Level: moderate    Malnutrition Characteristic Summary:  Weight Loss (Malnutrition): other (see comments) (does not meet criteria)  Energy Intake (Malnutrition): other (see comments) (does not meet criteria)  Subcutaneous Fat (Malnutrition): mild depletion  Muscle Mass (Malnutrition): moderate depletion    Interventions/Recommendations (treatment strategy):       Urinary tract infection associated with indwelling urethral catheter  He completed a course of abx.      Urinary retention  There were some issues with his bobo this am -- it was irritated, maybe was pulled during the night.  The nurse removed it this am.  He is having some bleeding.  I have instructed her to replace the bobo if no void over the next 6 hours.        COPD (chronic obstructive pulmonary disease)  His breathing is getting worse.  I added prednisone yesterday.  Will consult pulmonary to see if they have any other recommendations.  Will get ST eval.  Echo shows normal systolic function and some diastolic dysfunction.  Will try another dose of lasix.    Coronary arteriosclerosis  stable    Benign essential  hypertension  Chronic, controlled. Continue current meds -- amlodipine, metoprolol        VTE Risk Mitigation (From admission, onward)           Ordered     enoxaparin injection 40 mg  Every 24 hours         04/14/24 0858                    Discharge Planning   MATTY:      Code Status: DNR   Is the patient medically ready for discharge?:     Reason for patient still in hospital (select all that apply): Patient trending condition  Discharge Plan A: Home with family                  Althea Lundberg MD  Department of Hospital Medicine   [facility]

## 2024-04-17 LAB
ABS NEUT (OLG): ABNORMAL
ANION GAP SERPL CALC-SCNC: 14 MEQ/L
APPEARANCE UR: ABNORMAL
BACTERIA #/AREA URNS AUTO: ABNORMAL /HPF
BASOPHILS NFR BLD MANUAL: 1 %
BILIRUB UR QL STRIP.AUTO: NEGATIVE
BUN SERPL-MCNC: 28.8 MG/DL (ref 8.4–25.7)
BURR CELLS (OLG): ABNORMAL
CALCIUM SERPL-MCNC: 8.1 MG/DL (ref 8.8–10)
CHLORIDE SERPL-SCNC: 91 MMOL/L (ref 98–107)
CO2 SERPL-SCNC: 23 MMOL/L (ref 23–31)
COLOR UR AUTO: ABNORMAL
CREAT SERPL-MCNC: 1.32 MG/DL (ref 0.73–1.18)
CREAT/UREA NIT SERPL: 22
ERYTHROCYTE [DISTWIDTH] IN BLOOD BY AUTOMATED COUNT: 16.2 % (ref 11.5–17)
GFR SERPLBLD CREATININE-BSD FMLA CKD-EPI: 52 MLS/MIN/1.73/M2
GLUCOSE SERPL-MCNC: 84 MG/DL (ref 82–115)
GLUCOSE UR QL STRIP.AUTO: 3
HCT VFR BLD AUTO: 29.5 % (ref 42–52)
HGB BLD-MCNC: 9.5 G/DL (ref 14–18)
KETONES UR QL STRIP.AUTO: NEGATIVE
LEUKOCYTE ESTERASE UR QL STRIP.AUTO: ABNORMAL
LYMPHOCYTES NFR BLD MANUAL: 2 %
MCH RBC QN AUTO: 26.5 PG (ref 27–31)
MCHC RBC AUTO-ENTMCNC: 32.2 G/DL (ref 33–36)
MCV RBC AUTO: 82.4 FL (ref 80–94)
MONOCYTES NFR BLD MANUAL: 3 %
NEUTROPHILS NFR BLD MANUAL: 95 %
NITRITE UR QL STRIP.AUTO: NEGATIVE
NRBC BLD AUTO-RTO: 0 %
PH UR STRIP.AUTO: 6 [PH]
PLATELET # BLD AUTO: 201 X10(3)/MCL (ref 130–400)
PMV BLD AUTO: 10.4 FL (ref 7.4–10.4)
POIKILOCYTOSIS BLD QL SMEAR: ABNORMAL
POTASSIUM SERPL-SCNC: 4 MMOL/L (ref 3.5–5.1)
PROT UR QL STRIP.AUTO: ABNORMAL
RBC # BLD AUTO: 3.58 X10(6)/MCL (ref 4.7–6.1)
RBC #/AREA URNS AUTO: >100 /HPF
RBC UR QL AUTO: ABNORMAL
SODIUM SERPL-SCNC: 128 MMOL/L (ref 136–145)
SP GR UR STRIP.AUTO: 1.01 (ref 1–1.03)
SQUAMOUS #/AREA URNS LPF: ABNORMAL /HPF
UROBILINOGEN UR STRIP-ACNC: 0.2
WBC # SPEC AUTO: 36.26 X10(3)/MCL (ref 4.5–11.5)
WBC #/AREA URNS AUTO: ABNORMAL /HPF

## 2024-04-17 PROCEDURE — 99900031 HC PATIENT EDUCATION (STAT)

## 2024-04-17 PROCEDURE — 25000003 PHARM REV CODE 250: Performed by: INTERNAL MEDICINE

## 2024-04-17 PROCEDURE — 80048 BASIC METABOLIC PNL TOTAL CA: CPT | Performed by: INTERNAL MEDICINE

## 2024-04-17 PROCEDURE — 25000242 PHARM REV CODE 250 ALT 637 W/ HCPCS: Performed by: INTERNAL MEDICINE

## 2024-04-17 PROCEDURE — 63600175 PHARM REV CODE 636 W HCPCS: Performed by: INTERNAL MEDICINE

## 2024-04-17 PROCEDURE — 85027 COMPLETE CBC AUTOMATED: CPT | Performed by: INTERNAL MEDICINE

## 2024-04-17 PROCEDURE — 11000001 HC ACUTE MED/SURG PRIVATE ROOM

## 2024-04-17 PROCEDURE — 81001 URINALYSIS AUTO W/SCOPE: CPT | Performed by: INTERNAL MEDICINE

## 2024-04-17 PROCEDURE — 99233 SBSQ HOSP IP/OBS HIGH 50: CPT | Mod: ,,, | Performed by: INTERNAL MEDICINE

## 2024-04-17 PROCEDURE — 94640 AIRWAY INHALATION TREATMENT: CPT

## 2024-04-17 PROCEDURE — 87086 URINE CULTURE/COLONY COUNT: CPT | Performed by: INTERNAL MEDICINE

## 2024-04-17 PROCEDURE — 27000221 HC OXYGEN, UP TO 24 HOURS

## 2024-04-17 PROCEDURE — 99900035 HC TECH TIME PER 15 MIN (STAT)

## 2024-04-17 PROCEDURE — 94760 N-INVAS EAR/PLS OXIMETRY 1: CPT

## 2024-04-17 PROCEDURE — 21400001 HC TELEMETRY ROOM

## 2024-04-17 RX ORDER — BISACODYL 5 MG
5 TABLET, DELAYED RELEASE (ENTERIC COATED) ORAL DAILY PRN
Status: DISCONTINUED | OUTPATIENT
Start: 2024-04-17 | End: 2024-04-25 | Stop reason: HOSPADM

## 2024-04-17 RX ORDER — POLYETHYLENE GLYCOL 3350 17 G/17G
17 POWDER, FOR SOLUTION ORAL 2 TIMES DAILY PRN
Status: DISCONTINUED | OUTPATIENT
Start: 2024-04-17 | End: 2024-04-25 | Stop reason: HOSPADM

## 2024-04-17 RX ORDER — IPRATROPIUM BROMIDE AND ALBUTEROL SULFATE 2.5; .5 MG/3ML; MG/3ML
3 SOLUTION RESPIRATORY (INHALATION) EVERY 4 HOURS
Status: DISCONTINUED | OUTPATIENT
Start: 2024-04-17 | End: 2024-04-25 | Stop reason: HOSPADM

## 2024-04-17 RX ADMIN — IPRATROPIUM BROMIDE AND ALBUTEROL SULFATE 3 ML: 2.5; .5 SOLUTION RESPIRATORY (INHALATION) at 11:04

## 2024-04-17 RX ADMIN — METOPROLOL SUCCINATE 25 MG: 25 TABLET, EXTENDED RELEASE ORAL at 10:04

## 2024-04-17 RX ADMIN — OLANZAPINE 5 MG: 5 TABLET, FILM COATED ORAL at 08:04

## 2024-04-17 RX ADMIN — ATORVASTATIN CALCIUM 80 MG: 40 TABLET, FILM COATED ORAL at 10:04

## 2024-04-17 RX ADMIN — TAMSULOSIN HYDROCHLORIDE 0.8 MG: 0.4 CAPSULE ORAL at 10:04

## 2024-04-17 RX ADMIN — PREDNISONE 40 MG: 20 TABLET ORAL at 10:04

## 2024-04-17 RX ADMIN — ASPIRIN 81 MG: 81 TABLET, COATED ORAL at 09:04

## 2024-04-17 RX ADMIN — IPRATROPIUM BROMIDE AND ALBUTEROL SULFATE 3 ML: 2.5; .5 SOLUTION RESPIRATORY (INHALATION) at 08:04

## 2024-04-17 RX ADMIN — IPRATROPIUM BROMIDE AND ALBUTEROL SULFATE 3 ML: 2.5; .5 SOLUTION RESPIRATORY (INHALATION) at 04:04

## 2024-04-17 RX ADMIN — ENOXAPARIN SODIUM 40 MG: 40 INJECTION SUBCUTANEOUS at 04:04

## 2024-04-17 RX ADMIN — LACTULOSE 30 G: 10 SOLUTION ORAL at 10:04

## 2024-04-17 RX ADMIN — FINASTERIDE 5 MG: 5 TABLET, FILM COATED ORAL at 10:04

## 2024-04-17 RX ADMIN — AMLODIPINE BESYLATE 5 MG: 5 TABLET ORAL at 04:04

## 2024-04-17 RX ADMIN — FLUTICASONE FUROATE 1 PUFF: 100 POWDER RESPIRATORY (INHALATION) at 10:04

## 2024-04-17 RX ADMIN — ALPRAZOLAM 0.5 MG: 0.5 TABLET ORAL at 09:04

## 2024-04-17 RX ADMIN — Medication: at 10:04

## 2024-04-17 RX ADMIN — DOCUSATE SODIUM 100 MG: 100 CAPSULE, LIQUID FILLED ORAL at 10:04

## 2024-04-17 RX ADMIN — NALOXEGOL OXALATE 25 MG: 25 TABLET, FILM COATED ORAL at 10:04

## 2024-04-17 NOTE — PT/OT/SLP PROGRESS
SLP attempting clinical swallowing evaluation, however pt SOB with increased work of breathing. PO intake is unsafe at this time. SLP to continue to follow and treat as appropriate.

## 2024-04-17 NOTE — PLAN OF CARE
Problem: Adult Inpatient Plan of Care  Goal: Plan of Care Review  4/17/2024 1433 by Bernie Anne LPN  Outcome: Ongoing, Progressing  4/17/2024 1422 by Bernie Anne LPN  Outcome: Ongoing, Progressing  Goal: Patient-Specific Goal (Individualized)  4/17/2024 1433 by Bernie Anne LPN  Outcome: Ongoing, Progressing  4/17/2024 1422 by Bernie Anne LPN  Outcome: Ongoing, Progressing  Goal: Absence of Hospital-Acquired Illness or Injury  4/17/2024 1433 by Bernie Anne LPN  Outcome: Ongoing, Progressing  4/17/2024 1422 by Bernie Anne LPN  Outcome: Ongoing, Progressing  Goal: Optimal Comfort and Wellbeing  4/17/2024 1433 by Bernie Anne LPN  Outcome: Ongoing, Progressing  4/17/2024 1422 by Bernie Anne LPN  Outcome: Ongoing, Progressing  Goal: Readiness for Transition of Care  4/17/2024 1433 by Bernie Anne LPN  Outcome: Ongoing, Progressing  4/17/2024 1422 by Bernie Anne LPN  Outcome: Ongoing, Progressing     Problem: Impaired Wound Healing  Goal: Optimal Wound Healing  4/17/2024 1433 by Bernie Anne LPN  Outcome: Ongoing, Progressing  4/17/2024 1422 by Bernie Anne LPN  Outcome: Ongoing, Progressing     Problem: Skin Injury Risk Increased  Goal: Skin Health and Integrity  4/17/2024 1433 by Bernie Anne LPN  Outcome: Ongoing, Progressing  4/17/2024 1422 by Bernie Anne LPN  Outcome: Ongoing, Progressing     Problem: Fall Injury Risk  Goal: Absence of Fall and Fall-Related Injury  4/17/2024 1433 by Bernie Anne LPN  Outcome: Ongoing, Progressing  4/17/2024 1422 by Bernie Anne LPN  Outcome: Ongoing, Progressing     Problem: Pain Acute  Goal: Acceptable Pain Control and Functional Ability  4/17/2024 1433 by Bernie Anne LPN  Outcome: Ongoing, Progressing  4/17/2024 1422 by Bernie Anne LPN  Outcome: Ongoing, Progressing     Problem: Infection  Goal: Absence of Infection Signs and Symptoms  4/17/2024 1433 by Bernie Anne LPN  Outcome: Ongoing, Progressing  4/17/2024 1422 by Bernie Anne LPN  Outcome:  Ongoing, Progressing

## 2024-04-17 NOTE — ASSESSMENT & PLAN NOTE
Increase his nebs to Q 3-4 hours scheduled.  Continue steroids continue supplemental oxygen to maintain SpO2 greater than 90%.  Will discuss with the patient in the family regarding next step.  He probably would benefit from hospice care.

## 2024-04-17 NOTE — PROGRESS NOTES
[facility] Hospital Medicine  Progress Note    Patient Name: Max Squires  MRN: [id number]  Patient Class: IP- Inpatient   Admission Date: 4/3/2024  Length of Stay: 14 days  Attending Physician: Ephraim Ramires MD  Primary Care Provider: Ephraim Ramires MD        Subjective:     Principal Problem:Low back pain        HPI:  No notes on file    Overview/Hospital Course:  No notes on file    Interval History:  On-call for Dr. Ramires.  Patient was seen early this morning approximately 8:00 a.m..  Patient was sitting on the edge of the bed receiving nebulized treatment is requiring more frequent nebs because of wheezing.  Unfortunately his wife just passed away yesterday and he is sad about this.  Otherwise he is stable    Review of Systems   Constitutional: Negative.    HENT: Negative.     Eyes: Negative.    Respiratory:  Positive for shortness of breath. Negative for cough and chest tightness.    Cardiovascular:  Negative for chest pain and leg swelling.   Gastrointestinal:  Negative for abdominal pain, diarrhea, nausea and vomiting.   Endocrine: Negative.    Genitourinary: Negative.    Musculoskeletal: Negative.    Skin: Negative.    Allergic/Immunologic: Negative.    Neurological: Negative.  Negative for headaches.   Hematological: Negative.    Psychiatric/Behavioral: Negative.       Objective:     Vital Signs (Most Recent):  Temp: 97.8 °F (36.6 °C) (04/17/24 1526)  Pulse: 91 (04/17/24 1626)  Resp: 18 (04/17/24 1626)  BP: 96/61 (04/17/24 1526)  SpO2: 96 % (04/17/24 1626) Vital Signs (24h Range):  Temp:  [97.8 °F (36.6 °C)-98.2 °F (36.8 °C)] 97.8 °F (36.6 °C)  Pulse:  [] 91  Resp:  [18-22] 18  SpO2:  [92 %-99 %] 96 %  BP: ()/(50-73) 96/61     Weight: 72.6 kg (160 lb)  Body mass index is 21.7 kg/m².    Intake/Output Summary (Last 24 hours) at 4/17/2024 1716  Last data filed at 4/17/2024 1100  Gross per 24 hour   Intake 680 ml   Output --   Net 680 ml          Physical Exam  Constitutional:       General: He is in acute distress (mild).   Cardiovascular:      Rate and Rhythm: Normal rate and regular rhythm.   Pulmonary:      Comments: Tachypneic, without any accessory muscle use, some pursed lip breathing, lungs with diminished breath sounds throughout but some exp wheezing bilaterally, no rales.  Musculoskeletal:      Right lower leg: No edema.      Left lower leg: No edema.   Skin:     General: Skin is warm and dry.   Neurological:      General: No focal deficit present.      Mental Status: He is alert.      Comments: Mild confusion             Significant Labs: All pertinent labs within the past 24 hours have been reviewed.    Significant Imaging: I have reviewed all pertinent imaging results/findings within the past 24 hours.  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      Hyponatremia  Improved    Closed compression fracture of L1 vertebra  Pain seems better.      Pelvic fluid collection  He has a large bladder diverticulum.      Moderate malnutrition  Nutrition consulted. Most recent weight and BMI monitored-     Measurements:  Wt Readings from Last 1 Encounters:   04/06/24 72.6 kg (160 lb)   Body mass index is 21.7 kg/m².    Patient has been screened and assessed by RD.    Malnutrition Type:  Context: chronic illness  Level: moderate    Malnutrition Characteristic Summary:  Weight Loss (Malnutrition): other (see comments) (does not meet criteria)  Energy Intake (Malnutrition): other (see comments) (does not meet criteria)  Subcutaneous Fat (Malnutrition): mild depletion  Muscle Mass (Malnutrition): moderate depletion    Interventions/Recommendations (treatment strategy):     I asked the nurse to see about getting him some meal supplements like Ensure or Boost today.      Urinary tract infection associated with  indwelling urethral catheter  He completed a course of abx.      Urinary retention  There were some issues with his bobo this am -- it was irritated, maybe was pulled during the night.  The nurse removed it this am.  He is having some bleeding.  I have instructed her to replace the bobo if no void over the next 6 hours.        COPD (chronic obstructive pulmonary disease)  Increase his nebs to Q 3-4 hours scheduled.  Continue steroids continue supplemental oxygen to maintain SpO2 greater than 90%.  Will discuss with the patient in the family regarding next step.  He probably would benefit from hospice care.    Coronary arteriosclerosis  stable continue home meds.    Benign essential hypertension  Chronic, controlled. Continue current meds -- amlodipine, metoprolol        VTE Risk Mitigation (From admission, onward)           Ordered     enoxaparin injection 40 mg  Every 24 hours         04/14/24 0858                    Discharge Planning   MATTY:      Code Status: DNR   Is the patient medically ready for discharge?:     Reason for patient still in hospital (select all that apply): Treatment  Discharge Plan A: Home with family                  ALLEN MARIANO MD  Department of Hospital Medicine   [facility]

## 2024-04-17 NOTE — SUBJECTIVE & OBJECTIVE
Interval History:  On-call for Dr. Ramires.  Patient was seen early this morning approximately 8:00 a.m..  Patient was sitting on the edge of the bed receiving nebulized treatment is requiring more frequent nebs because of wheezing.  Unfortunately his wife just passed away yesterday and he is sad about this.  Otherwise he is stable    Review of Systems   Constitutional: Negative.    HENT: Negative.     Eyes: Negative.    Respiratory:  Positive for shortness of breath. Negative for cough and chest tightness.    Cardiovascular:  Negative for chest pain and leg swelling.   Gastrointestinal:  Negative for abdominal pain, diarrhea, nausea and vomiting.   Endocrine: Negative.    Genitourinary: Negative.    Musculoskeletal: Negative.    Skin: Negative.    Allergic/Immunologic: Negative.    Neurological: Negative.  Negative for headaches.   Hematological: Negative.    Psychiatric/Behavioral: Negative.       Objective:     Vital Signs (Most Recent):  Temp: 97.8 °F (36.6 °C) (04/17/24 1526)  Pulse: 91 (04/17/24 1626)  Resp: 18 (04/17/24 1626)  BP: 96/61 (04/17/24 1526)  SpO2: 96 % (04/17/24 1626) Vital Signs (24h Range):  Temp:  [97.8 °F (36.6 °C)-98.2 °F (36.8 °C)] 97.8 °F (36.6 °C)  Pulse:  [] 91  Resp:  [18-22] 18  SpO2:  [92 %-99 %] 96 %  BP: ()/(50-73) 96/61     Weight: 72.6 kg (160 lb)  Body mass index is 21.7 kg/m².    Intake/Output Summary (Last 24 hours) at 4/17/2024 1716  Last data filed at 4/17/2024 1100  Gross per 24 hour   Intake 680 ml   Output --   Net 680 ml         Physical Exam  Constitutional:       General: He is in acute distress (mild).   Cardiovascular:      Rate and Rhythm: Normal rate and regular rhythm.   Pulmonary:      Comments: Tachypneic, without any accessory muscle use, some pursed lip breathing, lungs with diminished breath sounds throughout but some exp wheezing bilaterally, no rales.  Musculoskeletal:      Right lower leg: No edema.      Left lower leg: No edema.   Skin:      General: Skin is warm and dry.   Neurological:      General: No focal deficit present.      Mental Status: He is alert.      Comments: Mild confusion             Significant Labs: All pertinent labs within the past 24 hours have been reviewed.    Significant Imaging: I have reviewed all pertinent imaging results/findings within the past 24 hours.  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

## 2024-04-18 PROBLEM — Z51.5 COMFORT MEASURES ONLY STATUS: Status: ACTIVE | Noted: 2024-04-18

## 2024-04-18 LAB
ABS NEUT (OLG): 27.62 X10(3)/MCL (ref 2.1–9.2)
ACANTHOCYTES (OLG): ABNORMAL
ALBUMIN SERPL-MCNC: 2.7 G/DL (ref 3.4–4.8)
ALBUMIN/GLOB SERPL: 0.9 RATIO (ref 1.1–2)
ALP SERPL-CCNC: 191 UNIT/L (ref 40–150)
ALT SERPL-CCNC: 56 UNIT/L (ref 0–55)
AST SERPL-CCNC: 70 UNIT/L (ref 5–34)
BILIRUB SERPL-MCNC: 1.3 MG/DL
BUN SERPL-MCNC: 51.6 MG/DL (ref 8.4–25.7)
BURR CELLS (OLG): ABNORMAL
CALCIUM SERPL-MCNC: 8 MG/DL (ref 8.8–10)
CHLORIDE SERPL-SCNC: 90 MMOL/L (ref 98–107)
CO2 SERPL-SCNC: 19 MMOL/L (ref 23–31)
CREAT SERPL-MCNC: 1.9 MG/DL (ref 0.73–1.18)
ERYTHROCYTE [DISTWIDTH] IN BLOOD BY AUTOMATED COUNT: 16.3 % (ref 11.5–17)
GFR SERPLBLD CREATININE-BSD FMLA CKD-EPI: 33 MLS/MIN/1.73/M2
GLOBULIN SER-MCNC: 2.9 GM/DL (ref 2.4–3.5)
GLUCOSE SERPL-MCNC: 94 MG/DL (ref 82–115)
HCT VFR BLD AUTO: 28.5 % (ref 42–52)
HGB BLD-MCNC: 9.1 G/DL (ref 14–18)
INSTRUMENT WBC (OLG): 31.39 X10(3)/MCL
MCH RBC QN AUTO: 26.6 PG (ref 27–31)
MCHC RBC AUTO-ENTMCNC: 31.9 G/DL (ref 33–36)
MCV RBC AUTO: 83.3 FL (ref 80–94)
METAMYELOCYTES NFR BLD MANUAL: 5 %
MONOCYTES NFR BLD MANUAL: 1.26 X10(3)/MCL (ref 0.1–1.3)
MONOCYTES NFR BLD MANUAL: 4 %
MYELOCYTES NFR BLD MANUAL: 4 %
NEUTROPHILS NFR BLD MANUAL: 88 %
NRBC BLD AUTO-RTO: 0 %
PLATELET # BLD AUTO: 148 X10(3)/MCL (ref 130–400)
PLATELET # BLD EST: NORMAL 10*3/UL
PMV BLD AUTO: 10.7 FL (ref 7.4–10.4)
POIKILOCYTOSIS BLD QL SMEAR: ABNORMAL
POTASSIUM SERPL-SCNC: 4.5 MMOL/L (ref 3.5–5.1)
PROT SERPL-MCNC: 5.6 GM/DL (ref 5.8–7.6)
RBC # BLD AUTO: 3.42 X10(6)/MCL (ref 4.7–6.1)
RBC MORPH BLD: ABNORMAL
SODIUM SERPL-SCNC: 126 MMOL/L (ref 136–145)
WBC # SPEC AUTO: 31.39 X10(3)/MCL (ref 4.5–11.5)
WBC VACUOLES (OHS): ABNORMAL

## 2024-04-18 PROCEDURE — 94799 UNLISTED PULMONARY SVC/PX: CPT

## 2024-04-18 PROCEDURE — 99900031 HC PATIENT EDUCATION (STAT)

## 2024-04-18 PROCEDURE — 27000221 HC OXYGEN, UP TO 24 HOURS

## 2024-04-18 PROCEDURE — 25000003 PHARM REV CODE 250: Performed by: INTERNAL MEDICINE

## 2024-04-18 PROCEDURE — 25000242 PHARM REV CODE 250 ALT 637 W/ HCPCS: Performed by: INTERNAL MEDICINE

## 2024-04-18 PROCEDURE — 11000001 HC ACUTE MED/SURG PRIVATE ROOM

## 2024-04-18 PROCEDURE — 99900026 HC AIRWAY MAINTENANCE (STAT)

## 2024-04-18 PROCEDURE — 27000200 HC HIGH FLOW DEL DISP CIRCUIT

## 2024-04-18 PROCEDURE — 99223 1ST HOSP IP/OBS HIGH 75: CPT | Mod: ,,, | Performed by: NURSE PRACTITIONER

## 2024-04-18 PROCEDURE — 99900035 HC TECH TIME PER 15 MIN (STAT)

## 2024-04-18 PROCEDURE — 27000249 HC VAPOTHERM CIRCUIT

## 2024-04-18 PROCEDURE — 99232 SBSQ HOSP IP/OBS MODERATE 35: CPT | Mod: ,,, | Performed by: INTERNAL MEDICINE

## 2024-04-18 PROCEDURE — 27100171 HC OXYGEN HIGH FLOW UP TO 24 HOURS

## 2024-04-18 PROCEDURE — 85027 COMPLETE CBC AUTOMATED: CPT | Performed by: INTERNAL MEDICINE

## 2024-04-18 PROCEDURE — 94760 N-INVAS EAR/PLS OXIMETRY 1: CPT

## 2024-04-18 PROCEDURE — 5A0955A ASSISTANCE WITH RESPIRATORY VENTILATION, GREATER THAN 96 CONSECUTIVE HOURS, HIGH NASAL FLOW/VELOCITY: ICD-10-PCS | Performed by: INTERNAL MEDICINE

## 2024-04-18 PROCEDURE — 80053 COMPREHEN METABOLIC PANEL: CPT | Performed by: INTERNAL MEDICINE

## 2024-04-18 PROCEDURE — 94640 AIRWAY INHALATION TREATMENT: CPT

## 2024-04-18 PROCEDURE — 21400001 HC TELEMETRY ROOM

## 2024-04-18 RX ORDER — HALOPERIDOL 5 MG/ML
2 INJECTION INTRAMUSCULAR EVERY 4 HOURS PRN
Status: DISCONTINUED | OUTPATIENT
Start: 2024-04-18 | End: 2024-04-25 | Stop reason: HOSPADM

## 2024-04-18 RX ORDER — MORPHINE SULFATE 4 MG/ML
2 INJECTION, SOLUTION INTRAMUSCULAR; INTRAVENOUS ONCE AS NEEDED
Status: COMPLETED | OUTPATIENT
Start: 2024-04-18 | End: 2024-04-19

## 2024-04-18 RX ORDER — ENOXAPARIN SODIUM 100 MG/ML
30 INJECTION SUBCUTANEOUS EVERY 24 HOURS
Status: DISCONTINUED | OUTPATIENT
Start: 2024-04-18 | End: 2024-04-22

## 2024-04-18 RX ORDER — MORPHINE SULFATE 4 MG/ML
2 INJECTION, SOLUTION INTRAMUSCULAR; INTRAVENOUS
Status: DISCONTINUED | OUTPATIENT
Start: 2024-04-18 | End: 2024-04-25 | Stop reason: HOSPADM

## 2024-04-18 RX ADMIN — IPRATROPIUM BROMIDE AND ALBUTEROL SULFATE 3 ML: 2.5; .5 SOLUTION RESPIRATORY (INHALATION) at 08:04

## 2024-04-18 RX ADMIN — IPRATROPIUM BROMIDE AND ALBUTEROL SULFATE 3 ML: 2.5; .5 SOLUTION RESPIRATORY (INHALATION) at 12:04

## 2024-04-18 RX ADMIN — Medication: at 09:04

## 2024-04-18 RX ADMIN — IPRATROPIUM BROMIDE AND ALBUTEROL SULFATE 3 ML: 2.5; .5 SOLUTION RESPIRATORY (INHALATION) at 04:04

## 2024-04-18 RX ADMIN — DOCUSATE SODIUM 100 MG: 100 CAPSULE, LIQUID FILLED ORAL at 09:04

## 2024-04-18 RX ADMIN — ALPRAZOLAM 0.5 MG: 0.5 TABLET ORAL at 09:04

## 2024-04-18 RX ADMIN — EZETIMIBE 10 MG: 10 TABLET ORAL at 09:04

## 2024-04-18 RX ADMIN — OLANZAPINE 5 MG: 5 TABLET, FILM COATED ORAL at 09:04

## 2024-04-18 NOTE — NURSING
PATIENT IN BED, RESTING, EYES OPEN, ALERT, NO AUDIBLE WHEEZING NOTED. O2 WITH CONT. SPO2 MONITORING IN PROGRESS. FAMILY AT BEDSIDE.

## 2024-04-18 NOTE — NURSING
"PATIENT IN BED WHEEZING WITH LABORED RESPIRATIONS SPO2 UPPER 90'S. GUILLERMINA LITTLE RRT NOTIFIED TO COME ASSESS PT RESPIRATORY STATUS AND TO ADMINISTER SCHEDULED NEB TX. PER GUILLERMINA LITTLE RRT THE PATIENT "ALWAYS GETS WORKED UP LIKE THIS"     2047 GUILLERMINA LITTLE RRT TO PATIENT ROOM SEE MAR AND ASSESSMENT.   PATIENT PLACED ON CONT. SPO2 MONITORING   "

## 2024-04-18 NOTE — SUBJECTIVE & OBJECTIVE
Interval History:  Patient deteriorating.  RRT was called despite being DNR and was placed on Vapotherm.  Spoke with the family and they want comfort care.  They are little hesitant about home hospice because the patient's wife just passed away 2 days ago on home hospice..  Went to entertain inpatient hospice and comfort care.  He remains hyponatremic renal function did have a bump to 1.9.  Review of Systems   Constitutional: Negative.    HENT: Negative.     Eyes: Negative.    Respiratory:  Positive for shortness of breath. Negative for cough and chest tightness.    Cardiovascular:  Negative for chest pain and leg swelling.   Gastrointestinal:  Negative for abdominal pain, diarrhea, nausea and vomiting.   Endocrine: Negative.    Genitourinary: Negative.    Musculoskeletal: Negative.    Skin: Negative.    Allergic/Immunologic: Negative.    Neurological: Negative.  Negative for headaches.   Hematological: Negative.    Psychiatric/Behavioral: Negative.       Objective:     Vital Signs (Most Recent):  Temp: 98.2 °F (36.8 °C) (04/18/24 1455)  Pulse: 98 (04/18/24 1652)  Resp: (!) 28 (04/18/24 1652)  BP: (!) 86/51 (04/18/24 1455)  SpO2: 96 % (04/18/24 1652) Vital Signs (24h Range):  Temp:  [97.7 °F (36.5 °C)-99.5 °F (37.5 °C)] 98.2 °F (36.8 °C)  Pulse:  [] 98  Resp:  [19-32] 28  SpO2:  [91 %-99 %] 96 %  BP: ()/(47-75) 86/51     Weight: 72.6 kg (160 lb)  Body mass index is 21.7 kg/m².    Intake/Output Summary (Last 24 hours) at 4/18/2024 1728  Last data filed at 4/18/2024 0643  Gross per 24 hour   Intake 120 ml   Output --   Net 120 ml         Physical Exam  Constitutional:       General: He is in acute distress (mild).   Cardiovascular:      Rate and Rhythm: Normal rate and regular rhythm.   Pulmonary:      Comments: Tachypneic, without any accessory muscle use, some pursed lip breathing, lungs with diminished breath sounds throughout but some exp wheezing bilaterally, no rales.  Musculoskeletal:      Right  lower leg: No edema.      Left lower leg: No edema.   Skin:     General: Skin is warm and dry.   Neurological:      General: No focal deficit present.      Mental Status: He is alert.      Comments: Mild confusion             Significant Labs: All pertinent labs within the past 24 hours have been reviewed.    Significant Imaging: I have reviewed all pertinent imaging results/findings within the past 24 hours.  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

## 2024-04-18 NOTE — PROGRESS NOTES
[facility] Hospital Medicine  Progress Note    Patient Name: Max Squires  MRN: [id number]  Patient Class: IP- Inpatient   Admission Date: 4/3/2024  Length of Stay: 15 days  Attending Physician: Ephraim Ramires MD  Primary Care Provider: Ephraim Ramires MD        Subjective:     Principal Problem:Low back pain  Acute Condition:         HPI:  No notes on file    Overview/Hospital Course:  No notes on file    Interval History:  Patient deteriorating.  RRT was called despite being DNR and was placed on Vapotherm.  Spoke with the family and they want comfort care.  They are little hesitant about home hospice because the patient's wife just passed away 2 days ago on home hospice..  Went to entertain inpatient hospice and comfort care.  He remains hyponatremic renal function did have a bump to 1.9.  Review of Systems   Constitutional: Negative.    HENT: Negative.     Eyes: Negative.    Respiratory:  Positive for shortness of breath. Negative for cough and chest tightness.    Cardiovascular:  Negative for chest pain and leg swelling.   Gastrointestinal:  Negative for abdominal pain, diarrhea, nausea and vomiting.   Endocrine: Negative.    Genitourinary: Negative.    Musculoskeletal: Negative.    Skin: Negative.    Allergic/Immunologic: Negative.    Neurological: Negative.  Negative for headaches.   Hematological: Negative.    Psychiatric/Behavioral: Negative.       Objective:     Vital Signs (Most Recent):  Temp: 98.2 °F (36.8 °C) (04/18/24 1455)  Pulse: 98 (04/18/24 1652)  Resp: (!) 28 (04/18/24 1652)  BP: (!) 86/51 (04/18/24 1455)  SpO2: 96 % (04/18/24 1652) Vital Signs (24h Range):  Temp:  [97.7 °F (36.5 °C)-99.5 °F (37.5 °C)] 98.2 °F (36.8 °C)  Pulse:  [] 98  Resp:  [19-32] 28  SpO2:  [91 %-99 %] 96 %  BP: ()/(47-75) 86/51     Weight: 72.6 kg (160 lb)  Body mass index is 21.7 kg/m².    Intake/Output Summary (Last 24 hours) at 4/18/2024 3748  Last data filed at  4/18/2024 0643  Gross per 24 hour   Intake 120 ml   Output --   Net 120 ml         Physical Exam  Constitutional:       General: He is in acute distress (mild).   Cardiovascular:      Rate and Rhythm: Normal rate and regular rhythm.   Pulmonary:      Comments: Tachypneic, without any accessory muscle use, some pursed lip breathing, lungs with diminished breath sounds throughout but some exp wheezing bilaterally, no rales.  Musculoskeletal:      Right lower leg: No edema.      Left lower leg: No edema.   Skin:     General: Skin is warm and dry.   Neurological:      General: No focal deficit present.      Mental Status: He is alert.      Comments: Mild confusion             Significant Labs: All pertinent labs within the past 24 hours have been reviewed.    Significant Imaging: I have reviewed all pertinent imaging results/findings within the past 24 hours.  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      Hyponatremia  Improved    Closed compression fracture of L1 vertebra  Pain seems better.      Pelvic fluid collection  He has a large bladder diverticulum.      Moderate malnutrition  Nutrition consulted. Most recent weight and BMI monitored-     Measurements:  Wt Readings from Last 1 Encounters:   04/06/24 72.6 kg (160 lb)   Body mass index is 21.7 kg/m².    Patient has been screened and assessed by RD.    Malnutrition Type:  Context: chronic illness  Level: moderate    Malnutrition Characteristic Summary:  Weight Loss (Malnutrition): other (see comments) (does not meet criteria)  Energy Intake (Malnutrition): other (see comments) (does not meet criteria)  Subcutaneous Fat (Malnutrition): mild depletion  Muscle Mass (Malnutrition): moderate depletion    Interventions/Recommendations (treatment strategy):     I asked the nurse to see about getting him some meal  supplements like Ensure or Boost today.      Urinary tract infection associated with indwelling urethral catheter  He completed a course of abx.      Urinary retention  There were some issues with his bobo this am -- it was irritated, maybe was pulled during the night.  The nurse removed it this am.  He is having some bleeding.  I have instructed her to replace the bobo if no void over the next 6 hours.        COPD (chronic obstructive pulmonary disease)  Increase his nebs to Q 3-4 hours scheduled.  Continue steroids continue supplemental oxygen to maintain SpO2 greater than 90%.  Will discuss with the patient in the family regarding next step.  He probably would benefit from hospice care.    Coronary arteriosclerosis  stable continue home meds.    Benign essential hypertension  Chronic, controlled. Continue current meds -- amlodipine, metoprolol        VTE Risk Mitigation (From admission, onward)           Ordered     enoxaparin injection 30 mg  Every 24 hours         04/18/24 1341                    Discharge Planning   MATTY:      Code Status: DNR   Is the patient medically ready for discharge?:     Reason for patient still in hospital (select all that apply): Patient unstable  Discharge Plan A: Home with family        Discussed with the family patient was actively dying.  He is DNR status will continue with comfort care.  Seen by palliative care.  Will assess over the next 24 to 48 hours for inpatient hospice.  Also spoke with Dr. Ramires who is the patient's nephew regarding the patient this morning.  Continue comfort care measures.            ALLEN MARIANO MD  Department of Hospital Medicine   [facility]

## 2024-04-18 NOTE — PT/OT/SLP PROGRESS
Pt with decline in respiratory status. SLP to sign off at this time. SLP notifed MD. Please re-consult as warranted.

## 2024-04-18 NOTE — PROGRESS NOTES
Pharmacist Renal Dose Adjustment Note    Max Squires is a 89 y.o. male being treated with the medication Lovenox    Patient Data:    Vital Signs (Most Recent):  Temp: 98 °F (36.7 °C) (04/18/24 1130)  Pulse: 88 (04/18/24 1249)  Resp: (!) 31 (04/18/24 1249)  BP: (!) 95/57 (04/18/24 1130)  SpO2: 97 % (04/18/24 1249) Vital Signs (72h Range):  Temp:  [97.6 °F (36.4 °C)-99.5 °F (37.5 °C)]   Pulse:  []   Resp:  [18-32]   BP: ()/(47-79)   SpO2:  [90 %-99 %]      Recent Labs   Lab 04/14/24  0401 04/17/24  0351 04/18/24  0431   CREATININE 0.78 1.32* 1.90*     Serum creatinine: 1.9 mg/dL (H) 04/18/24 0431  Estimated creatinine clearance: 27.1 mL/min (A)    Lovenox 40mg q24h will be changed to Lovenox 30mg q24h based on Creatinine Clearance = 27.1 mL/min.     Pharmacist's Name: Bright Torrez  Pharmacist's Extension: [phone]

## 2024-04-18 NOTE — PROGRESS NOTES
Inpatient Nutrition Assessment    Admit Date: 4/3/2024   Total duration of encounter: 15 days   Patient Age: 89 y.o.    Nutrition Recommendation/Prescription     Continue heart healthy diet as tolerated/per MD.   Continue Boost BID to provide 240 kcal and 10 g protein per serving  Continue bowel regimen.   Monitor labs, intake and weight    Communication of Recommendations: reviewed with nurse and reviewed with family    Nutrition Assessment     Malnutrition Assessment/Nutrition-Focused Physical Exam    Malnutrition Context: chronic illness (04/09/24 1040)  Malnutrition Level: moderate (04/09/24 1040)  Energy Intake (Malnutrition): other (see comments) (does not meet criteria) (04/09/24 1040)  Weight Loss (Malnutrition): other (see comments) (does not meet criteria) (04/09/24 1040)  Subcutaneous Fat (Malnutrition): mild depletion (04/09/24 1040)  Orbital Region (Subcutaneous Fat Loss): mild depletion  Upper Arm Region (Subcutaneous Fat Loss): mild depletion     Muscle Mass (Malnutrition): moderate depletion (04/09/24 1040)  [place]ian Region (Muscle Loss): moderate depletion     Clavicle and Acromion Bone Region (Muscle Loss): moderate depletion     Dorsal Hand (Muscle Loss): moderate depletion                    A minimum of two characteristics is recommended for diagnosis of either severe or non-severe malnutrition.    Chart Review    Reason Seen: length of stay and follow-up    Malnutrition Screening Tool Results   Have you recently lost weight without trying?: No  Have you been eating poorly because of a decreased appetite?: No   MST Score: 0   Diagnosis:  Subacute fracture L1   Suspected pelvic abscess   UTI and pre-existing urinary retention with Almeida catheter  Hyperkalemia. Corrected   Constipation     Relevant Medical History: COPD, CAD, HF    Scheduled Medications:  albuterol-ipratropium, 3 mL, Q4H  amLODIPine, 5 mg, Daily  aspirin, 81 mg, Daily  atorvastatin, 80 mg, Daily  docusate sodium, 100 mg,  BID  enoxparin, 40 mg, Q24H (prophylaxis, 1700)  ezetimibe, 10 mg, QHS  finasteride, 5 mg, Daily  fluticasone furoate, 1 puff, Daily  lactulose 10 gram/15 ml, 30 g, Daily  metoprolol succinate, 25 mg, Daily  naloxegoL, 25 mg, Daily  predniSONE, 40 mg, Daily  tamsulosin, 2 capsule, Daily  zinc oxide-cod liver oil, , BID    Continuous Infusions:   PRN Medications:     Current Facility-Administered Medications:     ALPRAZolam, 0.5 mg, Oral, Q4H PRN    bisacodyL, 5 mg, Oral, Daily PRN    furosemide, 40 mg, Oral, PRN    ipratropium, 2 spray, Each Nostril, QID PRN    OLANZapine, 5 mg, Oral, Nightly PRN    oxyCODONE-acetaminophen, 1 tablet, Oral, Q6H PRN    polyethylene glycol, 17 g, Oral, BID PRN      Calorie Containing IV Medications: no significant kcals from medications at this time    Recent Labs   Lab 04/12/24  0523 04/13/24  0459 04/14/24  0401 04/17/24  0351 04/18/24  0431   * 131* 132* 128* 126*   K 5.8* 4.5 4.7 4.0 4.5   CALCIUM 8.5* 8.5* 8.5* 8.1* 8.0*   CHLORIDE 94* 97* 99 91* 90*   CO2 24 26 23 23 19*   BUN 18.4 14.4 14.6 28.8* 51.6*   CREATININE 0.93 0.73 0.78 1.32* 1.90*   EGFRNORACEVR >60 >60 >60 52 33   GLUCOSE 91 97 93 84 94   BILITOT 0.5  --   --   --  1.3   ALKPHOS 99  --   --   --  191*   ALT 11  --   --   --  56*   AST 23  --   --   --  70*   ALBUMIN 3.1*  --   --   --  2.7*   WBC 10.91 10.50  --  36.26* 31.39  31.39*   HGB 10.8* 10.4*  --  9.5* 9.1*   HCT 34.2* 31.6*  --  29.5* 28.5*     Nutrition Orders:  Diet Heart Healthy  Dietary nutrition supplements Boost Original Nutritional Drink - Vanilla; BID    Appetite/Oral Intake: poor/25-50% of meals  Factors Affecting Nutritional Intake: respiratory status  Food/Caodaism/Cultural Preferences: none reported  Food Allergies: no known food allergies  Last Bowel Movement: 04/17/24  Wound(s):     Altered Skin Integrity 04/03/24 1544 Perineum #1-Tissue loss description: Not applicable     Comments    Comments    4/9: Pt reports good appetite, eating  100% of meals. Good appetite PTA. Denies N/V, reports constipation. Last BM 4/3, meds noted. UBW reported ~170# with unintentional weight loss past year. Per EMR weight history, 4.6% weight loss in 2 months (not significant). Pt with moderate muscle and mild fat depletion per NFPE. Agreed to trial ONS.    : Pt reports poor po intake of meals; states that he has been having an upset stomach with some n/v; reports that he is drinking his Boost.     4/15: Patient reports fair oral intake, drinking Boost nutrition supplements. Patient denies any nausea/vomiting/diarrhea/constipation.     : Per family present in the room, pt's po intake has been poor since his respiratory status has worsened since yesterday. SLP recommending unsafe for po intake at this time. Pt is drinking some of the oral supplement per family.     Anthropometrics    Height: 6' (182.9 cm), Height Method: Stated  Last Weight: 72.6 kg (160 lb) (24 0950), Weight Method: Bed Scale  BMI (Calculated): 21.7  BMI Classification: underweight (BMI less than 22 if >65 years of age)        Ideal Body Weight (IBW), Male: 178 lb     % Ideal Body Weight, Male (lb): 89.89 %                 Usual Body Weight (UBW), k.2 kg  % Usual Body Weight: 95.44     Usual Weight Provided By: patient and EMR weight history    Wt Readings from Last 5 Encounters:   24 72.6 kg (160 lb)   24 72.6 kg (160 lb)   24 76.2 kg (168 lb)   10/18/23 74.4 kg (164 lb)   23 72.1 kg (159 lb)     Weight Change(s) Since Admission:   Wt Readings from Last 1 Encounters:   24 0950 72.6 kg (160 lb)   24 0528 72.6 kg (160 lb)   Admit Weight: 72.6 kg (160 lb) (24 0528), Weight Method: Stated  : no new wt noted    Estimated Needs    Weight Used For Calorie Calculations: 72.6 kg (160 lb 0.9 oz)  Energy Calorie Requirements (kcal): 1572 kcal (1.1 stress factor)  Energy Need Method: Sandra Miner  Weight Used For Protein Calculations: 72.6 kg  (160 lb 0.9 oz)  Protein Requirements: 80-87 g (1.1-1.2 g/kg)  Fluid Requirements (mL): 1572 ml (1 ml/kcal)    Enteral Nutrition     Patient not receiving enteral nutrition at this time.    Parenteral Nutrition     Patient not receiving parenteral nutrition support at this time.    Evaluation of Received Nutrient Intake    Calories: not meeting estimated needs  Protein: not meeting estimated needs    Patient Education     Not applicable.    Nutrition Diagnosis     PES: Moderate chronic disease or condition related malnutrition related to inability to consume sufficient nutrients as evidenced by mild fat depletion and moderate muscle depletion. (active)    Nutrition Interventions     Intervention(s): general/healthful diet, commercial beverage, prescription medication, and collaboration with other providers    Goal: Consume % of oral supplements by follow-up. (goal not met)  Goal: Maintain weight throughout hospitalization. (goal progressing)    Nutrition Goals & Monitoring     Dietitian will monitor: food and beverage intake, weight, and electrolyte/renal panel    Nutrition Risk/Follow-Up: moderate (follow-up in 3-5 days)   Please consult if re-assessment needed sooner.

## 2024-04-18 NOTE — PT/OT/SLP PROGRESS
Physical Therapy Treatment    Patient Name:  Max Squires   MRN:  [id number]    Patient with decline in respiratory status. No longer appropriate for PT services. We are signing off.

## 2024-04-18 NOTE — PT/OT/SLP DISCHARGE
Physical Therapy Discharge Summary    Name: Max Squires  MRN: [id number]   Principal Problem: Low back pain     Patient Discharged from acute Physical Therapy on 4/18/24.  Please refer to prior PT noted date on 4/15/24 for functional status.     Assessment:     Patient is no longer making progress.    Objective:     GOALS:   Multidisciplinary Problems       Physical Therapy Goals          Problem: Physical Therapy    Goal Priority Disciplines Outcome Goal Variances Interventions   Physical Therapy Goal     PT, PT/OT Ongoing, Progressing     Description: Pt will be seen for the following goals  1. Pt will be sba with bed mobility  2. Pt will be sba with transfers with lso brace on and using a rw  3. Pt will amb with a rw and LSO brace on for 150ft sba                       Reasons for Discontinuation of Therapy Services  Patient is unable to continue work toward goals because of medical or psychosocial complications.      Plan:     Patient Discharged to: in house.       4/18/2024

## 2024-04-18 NOTE — NURSING
Nurse to room per family request. Patient in bed restless, removing OxyMask, SPO2 80's-90's, HR tachycardic >110, SBP 80's    0328 Physician on call paged per answering service. Nurse awaiting response.    0337 Return response received from Dr. Wilkerson (Physician on call for Dr. Ramires). Full update given on patient admit dx, hx, including patient's current status, need for >10L O2, and VS.  No new orders given. Nurse suggested for RRT, IVF, Physician declined.  Charge Nurse updated.

## 2024-04-18 NOTE — CONSULTS
Inpatient consult to Palliative Care  Consult performed by: Mandy Davalos FNP  Consult ordered by: Ephraim Ramires MD      Patient Name: Max Squires   MRN: [id number]   Admission Date: 4/3/2024   Hospital Length of Stay: 15   Attending Provider: Ephraim Ramires MD   Consulting Provider: Mandy CESAR  Reason for Consult: Goals of Care  Primary Care Physician: Ephraim Ramires MD     Principal Problem: Low back pain     Patient information was obtained from relative(s) and ER records.      Final diagnoses:  [R10.9] Abdominal pain  [K59.00] Constipation, unspecified constipation type (Primary)     Assessment/Plan:     I reviewed the patient and family's understanding of the seriousness of the illness and its expected prognosis. We discussed the patient's goals of care and treatment preferences. We discussed the difference between palliative and curative medicine. I explained the differences between home health, palliative and hospice care. I clarified current code status. I identified the surrogate decision maker or health care POA. I answered all questions and we formulated a plan including recommendations for symptom management and how to best achieve goals of care.       Advance Care Planning     Date: 04/18/2024    [facility]  I engaged the family in a voluntary conversation about advance care planning and we specifically addressed what the goals of care would be moving forward, in light of the patient's change in clinical status, specifically current condition.  We did specifically address the patient's likely prognosis, which is poor.  We explored the patient's values and preferences for future care.  The family endorses that what is most important right now is to focus on comfort and QOL     Accordingly, we have decided that the best plan to meet the patient's goals includes pivot to comfort-focused care    I did explain the role for hospice care at this stage of the patient's illness,  including its ability to help the patient live with the best quality of life possible.  We will not be making a hospice referral.    Met with patient's daughter and grandson-introduced service and discussed patient's history and current condition.  Family informed that patient's wife  2 days ago under the services of hospice at home and had end-stage dementia.  Daughter states she has a half sister who was not involved patient's care and that she has been caring for both her parents at her home for over a year.  She states that they are originally from [place] but she moved them here to [place] to care for them.    Daughter states that her mother had been on hospice services the past few months and had advanced dementia.  She states that she was overwhelmed caring for her mother due to her persistent agitation which required frequent medication adjustments.  Daughter states that she has a piece with patient's condition and understands that he is dying, but she is concerned about moving him out of the hospital.  I discussed palliative role in instituting comfort measures to which she was in agreement.  Discussed that generally patients or kept in the hospital for about a day for evaluation of their stability and then patient hospice care is discussed.  Discussed the benefits of transitioned to hospice house as staff there are trained in end of life care.  She states that she would like to talk to Dr. Salinas would like patient made comfortable.  Informed that I would in his to comfort measures and continue to monitor.  Offered extensive support informed the palliative Care will continue to follow.        Recommendations:     Comfort measures in place      History of Present Illness:     Patient is an 89-year-old male with PMH of COPD on home oxygen, CAD s/p stents, diastolic heart failure, HTN, HLD, back surgery, hip replacement, bladder lift presented to the hospital with severe intractable low back pain  which he reports began a few weeks ago after he struggled to pull his wife up off the floor who had fallen.  Patient also reported worsening constipation and trouble voiding and had seen his urologist the week prior to presentation during which a Almeida catheter was placed.  Patient seemed to be improving during hospitalization, but patient received news that his wife had  and began having increased respiratory effort and wheezing for which he had been receiving frequent nebulizer treatments.  RRT called earlier this morning for worsening respiratory status for which patient was placed on Vapotherm.  Palliative care consulted to discuss goals of care.      Active Ambulatory Problems     Diagnosis Date Noted    C. difficile diarrhea 2023    Benign essential hypertension 2023    Cluster headache 2023    Coronary arteriosclerosis 2023    Hyperlipidemia 2023    Tobacco dependence syndrome 2023    CHF (congestive heart failure) 2023    COPD (chronic obstructive pulmonary disease) 2023     Resolved Ambulatory Problems     Diagnosis Date Noted    No Resolved Ambulatory Problems     Past Medical History:   Diagnosis Date    Arthritis     Emphysema, unspecified     Hypercholesterolemia     Hypertension     Unspecified osteoarthritis, unspecified site         Past Surgical History:   Procedure Laterality Date    BACK SURGERY      CORONARY ANGIOPLASTY WITH STENT PLACEMENT      FRACTURE SURGERY      foot    HIP SURGERY Left     Dr. Hoyos    JOINT REPLACEMENT Left     QEUTA    JOINT REPLACEMENT Right     TKA        Review of patient's allergies indicates:  No Known Allergies       Current Facility-Administered Medications:     albuterol-ipratropium 2.5 mg-0.5 mg/3 mL nebulizer solution 3 mL, 3 mL, Nebulization, Q4H, Abel Salinas II, MD, 3 mL at 24 0826    ALPRAZolam tablet 0.5 mg, 0.5 mg, Oral, Q4H PRN, Waed Mathew II, MD, 0.5 mg at 24 6468    amLODIPine  tablet 5 mg, 5 mg, Oral, Daily, Ephraim Ramires MD, 5 mg at 04/17/24 1626    aspirin EC tablet 81 mg, 81 mg, Oral, Daily, Ephraim Ramires MD, 81 mg at 04/17/24 0900    atorvastatin tablet 80 mg, 80 mg, Oral, Daily, Ephraim Ramires MD, 80 mg at 04/17/24 1017    bisacodyL EC tablet 5 mg, 5 mg, Oral, Daily PRN, Cirilo Wilkerson MD    docusate sodium capsule 100 mg, 100 mg, Oral, BID, Althea Lundberg MD, 100 mg at 04/17/24 1017    enoxaparin injection 40 mg, 40 mg, Subcutaneous, Q24H (prophylaxis, 1700), Althea Lundberg MD, 40 mg at 04/17/24 1625    ezetimibe tablet 10 mg, 10 mg, Oral, QHS, Ephraim Ramires MD, 10 mg at 04/16/24 2221    finasteride tablet 5 mg, 5 mg, Oral, Daily, Ephraim Ramires MD, 5 mg at 04/17/24 1017    fluticasone furoate 100 mcg/actuation inhaler 1 puff, 1 puff, Inhalation, Daily, Althea Lundberg MD, 1 puff at 04/17/24 1017    furosemide tablet 40 mg, 40 mg, Oral, PRN, Ephraim Ramires MD, 40 mg at 04/15/24 0235    haloperidol lactate injection 2 mg, 2 mg, Intravenous, Q4H PRN, Mandy Davalos FNP    ipratropium 42 mcg (0.06 %) nasal spray 2 spray, 2 spray, Each Nostril, QID PRN, Althea Lundberg MD, 2 spray at 04/14/24 1022    lactulose 10 gram/15 ml solution 30 g, 30 g, Oral, Daily, Ephriam Ramires MD, 30 g at 04/17/24 1016    metoprolol succinate (TOPROL-XL) 24 hr tablet 25 mg, 25 mg, Oral, Daily, Ephraim Ramires MD, 25 mg at 04/17/24 1016    morphine injection 2 mg, 2 mg, Intravenous, Once PRN, Mandy Davalos FNP    morphine injection 2 mg, 2 mg, Intravenous, Q1H PRN, Mandy Davalos, ARSENIO    naloxegoL (MOVANTIK) tablet 25 mg, 25 mg, Oral, Daily, Ephraim Ramires MD, 25 mg at 04/17/24 1017    OLANZapine tablet 5 mg, 5 mg, Oral, Nightly PRN, Ephraim Rmaires MD, 5 mg at 04/17/24 2038    oxyCODONE-acetaminophen  mg per tablet 1 tablet, 1 tablet, Oral, Q6H PRN, Ephraim Ramires MD, 1 tablet at 04/16/24 2022     polyethylene glycol packet 17 g, 17 g, Oral, BID PRN, Cirilo Wilkerson MD    predniSONE tablet 40 mg, 40 mg, Oral, Daily, Althea Lundberg MD, 40 mg at 04/17/24 1017    tamsulosin 24 hr capsule 0.8 mg, 2 capsule, Oral, Daily, Ephraim Ramires MD, 0.8 mg at 04/17/24 1016    zinc oxide-cod liver oil 40 % paste, , Topical (Top), BID, Ephraim Ramires MD, Given at 04/17/24 1018       Current Facility-Administered Medications:     ALPRAZolam, 0.5 mg, Oral, Q4H PRN    bisacodyL, 5 mg, Oral, Daily PRN    furosemide, 40 mg, Oral, PRN    haloperidol lactate, 2 mg, Intravenous, Q4H PRN    ipratropium, 2 spray, Each Nostril, QID PRN    morphine, 2 mg, Intravenous, Once PRN    morphine, 2 mg, Intravenous, Q1H PRN    OLANZapine, 5 mg, Oral, Nightly PRN    oxyCODONE-acetaminophen, 1 tablet, Oral, Q6H PRN    polyethylene glycol, 17 g, Oral, BID PRN     No family history on file.     Review of Systems   Unable to perform ROS: Acuity of condition            Objective:   BP (!) 72/47   Pulse 102   Temp 98.7 °F (37.1 °C) (Oral)   Resp (!) 29   Ht 6' (1.829 m)   Wt 72.6 kg (160 lb)   SpO2 (!) 94%   BMI 21.70 kg/m²      Physical Exam  Constitutional:       Appearance: He is ill-appearing.   Eyes:      Pupils: Pupils are equal, round, and reactive to light.   Cardiovascular:      Rate and Rhythm: Rhythm irregular.   Pulmonary:      Effort: Respiratory distress present.      Breath sounds: Rhonchi present.   Abdominal:      Palpations: Abdomen is soft.   Skin:     Comments: cyanotic   Neurological:      Comments: To person           FAMILY CONTACTS:     Review of Symptoms  Review of Symptoms      Symptom Assessment (ESAS 0-10 Scale)  Pain:  0  Dyspnea:  0  Anxiety:  0  Nausea:  0  Depression:  0  Anorexia:  0  Fatigue:  0  Insomnia:  0  Restlessness:  0  Agitation:  0         Performance Status:  20    Living Arrangements:  Lives with spouse    Psychosocial/Cultural:   See Palliative Psychosocial Note: Yes  Patient is  recently  and has 2 daughters, one of whom is involved in his care.  He is retired from working at [employer].   **Primary  to Follow**  Palliative Care  Consult: No    Spiritual:  F - Alia and Belief:  Pentecostalism      Advance Care Planning   Advance Directives:   Living Will: Yes        Copy on chart: Yes    Do Not Resuscitate Status: Yes      Decision Making:  Family answered questions  Goals of Care: The family endorses that what is most important right now is to focus on comfort and QOL     Accordingly, we have decided that the best plan to meet the patient's goals includes pivot to comfort-focused care          PAINAD: NA    Caregiver burden formerly assessed: Yes        > 50% of 75 min of encounter was spent in chart review, face to face discussion of goals of care, symptom assessment, coordination of care and emotional support.         Mandy MEJÍAP, [facility]  Palliative Medicine  [facility]

## 2024-04-19 LAB
ABS NEUT (OLG): 31.56 X10(3)/MCL (ref 2.1–9.2)
ACANTHOCYTES (OLG): ABNORMAL
ALBUMIN SERPL-MCNC: 2.7 G/DL (ref 3.4–4.8)
ALBUMIN/GLOB SERPL: 0.8 RATIO (ref 1.1–2)
ALP SERPL-CCNC: 146 UNIT/L (ref 40–150)
ALT SERPL-CCNC: 104 UNIT/L (ref 0–55)
ANISOCYTOSIS BLD QL SMEAR: ABNORMAL
AST SERPL-CCNC: 66 UNIT/L (ref 5–34)
BACTERIA UR CULT: ABNORMAL
BILIRUB SERPL-MCNC: 0.5 MG/DL
BUN SERPL-MCNC: 59.8 MG/DL (ref 8.4–25.7)
BURR CELLS (OLG): ABNORMAL
CALCIUM SERPL-MCNC: 8.1 MG/DL (ref 8.8–10)
CHLORIDE SERPL-SCNC: 91 MMOL/L (ref 98–107)
CO2 SERPL-SCNC: 25 MMOL/L (ref 23–31)
CREAT SERPL-MCNC: 1.58 MG/DL (ref 0.73–1.18)
ERYTHROCYTE [DISTWIDTH] IN BLOOD BY AUTOMATED COUNT: 16.6 % (ref 11.5–17)
GFR SERPLBLD CREATININE-BSD FMLA CKD-EPI: 42 MLS/MIN/1.73/M2
GLOBULIN SER-MCNC: 3.3 GM/DL (ref 2.4–3.5)
GLUCOSE SERPL-MCNC: 128 MG/DL (ref 82–115)
HCT VFR BLD AUTO: 31.1 % (ref 42–52)
HGB BLD-MCNC: 9.8 G/DL (ref 14–18)
INSTRUMENT WBC (OLG): 32.54 X10(3)/MCL
LYMPHOCYTES NFR BLD MANUAL: 0.98 X10(3)/MCL
LYMPHOCYTES NFR BLD MANUAL: 3 %
MCH RBC QN AUTO: 26.6 PG (ref 27–31)
MCHC RBC AUTO-ENTMCNC: 31.5 G/DL (ref 33–36)
MCV RBC AUTO: 84.3 FL (ref 80–94)
MONOCYTES NFR BLD MANUAL: 0.33 X10(3)/MCL (ref 0.1–1.3)
MONOCYTES NFR BLD MANUAL: 1 %
NEUTROPHILS NFR BLD MANUAL: 97 %
NRBC BLD AUTO-RTO: 0 %
OVALOCYTES (OLG): ABNORMAL
PLATELET # BLD AUTO: 169 X10(3)/MCL (ref 130–400)
PLATELET # BLD EST: NORMAL 10*3/UL
PMV BLD AUTO: 10.9 FL (ref 7.4–10.4)
POIKILOCYTOSIS BLD QL SMEAR: ABNORMAL
POTASSIUM SERPL-SCNC: 4.5 MMOL/L (ref 3.5–5.1)
PROT SERPL-MCNC: 6 GM/DL (ref 5.8–7.6)
RBC # BLD AUTO: 3.69 X10(6)/MCL (ref 4.7–6.1)
RBC MORPH BLD: ABNORMAL
SODIUM SERPL-SCNC: 126 MMOL/L (ref 136–145)
WBC # SPEC AUTO: 32.54 X10(3)/MCL (ref 4.5–11.5)

## 2024-04-19 PROCEDURE — 25000242 PHARM REV CODE 250 ALT 637 W/ HCPCS: Performed by: INTERNAL MEDICINE

## 2024-04-19 PROCEDURE — 94799 UNLISTED PULMONARY SVC/PX: CPT

## 2024-04-19 PROCEDURE — 27100171 HC OXYGEN HIGH FLOW UP TO 24 HOURS

## 2024-04-19 PROCEDURE — 99900031 HC PATIENT EDUCATION (STAT)

## 2024-04-19 PROCEDURE — 99233 SBSQ HOSP IP/OBS HIGH 50: CPT | Mod: ,,, | Performed by: NURSE PRACTITIONER

## 2024-04-19 PROCEDURE — 63600175 PHARM REV CODE 636 W HCPCS: Performed by: INTERNAL MEDICINE

## 2024-04-19 PROCEDURE — 21400001 HC TELEMETRY ROOM

## 2024-04-19 PROCEDURE — 80053 COMPREHEN METABOLIC PANEL: CPT | Performed by: INTERNAL MEDICINE

## 2024-04-19 PROCEDURE — 94640 AIRWAY INHALATION TREATMENT: CPT

## 2024-04-19 PROCEDURE — 27000249 HC VAPOTHERM CIRCUIT

## 2024-04-19 PROCEDURE — 25000003 PHARM REV CODE 250: Performed by: INTERNAL MEDICINE

## 2024-04-19 PROCEDURE — 85027 COMPLETE CBC AUTOMATED: CPT | Performed by: INTERNAL MEDICINE

## 2024-04-19 PROCEDURE — 99900035 HC TECH TIME PER 15 MIN (STAT)

## 2024-04-19 PROCEDURE — 94760 N-INVAS EAR/PLS OXIMETRY 1: CPT

## 2024-04-19 PROCEDURE — 99233 SBSQ HOSP IP/OBS HIGH 50: CPT | Mod: ,,, | Performed by: INTERNAL MEDICINE

## 2024-04-19 PROCEDURE — 11000001 HC ACUTE MED/SURG PRIVATE ROOM

## 2024-04-19 PROCEDURE — 63600175 PHARM REV CODE 636 W HCPCS: Performed by: NURSE PRACTITIONER

## 2024-04-19 RX ORDER — SODIUM CHLORIDE 9 MG/ML
1000 INJECTION, SOLUTION INTRAVENOUS ONCE
Status: COMPLETED | OUTPATIENT
Start: 2024-04-19 | End: 2024-04-19

## 2024-04-19 RX ORDER — MORPHINE SULFATE 4 MG/ML
2 INJECTION, SOLUTION INTRAMUSCULAR; INTRAVENOUS EVERY 8 HOURS
Status: DISCONTINUED | OUTPATIENT
Start: 2024-04-19 | End: 2024-04-22

## 2024-04-19 RX ADMIN — MORPHINE SULFATE 2 MG: 4 INJECTION, SOLUTION INTRAMUSCULAR; INTRAVENOUS at 02:04

## 2024-04-19 RX ADMIN — IPRATROPIUM BROMIDE AND ALBUTEROL SULFATE 3 ML: 2.5; .5 SOLUTION RESPIRATORY (INHALATION) at 11:04

## 2024-04-19 RX ADMIN — IPRATROPIUM BROMIDE AND ALBUTEROL SULFATE 3 ML: 2.5; .5 SOLUTION RESPIRATORY (INHALATION) at 08:04

## 2024-04-19 RX ADMIN — TAMSULOSIN HYDROCHLORIDE 0.8 MG: 0.4 CAPSULE ORAL at 11:04

## 2024-04-19 RX ADMIN — IPRATROPIUM BROMIDE AND ALBUTEROL SULFATE 3 ML: 2.5; .5 SOLUTION RESPIRATORY (INHALATION) at 12:04

## 2024-04-19 RX ADMIN — IPRATROPIUM BROMIDE AND ALBUTEROL SULFATE 3 ML: 2.5; .5 SOLUTION RESPIRATORY (INHALATION) at 04:04

## 2024-04-19 RX ADMIN — ATORVASTATIN CALCIUM 80 MG: 40 TABLET, FILM COATED ORAL at 11:04

## 2024-04-19 RX ADMIN — FINASTERIDE 5 MG: 5 TABLET, FILM COATED ORAL at 09:04

## 2024-04-19 RX ADMIN — Medication: at 09:04

## 2024-04-19 RX ADMIN — PREDNISONE 40 MG: 20 TABLET ORAL at 11:04

## 2024-04-19 RX ADMIN — DOCUSATE SODIUM 100 MG: 100 CAPSULE, LIQUID FILLED ORAL at 11:04

## 2024-04-19 RX ADMIN — ENOXAPARIN SODIUM 30 MG: 30 INJECTION SUBCUTANEOUS at 05:04

## 2024-04-19 RX ADMIN — NALOXEGOL OXALATE 25 MG: 25 TABLET, FILM COATED ORAL at 11:04

## 2024-04-19 RX ADMIN — Medication: at 08:04

## 2024-04-19 RX ADMIN — EZETIMIBE 10 MG: 10 TABLET ORAL at 08:04

## 2024-04-19 RX ADMIN — IPRATROPIUM BROMIDE AND ALBUTEROL SULFATE 3 ML: 2.5; .5 SOLUTION RESPIRATORY (INHALATION) at 03:04

## 2024-04-19 RX ADMIN — MORPHINE SULFATE 2 MG: 4 INJECTION INTRAVENOUS at 08:04

## 2024-04-19 RX ADMIN — SODIUM CHLORIDE 1000 ML: 9 INJECTION, SOLUTION INTRAVENOUS at 02:04

## 2024-04-19 RX ADMIN — MORPHINE SULFATE 2 MG: 4 INJECTION INTRAVENOUS at 01:04

## 2024-04-19 RX ADMIN — OXYCODONE HYDROCHLORIDE AND ACETAMINOPHEN 1 TABLET: 10; 325 TABLET ORAL at 05:04

## 2024-04-19 RX ADMIN — ASPIRIN 81 MG: 81 TABLET, COATED ORAL at 11:04

## 2024-04-19 RX ADMIN — IPRATROPIUM BROMIDE 2 SPRAY: 42 SPRAY, METERED NASAL at 11:04

## 2024-04-19 RX ADMIN — AMLODIPINE BESYLATE 5 MG: 5 TABLET ORAL at 05:04

## 2024-04-19 RX ADMIN — LACTULOSE 30 G: 10 SOLUTION ORAL at 09:04

## 2024-04-19 RX ADMIN — DOCUSATE SODIUM 100 MG: 100 CAPSULE, LIQUID FILLED ORAL at 08:04

## 2024-04-19 RX ADMIN — FLUTICASONE FUROATE 1 PUFF: 100 POWDER RESPIRATORY (INHALATION) at 11:04

## 2024-04-19 RX ADMIN — IPRATROPIUM BROMIDE AND ALBUTEROL SULFATE 3 ML: 2.5; .5 SOLUTION RESPIRATORY (INHALATION) at 07:04

## 2024-04-19 RX ADMIN — MORPHINE SULFATE 2 MG: 4 INJECTION, SOLUTION INTRAMUSCULAR; INTRAVENOUS at 10:04

## 2024-04-19 NOTE — CONSULTS
Consults    Patient Name: Max Squiers   MRN: [id number]   Admission Date: 4/3/2024   Hospital Length of Stay: 16   Attending Provider: Ephraim Ramires MD   Consulting Provider: Mandy CESAR  Reason for Consult: Goals of Care  Primary Care Physician:  Ephraim Ramires MD     Principal Problem: Low back pain       Final diagnoses:  [R10.9] Abdominal pain  [K59.00] Constipation, unspecified constipation type (Primary)      Assessment/Plan:     I reviewed the patient and family's understanding of the seriousness of the illness and its expected prognosis. We discussed the patient's goals of care and treatment preferences.        Advance Care Planning     Date: 04/19/2024    [facility]  I engaged the family in a voluntary conversation about advance care planning and we specifically addressed what the goals of care would be moving forward, in light of the patient's change in clinical status, specifically current condition.  We did specifically address the patient's likely prognosis, which is poor.  We explored the patient's values and preferences for future care.  The family endorses that what is most important right now is to focus on comfort and QOL     Accordingly, we have decided that the best plan to meet the patient's goals includes continuing with treatment      Met with family who states that patient has slept better last night and morphine seems to be controlling his air hunger.    Informed that they spoke with Dr. Salinas this morning about plan.   Family would like to continue to monitor, but is considering hospice services after hospitalization.    Discussed medication  regimen with family  who informed that patient was more comfortable after IV dose of morphine this morning.  States they find him somewhat more agitated at this time.  I discussed that we would schedule morphine around the clock and that PRN. dose would remain in place.  Discussed that palliative  Medicine will continue to  monitor.           Recommendations:     Morphine 2 mg Q8 ATC with morphine 2 mg Q 1 PRN pain or dyspnea      Interval History:     Patient sitting up in bed and appears more comfortable.  Confused speaking gibberish at times. Palliative Care continuing to follow.      Active Ambulatory Problems     Diagnosis Date Noted    C. difficile diarrhea 03/19/2023    Benign essential hypertension 04/11/2023    Cluster headache 04/11/2023    Coronary arteriosclerosis 04/11/2023    Hyperlipidemia 04/11/2023    Tobacco dependence syndrome 04/11/2023    CHF (congestive heart failure) 04/11/2023    COPD (chronic obstructive pulmonary disease) 04/11/2023     Resolved Ambulatory Problems     Diagnosis Date Noted    No Resolved Ambulatory Problems     Past Medical History:   Diagnosis Date    Arthritis     Emphysema, unspecified     Hypercholesterolemia     Hypertension     Unspecified osteoarthritis, unspecified site         Past Surgical History:   Procedure Laterality Date    BACK SURGERY      CORONARY ANGIOPLASTY WITH STENT PLACEMENT      FRACTURE SURGERY      foot    HIP SURGERY Left     Dr. Hoyos    JOINT REPLACEMENT Left     QUETA    JOINT REPLACEMENT Right     TKA        Review of patient's allergies indicates:  No Known Allergies       Current Facility-Administered Medications:     albuterol-ipratropium 2.5 mg-0.5 mg/3 mL nebulizer solution 3 mL, 3 mL, Nebulization, Q4H, Abel Salinas II, MD, 3 mL at 04/19/24 0819    ALPRAZolam tablet 0.5 mg, 0.5 mg, Oral, Q4H PRN, Wade Mathew II, MD, 0.5 mg at 04/18/24 2155    amLODIPine tablet 5 mg, 5 mg, Oral, Daily, Ephraim Ramires MD, 5 mg at 04/17/24 1626    aspirin EC tablet 81 mg, 81 mg, Oral, Daily, Ephraim Ramires MD, 81 mg at 04/17/24 0900    atorvastatin tablet 80 mg, 80 mg, Oral, Daily, Ephraim Ramires MD, 80 mg at 04/17/24 1017    bisacodyL EC tablet 5 mg, 5 mg, Oral, Daily PRN, Cirilo Wilkerson MD    docusate sodium capsule 100 mg, 100 mg, Oral, BID,  Althea Lundberg MD, 100 mg at 04/18/24 2155    enoxaparin injection 30 mg, 30 mg, Subcutaneous, Q24H (prophylaxis, 1700), Ephraim Ramires MD    ezetimibe tablet 10 mg, 10 mg, Oral, QHS, Ephraim Ramires MD, 10 mg at 04/18/24 2156    finasteride tablet 5 mg, 5 mg, Oral, Daily, Ephraim Ramires MD, 5 mg at 04/17/24 1017    fluticasone furoate 100 mcg/actuation inhaler 1 puff, 1 puff, Inhalation, Daily, Althea Lundberg MD, 1 puff at 04/17/24 1017    furosemide tablet 40 mg, 40 mg, Oral, PRN, Ephraim Ramires MD, 40 mg at 04/15/24 0235    haloperidol lactate injection 2 mg, 2 mg, Intravenous, Q4H PRN, Mandy Davalos FNP    ipratropium 42 mcg (0.06 %) nasal spray 2 spray, 2 spray, Each Nostril, QID PRN, Althea Lundberg MD, 2 spray at 04/14/24 1022    lactulose 10 gram/15 ml solution 30 g, 30 g, Oral, Daily, Ephraim Ramires MD, 30 g at 04/17/24 1016    metoprolol succinate (TOPROL-XL) 24 hr tablet 25 mg, 25 mg, Oral, Daily, Ephraim Ramires MD, 25 mg at 04/17/24 1016    morphine injection 2 mg, 2 mg, Intravenous, Q1H PRN, Mandy Davalos FNP    morphine injection 2 mg, 2 mg, Intravenous, Q8H, Mandy Davalos FNP    naloxegoL (MOVANTIK) tablet 25 mg, 25 mg, Oral, Daily, Ephraim Ramires MD, 25 mg at 04/17/24 1017    OLANZapine tablet 5 mg, 5 mg, Oral, Nightly PRN, Ephraim Ramires MD, 5 mg at 04/18/24 2155    oxyCODONE-acetaminophen  mg per tablet 1 tablet, 1 tablet, Oral, Q6H PRN, Ephraim Ramires MD, 1 tablet at 04/19/24 0558    polyethylene glycol packet 17 g, 17 g, Oral, BID PRN, Cirilo Wilkerson MD    predniSONE tablet 40 mg, 40 mg, Oral, Daily, Althea Lundberg MD, 40 mg at 04/17/24 1017    tamsulosin 24 hr capsule 0.8 mg, 2 capsule, Oral, Daily, Ephraim Ramires MD, 0.8 mg at 04/17/24 1016    zinc oxide-cod liver oil 40 % paste, , Topical (Top), BID, Ephraim Ramires MD, Given at 04/18/24 7592       Current Facility-Administered  Medications:     ALPRAZolam, 0.5 mg, Oral, Q4H PRN    bisacodyL, 5 mg, Oral, Daily PRN    furosemide, 40 mg, Oral, PRN    haloperidol lactate, 2 mg, Intravenous, Q4H PRN    ipratropium, 2 spray, Each Nostril, QID PRN    morphine, 2 mg, Intravenous, Q1H PRN    OLANZapine, 5 mg, Oral, Nightly PRN    oxyCODONE-acetaminophen, 1 tablet, Oral, Q6H PRN    polyethylene glycol, 17 g, Oral, BID PRN     No family history on file.       Review of Systems   Unable to perform ROS: Acuity of condition            Objective:   /61   Pulse 96   Temp 96.1 °F (35.6 °C) (Axillary)   Resp 18   Ht 6' (1.829 m)   Wt 72.6 kg (160 lb)   SpO2 96%   BMI 21.70 kg/m²      Physical Exam   Constitutional: He appears ill.   Eyes: Pupils are equal, round, and reactive to light.   Cardiovascular: An irregular rhythm present. Pulmonary:      Breath sounds: Rhonchi present.      Comments: Dyspnea at rest    Abdominal: Soft.   Musculoskeletal:      Comments: sarcopenia   Neurological:   To person          Review of Symptoms  Review of Symptoms      Symptom Assessment (ESAS 0-10 Scale)  Pain:  0  Dyspnea:  0  Anxiety:  0  Nausea:  0  Depression:  0  Anorexia:  0  Fatigue:  0  Insomnia:  0  Restlessness:  0  Agitation:  0         Psychosocial/Cultural:   See Palliative Psychosocial Note: Yes  **Primary  to Follow**  Palliative Care  Consult: No      Advance Care Planning   Advance Care Planning        PAINAD: NA    Caregiver burden formerly assessed: Yes      No results displayed because visit has over 200 results.               > 50% of 40 min of encounter was spent in chart review, face to face discussion of goals of care, symptom assessment, coordination of care and emotional support.    Mandy Davalos FNP, [facility]  Palliative Medicine  [facility]

## 2024-04-19 NOTE — CONSULTS
1:30PM  RN Consult.  Offered pastoral support to grieving Pt.  I will follow  up tomorrow.  Fr. MATT Lepe

## 2024-04-19 NOTE — PROGRESS NOTES
[facility] Hospital Medicine  Progress Note    Patient Name: Max Squires  MRN: [id number]  Patient Class: IP- Inpatient   Admission Date: 4/3/2024  Length of Stay: 16 days  Attending Physician: Ephraim Ramires MD  Primary Care Provider: Ephraim Ramires MD        Subjective:     Principal Problem:Low back pain  Acute Condition:         HPI:  No notes on file    Overview/Hospital Course:  No notes on file    Interval History:  Patient doing little bit better than yesterday.  He is awake alert oriented sitting up on the side of the bed with Vapotherm.  Family bed no labs done this morning will repeat blood work today.  Overall stable compared to yesterday        Review of Systems   Constitutional: Negative.    HENT: Negative.     Eyes: Negative.    Respiratory:  Positive for shortness of breath. Negative for cough and chest tightness.    Cardiovascular:  Negative for chest pain and leg swelling.   Gastrointestinal:  Negative for abdominal pain, diarrhea, nausea and vomiting.   Endocrine: Negative.    Genitourinary: Negative.    Musculoskeletal: Negative.    Skin: Negative.    Allergic/Immunologic: Negative.    Neurological: Negative.  Negative for headaches.   Hematological: Negative.    Psychiatric/Behavioral: Negative.       Objective:     Vital Signs (Most Recent):  Temp: 97.7 °F (36.5 °C) (04/19/24 1132)  Pulse: 76 (04/19/24 1200)  Resp: 18 (04/19/24 1200)  BP: 95/65 (04/19/24 1132)  SpO2: 95 % (04/19/24 1200) Vital Signs (24h Range):  Temp:  [96.1 °F (35.6 °C)-98.2 °F (36.8 °C)] 97.7 °F (36.5 °C)  Pulse:  [] 76  Resp:  [16-31] 18  SpO2:  [95 %-98 %] 95 %  BP: ()/(51-71) 95/65     Weight: 72.6 kg (160 lb)  Body mass index is 21.7 kg/m².    Intake/Output Summary (Last 24 hours) at 4/19/2024 1215  Last data filed at 4/18/2024 2236  Gross per 24 hour   Intake 300 ml   Output --   Net 300 ml         Physical Exam  Constitutional:       General: He is in acute  distress (mild).   Cardiovascular:      Rate and Rhythm: Normal rate and regular rhythm.   Pulmonary:      Comments: Tachypneic, without any accessory muscle use, some pursed lip breathing, lungs with diminished breath sounds throughout but some exp wheezing bilaterally, no rales.  Musculoskeletal:      Right lower leg: No edema.      Left lower leg: No edema.   Skin:     General: Skin is warm and dry.   Neurological:      General: No focal deficit present.      Mental Status: He is alert.      Comments: Mild confusion             Significant Labs: All pertinent labs within the past 24 hours have been reviewed.    Significant Imaging: I have reviewed all pertinent imaging results/findings within the past 24 hours.  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      Hyponatremia  Improved    Closed compression fracture of L1 vertebra  Pain seems better.      Pelvic fluid collection  He has a large bladder diverticulum.      Moderate malnutrition  Nutrition consulted. Most recent weight and BMI monitored-     Measurements:  Wt Readings from Last 1 Encounters:   04/06/24 72.6 kg (160 lb)   Body mass index is 21.7 kg/m².    Patient has been screened and assessed by RD.    Malnutrition Type:  Context: chronic illness  Level: moderate    Malnutrition Characteristic Summary:  Weight Loss (Malnutrition): other (see comments) (does not meet criteria)  Energy Intake (Malnutrition): other (see comments) (does not meet criteria)  Subcutaneous Fat (Malnutrition): mild depletion  Muscle Mass (Malnutrition): moderate depletion    Interventions/Recommendations (treatment strategy):     I asked the nurse to see about getting him some meal supplements like Ensure or Boost today.      Urinary tract infection associated with indwelling urethral catheter  He completed a course of  abx.      Urinary retention  There were some issues with his bobo this am -- it was irritated, maybe was pulled during the night.  The nurse removed it this am.  He is having some bleeding.  I have instructed her to replace the bobo if no void over the next 6 hours.        COPD (chronic obstructive pulmonary disease)  Increase his nebs to Q 3-4 hours scheduled.  Continue steroids continue supplemental oxygen to maintain SpO2 greater than 90%.  Will discuss with the patient in the family regarding next step.  He probably would benefit from hospice care.    Coronary arteriosclerosis  stable continue home meds.    Benign essential hypertension  Chronic, controlled. Continue current meds -- amlodipine, metoprolol        VTE Risk Mitigation (From admission, onward)           Ordered     enoxaparin injection 30 mg  Every 24 hours         24 1341                    Discharge Planning   MATTY:      Code Status: DNR   Is the patient medically ready for discharge?:     Reason for patient still in hospital (select all that apply): Treatment  Discharge Plan A: Home with family          Improved compared to yesterday.  Repeat his lab work today shows continued hyponatremia.  Does have continued leukocytosis with left shift denies any fever.  Does have improved renal function with a serum creatinine 1.5 down from 1.9 yesterday.  Will give 1L normal saline to treat the sodium and also the kidney function.  Still would likely be a good candidate for hospice care.  The family still would like to think about this.  The patient's wife just  about 3 days ago they do not want to do home hospice because they just had to deal with that recently.  Otherwise continue current therapy.        ALLEN MARIANO MD  Department of Hospital Medicine   [facility]

## 2024-04-19 NOTE — SUBJECTIVE & OBJECTIVE
Interval History:  Patient doing little bit better than yesterday.  He is awake alert oriented sitting up on the side of the bed with Vapotherm.  Family bed no labs done this morning will repeat blood work today.  Overall stable compared to yesterday        Review of Systems   Constitutional: Negative.    HENT: Negative.     Eyes: Negative.    Respiratory:  Positive for shortness of breath. Negative for cough and chest tightness.    Cardiovascular:  Negative for chest pain and leg swelling.   Gastrointestinal:  Negative for abdominal pain, diarrhea, nausea and vomiting.   Endocrine: Negative.    Genitourinary: Negative.    Musculoskeletal: Negative.    Skin: Negative.    Allergic/Immunologic: Negative.    Neurological: Negative.  Negative for headaches.   Hematological: Negative.    Psychiatric/Behavioral: Negative.       Objective:     Vital Signs (Most Recent):  Temp: 97.7 °F (36.5 °C) (04/19/24 1132)  Pulse: 76 (04/19/24 1200)  Resp: 18 (04/19/24 1200)  BP: 95/65 (04/19/24 1132)  SpO2: 95 % (04/19/24 1200) Vital Signs (24h Range):  Temp:  [96.1 °F (35.6 °C)-98.2 °F (36.8 °C)] 97.7 °F (36.5 °C)  Pulse:  [] 76  Resp:  [16-31] 18  SpO2:  [95 %-98 %] 95 %  BP: ()/(51-71) 95/65     Weight: 72.6 kg (160 lb)  Body mass index is 21.7 kg/m².    Intake/Output Summary (Last 24 hours) at 4/19/2024 1215  Last data filed at 4/18/2024 2236  Gross per 24 hour   Intake 300 ml   Output --   Net 300 ml         Physical Exam  Constitutional:       General: He is in acute distress (mild).   Cardiovascular:      Rate and Rhythm: Normal rate and regular rhythm.   Pulmonary:      Comments: Tachypneic, without any accessory muscle use, some pursed lip breathing, lungs with diminished breath sounds throughout but some exp wheezing bilaterally, no rales.  Musculoskeletal:      Right lower leg: No edema.      Left lower leg: No edema.   Skin:     General: Skin is warm and dry.   Neurological:      General: No focal deficit  present.      Mental Status: He is alert.      Comments: Mild confusion             Significant Labs: All pertinent labs within the past 24 hours have been reviewed.    Significant Imaging: I have reviewed all pertinent imaging results/findings within the past 24 hours.  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

## 2024-04-20 LAB
ABS NEUT (OLG): 23.94 X10(3)/MCL (ref 2.1–9.2)
ACANTHOCYTES (OLG): ABNORMAL
ANION GAP SERPL CALC-SCNC: 8 MEQ/L
BUN SERPL-MCNC: 49.7 MG/DL (ref 8.4–25.7)
BURR CELLS (OLG): ABNORMAL
CALCIUM SERPL-MCNC: 8.2 MG/DL (ref 8.8–10)
CHLORIDE SERPL-SCNC: 96 MMOL/L (ref 98–107)
CO2 SERPL-SCNC: 26 MMOL/L (ref 23–31)
CREAT SERPL-MCNC: 1.07 MG/DL (ref 0.73–1.18)
CREAT/UREA NIT SERPL: 46
ERYTHROCYTE [DISTWIDTH] IN BLOOD BY AUTOMATED COUNT: 16.3 % (ref 11.5–17)
GFR SERPLBLD CREATININE-BSD FMLA CKD-EPI: >60 MLS/MIN/1.73/M2
GLUCOSE SERPL-MCNC: 157 MG/DL (ref 82–115)
HCT VFR BLD AUTO: 30.9 % (ref 42–52)
HGB BLD-MCNC: 9.9 G/DL (ref 14–18)
INSTRUMENT WBC (OLG): 25.47 X10(3)/MCL
LYMPHOCYTES NFR BLD MANUAL: 1.02 X10(3)/MCL
LYMPHOCYTES NFR BLD MANUAL: 4 %
MCH RBC QN AUTO: 26.4 PG (ref 27–31)
MCHC RBC AUTO-ENTMCNC: 32 G/DL (ref 33–36)
MCV RBC AUTO: 82.4 FL (ref 80–94)
MONOCYTES NFR BLD MANUAL: 0.76 X10(3)/MCL (ref 0.1–1.3)
MONOCYTES NFR BLD MANUAL: 3 %
NEUTROPHILS NFR BLD MANUAL: 94 %
NRBC BLD AUTO-RTO: 0 %
PLATELET # BLD AUTO: 150 X10(3)/MCL (ref 130–400)
PLATELET # BLD EST: NORMAL 10*3/UL
PMV BLD AUTO: 11.4 FL (ref 7.4–10.4)
POIKILOCYTOSIS BLD QL SMEAR: ABNORMAL
POTASSIUM SERPL-SCNC: 5.1 MMOL/L (ref 3.5–5.1)
RBC # BLD AUTO: 3.75 X10(6)/MCL (ref 4.7–6.1)
RBC MORPH BLD: ABNORMAL
SODIUM SERPL-SCNC: 130 MMOL/L (ref 136–145)
TARGETS BLD QL SMEAR: ABNORMAL
WBC # SPEC AUTO: 25.47 X10(3)/MCL (ref 4.5–11.5)

## 2024-04-20 PROCEDURE — 99900035 HC TECH TIME PER 15 MIN (STAT)

## 2024-04-20 PROCEDURE — 94640 AIRWAY INHALATION TREATMENT: CPT

## 2024-04-20 PROCEDURE — 25000003 PHARM REV CODE 250: Performed by: INTERNAL MEDICINE

## 2024-04-20 PROCEDURE — 25000242 PHARM REV CODE 250 ALT 637 W/ HCPCS: Performed by: INTERNAL MEDICINE

## 2024-04-20 PROCEDURE — 63600175 PHARM REV CODE 636 W HCPCS: Performed by: NURSE PRACTITIONER

## 2024-04-20 PROCEDURE — 63600175 PHARM REV CODE 636 W HCPCS: Performed by: INTERNAL MEDICINE

## 2024-04-20 PROCEDURE — 85007 BL SMEAR W/DIFF WBC COUNT: CPT | Performed by: INTERNAL MEDICINE

## 2024-04-20 PROCEDURE — 27000249 HC VAPOTHERM CIRCUIT

## 2024-04-20 PROCEDURE — 27100171 HC OXYGEN HIGH FLOW UP TO 24 HOURS

## 2024-04-20 PROCEDURE — 94799 UNLISTED PULMONARY SVC/PX: CPT

## 2024-04-20 PROCEDURE — 99900031 HC PATIENT EDUCATION (STAT)

## 2024-04-20 PROCEDURE — 11000001 HC ACUTE MED/SURG PRIVATE ROOM

## 2024-04-20 PROCEDURE — 80048 BASIC METABOLIC PNL TOTAL CA: CPT | Performed by: INTERNAL MEDICINE

## 2024-04-20 PROCEDURE — 94760 N-INVAS EAR/PLS OXIMETRY 1: CPT

## 2024-04-20 PROCEDURE — 21400001 HC TELEMETRY ROOM

## 2024-04-20 PROCEDURE — 27100092 HC HIGH FLOW DELIVERY CANNULA

## 2024-04-20 RX ADMIN — Medication: at 09:04

## 2024-04-20 RX ADMIN — IPRATROPIUM BROMIDE AND ALBUTEROL SULFATE 3 ML: 2.5; .5 SOLUTION RESPIRATORY (INHALATION) at 03:04

## 2024-04-20 RX ADMIN — DOCUSATE SODIUM 100 MG: 100 CAPSULE, LIQUID FILLED ORAL at 09:04

## 2024-04-20 RX ADMIN — LACTULOSE 30 G: 10 SOLUTION ORAL at 09:04

## 2024-04-20 RX ADMIN — METOPROLOL SUCCINATE 25 MG: 25 TABLET, EXTENDED RELEASE ORAL at 09:04

## 2024-04-20 RX ADMIN — MORPHINE SULFATE 2 MG: 4 INJECTION, SOLUTION INTRAMUSCULAR; INTRAVENOUS at 02:04

## 2024-04-20 RX ADMIN — IPRATROPIUM BROMIDE AND ALBUTEROL SULFATE 3 ML: 2.5; .5 SOLUTION RESPIRATORY (INHALATION) at 12:04

## 2024-04-20 RX ADMIN — MORPHINE SULFATE 2 MG: 4 INJECTION INTRAVENOUS at 11:04

## 2024-04-20 RX ADMIN — MORPHINE SULFATE 2 MG: 4 INJECTION, SOLUTION INTRAMUSCULAR; INTRAVENOUS at 09:04

## 2024-04-20 RX ADMIN — OXYCODONE HYDROCHLORIDE AND ACETAMINOPHEN 1 TABLET: 10; 325 TABLET ORAL at 09:04

## 2024-04-20 RX ADMIN — PREDNISONE 40 MG: 20 TABLET ORAL at 09:04

## 2024-04-20 RX ADMIN — MORPHINE SULFATE 2 MG: 4 INJECTION, SOLUTION INTRAMUSCULAR; INTRAVENOUS at 07:04

## 2024-04-20 RX ADMIN — IPRATROPIUM BROMIDE AND ALBUTEROL SULFATE 3 ML: 2.5; .5 SOLUTION RESPIRATORY (INHALATION) at 11:04

## 2024-04-20 RX ADMIN — TAMSULOSIN HYDROCHLORIDE 0.8 MG: 0.4 CAPSULE ORAL at 09:04

## 2024-04-20 RX ADMIN — NALOXEGOL OXALATE 25 MG: 25 TABLET, FILM COATED ORAL at 09:04

## 2024-04-20 RX ADMIN — HALOPERIDOL LACTATE 2 MG: 5 INJECTION, SOLUTION INTRAMUSCULAR at 05:04

## 2024-04-20 RX ADMIN — IPRATROPIUM BROMIDE AND ALBUTEROL SULFATE 3 ML: 2.5; .5 SOLUTION RESPIRATORY (INHALATION) at 08:04

## 2024-04-20 RX ADMIN — IPRATROPIUM BROMIDE 2 SPRAY: 42 SPRAY, METERED NASAL at 09:04

## 2024-04-20 RX ADMIN — ATORVASTATIN CALCIUM 80 MG: 40 TABLET, FILM COATED ORAL at 09:04

## 2024-04-20 RX ADMIN — IPRATROPIUM BROMIDE AND ALBUTEROL SULFATE 3 ML: 2.5; .5 SOLUTION RESPIRATORY (INHALATION) at 07:04

## 2024-04-20 RX ADMIN — FLUTICASONE FUROATE 1 PUFF: 100 POWDER RESPIRATORY (INHALATION) at 09:04

## 2024-04-20 RX ADMIN — OLANZAPINE 5 MG: 5 TABLET, FILM COATED ORAL at 09:04

## 2024-04-20 RX ADMIN — IPRATROPIUM BROMIDE AND ALBUTEROL SULFATE 3 ML: 2.5; .5 SOLUTION RESPIRATORY (INHALATION) at 04:04

## 2024-04-20 RX ADMIN — ASPIRIN 81 MG: 81 TABLET, COATED ORAL at 09:04

## 2024-04-20 RX ADMIN — EZETIMIBE 10 MG: 10 TABLET ORAL at 09:04

## 2024-04-20 RX ADMIN — FINASTERIDE 5 MG: 5 TABLET, FILM COATED ORAL at 09:04

## 2024-04-20 NOTE — PLAN OF CARE
Problem: Adult Inpatient Plan of Care  Goal: Plan of Care Review  Outcome: Ongoing, Progressing  Goal: Absence of Hospital-Acquired Illness or Injury  Outcome: Ongoing, Progressing  Goal: Optimal Comfort and Wellbeing  Outcome: Ongoing, Progressing     Problem: Fall Injury Risk  Goal: Absence of Fall and Fall-Related Injury  Outcome: Ongoing, Progressing     Problem: Pain Acute  Goal: Acceptable Pain Control and Functional Ability  Outcome: Ongoing, Progressing     Problem: Infection  Goal: Absence of Infection Signs and Symptoms  Outcome: Ongoing, Progressing     Problem: Palliative Care  Goal: Enhanced Quality of Life  Outcome: Ongoing, Progressing

## 2024-04-20 NOTE — PLAN OF CARE
Problem: Adult Inpatient Plan of Care  Goal: Plan of Care Review  Outcome: Ongoing, Progressing  Goal: Patient-Specific Goal (Individualized)  Outcome: Ongoing, Progressing  Goal: Absence of Hospital-Acquired Illness or Injury  Outcome: Ongoing, Progressing  Goal: Optimal Comfort and Wellbeing  Outcome: Ongoing, Progressing  Goal: Readiness for Transition of Care  Outcome: Ongoing, Progressing     Problem: Impaired Wound Healing  Goal: Optimal Wound Healing  Outcome: Ongoing, Progressing     Problem: Skin Injury Risk Increased  Goal: Skin Health and Integrity  Outcome: Ongoing, Progressing     Problem: Fall Injury Risk  Goal: Absence of Fall and Fall-Related Injury  Outcome: Ongoing, Progressing     Problem: Pain Acute  Goal: Acceptable Pain Control and Functional Ability  Outcome: Ongoing, Progressing     Problem: Infection  Goal: Absence of Infection Signs and Symptoms  Outcome: Ongoing, Progressing     Problem: Coping Ineffective  Goal: Effective Coping  Outcome: Ongoing, Progressing     Problem: Palliative Care  Goal: Enhanced Quality of Life  Outcome: Ongoing, Progressing

## 2024-04-20 NOTE — PROGRESS NOTES
"Progress Note  Hospital Medicine    Patient Name: Max Squires  YOB: 1935    Admit Date: 4/3/2024                     LOS: 17    SUBJECTIVE:     Reason for Admission:  Low back pain  See H&P for detailed presentating history and ROS.      Interval history: about the same  , refers pain is not well controlled  , nurse just gave him pain meds  3 mins  ago       OBJECTIVE:     Vital Signs Range (Last 24H):  Temp:  [97.5 °F (36.4 °C)-98.1 °F (36.7 °C)]   Pulse:  []   Resp:  [14-28]   BP: (105-132)/(54-78)   SpO2:  [94 %-97 %] Body mass index is 21.7 kg/m².  Wt Readings from Last 3 Encounters:   04/06/24 0950 72.6 kg (160 lb)   04/03/24 0528 72.6 kg (160 lb)   03/27/24 1305 72.6 kg (160 lb)   02/26/24 1553 76.2 kg (168 lb)       I & O (Last 24H):  Intake/Output Summary (Last 24 hours) at 4/20/2024 1219  Last data filed at 4/20/2024 0933  Gross per 24 hour   Intake 1080 ml   Output --   Net 1080 ml       Physical Exam:  Alert O X3   Speech is clear   HEART  R1R2  LUNGS  CTA bilaterally   ABD  benugn  Soft and dpressible    EXT  No edema     Diagnostic Results:  Lab Results   Component Value Date    WBC 25.47 (H) 04/20/2024    WBC 25.47 04/20/2024    HGB 9.9 (L) 04/20/2024    HCT 30.9 (L) 04/20/2024    MCV 82.4 04/20/2024     04/20/2024     Recent Labs   Lab 04/20/24  0331   *   K 5.1   CO2 26   BUN 49.7*   CREATININE 1.07   CALCIUM 8.2*     Lab Results   Component Value Date    INR 1.0 04/05/2024    PROTIME 13.0 04/05/2024     No results found for: "HGBA1C"  No results for input(s): "POCTGLUCOSE" in the last 72 hours.    ASSESSMENT/PLAN:     Active Hospital Problems    Diagnosis  POA    *Low back pain [M54.50]  Yes    Comfort measures only status [Z51.5]  Not Applicable    SOB (shortness of breath) [R06.02]  No    Closed compression fracture of L1 vertebra [S32.010A]  Yes    Hyponatremia [E87.1]  No    Moderate malnutrition [E44.0]  Yes         Patient meets ASPEN criteria for moderate " malnutrition of chronic illness per RD assessment as evidenced by:  Energy Intake (Malnutrition): other (see comments) (does not meet criteria)  Weight Loss (Malnutrition): other (see comments) (does not meet criteria)  Subcutaneous Fat (Malnutrition): mild depletion  Muscle Mass (Malnutrition): moderate depletion           A minimum of two characteristics is recommended for diagnosis of either severe or non-severe malnutrition.        Pelvic fluid collection [R18.8]  Yes    Urinary retention [R33.9]  Yes    Urinary tract infection associated with indwelling urethral catheter [T83.511A, N39.0]  Yes    Benign essential hypertension [I10]  Yes    Coronary arteriosclerosis [I25.10]  Yes    COPD (chronic obstructive pulmonary disease) [J44.9]  Yes      Resolved Hospital Problems   No resolved problems to display.        Problems Addressed Today:    Closed compression fracture of L1 vertebra  Pain controlled with percocet and getting PT, plans for SNF.      COPD (chronic obstructive pulmonary disease)  This is chronic and stable.    Moderate malnutrition  Nutrition consulted. Most recent weight and BMI monitored-     Measurements:  Wt Readings from Last 1 Encounters:   04/06/24 72.6 kg (160 lb)   Body mass index is 21.7 kg/m².    Patient has been screened and assessed by RD.    Malnutrition Type:  Context: chronic illness  Level: moderate    Malnutrition Characteristic Summary:  Weight Loss (Malnutrition): other (see comments) (does not meet criteria)  Energy Intake (Malnutrition): other (see comments) (does not meet criteria)  Subcutaneous Fat (Malnutrition): mild depletion  Muscle Mass (Malnutrition): moderate depletion    Interventions/Recommendations (treatment strategy):     I asked the nurse to see about getting him some meal supplements like Ensure or Boost today.      Hyponatremia  Patient has hyponatremia which is uncontrolled,We will aim to correct the sodium by 4-6mEq in 24 hours. We will monitor sodium Daily.  The hyponatremia is due to Dehydration/hypovolemia. We will obtain the following studies: bmp in am. We will treat the hyponatremia with IV fluids as follows: Normal Saline at 75 ml /hr. The patient's sodium results have been reviewed and are listed below.  Recent Labs   Lab 04/12/24  0523   *       Pelvic fluid collection  He has a large bladder diverticulum.      Urinary retention  Continue bobo.      Urinary tract infection associated with indwelling urethral catheter  He completed a course of abx.      Coronary arteriosclerosis  stable    Benign essential hypertension  Chronic, controlled. Continue current meds -- amlodipine, metoprolol      Closed compression fracture of L1 vertebra  Pain controlled with percocet and getting PT, plans for SNF.      Benign essential hypertension  Chronic, controlled. Continue current meds -- amlodipine, metoprolol      COPD (chronic obstructive pulmonary disease)  This is chronic and stable.    Hyponatremia  Improving with iv fluids      Improving with iv fluids.  I stopped the fluids yesterday.    SOB (shortness of breath)  He is wheezing and does have a h/o copd.  But he is having orthonpnea.  Looks like JVP is elevated.  Will get a cxr, check a bnp and try a small dose of lasix.      COPD (chronic obstructive pulmonary disease)  His breathing is getting worse.  Will check a d-dimer.  If that is +, will get CT to r/o PE.  Add prednsone.    Closed compression fracture of L1 vertebra  Pain seems better.      Hyponatremia  Improved    Pelvic fluid collection  He has a large bladder diverticulum.      SOB (shortness of breath)  As stated above.      COPD (chronic obstructive pulmonary disease)  His breathing is getting worse.  I added prednisone yesterday.  Will consult pulmonary to see if they have any other recommendations.  Will get ST eval.  Echo shows normal systolic function and some diastolic dysfunction.  Will try another dose of lasix.    Closed compression  fracture of L1 vertebra  Pain seems better.      Benign essential hypertension  Chronic, controlled. Continue current meds -- amlodipine, metoprolol      Urinary retention  There were some issues with his bobo this am -- it was irritated, maybe was pulled during the night.  The nurse removed it this am.  He is having some bleeding.  I have instructed her to replace the bobo if no void over the next 6 hours.        Benign essential hypertension  Chronic, controlled. Continue current meds -- amlodipine, metoprolol      COPD (chronic obstructive pulmonary disease)  Increase his nebs to Q 3-4 hours scheduled.  Continue steroids continue supplemental oxygen to maintain SpO2 greater than 90%.  Will discuss with the patient in the family regarding next step.  He probably would benefit from hospice care.    Coronary arteriosclerosis  stable continue home meds.    Hyponatremia  Improved    SOB (shortness of breath)  As stated above.        DISCHARGE PLANNING:     Continue palliative  care   Signing Physician:  Cirilo Triplett MD

## 2024-04-21 PROCEDURE — 25000242 PHARM REV CODE 250 ALT 637 W/ HCPCS: Performed by: INTERNAL MEDICINE

## 2024-04-21 PROCEDURE — 94640 AIRWAY INHALATION TREATMENT: CPT

## 2024-04-21 PROCEDURE — 27100171 HC OXYGEN HIGH FLOW UP TO 24 HOURS

## 2024-04-21 PROCEDURE — 99900026 HC AIRWAY MAINTENANCE (STAT)

## 2024-04-21 PROCEDURE — 31720 CLEARANCE OF AIRWAYS: CPT

## 2024-04-21 PROCEDURE — 99900031 HC PATIENT EDUCATION (STAT)

## 2024-04-21 PROCEDURE — 63600175 PHARM REV CODE 636 W HCPCS: Performed by: NURSE PRACTITIONER

## 2024-04-21 PROCEDURE — 94760 N-INVAS EAR/PLS OXIMETRY 1: CPT

## 2024-04-21 PROCEDURE — 25000003 PHARM REV CODE 250: Performed by: INTERNAL MEDICINE

## 2024-04-21 PROCEDURE — 21400001 HC TELEMETRY ROOM

## 2024-04-21 PROCEDURE — 99900035 HC TECH TIME PER 15 MIN (STAT)

## 2024-04-21 PROCEDURE — 63600175 PHARM REV CODE 636 W HCPCS: Performed by: INTERNAL MEDICINE

## 2024-04-21 PROCEDURE — 11000001 HC ACUTE MED/SURG PRIVATE ROOM

## 2024-04-21 RX ADMIN — DOCUSATE SODIUM 100 MG: 100 CAPSULE, LIQUID FILLED ORAL at 09:04

## 2024-04-21 RX ADMIN — IPRATROPIUM BROMIDE AND ALBUTEROL SULFATE 3 ML: 2.5; .5 SOLUTION RESPIRATORY (INHALATION) at 03:04

## 2024-04-21 RX ADMIN — IPRATROPIUM BROMIDE AND ALBUTEROL SULFATE 3 ML: 2.5; .5 SOLUTION RESPIRATORY (INHALATION) at 04:04

## 2024-04-21 RX ADMIN — IPRATROPIUM BROMIDE AND ALBUTEROL SULFATE 3 ML: 2.5; .5 SOLUTION RESPIRATORY (INHALATION) at 12:04

## 2024-04-21 RX ADMIN — IPRATROPIUM BROMIDE AND ALBUTEROL SULFATE 3 ML: 2.5; .5 SOLUTION RESPIRATORY (INHALATION) at 08:04

## 2024-04-21 RX ADMIN — IPRATROPIUM BROMIDE 2 SPRAY: 42 SPRAY, METERED NASAL at 09:04

## 2024-04-21 RX ADMIN — EZETIMIBE 10 MG: 10 TABLET ORAL at 08:04

## 2024-04-21 RX ADMIN — MORPHINE SULFATE 2 MG: 4 INJECTION, SOLUTION INTRAMUSCULAR; INTRAVENOUS at 06:04

## 2024-04-21 RX ADMIN — NALOXEGOL OXALATE 25 MG: 25 TABLET, FILM COATED ORAL at 09:04

## 2024-04-21 RX ADMIN — Medication: at 09:04

## 2024-04-21 RX ADMIN — FLUTICASONE FUROATE 1 PUFF: 100 POWDER RESPIRATORY (INHALATION) at 09:04

## 2024-04-21 RX ADMIN — ASPIRIN 81 MG: 81 TABLET, COATED ORAL at 09:04

## 2024-04-21 RX ADMIN — MORPHINE SULFATE 2 MG: 4 INJECTION INTRAVENOUS at 05:04

## 2024-04-21 RX ADMIN — ENOXAPARIN SODIUM 30 MG: 30 INJECTION SUBCUTANEOUS at 05:04

## 2024-04-21 RX ADMIN — POLYETHYLENE GLYCOL 3350 17 G: 17 POWDER, FOR SOLUTION ORAL at 01:04

## 2024-04-21 RX ADMIN — ALPRAZOLAM 0.5 MG: 0.5 TABLET ORAL at 03:04

## 2024-04-21 RX ADMIN — OLANZAPINE 5 MG: 5 TABLET, FILM COATED ORAL at 11:04

## 2024-04-21 RX ADMIN — METOPROLOL SUCCINATE 25 MG: 25 TABLET, EXTENDED RELEASE ORAL at 09:04

## 2024-04-21 RX ADMIN — AMLODIPINE BESYLATE 5 MG: 5 TABLET ORAL at 05:04

## 2024-04-21 RX ADMIN — ATORVASTATIN CALCIUM 80 MG: 40 TABLET, FILM COATED ORAL at 09:04

## 2024-04-21 RX ADMIN — IPRATROPIUM BROMIDE AND ALBUTEROL SULFATE 3 ML: 2.5; .5 SOLUTION RESPIRATORY (INHALATION) at 07:04

## 2024-04-21 RX ADMIN — LACTULOSE 30 G: 10 SOLUTION ORAL at 09:04

## 2024-04-21 RX ADMIN — MORPHINE SULFATE 2 MG: 4 INJECTION, SOLUTION INTRAMUSCULAR; INTRAVENOUS at 09:04

## 2024-04-21 RX ADMIN — DOCUSATE SODIUM 100 MG: 100 CAPSULE, LIQUID FILLED ORAL at 08:04

## 2024-04-21 RX ADMIN — BISACODYL 5 MG: 5 TABLET, COATED ORAL at 01:04

## 2024-04-21 RX ADMIN — MORPHINE SULFATE 2 MG: 4 INJECTION, SOLUTION INTRAMUSCULAR; INTRAVENOUS at 02:04

## 2024-04-21 RX ADMIN — Medication: at 08:04

## 2024-04-21 RX ADMIN — PREDNISONE 40 MG: 20 TABLET ORAL at 09:04

## 2024-04-21 RX ADMIN — FINASTERIDE 5 MG: 5 TABLET, FILM COATED ORAL at 09:04

## 2024-04-21 RX ADMIN — OXYCODONE HYDROCHLORIDE AND ACETAMINOPHEN 1 TABLET: 10; 325 TABLET ORAL at 03:04

## 2024-04-21 RX ADMIN — TAMSULOSIN HYDROCHLORIDE 0.8 MG: 0.4 CAPSULE ORAL at 09:04

## 2024-04-21 NOTE — PLAN OF CARE
Problem: Adult Inpatient Plan of Care  Goal: Plan of Care Review  4/21/2024 1641 by Nava Craft LPN  Outcome: Ongoing, Progressing  4/21/2024 1533 by Nava Craft LPN  Outcome: Ongoing, Progressing  4/21/2024 1531 by Nava Craft LPN  Outcome: Ongoing, Progressing  Goal: Patient-Specific Goal (Individualized)  4/21/2024 1641 by Nava Craft LPN  Outcome: Ongoing, Progressing  4/21/2024 1533 by Nava Craft LPN  Outcome: Ongoing, Progressing  4/21/2024 1531 by Nava Craft LPN  Outcome: Ongoing, Progressing  Goal: Absence of Hospital-Acquired Illness or Injury  4/21/2024 1641 by Nava Craft LPN  Outcome: Ongoing, Progressing  4/21/2024 1533 by Nava Craft LPN  Outcome: Ongoing, Progressing  4/21/2024 1531 by Nava Craft LPN  Outcome: Ongoing, Progressing  Goal: Optimal Comfort and Wellbeing  4/21/2024 1641 by Nava Craft LPN  Outcome: Ongoing, Progressing  4/21/2024 1533 by Nava Craft LPN  Outcome: Ongoing, Progressing  4/21/2024 1531 by Nava Craft LPN  Outcome: Ongoing, Progressing  Goal: Readiness for Transition of Care  4/21/2024 1641 by Nava Craft LPN  Outcome: Ongoing, Progressing  4/21/2024 1533 by Nava Craft LPN  Outcome: Ongoing, Progressing  4/21/2024 1531 by Nava Craft LPN  Outcome: Ongoing, Progressing     Problem: Impaired Wound Healing  Goal: Optimal Wound Healing  4/21/2024 1641 by Nava Craft LPN  Outcome: Ongoing, Progressing  4/21/2024 1533 by Nava Craft LPN  Outcome: Ongoing, Progressing  4/21/2024 1531 by Nava Craft LPN  Outcome: Ongoing, Progressing     Problem: Skin Injury Risk Increased  Goal: Skin Health and Integrity  4/21/2024 1641 by Nava Craft LPN  Outcome: Ongoing, Progressing  4/21/2024 1533 by Nava Craft LPN  Outcome: Ongoing, Progressing  4/21/2024 1531 by Craft, Nava, LPN  Outcome: Ongoing, Progressing     Problem: Fall Injury Risk  Goal: Absence of Fall  [Patient reported PAP Smear was normal] : Patient reported PAP Smear was normal and Fall-Related Injury  4/21/2024 1641 by Nava Craft LPN  Outcome: Ongoing, Progressing  4/21/2024 1533 by Nava Craft LPN  Outcome: Ongoing, Progressing  4/21/2024 1531 by Nava Craft LPN  Outcome: Ongoing, Progressing     Problem: Pain Acute  Goal: Acceptable Pain Control and Functional Ability  4/21/2024 1641 by Nava Craft LPN  Outcome: Ongoing, Progressing  4/21/2024 1533 by Nava Craft LPN  Outcome: Ongoing, Progressing  4/21/2024 1531 by Nava Craft LPN  Outcome: Ongoing, Progressing     Problem: Infection  Goal: Absence of Infection Signs and Symptoms  4/21/2024 1641 by Nava Craft LPN  Outcome: Ongoing, Progressing  4/21/2024 1533 by Nava Craft LPN  Outcome: Ongoing, Progressing  4/21/2024 1531 by Nava Craft LPN  Outcome: Ongoing, Progressing     Problem: Coping Ineffective  Goal: Effective Coping  4/21/2024 1641 by Nava Craft LPN  Outcome: Ongoing, Progressing  4/21/2024 1533 by Nava Craft LPN  Outcome: Ongoing, Progressing  4/21/2024 1531 by Nava Craft LPN  Outcome: Ongoing, Progressing     Problem: Palliative Care  Goal: Enhanced Quality of Life  4/21/2024 1641 by Nava Carft LPN  Outcome: Ongoing, Progressing  4/21/2024 1533 by Nava Craft LPN  Outcome: Ongoing, Progressing  4/21/2024 1531 by Nava Craft LPN  Outcome: Ongoing, Progressing      [HIV Test offered] : HIV Test offered [PapSmearDate] : 11/23

## 2024-04-21 NOTE — PLAN OF CARE
Problem: Adult Inpatient Plan of Care  Goal: Plan of Care Review  Outcome: Ongoing, Progressing     Problem: Adult Inpatient Plan of Care  Goal: Absence of Hospital-Acquired Illness or Injury  Outcome: Ongoing, Progressing     Problem: Adult Inpatient Plan of Care  Goal: Optimal Comfort and Wellbeing  Outcome: Ongoing, Progressing     Problem: Skin Injury Risk Increased  Goal: Skin Health and Integrity  Outcome: Ongoing, Progressing     Problem: Impaired Wound Healing  Goal: Optimal Wound Healing  Outcome: Ongoing, Progressing     Problem: Fall Injury Risk  Goal: Absence of Fall and Fall-Related Injury  Outcome: Ongoing, Progressing     Problem: Palliative Care  Goal: Enhanced Quality of Life  Outcome: Ongoing, Progressing     Problem: Infection  Goal: Absence of Infection Signs and Symptoms  Outcome: Ongoing, Progressing

## 2024-04-21 NOTE — PLAN OF CARE
Problem: Adult Inpatient Plan of Care  Goal: Plan of Care Review  4/21/2024 1533 by Nava Craft LPN  Outcome: Ongoing, Progressing  4/21/2024 1531 by Nava Craft LPN  Outcome: Ongoing, Progressing  Goal: Patient-Specific Goal (Individualized)  4/21/2024 1533 by Nava Craft LPN  Outcome: Ongoing, Progressing  4/21/2024 1531 by Nava Craft LPN  Outcome: Ongoing, Progressing  Goal: Absence of Hospital-Acquired Illness or Injury  4/21/2024 1533 by Nava Craft LPN  Outcome: Ongoing, Progressing  4/21/2024 1531 by Nava Craft LPN  Outcome: Ongoing, Progressing  Goal: Optimal Comfort and Wellbeing  4/21/2024 1533 by Nava Craft LPN  Outcome: Ongoing, Progressing  4/21/2024 1531 by Nvaa Craft LPN  Outcome: Ongoing, Progressing  Goal: Readiness for Transition of Care  4/21/2024 1533 by Nava Craft LPN  Outcome: Ongoing, Progressing  4/21/2024 1531 by Nava Craft LPN  Outcome: Ongoing, Progressing     Problem: Impaired Wound Healing  Goal: Optimal Wound Healing  4/21/2024 1533 by Nava Craft LPN  Outcome: Ongoing, Progressing  4/21/2024 1531 by Nava Craft LPN  Outcome: Ongoing, Progressing     Problem: Skin Injury Risk Increased  Goal: Skin Health and Integrity  4/21/2024 1533 by Nava Craft LPN  Outcome: Ongoing, Progressing  4/21/2024 1531 by Nava Craft LPN  Outcome: Ongoing, Progressing     Problem: Fall Injury Risk  Goal: Absence of Fall and Fall-Related Injury  4/21/2024 1533 by Nava Craft LPN  Outcome: Ongoing, Progressing  4/21/2024 1531 by Nava Craft LPN  Outcome: Ongoing, Progressing     Problem: Pain Acute  Goal: Acceptable Pain Control and Functional Ability  4/21/2024 1533 by Nava Craft LPN  Outcome: Ongoing, Progressing  4/21/2024 1531 by Nava Craft LPN  Outcome: Ongoing, Progressing     Problem: Infection  Goal: Absence of Infection Signs and Symptoms  4/21/2024 1533 by Nava Craft  LPN  Outcome: Ongoing, Progressing  4/21/2024 1531 by Nava Craft LPN  Outcome: Ongoing, Progressing     Problem: Coping Ineffective  Goal: Effective Coping  4/21/2024 1533 by Nava Craft LPN  Outcome: Ongoing, Progressing  4/21/2024 1531 by Nava Craft LPN  Outcome: Ongoing, Progressing     Problem: Palliative Care  Goal: Enhanced Quality of Life  4/21/2024 1533 by Nava Craft LPN  Outcome: Ongoing, Progressing  4/21/2024 1531 by Nava Craft LPN  Outcome: Ongoing, Progressing

## 2024-04-22 PROCEDURE — 94760 N-INVAS EAR/PLS OXIMETRY 1: CPT

## 2024-04-22 PROCEDURE — 94799 UNLISTED PULMONARY SVC/PX: CPT

## 2024-04-22 PROCEDURE — 25000242 PHARM REV CODE 250 ALT 637 W/ HCPCS: Performed by: INTERNAL MEDICINE

## 2024-04-22 PROCEDURE — 63600175 PHARM REV CODE 636 W HCPCS: Performed by: NURSE PRACTITIONER

## 2024-04-22 PROCEDURE — 11000001 HC ACUTE MED/SURG PRIVATE ROOM

## 2024-04-22 PROCEDURE — 25000003 PHARM REV CODE 250: Performed by: INTERNAL MEDICINE

## 2024-04-22 PROCEDURE — 99900031 HC PATIENT EDUCATION (STAT)

## 2024-04-22 PROCEDURE — 99233 SBSQ HOSP IP/OBS HIGH 50: CPT | Mod: ,,, | Performed by: INTERNAL MEDICINE

## 2024-04-22 PROCEDURE — 99900035 HC TECH TIME PER 15 MIN (STAT)

## 2024-04-22 PROCEDURE — 94640 AIRWAY INHALATION TREATMENT: CPT

## 2024-04-22 PROCEDURE — 63600175 PHARM REV CODE 636 W HCPCS: Mod: JZ,JG | Performed by: INTERNAL MEDICINE

## 2024-04-22 PROCEDURE — 27100171 HC OXYGEN HIGH FLOW UP TO 24 HOURS

## 2024-04-22 RX ORDER — MORPHINE SULFATE 4 MG/ML
3 INJECTION, SOLUTION INTRAMUSCULAR; INTRAVENOUS EVERY 8 HOURS
Status: DISCONTINUED | OUTPATIENT
Start: 2024-04-22 | End: 2024-04-23

## 2024-04-22 RX ADMIN — IPRATROPIUM BROMIDE AND ALBUTEROL SULFATE 3 ML: 2.5; .5 SOLUTION RESPIRATORY (INHALATION) at 07:04

## 2024-04-22 RX ADMIN — MORPHINE SULFATE 2 MG: 4 INJECTION, SOLUTION INTRAMUSCULAR; INTRAVENOUS at 05:04

## 2024-04-22 RX ADMIN — IPRATROPIUM BROMIDE AND ALBUTEROL SULFATE 3 ML: 2.5; .5 SOLUTION RESPIRATORY (INHALATION) at 12:04

## 2024-04-22 RX ADMIN — MORPHINE SULFATE 3 MG: 4 INJECTION, SOLUTION INTRAMUSCULAR; INTRAVENOUS at 10:04

## 2024-04-22 RX ADMIN — IPRATROPIUM BROMIDE AND ALBUTEROL SULFATE 3 ML: 2.5; .5 SOLUTION RESPIRATORY (INHALATION) at 03:04

## 2024-04-22 RX ADMIN — IPRATROPIUM BROMIDE AND ALBUTEROL SULFATE 3 ML: 2.5; .5 SOLUTION RESPIRATORY (INHALATION) at 04:04

## 2024-04-22 RX ADMIN — DOCUSATE SODIUM 100 MG: 100 CAPSULE, LIQUID FILLED ORAL at 09:04

## 2024-04-22 RX ADMIN — MORPHINE SULFATE 2 MG: 4 INJECTION INTRAVENOUS at 04:04

## 2024-04-22 NOTE — PLAN OF CARE
Problem: Adult Inpatient Plan of Care  Goal: Plan of Care Review  Outcome: Ongoing, Progressing  Goal: Absence of Hospital-Acquired Illness or Injury  Outcome: Ongoing, Progressing  Goal: Optimal Comfort and Wellbeing  Outcome: Ongoing, Progressing     Problem: Skin Injury Risk Increased  Goal: Skin Health and Integrity  Outcome: Ongoing, Progressing     Problem: Fall Injury Risk  Goal: Absence of Fall and Fall-Related Injury  Outcome: Ongoing, Progressing     Problem: Pain Acute  Goal: Acceptable Pain Control and Functional Ability  Outcome: Ongoing, Progressing     Problem: Palliative Care  Goal: Enhanced Quality of Life  Outcome: Ongoing, Progressing

## 2024-04-22 NOTE — PROGRESS NOTES
Advance Care Planning     Date: 04/22/2024    Today a voluntary meeting took place: bedside    Patient Participation: Patient is unable to participate     Attendees (Name and  Relationship to patient):  No family at bedside,  reports will follow up with patient's daughter regarding patient's discharge planning, patient continues to appear weak and frail, requiring vapotherm oxygen therapy.     Staff attendees (Name and  Role): Kathy RN-CHPN,     ACP Conversation (General): Understanding of current condition discuss current condition, patient reports he remains tired and weak. Pain and feeling of shortness of breath reported with little activity, denies while lying still resting. Patient remains terminal, case management     To discuss discharge planning goals. Patient continues to receive palliative care.     Code Status: DNR; status confirmed/order placed in chart    ACP Documents: Confirmed existing forms and scanned into chart    Goals of care: The patient and family endorses that what is most important right now is to focus on remaining as independent as possible, symptom/pain control, improvement in condition but with limits to invasive therapies, and comfort and QOL     Accordingly, we have decided that the best plan to meet the patient's goals includes continuing with treatment and pivot to comfort-focused care      Recommendations/  Follow-up tasks: The patient and health care agent were provided the following recommendations Hospice services at home vs nursing home vs inpatient

## 2024-04-22 NOTE — PROGRESS NOTES
Inpatient Nutrition Assessment    Admit Date: 4/3/2024   Total duration of encounter: 19 days   Patient Age: 89 y.o.    Nutrition Recommendation/Prescription     Oral diet as feasible/appropriate.   Consult RD if plan of care changes or more aggressive nutrition intervention is desired/warranted.     Communication of Recommendations: reviewed with nurse    Nutrition Assessment     Malnutrition Assessment/Nutrition-Focused Physical Exam    Malnutrition Context: chronic illness (04/09/24 1040)  Malnutrition Level: moderate (04/09/24 1040)  Energy Intake (Malnutrition): other (see comments) (does not meet criteria) (04/09/24 1040)  Weight Loss (Malnutrition): other (see comments) (does not meet criteria) (04/09/24 1040)  Subcutaneous Fat (Malnutrition): mild depletion (04/09/24 1040)  Orbital Region (Subcutaneous Fat Loss): mild depletion  Upper Arm Region (Subcutaneous Fat Loss): mild depletion     Muscle Mass (Malnutrition): moderate depletion (04/09/24 1040)  [place] Region (Muscle Loss): moderate depletion     Clavicle and Acromion Bone Region (Muscle Loss): moderate depletion     Dorsal Hand (Muscle Loss): moderate depletion                    A minimum of two characteristics is recommended for diagnosis of either severe or non-severe malnutrition.    Chart Review    Reason Seen: length of stay and follow-up    Malnutrition Screening Tool Results   Have you recently lost weight without trying?: No  Have you been eating poorly because of a decreased appetite?: No   MST Score: 0   Diagnosis:  Subacute fracture L1   Suspected pelvic abscess   UTI and pre-existing urinary retention with [place] catheter  Hyperkalemia. Corrected   Constipation     Relevant Medical History: COPD, CAD, HF    Scheduled Medications:  albuterol-ipratropium, 3 mL, Q4H  aspirin, 81 mg, Daily  docusate sodium, 100 mg, BID  finasteride, 5 mg, Daily  fluticasone furoate, 1 puff, Daily  lactulose 10 gram/15 ml, 30 g, Daily  metoprolol succinate, 25  mg, Daily  morphine, 3 mg, Q8H  naloxegoL, 25 mg, Daily  predniSONE, 40 mg, Daily  tamsulosin, 2 capsule, Daily  zinc oxide-cod liver oil, , BID    Continuous Infusions:   PRN Medications:     Current Facility-Administered Medications:     ALPRAZolam, 0.5 mg, Oral, Q4H PRN    bisacodyL, 5 mg, Oral, Daily PRN    furosemide, 40 mg, Oral, PRN    haloperidol lactate, 2 mg, Intravenous, Q4H PRN    ipratropium, 2 spray, Each Nostril, QID PRN    morphine, 2 mg, Intravenous, Q1H PRN    OLANZapine, 5 mg, Oral, Nightly PRN    oxyCODONE-acetaminophen, 1 tablet, Oral, Q6H PRN    polyethylene glycol, 17 g, Oral, BID PRN      Calorie Containing IV Medications: no significant kcals from medications at this time    Recent Labs   Lab 04/17/24  0351 04/18/24  0431 04/19/24  0921 04/20/24  0331   * 126* 126* 130*   K 4.0 4.5 4.5 5.1   CALCIUM 8.1* 8.0* 8.1* 8.2*   CHLORIDE 91* 90* 91* 96*   CO2 23 19* 25 26   BUN 28.8* 51.6* 59.8* 49.7*   CREATININE 1.32* 1.90* 1.58* 1.07   EGFRNORACEVR 52 33 42 >60   GLUCOSE 84 94 128* 157*   BILITOT  --  1.3 0.5  --    ALKPHOS  --  191* 146  --    ALT  --  56* 104*  --    AST  --  70* 66*  --    ALBUMIN  --  2.7* 2.7*  --    WBC 36.26* 31.39  31.39* 32.54  32.54* 25.47  25.47*   HGB 9.5* 9.1* 9.8* 9.9*   HCT 29.5* 28.5* 31.1* 30.9*     Nutrition Orders:  Diet Heart Healthy  Dietary nutrition supplements Boost Original Nutritional Drink - Vanilla; BID    Appetite/Oral Intake: poor/0-25% of meals  Factors Affecting Nutritional Intake: respiratory status  Food/Episcopalian/Cultural Preferences: none reported  Food Allergies: no known food allergies  Last Bowel Movement: 04/17/24  Wound(s):     Altered Skin Integrity 04/03/24 1544 Perineum #1-Tissue loss description: Not applicable     Comments    Comments    4/9: Pt reports good appetite, eating 100% of meals. Good appetite PTA. Denies N/V, reports constipation. Last BM 4/3, meds noted. UBW reported ~170# with unintentional weight loss past year.  Per EMR weight history, 4.6% weight loss in 2 months (not significant). Pt with moderate muscle and mild fat depletion per NFPE. Agreed to trial ONS.    : Pt reports poor po intake of meals; states that he has been having an upset stomach with some n/v; reports that he is drinking his Boost.     4/15: Patient reports fair oral intake, drinking Boost nutrition supplements. Patient denies any nausea/vomiting/diarrhea/constipation.     : Per family present in the room, pt's po intake has been poor since his respiratory status has worsened since yesterday. SLP recommending unsafe for po intake at this time. Pt is drinking some of the oral supplement per family.     : Palliative care following patient; pt to be discharged with hospice; comfort measures at this time.    Anthropometrics    Height: 6' (182.9 cm), Height Method: Stated  Last Weight: 72.6 kg (160 lb) (24 0950), Weight Method: Bed Scale  BMI (Calculated): 21.7  BMI Classification: underweight (BMI less than 22 if >65 years of age)        Ideal Body Weight (IBW), Male: 178 lb     % Ideal Body Weight, Male (lb): 89.89 %                 Usual Body Weight (UBW), k.2 kg  % Usual Body Weight: 95.44     Usual Weight Provided By: patient and EMR weight history    Wt Readings from Last 5 Encounters:   24 72.6 kg (160 lb)   24 72.6 kg (160 lb)   24 76.2 kg (168 lb)   10/18/23 74.4 kg (164 lb)   23 72.1 kg (159 lb)     Weight Change(s) Since Admission:   Wt Readings from Last 1 Encounters:   24 0950 72.6 kg (160 lb)   24 0528 72.6 kg (160 lb)   Admit Weight: 72.6 kg (160 lb) (24 0528), Weight Method: Stated  : no new wt noted    Estimated Needs    Weight Used For Calorie Calculations: 72.6 kg (160 lb 0.9 oz)  Energy Calorie Requirements (kcal): 1572 kcal (1.1 stress factor)  Energy Need Method: [facility]  Weight Used For Protein Calculations: 72.6 kg (160 lb 0.9 oz)  Protein Requirements: 80-87 g  (1.1-1.2 g/kg)  Fluid Requirements (mL): 1572 ml (1 ml/kcal)    Enteral Nutrition     Patient not receiving enteral nutrition at this time.    Parenteral Nutrition     Patient not receiving parenteral nutrition support at this time.    Evaluation of Received Nutrient Intake    Calories: not meeting estimated needs  Protein: not meeting estimated needs    Patient Education     Not applicable.    Nutrition Diagnosis     PES: Moderate chronic disease or condition related malnutrition related to inability to consume sufficient nutrients as evidenced by mild fat depletion and moderate muscle depletion. (active)    Nutrition Interventions     Intervention(s): general/healthful diet, commercial beverage, prescription medication, and collaboration with other providers    Goal: Consume % of oral supplements by follow-up. (goal not met)  Goal: Maintain weight throughout hospitalization. (goal progressing)    Nutrition Goals & Monitoring     Dietitian will monitor: food and beverage intake, weight, and electrolyte/renal panel    Nutrition Risk/Follow-Up: moderate (follow-up in 3-5 days)   Please consult if re-assessment needed sooner.

## 2024-04-22 NOTE — PROGRESS NOTES
Events of the last week reviewed.  Interviewed and examined the patient.  Now on palliative care with which I agree.  Requiring high-flow oxygen via Vapotherm to maintain oxygen saturations.    Subjective:  Still admits to shortness a breath and still admits to pain that has not controlled.  He claims to be eating and drinking well.    Objective:  He was afebrile.  Blood pressure 94/63.  Heart rate 102.  Respiratory rate 19.  O2 sat 97%.  He was on 30 L of flow and FiO2 45%.    General appearance is notable for frail elderly man who does awaken when called and does speak fairly clearly and does recognize me.  He does have an occasional wet sounding cough.  Heart has a regular rate and rhythm.  Lungs bilateral rhonchi.  Abdomen nontender.  Extremities without edema.    No recent lab work to review although he did have blood work 2 days ago which was notable for a mild anemia and a significant leukocytopenia that was actually improving and a stable basic metabolic profile.  Urine culture from 04/17 notable for a pansensitive E coli.    Assessment:  1.  Terminal condition    2. Severe COPD with exacerbation    3. Intractable back pain secondary to compression fracture.    4. History of coronary disease, hypertension, hyperlipidemia.  These are all currently stable.  Probably no longer need to address the hyperlipidemia and we can also reduce his blood pressure meds as he was relatively hypotensive.      5. Urinary tract infection.  He was already receive numerous antibiotics.  Current culture most likely represents colonization.    6. History of large bladder diverticulum and urinary retention.    Plan: Simplify meds.  Increase morphine dose for comfort care.  He requires high-flow oxygen which could not be delivered at home except  through hospice type services.

## 2024-04-23 PROCEDURE — 99900031 HC PATIENT EDUCATION (STAT)

## 2024-04-23 PROCEDURE — 99900035 HC TECH TIME PER 15 MIN (STAT)

## 2024-04-23 PROCEDURE — 27000249 HC VAPOTHERM CIRCUIT

## 2024-04-23 PROCEDURE — 25000242 PHARM REV CODE 250 ALT 637 W/ HCPCS: Performed by: INTERNAL MEDICINE

## 2024-04-23 PROCEDURE — 94760 N-INVAS EAR/PLS OXIMETRY 1: CPT

## 2024-04-23 PROCEDURE — 94640 AIRWAY INHALATION TREATMENT: CPT

## 2024-04-23 PROCEDURE — 99232 SBSQ HOSP IP/OBS MODERATE 35: CPT | Mod: ,,, | Performed by: INTERNAL MEDICINE

## 2024-04-23 PROCEDURE — 63600175 PHARM REV CODE 636 W HCPCS: Performed by: INTERNAL MEDICINE

## 2024-04-23 PROCEDURE — 25000003 PHARM REV CODE 250: Performed by: INTERNAL MEDICINE

## 2024-04-23 PROCEDURE — 11000001 HC ACUTE MED/SURG PRIVATE ROOM

## 2024-04-23 PROCEDURE — 27100171 HC OXYGEN HIGH FLOW UP TO 24 HOURS

## 2024-04-23 PROCEDURE — 94799 UNLISTED PULMONARY SVC/PX: CPT

## 2024-04-23 RX ORDER — MORPHINE SULFATE ORAL SOLUTION 10 MG/5ML
5 SOLUTION ORAL 3 TIMES DAILY
Status: DISCONTINUED | OUTPATIENT
Start: 2024-04-23 | End: 2024-04-25 | Stop reason: HOSPADM

## 2024-04-23 RX ORDER — MORPHINE SULFATE 20 MG/ML
5 SOLUTION ORAL 3 TIMES DAILY
Status: DISCONTINUED | OUTPATIENT
Start: 2024-04-23 | End: 2024-04-23 | Stop reason: SDUPTHER

## 2024-04-23 RX ADMIN — TAMSULOSIN HYDROCHLORIDE 0.8 MG: 0.4 CAPSULE ORAL at 09:04

## 2024-04-23 RX ADMIN — ALPRAZOLAM 0.5 MG: 0.5 TABLET ORAL at 09:04

## 2024-04-23 RX ADMIN — FINASTERIDE 5 MG: 5 TABLET, FILM COATED ORAL at 09:04

## 2024-04-23 RX ADMIN — IPRATROPIUM BROMIDE AND ALBUTEROL SULFATE 3 ML: 2.5; .5 SOLUTION RESPIRATORY (INHALATION) at 08:04

## 2024-04-23 RX ADMIN — METOPROLOL SUCCINATE 25 MG: 25 TABLET, EXTENDED RELEASE ORAL at 09:04

## 2024-04-23 RX ADMIN — NALOXEGOL OXALATE 25 MG: 25 TABLET, FILM COATED ORAL at 09:04

## 2024-04-23 RX ADMIN — PREDNISONE 30 MG: 20 TABLET ORAL at 09:04

## 2024-04-23 RX ADMIN — MORPHINE SULFATE 5 MG: 10 SOLUTION ORAL at 10:04

## 2024-04-23 RX ADMIN — ASPIRIN 81 MG: 81 TABLET, COATED ORAL at 09:04

## 2024-04-23 RX ADMIN — IPRATROPIUM BROMIDE AND ALBUTEROL SULFATE 3 ML: 2.5; .5 SOLUTION RESPIRATORY (INHALATION) at 04:04

## 2024-04-23 RX ADMIN — DOCUSATE SODIUM 100 MG: 100 CAPSULE, LIQUID FILLED ORAL at 09:04

## 2024-04-23 RX ADMIN — MORPHINE SULFATE 3 MG: 4 INJECTION, SOLUTION INTRAMUSCULAR; INTRAVENOUS at 05:04

## 2024-04-23 RX ADMIN — Medication: at 09:04

## 2024-04-23 RX ADMIN — LACTULOSE 30 G: 10 SOLUTION ORAL at 09:04

## 2024-04-23 RX ADMIN — MORPHINE SULFATE 5 MG: 10 SOLUTION ORAL at 03:04

## 2024-04-23 RX ADMIN — IPRATROPIUM BROMIDE AND ALBUTEROL SULFATE 3 ML: 2.5; .5 SOLUTION RESPIRATORY (INHALATION) at 12:04

## 2024-04-23 RX ADMIN — IPRATROPIUM BROMIDE AND ALBUTEROL SULFATE 3 ML: 2.5; .5 SOLUTION RESPIRATORY (INHALATION) at 11:04

## 2024-04-23 NOTE — PROGRESS NOTES
[facility] - Observation Unit  Wound Care    Patient Name:  Max Squires   MRN:  [id number]  Date: 4/23/2024  Diagnosis: Low back pain    History:     Past Medical History:   Diagnosis Date    Arthritis     COPD (chronic obstructive pulmonary disease)     Emphysema, unspecified     Hypercholesterolemia     Hypertension     Unspecified osteoarthritis, unspecified site        Social History     Socioeconomic History    Marital status:    Tobacco Use    Smoking status: Former     Current packs/day: 1.00     Types: Cigarettes    Smokeless tobacco: Never   Substance and Sexual Activity    Alcohol use: Not Currently     Comment: None    Drug use: Never     Social Determinants of Health     Financial Resource Strain: Low Risk  (4/4/2024)    Overall Financial Resource Strain (CARDIA)     Difficulty of Paying Living Expenses: Not hard at all   Food Insecurity: No Food Insecurity (4/5/2024)    Hunger Vital Sign     Worried About Running Out of Food in the Last Year: Never true     Ran Out of Food in the Last Year: Never true   Transportation Needs: No Transportation Needs (4/5/2024)    PRAPARE - Transportation     Lack of Transportation (Medical): No     Lack of Transportation (Non-Medical): No   Stress: Stress Concern Present (4/4/2024)    [place] [employer] - Occupational Stress Questionnaire     Feeling of Stress : To some extent   Housing Stability: Low Risk  (4/5/2024)    Housing Stability Vital Sign     Unable to Pay for Housing in the Last Year: No     Number of Places Lived in the Last Year: 1     Unstable Housing in the Last Year: No       Precautions:     Allergies as of 04/03/2024    (No Known Allergies)       WOC Assessment Details/Treatment        04/23/24 1030   WOCN Assessment   Visit Date 04/23/24   Visit Time 1030   Consult Type Follow Up   Intervention assessed   Teaching on-going   Skin Interventions   Device Skin Pressure Protection absorbent pad utilized/changed         Altered Skin Integrity 04/03/24 1544 Perineum #1   Date First Assessed/Time First Assessed: 04/03/24 1544   Altered Skin Integrity Present on Admission - Did Patient arrive to the hospital with altered skin?: yes  Location: Perineum  Wound Number: #1  Is this injury device related?: No   Wound Image    Dressing Appearance Open to air   Drainage Amount None   Appearance Pink   Tissue loss description Not applicable   Periwound Area Intact   Wound Edges   (closed)   Care Cleansed with:;Sterile normal saline   Dressing Applied     Woundcare follow up for perineum. No family at bedside. Continue with current treatment recommendations. Apply Desitin BID and PRN. Keep areas clean and dry, no adult briefs while in bed. Nursing to continue with turning every two hours, wedge, and floating heels. Head of bed elevated, bed in lowest position, and call bell within reach. Will follow up     04/23/2024

## 2024-04-23 NOTE — PROGRESS NOTES
Subjective:  The patient had a quiet night.  He has had bouts of confusion.  He told his daughter that he was taking the propane tank downstairs and had it filled.  He was stated that he had 3 bands already today.  His back pain is better with the IV morphine.    Objective:  He was afebrile.  T-max 99.6°.  Blood pressure 106/68 and heart rate 110.  Respiratory rate 20.  O2 sat 99% on Vapotherm at 30 liters/minute and 45% FiO2.    General appearance is notable for a confused and slightly agitated but pleasant elderly man.  He was sitting up eating breakfast.  He was in no distress.  Heart has a regular rate and rhythm.  Lungs with scattered rales throughout.  Abdomen nontender.  Extremities without edema.    Assessment:  1.  Severe COPD exacerbation.  He was had a turn for the better.  However still very hospice appropriate but he does not appear to be actively dying    2. Intractable back pain.  Controlled with IV morphine.    3. Stable other medical problems    4. Recent UTI    5. Recent urinary retention.  It catheter is currently out    Plan:  Switch to oral morphine preparations with IV as a backup.  Hopefully we can transition to home hospice in the near future.

## 2024-04-24 PROCEDURE — 94799 UNLISTED PULMONARY SVC/PX: CPT

## 2024-04-24 PROCEDURE — 25000003 PHARM REV CODE 250: Performed by: INTERNAL MEDICINE

## 2024-04-24 PROCEDURE — 99900035 HC TECH TIME PER 15 MIN (STAT)

## 2024-04-24 PROCEDURE — 25000242 PHARM REV CODE 250 ALT 637 W/ HCPCS: Performed by: INTERNAL MEDICINE

## 2024-04-24 PROCEDURE — 99900031 HC PATIENT EDUCATION (STAT)

## 2024-04-24 PROCEDURE — 99232 SBSQ HOSP IP/OBS MODERATE 35: CPT | Mod: ,,, | Performed by: INTERNAL MEDICINE

## 2024-04-24 PROCEDURE — 94760 N-INVAS EAR/PLS OXIMETRY 1: CPT

## 2024-04-24 PROCEDURE — 63600175 PHARM REV CODE 636 W HCPCS: Performed by: INTERNAL MEDICINE

## 2024-04-24 PROCEDURE — 94640 AIRWAY INHALATION TREATMENT: CPT

## 2024-04-24 PROCEDURE — 11000001 HC ACUTE MED/SURG PRIVATE ROOM

## 2024-04-24 PROCEDURE — 27100171 HC OXYGEN HIGH FLOW UP TO 24 HOURS

## 2024-04-24 RX ADMIN — FINASTERIDE 5 MG: 5 TABLET, FILM COATED ORAL at 09:04

## 2024-04-24 RX ADMIN — MORPHINE SULFATE 2 MG: 4 INJECTION INTRAVENOUS at 08:04

## 2024-04-24 RX ADMIN — Medication: at 08:04

## 2024-04-24 RX ADMIN — IPRATROPIUM BROMIDE AND ALBUTEROL SULFATE 3 ML: 2.5; .5 SOLUTION RESPIRATORY (INHALATION) at 07:04

## 2024-04-24 RX ADMIN — IPRATROPIUM BROMIDE 2 SPRAY: 42 SPRAY, METERED NASAL at 09:04

## 2024-04-24 RX ADMIN — METOPROLOL SUCCINATE 25 MG: 25 TABLET, EXTENDED RELEASE ORAL at 09:04

## 2024-04-24 RX ADMIN — ALPRAZOLAM 0.5 MG: 0.5 TABLET ORAL at 08:04

## 2024-04-24 RX ADMIN — FLUTICASONE FUROATE 1 PUFF: 100 POWDER RESPIRATORY (INHALATION) at 09:04

## 2024-04-24 RX ADMIN — DOCUSATE SODIUM 100 MG: 100 CAPSULE, LIQUID FILLED ORAL at 09:04

## 2024-04-24 RX ADMIN — PREDNISONE 30 MG: 20 TABLET ORAL at 09:04

## 2024-04-24 RX ADMIN — IPRATROPIUM BROMIDE AND ALBUTEROL SULFATE 3 ML: 2.5; .5 SOLUTION RESPIRATORY (INHALATION) at 04:04

## 2024-04-24 RX ADMIN — Medication: at 09:04

## 2024-04-24 RX ADMIN — IPRATROPIUM BROMIDE AND ALBUTEROL SULFATE 3 ML: 2.5; .5 SOLUTION RESPIRATORY (INHALATION) at 12:04

## 2024-04-24 RX ADMIN — ASPIRIN 81 MG: 81 TABLET, COATED ORAL at 09:04

## 2024-04-24 RX ADMIN — NALOXEGOL OXALATE 25 MG: 25 TABLET, FILM COATED ORAL at 09:04

## 2024-04-24 RX ADMIN — IPRATROPIUM BROMIDE AND ALBUTEROL SULFATE 3 ML: 2.5; .5 SOLUTION RESPIRATORY (INHALATION) at 08:04

## 2024-04-24 RX ADMIN — LACTULOSE 20 G: 10 SOLUTION ORAL at 09:04

## 2024-04-24 RX ADMIN — TAMSULOSIN HYDROCHLORIDE 0.8 MG: 0.4 CAPSULE ORAL at 09:04

## 2024-04-24 RX ADMIN — MORPHINE SULFATE 5 MG: 10 SOLUTION ORAL at 09:04

## 2024-04-24 NOTE — PLAN OF CARE
Pt/dgtr. in agreement  for admission to [facility] tomorrow 4/25/24.  asessment completed, able to provide  O2 needs

## 2024-04-24 NOTE — PLAN OF CARE
Problem: Adult Inpatient Plan of Care  Goal: Plan of Care Review  4/24/2024 1629 by Nava Craft LPN  Outcome: Progressing  4/24/2024 1629 by Nava Craft LPN  Outcome: Progressing  Goal: Patient-Specific Goal (Individualized)  4/24/2024 1629 by Nava Craft LPN  Outcome: Progressing  4/24/2024 1629 by Nava Craft LPN  Outcome: Progressing  Goal: Absence of Hospital-Acquired Illness or Injury  4/24/2024 1629 by Nava Craft LPN  Outcome: Progressing  4/24/2024 1629 by Nava Craft LPN  Outcome: Progressing  Goal: Optimal Comfort and Wellbeing  Outcome: Progressing  Goal: Readiness for Transition of Care  Outcome: Progressing     Problem: Impaired Wound Healing  Goal: Optimal Wound Healing  Outcome: Progressing     Problem: Skin Injury Risk Increased  Goal: Skin Health and Integrity  Outcome: Progressing     Problem: Fall Injury Risk  Goal: Absence of Fall and Fall-Related Injury  Outcome: Progressing     Problem: Pain Acute  Goal: Acceptable Pain Control and Functional Ability  Outcome: Progressing     Problem: Infection  Goal: Absence of Infection Signs and Symptoms  Outcome: Progressing     Problem: Coping Ineffective  Goal: Effective Coping  Outcome: Progressing     Problem: Palliative Care  Goal: Enhanced Quality of Life  Outcome: Progressing

## 2024-04-24 NOTE — PLAN OF CARE
Spoke to germán.  Tre [phone].  Requested [facility].  Referral sent .  Informed them of high flow O2 needed.  Rn will asess pt.  Today to determine if O2 needs can be met.  She will speak with tre/germán.  After asessment

## 2024-04-24 NOTE — PROGRESS NOTES
Subjective:  According to the family member in attendance the patient did well overnight.  He slept well.  He was up in the chair for a good part of yesterday.  He also notes that the patient desaturates his oxygen levels very quickly when the Vapotherm devices taken off briefly.  The patient has no particular complaints.  He states that his back pain is controlled at rest.  He did tolerate the oral morphine round-the-clock yesterday.      Objective:  Vital signs are stable.  Blood pressure 99/61, heart rate 110, respiratory rate 22 and O2 sat 91% on high-flow nasal cannula.  Flow rate 35 liters/minute and concentration of 45 oxygen.    General appearance is notable for sleepy elderly man who does awakened and recognizes me.  His speech is fairly clear.  Heart has a regular rate and rhythm.  Lungs clear anteriorly.  Abdomen nontender.  Extremities without clubbing cyanosis or edema.    Assessment:  1.  Severe COPD with exacerbation.  Clinically stable but requiring high flow oxygen via nasal cannula to survive.  He was certainly has improved but his condition does remain terminal    2. Intractable back pain.  Now controlled on oral morphine.    3. Stable other medical problems    Plan:  Would like to send him home with hospice, but requiring high-flow oxygen via nasal cannula.  Consult to case management placed.    Will also speak with the patient's daughter regarding choice of hospice.  Her mother was recently with hospice [facility] a and I am not sure she wants to stay with that organization.

## 2024-04-25 VITALS
SYSTOLIC BLOOD PRESSURE: 73 MMHG | HEART RATE: 124 BPM | RESPIRATION RATE: 28 BRPM | OXYGEN SATURATION: 92 % | HEIGHT: 72 IN | BODY MASS INDEX: 21.67 KG/M2 | DIASTOLIC BLOOD PRESSURE: 48 MMHG | WEIGHT: 160 LBS | TEMPERATURE: 99 F

## 2024-04-25 PROCEDURE — 99900035 HC TECH TIME PER 15 MIN (STAT)

## 2024-04-25 PROCEDURE — 27000249 HC VAPOTHERM CIRCUIT

## 2024-04-25 PROCEDURE — 63600175 PHARM REV CODE 636 W HCPCS: Performed by: INTERNAL MEDICINE

## 2024-04-25 PROCEDURE — 99239 HOSP IP/OBS DSCHRG MGMT >30: CPT | Mod: ,,, | Performed by: INTERNAL MEDICINE

## 2024-04-25 PROCEDURE — 25000242 PHARM REV CODE 250 ALT 637 W/ HCPCS: Performed by: INTERNAL MEDICINE

## 2024-04-25 PROCEDURE — 99900031 HC PATIENT EDUCATION (STAT)

## 2024-04-25 PROCEDURE — 63600175 PHARM REV CODE 636 W HCPCS: Performed by: NURSE PRACTITIONER

## 2024-04-25 PROCEDURE — 1111F DSCHRG MED/CURRENT MED MERGE: CPT | Mod: CPTII,,, | Performed by: INTERNAL MEDICINE

## 2024-04-25 PROCEDURE — 27100171 HC OXYGEN HIGH FLOW UP TO 24 HOURS

## 2024-04-25 PROCEDURE — 94760 N-INVAS EAR/PLS OXIMETRY 1: CPT

## 2024-04-25 PROCEDURE — 94799 UNLISTED PULMONARY SVC/PX: CPT

## 2024-04-25 PROCEDURE — 94640 AIRWAY INHALATION TREATMENT: CPT

## 2024-04-25 RX ORDER — ALPRAZOLAM 0.5 MG/1
0.5 TABLET ORAL 2 TIMES DAILY PRN
Start: 2024-04-25 | End: 2024-05-25

## 2024-04-25 RX ORDER — PREDNISONE 10 MG/1
30 TABLET ORAL DAILY
Start: 2024-04-25

## 2024-04-25 RX ORDER — MORPHINE SULFATE ORAL SOLUTION 10 MG/5ML
5 SOLUTION ORAL 3 TIMES DAILY
Start: 2024-04-25

## 2024-04-25 RX ORDER — HALOPERIDOL 5 MG/ML
2 INJECTION INTRAMUSCULAR EVERY 4 HOURS PRN
Start: 2024-04-25 | End: 2025-04-25

## 2024-04-25 RX ORDER — OLANZAPINE 5 MG/1
5 TABLET ORAL NIGHTLY PRN
Start: 2024-04-25 | End: 2025-04-25

## 2024-04-25 RX ADMIN — MORPHINE SULFATE 2 MG: 4 INJECTION INTRAVENOUS at 04:04

## 2024-04-25 RX ADMIN — IPRATROPIUM BROMIDE AND ALBUTEROL SULFATE 3 ML: 2.5; .5 SOLUTION RESPIRATORY (INHALATION) at 04:04

## 2024-04-25 RX ADMIN — IPRATROPIUM BROMIDE AND ALBUTEROL SULFATE 3 ML: 2.5; .5 SOLUTION RESPIRATORY (INHALATION) at 07:04

## 2024-04-25 RX ADMIN — MORPHINE SULFATE 2 MG: 4 INJECTION INTRAVENOUS at 05:04

## 2024-04-25 RX ADMIN — MORPHINE SULFATE 2 MG: 4 INJECTION INTRAVENOUS at 02:04

## 2024-04-25 RX ADMIN — HALOPERIDOL LACTATE 2 MG: 5 INJECTION, SOLUTION INTRAMUSCULAR at 02:04

## 2024-04-25 RX ADMIN — IPRATROPIUM BROMIDE AND ALBUTEROL SULFATE 3 ML: 2.5; .5 SOLUTION RESPIRATORY (INHALATION) at 12:04

## 2024-04-25 RX ADMIN — MORPHINE SULFATE 2 MG: 4 INJECTION INTRAVENOUS at 06:04

## 2024-04-25 RX ADMIN — MORPHINE SULFATE 2 MG: 4 INJECTION INTRAVENOUS at 03:04

## 2024-04-25 RX ADMIN — MORPHINE SULFATE 2 MG: 4 INJECTION INTRAVENOUS at 09:04

## 2024-04-25 NOTE — PLAN OF CARE
Problem: Adult Inpatient Plan of Care  Goal: Plan of Care Review  Flowsheets (Taken 4/24/2024 2248)  Plan of Care Reviewed With: patient  Goal: Absence of Hospital-Acquired Illness or Injury  Intervention: Identify and Manage Fall Risk  Flowsheets (Taken 4/24/2024 2248)  Safety Promotion/Fall Prevention:   assistive device/personal item within reach   side rails raised x 2  Intervention: Prevent Skin Injury  Flowsheets (Taken 4/24/2024 2248)  Body Position: weight shifting  Skin Protection:   skin sealant/moisture barrier applied   protective footwear used  Device Skin Pressure Protection:   tubing/devices free from skin contact   absorbent pad utilized/changed  Intervention: Prevent and Manage VTE (Venous Thromboembolism) Risk  Flowsheets (Taken 4/24/2024 2248)  VTE Prevention/Management:   ROM (active) performed   bleeding risk assessed  Intervention: Prevent Infection  Flowsheets (Taken 4/24/2024 2248)  Infection Prevention:   rest/sleep promoted   single patient room provided  Goal: Optimal Comfort and Wellbeing  Intervention: Monitor Pain and Promote Comfort  Flowsheets (Taken 4/24/2024 2248)  Pain Management Interventions:   care clustered   quiet environment facilitated  Intervention: Provide Person-Centered Care  Flowsheets (Taken 4/24/2024 2248)  Trust Relationship/Rapport:   questions answered   questions encouraged     Problem: Skin Injury Risk Increased  Goal: Skin Health and Integrity  Intervention: Optimize Skin Protection  Flowsheets (Taken 4/24/2024 2248)  Skin Protection:   skin sealant/moisture barrier applied   protective footwear used  Activity Management:   Rolling - L1   Leg kicks - L2  Head of Bed (HOB) Positioning: HOB elevated

## 2024-04-25 NOTE — DISCHARGE SUMMARY
The patient was admitted on  and discharged on .  He presented the hospital with intractable low back pain.  It was begun a few weeks prior to admission.  He also had trouble voiding and trouble with constipation and was found to be in urinary retention by his urologist a few days prior to this admission.  His other medical problems were essentially stable at the time of admission.  Physical exam was unrevealing.    Laboratory studies included an unremarkable CBC and chemistry profile except for low albumin level.  Urinalysis had over 100 white blood cells and culture did grow out Enterococcus faecalis.    X-ray studies included chest films and CT abdomen pelvis.  He also had an MRI of the lumbar spine.  This was consistent with the acute compression fracture at L1.  CT abdomen did show what appeared to be a pelvic mass or perhaps an abscess adjacent to the bladder.  A CT-guided attempt to drain the abscesses was unsuccessful 145.  He was additional CT scans on  and then follow-up studies with the laid contrast and this revealed that the mass or abscess was actually a large bladder diverticulum.  A CT scan PE protocol was done on 04/15 because of the decline in his condition.  This revealed severe COPD and multiple scattered nodules with recommendations for follow-up scan in a few months.    He was seen in consultation by urology did not recommend any additional treatment but leave the Almeida catheter in.  He was also seen by General surgery rectal surgery early on think it is he may have an abscess but has a turned out not to be the case and no surgery was necessary.  He was also seen by the palliative Medicine Service eventually plan to be referred to hospice at discharge.    Unfortunately his general condition did decline in his COPD worsened to the point where he nearly succumbed about a week ago.  It was felt he was likely going to  but did [place] for few days.  Unfortunately today  he was gotten worse and will be transferred to an inpatient hospice facility.  It was felt his condition was terminal few days ago and arrangements has been made in advanced him to go to the [facility].    Overnight his condition has worsened where he was no longer alert and appropriate he was more dyspneic than he has been before.  He has a low-grade fever.  He appears to be much closer to in the lifestyle he was just a few days ago.    He will be transferred to the the inpatient hospice facility for comfort care.    Discharge diagnosis 1. Severe COPD with exacerbation.  He was requiring high-flow nasal cannula oxygen to survive.    2. Intractable back pain has a result of an acute L1 compression fracture     3. History of coronary disease     4. History of diastolic dysfunction/congestive heart failure.    5. Urinary retention and recent urinary tract infection.  Treated    6. Large bladder diverticulum    7. Multiple pulmonary nodules noted on recent CT scan of unknown significance    In essence the patient is currently terminal will be transferred to the inpatient hospice service.  Medications were simplified ever really only for comfort.

## 2024-04-25 NOTE — PLAN OF CARE
Called [facility]  for transport to  [facility], [facility] , pt. Requiring vapotherm high flow o2 30L/50%.  Clinicals sent to [facility] spoke with Connie

## 2024-05-04 DIAGNOSIS — R33.9 URINARY RETENTION: Primary | ICD-10-CM

## 2024-05-06 RX ORDER — TAMSULOSIN HYDROCHLORIDE 0.4 MG/1
0.8 CAPSULE ORAL
Qty: 60 CAPSULE | Refills: 11 | Status: SHIPPED | OUTPATIENT
Start: 2024-05-06

## 2024-05-23 ENCOUNTER — TELEPHONE (OUTPATIENT)
Dept: INTERNAL MEDICINE | Facility: CLINIC | Age: 89
End: 2024-05-23
Payer: MEDICARE

## 2025-06-16 NOTE — PROGRESS NOTES
Addended by: OSCAR SANDERS on: 6/16/2025 11:42 AM     Modules accepted: Orders     [facility] - Observation Unit  Colon & Rectal Surgery  Progress Note    Subjective:     Interval History:   Repeat CT scan completed  Sitting on side of the bed  Reports another BM  No fevers  Almeida in place with good UOP       Medications:  Continuous Infusions:  Scheduled Meds:   albuterol-ipratropium  3 mL Nebulization QID    amLODIPine  5 mg Oral Daily    aspirin  81 mg Oral Daily    atorvastatin  80 mg Oral Daily    ezetimibe  10 mg Oral QHS    finasteride  5 mg Oral Daily    lactulose 10 gram/15 ml  30 g Oral Daily    metoprolol succinate  25 mg Oral Daily    mupirocin   Nasal BID    naloxegoL  25 mg Oral Daily    tamsulosin  2 capsule Oral Daily    vancomycin (VANCOCIN) IV (PEDS and ADULTS)  1,500 mg Intravenous Q24H    zinc oxide-cod liver oil   Topical (Top) BID     PRN Meds:ALPRAZolam, furosemide, oxyCODONE-acetaminophen, Pharmacy to dose Vancomycin consult **AND** vancomycin - pharmacy to dose     Objective:     Vital Signs (Most Recent):  Temp: 97.4 °F (36.3 °C) (04/10/24 0748)  Pulse: 68 (04/10/24 0956)  Resp: 16 (04/10/24 0806)  BP: (!) 150/71 (04/10/24 0956)  SpO2: 99 % (04/10/24 0806) Vital Signs (24h Range):  Temp:  [97.4 °F (36.3 °C)-98.3 °F (36.8 °C)] 97.4 °F (36.3 °C)  Pulse:  [64-82] 68  Resp:  [16-27] 16  SpO2:  [94 %-99 %] 99 %  BP: (114-150)/(64-72) 150/71       Intake/Output Summary (Last 24 hours) at 4/10/2024 1000  Last data filed at 4/10/2024 0655  Gross per 24 hour   Intake 960 ml   Output 1850 ml   Net -890 ml       Physical Exam  Vitals reviewed.   Constitutional:       General: He is not in acute distress.  HENT:      Head: Normocephalic and atraumatic.      Nose: Nose normal.   Eyes:      General: No scleral icterus.  Cardiovascular:      Rate and Rhythm: Normal rate and regular rhythm.   Pulmonary:      Effort: Pulmonary effort is normal. No respiratory distress.   Abdominal:      General: There is no distension.      Palpations: Abdomen is soft.      Tenderness:  There is no abdominal tenderness. There is no rebound.   Musculoskeletal:         General: Normal range of motion.   Neurological:      Mental Status: He is alert.         Significant Labs:  None    Significant Diagnostics:  CT scan reviewed & d/w Radiologist - appears to be bladder diverticulum     Assessment/Plan:     Active Diagnoses:    Diagnosis Date Noted POA    PRINCIPAL PROBLEM:  Low back pain [M54.50] 04/03/2024 Yes    Moderate malnutrition [E44.0] 04/09/2024 Yes    Pelvic fluid collection [R18.8] 04/09/2024 Unknown    Urinary retention [R33.9] 04/03/2024 Yes    Urinary tract infection associated with indwelling urethral catheter [T83.511A, N39.0] 04/03/2024 Yes    Benign essential hypertension [I10] 04/11/2023 Yes    Coronary arteriosclerosis [I25.10] 04/11/2023 Yes    COPD (chronic obstructive pulmonary disease) [J44.9] 04/11/2023 Yes      Problems Resolved During this Admission:       Suspected bladder diverticulum - delayed CT pelvis     Ephraim Joseph MD  Colon & Rectal Surgery  [facility] - Observation Unit